# Patient Record
Sex: FEMALE | Race: WHITE | Employment: FULL TIME | URBAN - METROPOLITAN AREA
[De-identification: names, ages, dates, MRNs, and addresses within clinical notes are randomized per-mention and may not be internally consistent; named-entity substitution may affect disease eponyms.]

---

## 2017-01-30 ENCOUNTER — OFFICE VISIT (OUTPATIENT)
Dept: ORTHOPEDIC SURGERY | Age: 72
End: 2017-01-30

## 2017-01-30 VITALS
BODY MASS INDEX: 32.79 KG/M2 | WEIGHT: 229.06 LBS | SYSTOLIC BLOOD PRESSURE: 128 MMHG | HEIGHT: 70 IN | DIASTOLIC BLOOD PRESSURE: 82 MMHG | HEART RATE: 72 BPM

## 2017-01-30 DIAGNOSIS — T84.84XA PAINFUL ORTHOPAEDIC HARDWARE (HCC): Primary | ICD-10-CM

## 2017-01-30 DIAGNOSIS — M79.604 RIGHT LEG PAIN: ICD-10-CM

## 2017-01-30 PROCEDURE — 99213 OFFICE O/P EST LOW 20 MIN: CPT | Performed by: ORTHOPAEDIC SURGERY

## 2017-01-30 PROCEDURE — 1123F ACP DISCUSS/DSCN MKR DOCD: CPT | Performed by: ORTHOPAEDIC SURGERY

## 2017-01-30 PROCEDURE — G8400 PT W/DXA NO RESULTS DOC: HCPCS | Performed by: ORTHOPAEDIC SURGERY

## 2017-01-30 PROCEDURE — G8484 FLU IMMUNIZE NO ADMIN: HCPCS | Performed by: ORTHOPAEDIC SURGERY

## 2017-01-30 PROCEDURE — 4040F PNEUMOC VAC/ADMIN/RCVD: CPT | Performed by: ORTHOPAEDIC SURGERY

## 2017-01-30 PROCEDURE — 1090F PRES/ABSN URINE INCON ASSESS: CPT | Performed by: ORTHOPAEDIC SURGERY

## 2017-01-30 PROCEDURE — G8427 DOCREV CUR MEDS BY ELIG CLIN: HCPCS | Performed by: ORTHOPAEDIC SURGERY

## 2017-01-30 PROCEDURE — G8419 CALC BMI OUT NRM PARAM NOF/U: HCPCS | Performed by: ORTHOPAEDIC SURGERY

## 2017-01-30 PROCEDURE — 1036F TOBACCO NON-USER: CPT | Performed by: ORTHOPAEDIC SURGERY

## 2017-01-30 PROCEDURE — 73552 X-RAY EXAM OF FEMUR 2/>: CPT | Performed by: ORTHOPAEDIC SURGERY

## 2017-01-30 PROCEDURE — 3017F COLORECTAL CA SCREEN DOC REV: CPT | Performed by: ORTHOPAEDIC SURGERY

## 2017-01-30 PROCEDURE — 3014F SCREEN MAMMO DOC REV: CPT | Performed by: ORTHOPAEDIC SURGERY

## 2017-02-07 ENCOUNTER — HOSPITAL ENCOUNTER (OUTPATIENT)
Dept: NUCLEAR MEDICINE | Age: 72
Discharge: HOME | End: 2017-02-07
Attending: ORTHOPAEDIC SURGERY | Admitting: ORTHOPAEDIC SURGERY

## 2017-02-07 DIAGNOSIS — T84.84XA PAIN DUE TO INTERNAL ORTHOPEDIC PROSTHETIC DEVICES, IMPLANTS AND GRAFTS, INITIAL ENCOUNTER (HCC): ICD-10-CM

## 2017-02-07 DIAGNOSIS — M79.604 RIGHT LEG PAIN: ICD-10-CM

## 2017-02-07 DIAGNOSIS — T84.84XA PAINFUL ORTHOPAEDIC HARDWARE (HCC): ICD-10-CM

## 2017-02-07 RX ORDER — TC 99M MEDRONATE 20 MG/10ML
27.6 INJECTION, POWDER, LYOPHILIZED, FOR SOLUTION INTRAVENOUS
Status: COMPLETED | OUTPATIENT
Start: 2017-02-07 | End: 2017-02-07

## 2017-02-07 RX ADMIN — TC 99M MEDRONATE 27.6 MILLICURIE: 20 INJECTION, POWDER, LYOPHILIZED, FOR SOLUTION INTRAVENOUS at 09:15

## 2017-02-14 ENCOUNTER — TELEPHONE (OUTPATIENT)
Dept: ORTHOPEDIC SURGERY | Age: 72
End: 2017-02-14

## 2017-02-15 ENCOUNTER — TELEPHONE (OUTPATIENT)
Dept: ORTHOPEDIC SURGERY | Age: 72
End: 2017-02-15

## 2017-02-15 DIAGNOSIS — M48.061 LUMBAR STENOSIS: ICD-10-CM

## 2017-02-15 DIAGNOSIS — M51.36 DDD (DEGENERATIVE DISC DISEASE), LUMBAR: Primary | ICD-10-CM

## 2017-02-20 ENCOUNTER — TELEPHONE (OUTPATIENT)
Dept: ORTHOPEDIC SURGERY | Age: 72
End: 2017-02-20

## 2017-02-21 ENCOUNTER — TELEPHONE (OUTPATIENT)
Dept: ORTHOPEDIC SURGERY | Age: 72
End: 2017-02-21

## 2017-02-22 ENCOUNTER — HOSPITAL ENCOUNTER (OUTPATIENT)
Dept: MRI IMAGING | Age: 72
Discharge: HOME | End: 2017-02-22
Attending: ORTHOPAEDIC SURGERY | Admitting: ORTHOPAEDIC SURGERY

## 2017-02-22 DIAGNOSIS — M48.061 LUMBAR STENOSIS: ICD-10-CM

## 2017-02-22 DIAGNOSIS — M51.36 DDD (DEGENERATIVE DISC DISEASE), LUMBAR: ICD-10-CM

## 2017-02-22 DIAGNOSIS — M51.36 OTHER INTERVERTEBRAL DISC DEGENERATION, LUMBAR REGION: ICD-10-CM

## 2017-02-23 ENCOUNTER — TELEPHONE (OUTPATIENT)
Dept: ORTHOPEDIC SURGERY | Age: 72
End: 2017-02-23

## 2017-03-23 ENCOUNTER — TELEPHONE (OUTPATIENT)
Dept: ORTHOPEDIC SURGERY | Age: 72
End: 2017-03-23

## 2017-04-10 ENCOUNTER — TELEPHONE (OUTPATIENT)
Dept: ENDOCRINOLOGY | Age: 72
End: 2017-04-10

## 2017-04-12 ENCOUNTER — TELEPHONE (OUTPATIENT)
Dept: ENDOCRINOLOGY | Age: 72
End: 2017-04-12

## 2017-04-18 ENCOUNTER — OFFICE VISIT (OUTPATIENT)
Dept: ENDOCRINOLOGY | Age: 72
End: 2017-04-18

## 2017-04-18 VITALS
HEART RATE: 79 BPM | WEIGHT: 232.2 LBS | OXYGEN SATURATION: 96 % | HEIGHT: 69 IN | RESPIRATION RATE: 16 BRPM | SYSTOLIC BLOOD PRESSURE: 83 MMHG | DIASTOLIC BLOOD PRESSURE: 56 MMHG | BODY MASS INDEX: 34.39 KG/M2

## 2017-04-18 DIAGNOSIS — M81.0 OSTEOPOROSIS: Primary | ICD-10-CM

## 2017-04-18 PROCEDURE — G8400 PT W/DXA NO RESULTS DOC: HCPCS | Performed by: INTERNAL MEDICINE

## 2017-04-18 PROCEDURE — 3017F COLORECTAL CA SCREEN DOC REV: CPT | Performed by: INTERNAL MEDICINE

## 2017-04-18 PROCEDURE — 1090F PRES/ABSN URINE INCON ASSESS: CPT | Performed by: INTERNAL MEDICINE

## 2017-04-18 PROCEDURE — 4005F PHARM THX FOR OP RXD: CPT | Performed by: INTERNAL MEDICINE

## 2017-04-18 PROCEDURE — 3014F SCREEN MAMMO DOC REV: CPT | Performed by: INTERNAL MEDICINE

## 2017-04-18 PROCEDURE — G8417 CALC BMI ABV UP PARAM F/U: HCPCS | Performed by: INTERNAL MEDICINE

## 2017-04-18 PROCEDURE — 1036F TOBACCO NON-USER: CPT | Performed by: INTERNAL MEDICINE

## 2017-04-18 PROCEDURE — 1123F ACP DISCUSS/DSCN MKR DOCD: CPT | Performed by: INTERNAL MEDICINE

## 2017-04-18 PROCEDURE — G8427 DOCREV CUR MEDS BY ELIG CLIN: HCPCS | Performed by: INTERNAL MEDICINE

## 2017-04-18 PROCEDURE — 4040F PNEUMOC VAC/ADMIN/RCVD: CPT | Performed by: INTERNAL MEDICINE

## 2017-04-18 PROCEDURE — 99213 OFFICE O/P EST LOW 20 MIN: CPT | Performed by: INTERNAL MEDICINE

## 2017-04-18 RX ORDER — GABAPENTIN 400 MG/1
400 CAPSULE ORAL 4 TIMES DAILY
COMMUNITY
End: 2021-10-12

## 2017-04-18 RX ORDER — FESOTERODINE FUMARATE 8 MG/1
TABLET, EXTENDED RELEASE ORAL
COMMUNITY
End: 2019-04-22 | Stop reason: SDUPTHER

## 2017-04-18 RX ORDER — ALENDRONATE SODIUM 70 MG/1
TABLET ORAL
Qty: 12 TABLET | Refills: 3 | Status: SHIPPED | OUTPATIENT
Start: 2017-04-18 | End: 2018-05-17

## 2017-04-18 ASSESSMENT — ENCOUNTER SYMPTOMS
BACK PAIN: 0
PHOTOPHOBIA: 0
HEMOPTYSIS: 0
COUGH: 0
BLURRED VISION: 0
DOUBLE VISION: 0
ORTHOPNEA: 0

## 2017-06-29 RX ORDER — ALENDRONATE SODIUM 70 MG/1
TABLET ORAL
Qty: 12 TABLET | Refills: 3 | Status: SHIPPED | OUTPATIENT
Start: 2017-06-29 | End: 2018-05-16 | Stop reason: SDUPTHER

## 2018-04-09 ENCOUNTER — HOSPITAL ENCOUNTER (OUTPATIENT)
Dept: GENERAL RADIOLOGY | Age: 73
Discharge: HOME | End: 2018-04-09
Attending: INTERNAL MEDICINE | Admitting: INTERNAL MEDICINE

## 2018-04-09 DIAGNOSIS — M81.0 AGE-RELATED OSTEOPOROSIS WITHOUT CURRENT PATHOLOGICAL FRACTURE: ICD-10-CM

## 2018-04-09 DIAGNOSIS — M81.0 OSTEOPOROSIS: ICD-10-CM

## 2018-04-17 ENCOUNTER — OFFICE VISIT (OUTPATIENT)
Dept: ENDOCRINOLOGY | Age: 73
End: 2018-04-17

## 2018-04-17 VITALS
HEART RATE: 82 BPM | SYSTOLIC BLOOD PRESSURE: 109 MMHG | WEIGHT: 227 LBS | DIASTOLIC BLOOD PRESSURE: 70 MMHG | RESPIRATION RATE: 16 BRPM | HEIGHT: 69 IN | BODY MASS INDEX: 33.62 KG/M2 | OXYGEN SATURATION: 95 %

## 2018-04-17 DIAGNOSIS — M85.80 OSTEOPENIA, UNSPECIFIED LOCATION: Primary | ICD-10-CM

## 2018-04-17 PROCEDURE — 1123F ACP DISCUSS/DSCN MKR DOCD: CPT | Performed by: INTERNAL MEDICINE

## 2018-04-17 PROCEDURE — 3014F SCREEN MAMMO DOC REV: CPT | Performed by: INTERNAL MEDICINE

## 2018-04-17 PROCEDURE — G8399 PT W/DXA RESULTS DOCUMENT: HCPCS | Performed by: INTERNAL MEDICINE

## 2018-04-17 PROCEDURE — 99213 OFFICE O/P EST LOW 20 MIN: CPT | Performed by: INTERNAL MEDICINE

## 2018-04-17 PROCEDURE — 1090F PRES/ABSN URINE INCON ASSESS: CPT | Performed by: INTERNAL MEDICINE

## 2018-04-17 PROCEDURE — G8427 DOCREV CUR MEDS BY ELIG CLIN: HCPCS | Performed by: INTERNAL MEDICINE

## 2018-04-17 PROCEDURE — 4040F PNEUMOC VAC/ADMIN/RCVD: CPT | Performed by: INTERNAL MEDICINE

## 2018-04-17 PROCEDURE — G8417 CALC BMI ABV UP PARAM F/U: HCPCS | Performed by: INTERNAL MEDICINE

## 2018-04-17 PROCEDURE — 1036F TOBACCO NON-USER: CPT | Performed by: INTERNAL MEDICINE

## 2018-04-17 PROCEDURE — 3017F COLORECTAL CA SCREEN DOC REV: CPT | Performed by: INTERNAL MEDICINE

## 2018-04-17 ASSESSMENT — ENCOUNTER SYMPTOMS
BACK PAIN: 0
BLURRED VISION: 0
PHOTOPHOBIA: 0
ORTHOPNEA: 0
DOUBLE VISION: 0
HEMOPTYSIS: 0
COUGH: 0

## 2018-05-17 RX ORDER — ALENDRONATE SODIUM 70 MG/1
TABLET ORAL
Qty: 12 TABLET | Refills: 3 | Status: SHIPPED | OUTPATIENT
Start: 2018-05-17 | End: 2021-10-12

## 2018-05-18 RX ORDER — ALENDRONATE SODIUM 70 MG/1
TABLET ORAL
Qty: 12 TABLET | Refills: 0 | Status: SHIPPED | OUTPATIENT
Start: 2018-05-18 | End: 2019-04-22 | Stop reason: SDUPTHER

## 2018-09-27 ENCOUNTER — OFFICE VISIT (OUTPATIENT)
Dept: ORTHOPEDIC SURGERY | Age: 73
End: 2018-09-27
Payer: MEDICARE

## 2018-09-27 VITALS
SYSTOLIC BLOOD PRESSURE: 139 MMHG | DIASTOLIC BLOOD PRESSURE: 78 MMHG | HEART RATE: 79 BPM | BODY MASS INDEX: 35.61 KG/M2 | HEIGHT: 68 IN | WEIGHT: 235 LBS

## 2018-09-27 DIAGNOSIS — M72.2 PLANTAR FASCIITIS, BILATERAL: Primary | ICD-10-CM

## 2018-09-27 PROCEDURE — G8427 DOCREV CUR MEDS BY ELIG CLIN: HCPCS | Performed by: PODIATRIST

## 2018-09-27 PROCEDURE — 1090F PRES/ABSN URINE INCON ASSESS: CPT | Performed by: PODIATRIST

## 2018-09-27 PROCEDURE — G8417 CALC BMI ABV UP PARAM F/U: HCPCS | Performed by: PODIATRIST

## 2018-09-27 PROCEDURE — 1036F TOBACCO NON-USER: CPT | Performed by: PODIATRIST

## 2018-09-27 PROCEDURE — 99202 OFFICE O/P NEW SF 15 MIN: CPT | Performed by: PODIATRIST

## 2018-09-27 PROCEDURE — 3017F COLORECTAL CA SCREEN DOC REV: CPT | Performed by: PODIATRIST

## 2018-09-27 PROCEDURE — 4040F PNEUMOC VAC/ADMIN/RCVD: CPT | Performed by: PODIATRIST

## 2018-09-27 PROCEDURE — 1123F ACP DISCUSS/DSCN MKR DOCD: CPT | Performed by: PODIATRIST

## 2018-09-27 PROCEDURE — G8399 PT W/DXA RESULTS DOCUMENT: HCPCS | Performed by: PODIATRIST

## 2018-09-27 PROCEDURE — 1101F PT FALLS ASSESS-DOCD LE1/YR: CPT | Performed by: PODIATRIST

## 2018-09-27 RX ORDER — NAPROXEN 500 MG/1
500 TABLET ORAL
COMMUNITY
Start: 2018-01-18 | End: 2021-10-12

## 2018-09-27 RX ORDER — AMLODIPINE BESYLATE 5 MG/1
5 TABLET ORAL
COMMUNITY
Start: 2018-01-18 | End: 2019-04-22 | Stop reason: SDUPTHER

## 2018-09-27 RX ORDER — ALENDRONATE SODIUM 70 MG/1
70 TABLET ORAL
COMMUNITY
Start: 2018-01-18 | End: 2019-04-22 | Stop reason: SDUPTHER

## 2018-09-27 RX ORDER — FESOTERODINE FUMARATE 8 MG/1
TABLET, EXTENDED RELEASE ORAL NIGHTLY
Status: ON HOLD | COMMUNITY
Start: 2018-09-17 | End: 2022-02-25 | Stop reason: HOSPADM

## 2018-09-27 NOTE — PROGRESS NOTES
HISTORY OF PRESENT ILLNESS: This is an initial visit for a 81-year-old female with heel pain to both feet. She's been having the pain for several months. There is more pain to the right foot. There is no history of trauma. The pain is worsened with activity and relieved by rest.  Pain is often present in the mornings and after short periods of inactivity. Initially, after movement, the pain is sharp but seems to resolve for a short time with activity, only to return with prolonged activity. The patient denies any shooting pain to the toes or on the bottom of the foot. In the past, she states that all she needed for treatment has been and orthotic. She feels that she needs a new orthotic prescription because she is having pain more consistently now. Family History, Social History, and Review of Systems were reviewed from patient history form dated on 9/27/2018 and available in the patient's chart under the MEDIA tab. PHYSICAL EXAMINATION:  The patient is alert and orientated x3. The area of greatest palpable tenderness is at the plantar central aspect of the right heel. There is no pain with side-to-side compression of the heel. There are no paresthesias elicited at the tibial nerve, over the tarsal tunnel. She has a semirigid higher arch foot structure bilateral.    There is less pain to the same area of the other heel. Again, no pain is elicited with side-to-side compression of the heel. No paresthesias are noted with percussion over the heel or the tarsal tunnel. Sensation is otherwise grossly intact. There is no erythema, ecchymosis, or edema noted. Palpable pedal pulses are present. The remainder of the exam is unremarkable. RADIOGRAPHS: None taken    ASSESSMENT: Plantar fasciitis, bilateral      PLAN: The patient was educated on the pathology and its treatment options. The importance of rest was emphasized in the treatment of this condition. Overall activity is to be decreased.

## 2018-10-03 ENCOUNTER — ORTHOTIC/BRACE ENCOUNTER (OUTPATIENT)
Dept: ORTHOPEDIC SURGERY | Age: 73
End: 2018-10-03

## 2018-10-10 ENCOUNTER — ORTHOTIC/BRACE ENCOUNTER (OUTPATIENT)
Dept: ORTHOPEDIC SURGERY | Age: 73
End: 2018-10-10
Payer: MEDICARE

## 2018-10-10 DIAGNOSIS — M72.2 PLANTAR FASCIITIS, BILATERAL: Primary | ICD-10-CM

## 2018-10-10 PROCEDURE — L3020 FOOT LONGITUD/METATARSAL SUP: HCPCS | Performed by: PEDORTHIST

## 2019-04-22 ENCOUNTER — OFFICE VISIT (OUTPATIENT)
Dept: ENDOCRINOLOGY | Age: 74
End: 2019-04-22
Payer: MEDICARE

## 2019-04-22 VITALS
SYSTOLIC BLOOD PRESSURE: 117 MMHG | OXYGEN SATURATION: 95 % | HEIGHT: 70 IN | BODY MASS INDEX: 31.5 KG/M2 | DIASTOLIC BLOOD PRESSURE: 72 MMHG | WEIGHT: 220 LBS | HEART RATE: 74 BPM

## 2019-04-22 DIAGNOSIS — E55.9 VITAMIN D DEFICIENCY: ICD-10-CM

## 2019-04-22 DIAGNOSIS — M81.0 OSTEOPOROSIS, UNSPECIFIED OSTEOPOROSIS TYPE, UNSPECIFIED PATHOLOGICAL FRACTURE PRESENCE: Primary | ICD-10-CM

## 2019-04-22 PROCEDURE — 1123F ACP DISCUSS/DSCN MKR DOCD: CPT | Performed by: INTERNAL MEDICINE

## 2019-04-22 PROCEDURE — G8399 PT W/DXA RESULTS DOCUMENT: HCPCS | Performed by: INTERNAL MEDICINE

## 2019-04-22 PROCEDURE — G8417 CALC BMI ABV UP PARAM F/U: HCPCS | Performed by: INTERNAL MEDICINE

## 2019-04-22 PROCEDURE — 4040F PNEUMOC VAC/ADMIN/RCVD: CPT | Performed by: INTERNAL MEDICINE

## 2019-04-22 PROCEDURE — 99214 OFFICE O/P EST MOD 30 MIN: CPT | Performed by: INTERNAL MEDICINE

## 2019-04-22 PROCEDURE — 1036F TOBACCO NON-USER: CPT | Performed by: INTERNAL MEDICINE

## 2019-04-22 PROCEDURE — 1090F PRES/ABSN URINE INCON ASSESS: CPT | Performed by: INTERNAL MEDICINE

## 2019-04-22 PROCEDURE — 3017F COLORECTAL CA SCREEN DOC REV: CPT | Performed by: INTERNAL MEDICINE

## 2019-04-22 PROCEDURE — G8427 DOCREV CUR MEDS BY ELIG CLIN: HCPCS | Performed by: INTERNAL MEDICINE

## 2019-04-22 RX ORDER — BUPROPION HYDROCHLORIDE 150 MG/1
300 TABLET, EXTENDED RELEASE ORAL DAILY
COMMUNITY
End: 2022-10-25 | Stop reason: CLARIF

## 2019-04-22 ASSESSMENT — ENCOUNTER SYMPTOMS
BACK PAIN: 0
DOUBLE VISION: 0
PHOTOPHOBIA: 0
BLURRED VISION: 0
COUGH: 0
ORTHOPNEA: 0
HEMOPTYSIS: 0

## 2019-04-22 NOTE — PROGRESS NOTES
Endocrinology    Raymond Mukherjee M.D. Phone: [phone]   FAX: [phone]       Nilsa Branch   YOB: 1945     Date of Visit:  4/22/2019    Allergies   Allergen Reactions    Pregabalin Other (See Comments)     Other reaction(s): Confusion       Outpatient Medications Marked as Taking for the 4/22/19 encounter (Office Visit) with Darren Connors MD   Medication Sig Dispense Refill    buPROPion (WELLBUTRIN SR) 150 MG extended release tablet Take 150 mg by mouth daily      naproxen (NAPROSYN) 500 MG tablet Take 500 mg by mouth      Fesoterodine Fumarate ER (TOVIAZ) 8 MG TB24 TAKE 1 TABLET BY MOUTH DAILY      alendronate (FOSAMAX) 70 MG tablet TAKE 1 TABLET BY MOUTH EVERY 7 DAYS 12 tablet 3    gabapentin (NEURONTIN) 400 MG capsule Take 400 mg by mouth 4 times daily.  naproxen (NAPROSYN) 250 MG tablet Take 250 mg by mouth 2 times daily (with meals)      Probiotic Product (PROBIOTIC & ACIDOPHILUS EX ST PO) Take 1 tablet by mouth daily 20 billion active culture      amLODIPine (NORVASC) 5 MG tablet Take 5 mg by mouth daily      RESTASIS 0.05 % ophthalmic emulsion Place 2 drops into both eyes every 12 hours       escitalopram (LEXAPRO) 20 MG tablet Take 20 mg by mouth daily       Multiple Vitamins-Minerals (MULTIVITAMIN PO) Take 1 tablet by mouth daily            Vitals:    04/22/19 0925   BP: 117/72   Site: Right Upper Arm   Position: Sitting   Cuff Size: Large Adult   Pulse: 74   SpO2: 95%   Weight: 220 lb (99.8 kg)   Height: 5' 9.65\" (1.769 m)     Body mass index is 31.89 kg/m².      Wt Readings from Last 3 Encounters:   04/22/19 220 lb (99.8 kg)   09/27/18 235 lb (106.6 kg)   04/17/18 227 lb (103 kg)     BP Readings from Last 3 Encounters:   04/22/19 117/72   09/27/18 139/78   04/17/18 109/70        Past Medical History:   Diagnosis Date    Allergic     rhinitis    Anemia     Anxiety     Depression     Eye disorder     Fibromyalgia     GERD (gastroesophageal reflux disease)     Hypertension     Obesity     KAIT (obstructive sleep apnea)     no CPAP - has dental appliance    Osteoarthritis     Osteoporosis     PONV (postoperative nausea and vomiting)      Past Surgical History:   Procedure Laterality Date    HIP SURGERY      JOINT REPLACEMENT Right 1993/2003    HIP    JOINT REPLACEMENT Left 5/18/16    left total knee replacement    KNEE SURGERY      WRIST FRACTURE SURGERY Left      Family History   Problem Relation Age of Onset    Cancer Father     Alcohol Abuse Mother     Cancer Mother     Osteoarthritis Mother     Dementia Mother     Cancer Maternal Aunt         breast     Social History     Tobacco Use   Smoking Status Never Smoker   Smokeless Tobacco Never Used      Social History     Substance and Sexual Activity   Alcohol Use No    Alcohol/week: 0.0 oz       HPI    Lorena Arvizu is a  who is here for a follow-up for management of osteoporosis. Orthopedic surgeon : Dr. Arlen Chacon MD       She has a PMH of Hypertension, GERD, Anxiety, osteoarthritis, anemia, obesity, s/p hip replacemet. She was diagnosed with Osteopenia in 2012. History of fractures : Wrist fracture after fall in 2005. Pharmacological therapy for Osteoprosis: Alendronate 70 mg weekly since 04/16     FH of Osteoporosis. Mother had clinical osteoporosis  FH of hip fracture. No     Secondary causes of osteoprorsis: Menopause at the age 56,No smoking, No alcohol, No prolonged steroid use. Calcium: 1000mg/d   _x_ Milk _x_ Cheese _x_ Yogurt __ Ice Cream __ Fortified OJ __ Other:     Vitamin D3:  800 IU/d ( MVI)    Exercise: Walks. Limited by severe arthritis. Follows up regularly with a dentist. No major dental procedures planned. Review of Systems   Constitutional: Positive for malaise/fatigue. Negative for chills, diaphoresis, fever and weight loss. Eyes: Negative for blurred vision, double vision and photophobia.    Respiratory: Negative for cough and hemoptysis. Cardiovascular: Negative for chest pain, palpitations and orthopnea. Genitourinary: Negative for dysuria, flank pain, frequency, hematuria and urgency. Musculoskeletal: Negative for back pain, falls, joint pain, myalgias and neck pain. Skin: Negative for itching and rash. Neurological: Negative for dizziness, tingling, tremors, sensory change, speech change, focal weakness, seizures, loss of consciousness and headaches. Endo/Heme/Allergies: Negative for environmental allergies and polydipsia. Does not bruise/bleed easily. Psychiatric/Behavioral: Negative for depression, hallucinations, memory loss, substance abuse and suicidal ideas. The patient is not nervous/anxious and does not have insomnia. Physical Exam   Constitutional: She is oriented to person, place, and time. She appears well-developed and well-nourished. Psychiatric: Her behavior is normal.           DEXA scan 04/18    BONE DENSITOMETRY       T score of the lumbar spine is 0.9 which is within the range of   normal.       T score of the left femoral neck is - 1.8 which is within the   range of osteopenia.         T score of the left hip is - 1.0 which is within the range of   normal.           Assessment/Plan       1. Osteoporosis    This 68 y.o. female was found to have osteopenia on DEXA scan done in 02/12. Post menopausal     DEXA scan in 02/12 showed a T score of -1.8 at L Femoral neck. Repeat DEXA scan in 03/16 showed the T score of -2.4 at the left femoral neck ( 6 % decrease in her BMD)    Risk factor is FH of osteoporosis. Work-up for secondary causes showed normal renal and liver function, TSH. She has chronic anemia due to iron deficiency. Calcium, phos, alk phos. Advised 1200 mg of calcium . On  alendronate 70 mg weekly since 04/16. Tolerating without side effects . DEXA scan in 04/18 showed   T score of 0.9 in lumbar spine and -1.8 at Left femur. Bone density has improved when compared to last time . Will continue alendronate. If bone is stable, improved at next DEXA scan ( 04/20) will consider a drug holiday. 2. Vit D def. Vit D was 27 in 07/18  On Vitamin D 800 IU daily. Will add vitamin D3 1000 Iu daily . 3. Arthritis. S/p Left knee replacement in 05/16 by Dr. Anupama Campos. S/p Right knee replacement by Dr. Ralph Pradhan. 4. GERD/hypertension . As per PCP. RTC 12 months.

## 2019-09-25 ENCOUNTER — OFFICE VISIT (OUTPATIENT)
Dept: ORTHOPEDIC SURGERY | Age: 74
End: 2019-09-25
Payer: MEDICARE

## 2019-09-25 VITALS
SYSTOLIC BLOOD PRESSURE: 122 MMHG | WEIGHT: 220.13 LBS | BODY MASS INDEX: 31.51 KG/M2 | HEART RATE: 84 BPM | HEIGHT: 70 IN | DIASTOLIC BLOOD PRESSURE: 74 MMHG

## 2019-09-25 DIAGNOSIS — M67.911 ROTATOR CUFF DYSFUNCTION, RIGHT: Primary | ICD-10-CM

## 2019-09-25 DIAGNOSIS — M75.101 TEAR OF RIGHT ROTATOR CUFF, UNSPECIFIED TEAR EXTENT, UNSPECIFIED WHETHER TRAUMATIC: ICD-10-CM

## 2019-09-25 DIAGNOSIS — M25.511 RIGHT SHOULDER PAIN, UNSPECIFIED CHRONICITY: ICD-10-CM

## 2019-09-25 PROCEDURE — 4040F PNEUMOC VAC/ADMIN/RCVD: CPT | Performed by: ORTHOPAEDIC SURGERY

## 2019-09-25 PROCEDURE — 3017F COLORECTAL CA SCREEN DOC REV: CPT | Performed by: ORTHOPAEDIC SURGERY

## 2019-09-25 PROCEDURE — 1123F ACP DISCUSS/DSCN MKR DOCD: CPT | Performed by: ORTHOPAEDIC SURGERY

## 2019-09-25 PROCEDURE — G8399 PT W/DXA RESULTS DOCUMENT: HCPCS | Performed by: ORTHOPAEDIC SURGERY

## 2019-09-25 PROCEDURE — G8417 CALC BMI ABV UP PARAM F/U: HCPCS | Performed by: ORTHOPAEDIC SURGERY

## 2019-09-25 PROCEDURE — G8427 DOCREV CUR MEDS BY ELIG CLIN: HCPCS | Performed by: ORTHOPAEDIC SURGERY

## 2019-09-25 PROCEDURE — 1090F PRES/ABSN URINE INCON ASSESS: CPT | Performed by: ORTHOPAEDIC SURGERY

## 2019-09-25 PROCEDURE — 1036F TOBACCO NON-USER: CPT | Performed by: ORTHOPAEDIC SURGERY

## 2019-09-25 PROCEDURE — 99213 OFFICE O/P EST LOW 20 MIN: CPT | Performed by: ORTHOPAEDIC SURGERY

## 2019-09-25 RX ORDER — MELOXICAM 15 MG/1
15 TABLET ORAL DAILY
Qty: 30 TABLET | Refills: 3 | Status: SHIPPED | OUTPATIENT
Start: 2019-09-25 | End: 2019-09-25 | Stop reason: SDUPTHER

## 2019-09-25 ASSESSMENT — ENCOUNTER SYMPTOMS
ALLERGIC/IMMUNOLOGIC NEGATIVE: 1
GASTROINTESTINAL NEGATIVE: 1
RESPIRATORY NEGATIVE: 1
BACK PAIN: 1
EYES NEGATIVE: 1

## 2019-09-25 NOTE — PROGRESS NOTES
things mentioned in the History of Present Illness and Past Medical History. Past History:  Past Medical History:   Diagnosis Date    Allergic     rhinitis    Anemia     Anxiety     Depression     Eye disorder     Fibromyalgia     GERD (gastroesophageal reflux disease)     Hypertension     Obesity     KAIT (obstructive sleep apnea)     no CPAP - has dental appliance    Osteoarthritis     Osteoporosis     PONV (postoperative nausea and vomiting)      Past Surgical History:   Procedure Laterality Date    HIP SURGERY      JOINT REPLACEMENT Right 1993/2003    HIP    JOINT REPLACEMENT Left 5/18/16    left total knee replacement    KNEE SURGERY      WRIST FRACTURE SURGERY Left      Current Outpatient Medications on File Prior to Visit   Medication Sig Dispense Refill    buPROPion (WELLBUTRIN SR) 150 MG extended release tablet Take 150 mg by mouth daily      naproxen (NAPROSYN) 500 MG tablet Take 500 mg by mouth      Fesoterodine Fumarate ER (TOVIAZ) 8 MG TB24 TAKE 1 TABLET BY MOUTH DAILY      alendronate (FOSAMAX) 70 MG tablet TAKE 1 TABLET BY MOUTH EVERY 7 DAYS 12 tablet 3    gabapentin (NEURONTIN) 400 MG capsule Take 400 mg by mouth 4 times daily.  naproxen (NAPROSYN) 250 MG tablet Take 250 mg by mouth 2 times daily (with meals)      Probiotic Product (PROBIOTIC & ACIDOPHILUS EX ST PO) Take 1 tablet by mouth daily 20 billion active culture      amLODIPine (NORVASC) 5 MG tablet Take 5 mg by mouth daily      RESTASIS 0.05 % ophthalmic emulsion Place 2 drops into both eyes every 12 hours       escitalopram (LEXAPRO) 20 MG tablet Take 20 mg by mouth daily       Multiple Vitamins-Minerals (MULTIVITAMIN PO) Take 1 tablet by mouth daily        No current facility-administered medications on file prior to visit.       Social History     Socioeconomic History    Marital status:      Spouse name: Not on file    Number of children: Not on file    Years of education: Not on file    Highest education level: Not on file   Occupational History    Not on file   Social Needs    Financial resource strain: Not on file    Food insecurity:     Worry: Not on file     Inability: Not on file    Transportation needs:     Medical: Not on file     Non-medical: Not on file   Tobacco Use    Smoking status: Never Smoker    Smokeless tobacco: Never Used   Substance and Sexual Activity    Alcohol use: No     Alcohol/week: 0.0 standard drinks    Drug use: No    Sexual activity: Not on file   Lifestyle    Physical activity:     Days per week: Not on file     Minutes per session: Not on file    Stress: Not on file   Relationships    Social connections:     Talks on phone: Not on file     Gets together: Not on file     Attends [employer] service: Not on file     Active member of club or organization: Not on file     Attends meetings of clubs or organizations: Not on file     Relationship status: Not on file    Intimate partner violence:     Fear of current or ex partner: Not on file     Emotionally abused: Not on file     Physically abused: Not on file     Forced sexual activity: Not on file   Other Topics Concern    Not on file   Social History Narrative    Not on file     Family History   Problem Relation Age of Onset    Cancer Father     Alcohol Abuse Mother     Cancer Mother     Osteoarthritis Mother     Dementia Mother     Cancer Maternal Aunt         breast       Current Medications:    Current Outpatient Medications   Medication Sig Dispense Refill    meloxicam (MOBIC) 15 MG tablet Take 1 tablet by mouth daily 30 tablet 3    buPROPion (WELLBUTRIN SR) 150 MG extended release tablet Take 150 mg by mouth daily      naproxen (NAPROSYN) 500 MG tablet Take 500 mg by mouth      Fesoterodine Fumarate ER (TOVIAZ) 8 MG TB24 TAKE 1 TABLET BY MOUTH DAILY      alendronate (FOSAMAX) 70 MG tablet TAKE 1 TABLET BY MOUTH EVERY 7 DAYS 12 tablet 3    gabapentin (NEURONTIN) 400 MG capsule Take 400 mg by mouth 4 times daily.  naproxen (NAPROSYN) 250 MG tablet Take 250 mg by mouth 2 times daily (with meals)      Probiotic Product (PROBIOTIC & ACIDOPHILUS EX ST PO) Take 1 tablet by mouth daily 20 billion active culture      amLODIPine (NORVASC) 5 MG tablet Take 5 mg by mouth daily      RESTASIS 0.05 % ophthalmic emulsion Place 2 drops into both eyes every 12 hours       escitalopram (LEXAPRO) 20 MG tablet Take 20 mg by mouth daily       Multiple Vitamins-Minerals (MULTIVITAMIN PO) Take 1 tablet by mouth daily        No current facility-administered medications for this visit. Allergies: Allergies   Allergen Reactions    Pregabalin Other (See Comments)     Other reaction(s): Confusion         Physical Exam:  Vitals:    09/25/19 1534   BP: 122/74   Pulse: 84     General: Samantha Jackson is a healthy and well appearing 69 y.o. female who is sitting comfortably in our office in acute distress. General Exam:   Constitutional: Patient is adequately groomed with no evidence of malnutrition  DTRs: Deep tendon reflexes are intact  Mental Status: The patient is oriented to time, place and person. The patient's mood and affect are appropriate. Lymphatic: The lymphatic examination bilaterally reveals all areas to be without enlargement or induration. Vascular: Examination reveals no swelling or calf tenderness. Peripheral pulses are palpable and 2+. Neurological: The patient has good coordination. There is no weakness or sensory deficit. Neuro: alert. Oriented X 3  Eyes: Extra-ocular muscles intact  Mouth: Oral mucosa moist. No perioral lesions  Pulm: Respirations unlabored and regular. Right shoulder Exam:  Inspection: No gross deformities, no signs of infection. Palpation: She has exquisite tenderness over the posterior joint line. She has mild tenderness over the rotator cuff and bicipital groove. She has no tenderness at the [facility] joint. She does have a glenohumeral crepitus present. Airam James, personally performed the services described in this documentation as described by Shelton Estrada PA-C in my presence, and it is both accurate and complete. Barry Busby MD, PhD  9/25/2019

## 2019-09-27 RX ORDER — MELOXICAM 15 MG/1
15 TABLET ORAL DAILY
Qty: 90 TABLET | Refills: 3 | Status: SHIPPED | OUTPATIENT
Start: 2019-09-27 | End: 2020-01-17

## 2019-10-23 ENCOUNTER — OFFICE VISIT (OUTPATIENT)
Dept: ORTHOPEDIC SURGERY | Age: 74
End: 2019-10-23
Payer: MEDICARE

## 2019-10-23 VITALS
HEART RATE: 66 BPM | WEIGHT: 220.24 LBS | DIASTOLIC BLOOD PRESSURE: 80 MMHG | BODY MASS INDEX: 31.53 KG/M2 | HEIGHT: 70 IN | SYSTOLIC BLOOD PRESSURE: 122 MMHG

## 2019-10-23 DIAGNOSIS — M25.511 RIGHT SHOULDER PAIN, UNSPECIFIED CHRONICITY: Primary | ICD-10-CM

## 2019-10-23 DIAGNOSIS — M75.101 TEAR OF RIGHT ROTATOR CUFF, UNSPECIFIED TEAR EXTENT, UNSPECIFIED WHETHER TRAUMATIC: ICD-10-CM

## 2019-10-23 PROCEDURE — G8427 DOCREV CUR MEDS BY ELIG CLIN: HCPCS | Performed by: ORTHOPAEDIC SURGERY

## 2019-10-23 PROCEDURE — 3017F COLORECTAL CA SCREEN DOC REV: CPT | Performed by: ORTHOPAEDIC SURGERY

## 2019-10-23 PROCEDURE — 1090F PRES/ABSN URINE INCON ASSESS: CPT | Performed by: ORTHOPAEDIC SURGERY

## 2019-10-23 PROCEDURE — 99212 OFFICE O/P EST SF 10 MIN: CPT | Performed by: ORTHOPAEDIC SURGERY

## 2019-10-23 PROCEDURE — 4040F PNEUMOC VAC/ADMIN/RCVD: CPT | Performed by: ORTHOPAEDIC SURGERY

## 2019-10-23 PROCEDURE — G8417 CALC BMI ABV UP PARAM F/U: HCPCS | Performed by: ORTHOPAEDIC SURGERY

## 2019-10-23 PROCEDURE — G8399 PT W/DXA RESULTS DOCUMENT: HCPCS | Performed by: ORTHOPAEDIC SURGERY

## 2019-10-23 PROCEDURE — 1036F TOBACCO NON-USER: CPT | Performed by: ORTHOPAEDIC SURGERY

## 2019-10-23 PROCEDURE — 1123F ACP DISCUSS/DSCN MKR DOCD: CPT | Performed by: ORTHOPAEDIC SURGERY

## 2019-10-23 PROCEDURE — G8484 FLU IMMUNIZE NO ADMIN: HCPCS | Performed by: ORTHOPAEDIC SURGERY

## 2020-01-17 RX ORDER — MELOXICAM 15 MG/1
15 TABLET ORAL DAILY
Qty: 30 TABLET | Refills: 2 | Status: SHIPPED | OUTPATIENT
Start: 2020-01-17 | End: 2020-01-17

## 2020-01-17 RX ORDER — MELOXICAM 15 MG/1
15 TABLET ORAL DAILY
Qty: 90 TABLET | Refills: 2 | Status: SHIPPED | OUTPATIENT
Start: 2020-01-17 | End: 2020-05-06

## 2020-04-27 ENCOUNTER — VIRTUAL VISIT (OUTPATIENT)
Dept: ENDOCRINOLOGY | Age: 75
End: 2020-04-27
Payer: MEDICARE

## 2020-04-27 ENCOUNTER — VIRTUAL VISIT (OUTPATIENT)
Dept: ENDOCRINOLOGY | Age: 75
End: 2020-04-27

## 2020-04-27 ENCOUNTER — OFFICE VISIT (OUTPATIENT)
Dept: ENDOCRINOLOGY | Age: 75
End: 2020-04-27
Payer: MEDICARE

## 2020-04-27 PROCEDURE — 99441 PR PHYS/QHP TELEPHONE EVALUATION 5-10 MIN: CPT | Performed by: INTERNAL MEDICINE

## 2020-04-27 NOTE — PROGRESS NOTES
alcohol, No prolonged steroid use. Calcium: 1000mg/d   _x_ Milk _x_ Cheese _x_ Yogurt __ Ice Cream __ Fortified OJ __ Other:     Vitamin D3:  800 IU/d ( MVI)    Exercise: Walks. Limited by severe arthritis. Follows up regularly with a dentist. No major dental procedures planned. Review of Systems   Constitutional: Positive for malaise/fatigue. Negative for chills, diaphoresis, fever and weight loss. Eyes: Negative for blurred vision, double vision and photophobia. Respiratory: Negative for cough and hemoptysis. Cardiovascular: Negative for chest pain, palpitations and orthopnea. Genitourinary: Negative for dysuria, flank pain, frequency, hematuria and urgency. Musculoskeletal: Negative for back pain, falls, joint pain, myalgias and neck pain. Skin: Negative for itching and rash. Neurological: Negative for dizziness, tingling, tremors, sensory change, speech change, focal weakness, seizures, loss of consciousness and headaches. Endo/Heme/Allergies: Negative for environmental allergies and polydipsia. Does not bruise/bleed easily. Psychiatric/Behavioral: Negative for depression, hallucinations, memory loss, substance abuse and suicidal ideas. The patient is not nervous/anxious and does not have insomnia. DEXA scan 04/18    BONE DENSITOMETRY       T score of the lumbar spine is 0.9 which is within the range of   normal.       T score of the left femoral neck is - 1.8 which is within the   range of osteopenia.         T score of the left hip is - 1.0 which is within the range of   normal.           Assessment/Plan       1. Osteoporosis    This 69 y.o. female was found to have osteopenia on DEXA scan done in 02/12. Post menopausal     DEXA scan in 02/12 showed a T score of -1.8 at L Femoral neck. Repeat DEXA scan in 03/16 showed the T score of -2.4 at the left femoral neck ( 6 % decrease in her BMD)    Risk factor is FH of osteoporosis.        Work-up for secondary

## 2020-04-27 NOTE — PROGRESS NOTES
Endocrinology    Gerardo Elkins M.D. Phone: [phone]   FAX: [phone]       Hardik Lin   YOB: 1945     Date of Visit:  4/27/2020    Allergies   Allergen Reactions    Pregabalin Other (See Comments)     Other reaction(s): Confusion       Outpatient Medications Marked as Taking for the 4/27/20 encounter (Office Visit) with Miles Yanez MD   Medication Sig Dispense Refill    meloxicam (MOBIC) 15 MG tablet TAKE 1 TABLET BY MOUTH DAILY 90 tablet 2    buPROPion (WELLBUTRIN SR) 150 MG extended release tablet Take 150 mg by mouth daily      naproxen (NAPROSYN) 500 MG tablet Take 500 mg by mouth      Fesoterodine Fumarate ER (TOVIAZ) 8 MG TB24 TAKE 1 TABLET BY MOUTH DAILY      alendronate (FOSAMAX) 70 MG tablet TAKE 1 TABLET BY MOUTH EVERY 7 DAYS 12 tablet 3    gabapentin (NEURONTIN) 400 MG capsule Take 400 mg by mouth 4 times daily.  naproxen (NAPROSYN) 250 MG tablet Take 250 mg by mouth 2 times daily (with meals)      Probiotic Product (PROBIOTIC & ACIDOPHILUS EX ST PO) Take 1 tablet by mouth daily 20 billion active culture      amLODIPine (NORVASC) 5 MG tablet Take 5 mg by mouth daily      RESTASIS 0.05 % ophthalmic emulsion Place 2 drops into both eyes every 12 hours       escitalopram (LEXAPRO) 20 MG tablet Take 20 mg by mouth daily       Multiple Vitamins-Minerals (MULTIVITAMIN PO) Take 1 tablet by mouth daily            There were no vitals filed for this visit. There is no height or weight on file to calculate BMI.      Wt Readings from Last 3 Encounters:   10/23/19 220 lb 3.8 oz (99.9 kg)   09/25/19 220 lb 2.1 oz (99.9 kg)   04/22/19 220 lb (99.8 kg)     BP Readings from Last 3 Encounters:   10/23/19 122/80   09/25/19 122/74   04/22/19 117/72        Past Medical History:   Diagnosis Date    Allergic     rhinitis    Anemia     Anxiety     Depression     Eye disorder     Fibromyalgia     GERD (gastroesophageal reflux disease)     Hypertension     Obesity

## 2020-05-06 RX ORDER — MELOXICAM 15 MG/1
15 TABLET ORAL DAILY
Qty: 30 TABLET | Refills: 2 | Status: ON HOLD | OUTPATIENT
Start: 2020-05-06 | End: 2022-02-25 | Stop reason: HOSPADM

## 2020-06-01 ENCOUNTER — HOSPITAL ENCOUNTER (OUTPATIENT)
Dept: GENERAL RADIOLOGY | Age: 75
Discharge: HOME | End: 2020-06-01
Payer: MEDICARE

## 2020-06-01 PROCEDURE — 77080 DXA BONE DENSITY AXIAL: CPT

## 2020-06-02 ENCOUNTER — TELEPHONE (OUTPATIENT)
Dept: ENDOCRINOLOGY | Age: 75
End: 2020-06-02

## 2020-10-28 ENCOUNTER — OFFICE VISIT (OUTPATIENT)
Dept: ORTHOPEDIC SURGERY | Age: 75
End: 2020-10-28
Payer: MEDICARE

## 2020-10-28 VITALS — TEMPERATURE: 96.6 F | WEIGHT: 220 LBS | BODY MASS INDEX: 31.5 KG/M2 | HEIGHT: 70 IN

## 2020-10-28 PROCEDURE — 1123F ACP DISCUSS/DSCN MKR DOCD: CPT | Performed by: ORTHOPAEDIC SURGERY

## 2020-10-28 PROCEDURE — 3017F COLORECTAL CA SCREEN DOC REV: CPT | Performed by: ORTHOPAEDIC SURGERY

## 2020-10-28 PROCEDURE — 1036F TOBACCO NON-USER: CPT | Performed by: ORTHOPAEDIC SURGERY

## 2020-10-28 PROCEDURE — G8417 CALC BMI ABV UP PARAM F/U: HCPCS | Performed by: ORTHOPAEDIC SURGERY

## 2020-10-28 PROCEDURE — G8484 FLU IMMUNIZE NO ADMIN: HCPCS | Performed by: ORTHOPAEDIC SURGERY

## 2020-10-28 PROCEDURE — 4040F PNEUMOC VAC/ADMIN/RCVD: CPT | Performed by: ORTHOPAEDIC SURGERY

## 2020-10-28 PROCEDURE — G8399 PT W/DXA RESULTS DOCUMENT: HCPCS | Performed by: ORTHOPAEDIC SURGERY

## 2020-10-28 PROCEDURE — 1090F PRES/ABSN URINE INCON ASSESS: CPT | Performed by: ORTHOPAEDIC SURGERY

## 2020-10-28 PROCEDURE — G8427 DOCREV CUR MEDS BY ELIG CLIN: HCPCS | Performed by: ORTHOPAEDIC SURGERY

## 2020-10-28 PROCEDURE — 99213 OFFICE O/P EST LOW 20 MIN: CPT | Performed by: ORTHOPAEDIC SURGERY

## 2020-10-28 NOTE — PROGRESS NOTES
Chief Complaint    Follow-up (left shoulder )      History of Present Illness:  Grant Daley is a pleasant, 77 y.o., female, here today for follow up of her bilateral shoulders. We have treated her in the past for right shoulder pain related to mild glenohumeral osteoarthritis. We have administered intermittent corticosteroid injection to which she responded well. Today she is hoping be be evaluated for her left shoulder pain. Overall she states she has some occasional soreness but attributes this to her fibromyalgia. She does not notice any overall issues with the shoulders at this time. Occasional soreness with sleeping on an awkward position. She reports no new injuries or setbacks. Pain Assessment  Location of Pain: Shoulder  Location Modifiers: Left  Severity of Pain: 1  Quality of Pain: Aching  Duration of Pain: Persistent  Frequency of Pain: Constant  Aggravating Factors: Other (Comment)(n/a)  Relieving Factors: Rest  Result of Injury: No  Work-Related Injury: No  Are there other pain locations you wish to document?: No      Medical History:  Patient's medications, allergies, past medical, surgical, social and family histories were reviewed and updated as appropriate. Review of Systems        Review of Systems  A 14 point review of systems was completed by the patient on 9/25/19 and is available in the media section of the scanned medical record and was reviewed on 10/28/2020. The review is negative with the exception of those things mentioned in the HPI and Past Medical History    Vital Signs:  Vitals:    10/28/20 0915   Temp: 96.6 °F (35.9 °C)       General/Appearance: Alert and oriented and in no apparent distress. Skin:  There are no skin lesions, cellulitis, or extreme edema. The patient has warm and well-perfused Bilateral upper extremities with brisk capillary refill.       Right and Left Shoulder Exam:    Forward flexion-170 right, 160 left  Abduction-170 right, 160 left  External rotation-50 right, 50 left  Internal rotation-T7 right, T10 left  5/5 strength with [place], [place] toast  5/5 strength with external rotation, external rotation  No tenderness palpation over the [facility] joint, biceps tendon, rotator cuff  Negative De, negative Neer        Radiology:     Plain radiographs of the left shoulder comprising 3 views: AP, Scapulary Y and Axillary were obtained and reviewed in the office:     Impression: Mild progression of known moderate glenohumeral joint arthritis. No other obvious abnormalities noted. Assessment :  Ms. David Dewitt is a pleasant, 77 y.o. patient who has mild to moderate left glenohumeral joint arthritis      Impression:  Encounter Diagnoses   Name Primary?  Arthritis of left glenohumeral joint Yes    Left shoulder pain, unspecified chronicity        Office Procedures:  Orders Placed This Encounter   Procedures    XR SHOULDER LEFT (MIN 2 VIEWS)     Standing Status:   Future     Number of Occurrences:   1     Standing Expiration Date:   10/28/2021       Treatment Plan:    Overall patient is doing very well. At this time she would like to continue conservative treatment of her bilateral shoulders. If she does have recurrent issues of pain or loss of function she was told to call to make appointment. Otherwise we will see her back at 1 year for repeat clinical exam and x-rays of the left shoulder. 10/28/2020  9:37 AM    [address] Fellow    The encounter with David Dewitt was supervised by Dr Moon Coon who personally examined the patient and reviewed the plan. This dictation was performed with a verbal recognition program (DRAGON) and it was checked for errors. It is possible that there are still dictated errors within this office note. If so, please bring any errors to my attention for an addendum.   All efforts were made to ensure that this office note is accurate.   __________________________  I was physically present and personally supervised the Orthopaedic Sports Medicine Fellow in the evaluation and development of a treatment plan for this patient. I personally interviewed the patient and performed a physical examination. In addition, I discussed the patient's condition and treatment options with them. I have also reviewed and agree with the past medical, family and social history unless otherwise noted. All of the patient's questions were answered. Barry Alejandro MD, PhD  10/28/2020

## 2021-10-12 ENCOUNTER — OFFICE VISIT (OUTPATIENT)
Dept: ORTHOPEDIC SURGERY | Age: 76
End: 2021-10-12
Payer: MEDICARE

## 2021-10-12 VITALS — BODY MASS INDEX: 31.5 KG/M2 | HEIGHT: 70 IN | WEIGHT: 220 LBS

## 2021-10-12 DIAGNOSIS — M19.012 PRIMARY OSTEOARTHRITIS OF LEFT SHOULDER: ICD-10-CM

## 2021-10-12 DIAGNOSIS — M19.012 ARTHRITIS OF LEFT GLENOHUMERAL JOINT: Primary | ICD-10-CM

## 2021-10-12 DIAGNOSIS — M50.30 DDD (DEGENERATIVE DISC DISEASE), CERVICAL: ICD-10-CM

## 2021-10-12 DIAGNOSIS — M25.511 RIGHT SHOULDER PAIN, UNSPECIFIED CHRONICITY: ICD-10-CM

## 2021-10-12 PROCEDURE — G8427 DOCREV CUR MEDS BY ELIG CLIN: HCPCS | Performed by: ORTHOPAEDIC SURGERY

## 2021-10-12 PROCEDURE — G8417 CALC BMI ABV UP PARAM F/U: HCPCS | Performed by: ORTHOPAEDIC SURGERY

## 2021-10-12 PROCEDURE — 3017F COLORECTAL CA SCREEN DOC REV: CPT | Performed by: ORTHOPAEDIC SURGERY

## 2021-10-12 PROCEDURE — 1036F TOBACCO NON-USER: CPT | Performed by: ORTHOPAEDIC SURGERY

## 2021-10-12 PROCEDURE — 1123F ACP DISCUSS/DSCN MKR DOCD: CPT | Performed by: ORTHOPAEDIC SURGERY

## 2021-10-12 PROCEDURE — 1090F PRES/ABSN URINE INCON ASSESS: CPT | Performed by: ORTHOPAEDIC SURGERY

## 2021-10-12 PROCEDURE — 99213 OFFICE O/P EST LOW 20 MIN: CPT | Performed by: ORTHOPAEDIC SURGERY

## 2021-10-12 PROCEDURE — G8399 PT W/DXA RESULTS DOCUMENT: HCPCS | Performed by: ORTHOPAEDIC SURGERY

## 2021-10-12 PROCEDURE — G8484 FLU IMMUNIZE NO ADMIN: HCPCS | Performed by: ORTHOPAEDIC SURGERY

## 2021-10-12 PROCEDURE — 4040F PNEUMOC VAC/ADMIN/RCVD: CPT | Performed by: ORTHOPAEDIC SURGERY

## 2021-10-12 RX ORDER — LOSARTAN POTASSIUM 50 MG/1
50 TABLET ORAL DAILY
Status: ON HOLD | COMMUNITY
Start: 2020-11-20 | End: 2022-02-11 | Stop reason: HOSPADM

## 2021-10-12 RX ORDER — DULOXETIN HYDROCHLORIDE 60 MG/1
60 CAPSULE, DELAYED RELEASE ORAL DAILY
COMMUNITY
Start: 2021-10-08 | End: 2022-10-25 | Stop reason: CLARIF

## 2021-10-12 RX ORDER — GABAPENTIN 600 MG/1
1200 TABLET ORAL 2 TIMES DAILY
Status: ON HOLD | COMMUNITY
Start: 2021-09-07 | End: 2022-02-25 | Stop reason: HOSPADM

## 2021-10-12 RX ORDER — LANOLIN/MINERAL OIL/PETROLATUM
OINTMENT (GRAM) OPHTHALMIC (EYE)
COMMUNITY
End: 2022-10-25 | Stop reason: CLARIF

## 2021-10-12 RX ORDER — OMEPRAZOLE 20 MG/1
CAPSULE, DELAYED RELEASE ORAL
Status: ON HOLD | COMMUNITY
Start: 2021-09-07 | End: 2022-02-06

## 2021-10-12 NOTE — PROGRESS NOTES
12 [facility]  History and Physical  Shoulder Pain    Date:  10/12/2021    Name:  Lance Lovelace  Address:  [address] [place], [place] [address]    :  1945      Age:   77 y.o.    SSN:  [id number]      Medical Record Number:  <[id number]>    Reason for Visit:    Shoulder Pain (F/U BILAT SHOULDER)      HPI:   Lance Lovelace is a 77 y.o. female who presents to our office today for follow up of the right shoulder pain. She has been experiencing right shoulder pain for the last 3 weeks. She is having pain primarily over the posterior right shoulder, over the trapezius and on the right lateral side of her neck. She reports that lifting her right arm up overhead does relieve her pain somewhat. She reports having 10/10 pain. She does having referred pain down the arm to the hand on occasion. She denies any numbness or tingling. She is currently going to physical therapy at [facility] and at [facility] on [employer]. Patient reports that this has not completely helped alleviate her symptoms. Pain Assessment  Location of Pain: Shoulder  Location Modifiers: Left, Right  Severity of Pain: 6  Quality of Pain: Aching, Dull, Sharp, Throbbing  Duration of Pain: Persistent  Frequency of Pain: Constant  Aggravating Factors: Other (Comment)  Limiting Behavior: Yes  Relieving Factors: Rest, Other (Comment), Ice  Work-Related Injury: No  Are there other pain locations you wish to document?: No    Patient has had no medical changes since last evaluated        Review of Systems:  A 14 point review of systems available in the scanned medical record as documented by the patient. The review is negative with the exception of those things mentioned in the History of Present Illness and Past Medical History.       Past Medical History:  Patient's medications, allergies, past medical, surgical, social and family histories were reviewed and updated as appropriate. Allergies: Allergies   Allergen Reactions    Pregabalin Other (See Comments)     Other reaction(s): Confusion    Other reaction(s): Confusion  Other reaction(s): not responsive, Other (See Comments)  Other reaction(s): Confusion       Physical Exam:  There were no vitals filed for this visit. General: Helga Chase is a healthy and well appearing 77 y.o. female who is sitting comfortably in our office in acute distress. Skin:  There are no skin lesions, cellulitis, or extreme edema. The patient has warm and well-perfused Bilateral upper extremities with brisk capillary refill. Eyes: Extra-ocular muscles intact  Mouth: Oral mucosa moist. No perioral lesions  Pulm: Respirations unlabored and regular. Neuro: Alert and oriented x3    right and left Shoulder Exam:  Inspection:  No gross deformities, no signs of infection. Palpation: Tenderness over the right trapezius. She has no tenderness over the rotator cuff footprint, bicipital groove, ac joint and posterior joint line. No crepitus. Active Range of Motion: Forward elevation of 160, abduction of 160, external rotation with elbow at the side 45, internal rotation to the back is T11    Passive Range of Motion: similar to active    Strength: External rotation with resistance with elbow at the side 5/5, internal rotation with resistance with elbow at the side 5/5, supraspinatus testing 5/5    Special Tests:   No Sauarv muscle deformity. Neurovascular: Sensation to light touch is intact, no motor deficits, palpable radial pulses 2+      Radiographic:  3 xray views of the bilateral  shoulder including True AP in internal and external and axillary lateral were taken in our office today reveals moderate degenerative changes within the left glenohumeral joint and mild degenerative changes in the right glenohumeral joint. No fractures, dislocations, visible tumors, or signs of acute trauma.       Radiographic Impression: Moderate osteoarthritis left shoulder very mild arthritis of the right shoulder    Self assessment questionnaires including ASES and Simple Shoulder Test were completed today. Assessment:  Bella Fajardo is a 77 y.o. female with a history of left primary osteoarthritis currently with right sided pain. This is most likely related to her cervical spine, at least based on physical examination. .    Impression:  Encounter Diagnoses   Name Primary?  Arthritis of left glenohumeral joint Yes    Right shoulder pain, unspecified chronicity     Primary osteoarthritis of left shoulder     DDD (degenerative disc disease), cervical        Office Procedures:  Orders Placed This Encounter   Procedures    XR SHOULDER LEFT (MIN 2 VIEWS)     Standing Status:   Future     Number of Occurrences:   1     Standing Expiration Date:   10/12/2022     Order Specific Question:   Reason for exam:     Answer:   f/u    XR SHOULDER RIGHT (MIN 2 VIEWS)     Standing Status:   Future     Number of Occurrences:   1     Standing Expiration Date:   10/12/2022     Order Specific Question:   Reason for exam:     Answer:   pain       Plan:   She does have underlining mild osteoarthritis of the right shoulder and moderate osteoarthritis in the left shoulder. We feel that her current symptoms are not coming from the shoulder but that it is coming from her cervical spine. We feel that it would be best for her to meet with a spine specialist for her neck. She may continue to go to physical therapy at [facility] in [place]. We given her a referral to the [address] [place] [address] clinic to evaluate her cervical spine. Riccardo was agreeable to this. All her questions were fully answered. We would like to see her back in 1 year for her shoulders or sooner with worsening symptoms. 10/12/2021  11:13 AM      Gaurav Stafford PA-C  Orthopaedic Sports Medicine Physician Assistant    During this examination, IGaurav PA-C, functioned as a scribe for Dr. Kylah Valles.      This dictation was performed with a verbal recognition program (DRAGON) and it was checked for errors. It is possible that there are still dictated errors within this office note. If so, please bring any errors to my attention for an addendum. All efforts were made to ensure that this office note is accurate.  ________  I, Dr. Lauren Cuevas, personally performed the services described in this documentation as described by Alma Anaya PA-C in my presence, and it is both accurate and complete. Barry Jasmine MD, PhD  10/12/2021

## 2022-02-06 ENCOUNTER — APPOINTMENT (OUTPATIENT)
Dept: GENERAL RADIOLOGY | Age: 77
DRG: 481 | End: 2022-02-06
Payer: MEDICARE

## 2022-02-06 ENCOUNTER — HOSPITAL ENCOUNTER (INPATIENT)
Age: 77
LOS: 5 days | Discharge: NURSING, REHAB OR LONG-TERM CARE | DRG: 481 | End: 2022-02-11
Attending: STUDENT IN AN ORGANIZED HEALTH CARE EDUCATION/TRAINING PROGRAM | Admitting: INTERNAL MEDICINE
Payer: MEDICARE

## 2022-02-06 DIAGNOSIS — S72.91XA CLOSED FRACTURE OF RIGHT FEMUR, UNSPECIFIED FRACTURE MORPHOLOGY, UNSPECIFIED PORTION OF FEMUR, INITIAL ENCOUNTER (HCC): Primary | ICD-10-CM

## 2022-02-06 DIAGNOSIS — S72.491A CLOSED COMMINUTED INTRA-ARTICULAR FRACTURE OF DISTAL FEMUR, RIGHT, INITIAL ENCOUNTER (HCC): ICD-10-CM

## 2022-02-06 LAB
ABO/RH: NORMAL
ALBUMIN SERPL-MCNC: 4.1 G/DL (ref 3.4–5)
ANION GAP SERPL CALCULATED.3IONS-SCNC: 12 MMOL/L (ref 3–16)
ANTIBODY SCREEN: NORMAL
APTT: 31 SEC (ref 26.2–38.6)
BACTERIA: ABNORMAL /HPF
BASOPHILS ABSOLUTE: 0 K/UL (ref 0–0.2)
BASOPHILS RELATIVE PERCENT: 0.2 %
BILIRUBIN URINE: ABNORMAL
BLOOD, URINE: NEGATIVE
BUN BLDV-MCNC: 21 MG/DL (ref 7–20)
CALCIUM SERPL-MCNC: 9.5 MG/DL (ref 8.3–10.6)
CHLORIDE BLD-SCNC: 101 MMOL/L (ref 99–110)
CLARITY: CLEAR
CO2: 27 MMOL/L (ref 21–32)
COLOR: YELLOW
COMMENT UA: ABNORMAL
CREAT SERPL-MCNC: 1 MG/DL (ref 0.6–1.2)
EOSINOPHILS ABSOLUTE: 0 K/UL (ref 0–0.6)
EOSINOPHILS RELATIVE PERCENT: 0.1 %
EPITHELIAL CELLS, UA: ABNORMAL /HPF (ref 0–5)
FINE CASTS, UA: ABNORMAL /LPF (ref 0–2)
GFR AFRICAN AMERICAN: >60
GFR NON-AFRICAN AMERICAN: 54
GLUCOSE BLD-MCNC: 160 MG/DL (ref 70–99)
GLUCOSE URINE: NEGATIVE MG/DL
HCT VFR BLD CALC: 36 % (ref 36–48)
HEMOGLOBIN: 12 G/DL (ref 12–16)
INR BLD: 1.05 (ref 0.88–1.12)
KETONES, URINE: 15 MG/DL
LEUKOCYTE ESTERASE, URINE: ABNORMAL
LYMPHOCYTES ABSOLUTE: 1 K/UL (ref 1–5.1)
LYMPHOCYTES RELATIVE PERCENT: 6.5 %
MCH RBC QN AUTO: 29.1 PG (ref 26–34)
MCHC RBC AUTO-ENTMCNC: 33.2 G/DL (ref 31–36)
MCV RBC AUTO: 87.6 FL (ref 80–100)
MICROSCOPIC EXAMINATION: YES
MONOCYTES ABSOLUTE: 0.8 K/UL (ref 0–1.3)
MONOCYTES RELATIVE PERCENT: 5.4 %
NEUTROPHILS ABSOLUTE: 13.5 K/UL (ref 1.7–7.7)
NEUTROPHILS RELATIVE PERCENT: 87.8 %
NITRITE, URINE: POSITIVE
PDW BLD-RTO: 14.7 % (ref 12.4–15.4)
PH UA: 6 (ref 5–8)
PHOSPHORUS: 3 MG/DL (ref 2.5–4.9)
PLATELET # BLD: 208 K/UL (ref 135–450)
PMV BLD AUTO: 9.7 FL (ref 5–10.5)
POTASSIUM SERPL-SCNC: 4.2 MMOL/L (ref 3.5–5.1)
PRO-BNP: 344 PG/ML (ref 0–449)
PROTEIN UA: 30 MG/DL
PROTHROMBIN TIME: 11.9 SEC (ref 9.9–12.7)
RBC # BLD: 4.11 M/UL (ref 4–5.2)
RBC UA: ABNORMAL /HPF (ref 0–4)
SODIUM BLD-SCNC: 140 MMOL/L (ref 136–145)
SPECIFIC GRAVITY UA: 1.02 (ref 1–1.03)
URINE REFLEX TO CULTURE: ABNORMAL
URINE TYPE: ABNORMAL
UROBILINOGEN, URINE: 0.2 E.U./DL
WBC # BLD: 15.4 K/UL (ref 4–11)
WBC UA: ABNORMAL /HPF (ref 0–5)

## 2022-02-06 PROCEDURE — 6360000002 HC RX W HCPCS: Performed by: INTERNAL MEDICINE

## 2022-02-06 PROCEDURE — 73560 X-RAY EXAM OF KNEE 1 OR 2: CPT

## 2022-02-06 PROCEDURE — 1200000000 HC SEMI PRIVATE

## 2022-02-06 PROCEDURE — 73552 X-RAY EXAM OF FEMUR 2/>: CPT

## 2022-02-06 PROCEDURE — 93005 ELECTROCARDIOGRAM TRACING: CPT | Performed by: INTERNAL MEDICINE

## 2022-02-06 PROCEDURE — 80069 RENAL FUNCTION PANEL: CPT

## 2022-02-06 PROCEDURE — 99284 EMERGENCY DEPT VISIT MOD MDM: CPT

## 2022-02-06 PROCEDURE — 36415 COLL VENOUS BLD VENIPUNCTURE: CPT

## 2022-02-06 PROCEDURE — 85610 PROTHROMBIN TIME: CPT

## 2022-02-06 PROCEDURE — 2580000003 HC RX 258: Performed by: INTERNAL MEDICINE

## 2022-02-06 PROCEDURE — 86923 COMPATIBILITY TEST ELECTRIC: CPT

## 2022-02-06 PROCEDURE — 73501 X-RAY EXAM HIP UNI 1 VIEW: CPT

## 2022-02-06 PROCEDURE — 6360000002 HC RX W HCPCS: Performed by: STUDENT IN AN ORGANIZED HEALTH CARE EDUCATION/TRAINING PROGRAM

## 2022-02-06 PROCEDURE — 96374 THER/PROPH/DIAG INJ IV PUSH: CPT

## 2022-02-06 PROCEDURE — 86900 BLOOD TYPING SEROLOGIC ABO: CPT

## 2022-02-06 PROCEDURE — P9016 RBC LEUKOCYTES REDUCED: HCPCS

## 2022-02-06 PROCEDURE — 81001 URINALYSIS AUTO W/SCOPE: CPT

## 2022-02-06 PROCEDURE — 6370000000 HC RX 637 (ALT 250 FOR IP): Performed by: INTERNAL MEDICINE

## 2022-02-06 PROCEDURE — 86850 RBC ANTIBODY SCREEN: CPT

## 2022-02-06 PROCEDURE — 85730 THROMBOPLASTIN TIME PARTIAL: CPT

## 2022-02-06 PROCEDURE — 85025 COMPLETE CBC W/AUTO DIFF WBC: CPT

## 2022-02-06 PROCEDURE — 86901 BLOOD TYPING SEROLOGIC RH(D): CPT

## 2022-02-06 PROCEDURE — 83880 ASSAY OF NATRIURETIC PEPTIDE: CPT

## 2022-02-06 RX ORDER — PANTOPRAZOLE SODIUM 40 MG/1
40 TABLET, DELAYED RELEASE ORAL
Status: DISCONTINUED | OUTPATIENT
Start: 2022-02-07 | End: 2022-02-11 | Stop reason: HOSPADM

## 2022-02-06 RX ORDER — OXYCODONE HYDROCHLORIDE AND ACETAMINOPHEN 5; 325 MG/1; MG/1
2 TABLET ORAL EVERY 4 HOURS PRN
Status: DISCONTINUED | OUTPATIENT
Start: 2022-02-06 | End: 2022-02-11 | Stop reason: HOSPADM

## 2022-02-06 RX ORDER — ACETAMINOPHEN 325 MG/1
650 TABLET ORAL EVERY 6 HOURS PRN
Status: DISCONTINUED | OUTPATIENT
Start: 2022-02-06 | End: 2022-02-11 | Stop reason: HOSPADM

## 2022-02-06 RX ORDER — ONDANSETRON 4 MG/1
4 TABLET, ORALLY DISINTEGRATING ORAL EVERY 8 HOURS PRN
Status: DISCONTINUED | OUTPATIENT
Start: 2022-02-06 | End: 2022-02-11 | Stop reason: HOSPADM

## 2022-02-06 RX ORDER — ACETAMINOPHEN 650 MG/1
650 SUPPOSITORY RECTAL EVERY 6 HOURS PRN
Status: DISCONTINUED | OUTPATIENT
Start: 2022-02-06 | End: 2022-02-11 | Stop reason: HOSPADM

## 2022-02-06 RX ORDER — ONDANSETRON 2 MG/ML
4 INJECTION INTRAMUSCULAR; INTRAVENOUS EVERY 6 HOURS PRN
Status: DISCONTINUED | OUTPATIENT
Start: 2022-02-06 | End: 2022-02-11 | Stop reason: HOSPADM

## 2022-02-06 RX ORDER — MULTIVIT WITH MINERALS/LUTEIN
1000 TABLET ORAL DAILY
COMMUNITY
End: 2022-10-25 | Stop reason: CLARIF

## 2022-02-06 RX ORDER — SODIUM CHLORIDE 0.9 % (FLUSH) 0.9 %
5-40 SYRINGE (ML) INJECTION PRN
Status: DISCONTINUED | OUTPATIENT
Start: 2022-02-06 | End: 2022-02-11 | Stop reason: HOSPADM

## 2022-02-06 RX ORDER — GABAPENTIN 600 MG/1
1200 TABLET ORAL 2 TIMES DAILY
Status: DISCONTINUED | OUTPATIENT
Start: 2022-02-06 | End: 2022-02-09

## 2022-02-06 RX ORDER — AMLODIPINE BESYLATE 5 MG/1
5 TABLET ORAL DAILY
Status: DISCONTINUED | OUTPATIENT
Start: 2022-02-07 | End: 2022-02-11 | Stop reason: HOSPADM

## 2022-02-06 RX ORDER — GABAPENTIN 600 MG/1
600 TABLET ORAL 4 TIMES DAILY
Status: DISCONTINUED | OUTPATIENT
Start: 2022-02-06 | End: 2022-02-06

## 2022-02-06 RX ORDER — DULOXETIN HYDROCHLORIDE 60 MG/1
60 CAPSULE, DELAYED RELEASE ORAL DAILY
Status: DISCONTINUED | OUTPATIENT
Start: 2022-02-07 | End: 2022-02-11 | Stop reason: HOSPADM

## 2022-02-06 RX ORDER — ESCITALOPRAM OXALATE 20 MG/1
20 TABLET ORAL DAILY
Status: DISCONTINUED | OUTPATIENT
Start: 2022-02-06 | End: 2022-02-11 | Stop reason: HOSPADM

## 2022-02-06 RX ORDER — SODIUM CHLORIDE 0.9 % (FLUSH) 0.9 %
5-40 SYRINGE (ML) INJECTION EVERY 12 HOURS SCHEDULED
Status: DISCONTINUED | OUTPATIENT
Start: 2022-02-06 | End: 2022-02-11 | Stop reason: HOSPADM

## 2022-02-06 RX ORDER — OXYCODONE HYDROCHLORIDE AND ACETAMINOPHEN 5; 325 MG/1; MG/1
1 TABLET ORAL EVERY 4 HOURS PRN
Status: DISCONTINUED | OUTPATIENT
Start: 2022-02-06 | End: 2022-02-11 | Stop reason: HOSPADM

## 2022-02-06 RX ORDER — BUPROPION HYDROCHLORIDE 150 MG/1
150 TABLET, EXTENDED RELEASE ORAL DAILY
Status: DISCONTINUED | OUTPATIENT
Start: 2022-02-06 | End: 2022-02-11 | Stop reason: HOSPADM

## 2022-02-06 RX ORDER — POLYETHYLENE GLYCOL 3350 17 G/17G
17 POWDER, FOR SOLUTION ORAL DAILY PRN
Status: DISCONTINUED | OUTPATIENT
Start: 2022-02-06 | End: 2022-02-11 | Stop reason: HOSPADM

## 2022-02-06 RX ORDER — SODIUM CHLORIDE 9 MG/ML
25 INJECTION, SOLUTION INTRAVENOUS PRN
Status: DISCONTINUED | OUTPATIENT
Start: 2022-02-06 | End: 2022-02-11 | Stop reason: HOSPADM

## 2022-02-06 RX ADMIN — SODIUM CHLORIDE, PRESERVATIVE FREE 10 ML: 5 INJECTION INTRAVENOUS at 21:36

## 2022-02-06 RX ADMIN — HYDROMORPHONE HYDROCHLORIDE 1 MG: 1 INJECTION, SOLUTION INTRAMUSCULAR; INTRAVENOUS; SUBCUTANEOUS at 15:22

## 2022-02-06 RX ADMIN — ESCITALOPRAM OXALATE 20 MG: 20 TABLET ORAL at 21:34

## 2022-02-06 RX ADMIN — OXYCODONE HYDROCHLORIDE AND ACETAMINOPHEN 2 TABLET: 5; 325 TABLET ORAL at 19:42

## 2022-02-06 RX ADMIN — BUPROPION HYDROCHLORIDE 150 MG: 150 TABLET, EXTENDED RELEASE ORAL at 21:35

## 2022-02-06 RX ADMIN — HYDROMORPHONE HYDROCHLORIDE 0.5 MG: 1 INJECTION, SOLUTION INTRAMUSCULAR; INTRAVENOUS; SUBCUTANEOUS at 18:51

## 2022-02-06 RX ADMIN — GABAPENTIN 1200 MG: 600 TABLET, FILM COATED ORAL at 21:34

## 2022-02-06 RX ADMIN — HYDROMORPHONE HYDROCHLORIDE 0.5 MG: 1 INJECTION, SOLUTION INTRAMUSCULAR; INTRAVENOUS; SUBCUTANEOUS at 21:32

## 2022-02-06 ASSESSMENT — PAIN DESCRIPTION - LOCATION
LOCATION: KNEE

## 2022-02-06 ASSESSMENT — PAIN SCALES - GENERAL
PAINLEVEL_OUTOF10: 10
PAINLEVEL_OUTOF10: 9
PAINLEVEL_OUTOF10: 10
PAINLEVEL_OUTOF10: 10

## 2022-02-06 ASSESSMENT — PAIN DESCRIPTION - DIRECTION
RADIATING_TOWARDS: HIP
RADIATING_TOWARDS: HIP

## 2022-02-06 ASSESSMENT — PAIN DESCRIPTION - DESCRIPTORS
DESCRIPTORS: SHARP;SHOOTING

## 2022-02-06 ASSESSMENT — PAIN - FUNCTIONAL ASSESSMENT
PAIN_FUNCTIONAL_ASSESSMENT: PREVENTS OR INTERFERES SOME ACTIVE ACTIVITIES AND ADLS
PAIN_FUNCTIONAL_ASSESSMENT: PREVENTS OR INTERFERES SOME ACTIVE ACTIVITIES AND ADLS

## 2022-02-06 ASSESSMENT — PAIN DESCRIPTION - PROGRESSION
CLINICAL_PROGRESSION: NOT CHANGED

## 2022-02-06 ASSESSMENT — PAIN DESCRIPTION - FREQUENCY
FREQUENCY: INTERMITTENT

## 2022-02-06 ASSESSMENT — PAIN DESCRIPTION - ONSET
ONSET: ON-GOING

## 2022-02-06 ASSESSMENT — PAIN DESCRIPTION - ORIENTATION
ORIENTATION: RIGHT

## 2022-02-06 ASSESSMENT — PAIN DESCRIPTION - PAIN TYPE
TYPE: ACUTE PAIN

## 2022-02-06 NOTE — H&P
Hospital Medicine History & Physical      PCP: Margie Parker MD    Date of Admission: 2/6/2022    Date of Service: Pt seen/examined on 02/6/22 and Admitted to Inpatient with expected LOS greater than two midnights due to medical therapy. Chief Complaint: Margaret Martinez on ice going down stairs at home      History Of Present Illness:      77 y.o. female Alfredo Austin   -Has a history of hypertension fibromyalgia depression, anxiety, history of bilateral knee replacements as well as right hip replacement. She had a mechanical fall from standing, she went out to walk out of her back door stepped on a patch of ice slipped and fell on her right hip/right side, she was unable to stand up or bear weight on the extremity. Noted to have 10 out of 10 severity pain, work-up in the ED showed distal right femoral fracture with displacement. Patient was given a dose of fentanyl in route by EMS. Given dose of Dilaudid in the emergency room. Past Medical History:          Diagnosis Date    Allergic     rhinitis    Anemia     Anxiety     Closed comminuted intra-articular fracture of distal femur, right, initial encounter ([facility] [address] 2/6/2022    Depression     Eye disorder     Fibromyalgia     GERD (gastroesophageal reflux disease)     Hypertension     Obesity     KAIT (obstructive sleep apnea)     no CPAP - has dental appliance    Osteoarthritis     Osteoporosis     PONV (postoperative nausea and vomiting)        Past Surgical History:          Procedure Laterality Date    HIP SURGERY      JOINT REPLACEMENT Right 1993/2003    HIP    JOINT REPLACEMENT Left 5/18/16    left total knee replacement    KNEE SURGERY      WRIST FRACTURE SURGERY Left        Medications Prior to Admission:      Prior to Admission medications    Medication Sig Start Date End Date Taking? Authorizing Provider   gabapentin (NEURONTIN) 600 MG tablet 4,200 mg daily.   9/7/21  Yes Historical Provider, MD   omeprazole (PRILOSEC) 20 MG delayed release capsule  9/7/21  Yes Historical Provider, MD   DULoxetine (CYMBALTA) 60 MG extended release capsule  10/8/21  Yes Historical Provider, MD   meloxicam (MOBIC) 15 MG tablet TAKE 1 TABLET BY MOUTH DAILY 5/6/20  Yes Humberto Alvarez MD   buPROPion [facility] SR) 150 MG extended release tablet Take 150 mg by mouth daily   Yes Historical Provider, MD   Fesoterodine Fumarate ER (TOVIAZ) 8 MG TB24 TAKE 1 TABLET BY MOUTH DAILY 9/17/18  Yes Historical Provider, MD   RESTASIS 0.05 % ophthalmic emulsion Place 2 drops into both eyes every 12 hours  10/18/13  Yes Historical Provider, MD   escitalopram (LEXAPRO) 20 MG tablet Take 20 mg by mouth daily  12/2/13  Yes Historical Provider, MD   Multiple Vitamins-Minerals (MULTIVITAMIN PO) Take 1 tablet by mouth daily    Yes Historical Provider, MD   White Petrolatum-Mineral Oil ([address] [place] [address] PETROL-MINERAL OIL-LANOLIN) 0.1-0.1 % OINT Refresh P.M. 57.3 %-42.5 % eye ointment   UTD    Historical Provider, MD   losartan (COZAAR) 50 MG tablet Take 50 mg by mouth daily 11/20/20   Historical Provider, MD   amLODIPine (NORVASC) 5 MG tablet Take 5 mg by mouth daily    Historical Provider, MD       Allergies:  Pregabalin    Social History:      The patient currently lives at home, alone    TOBACCO:   reports that she has never smoked. She has never used smokeless tobacco.  ETOH:   reports no history of alcohol use. Family History:      Reviewed        Problem Relation Age of Onset    Cancer Father     Alcohol Abuse Mother     Cancer Mother     Osteoarthritis Mother     Dementia Mother     Cancer Maternal Aunt         breast       REVIEW OF SYSTEMS:   Pertinent positives as noted in the HPI. All other systems reviewed and negative. PHYSICAL EXAM PERFORMED:    /75   Pulse 72   Temp 97.7 °F (36.5 °C) (Oral)   Resp 17   SpO2 98%     General appearance:  No apparent distress, appears stated age and cooperative. HEENT:  Normal cephalic, atraumatic without obvious deformity. Pupils equal, round, and reactive to light. Extra ocular muscles intact. Conjunctivae/corneas clear. Neck: Supple, with full range of motion. No jugular venous distention. Trachea midline. Respiratory:  Normal respiratory effort. Clear to auscultation, bilaterally without Rales/Wheezes/Rhonchi. Cardiovascular:  Regular rate and rhythm with normal S1/S2 without murmurs, rubs or gallops. Abdomen: Soft, non-tender, non-distended with normal bowel sounds. Musculoskeletal: tenderness mid to distal femur, negative logroll, pulses intact  Skin: Skin color, texture, turgor normal.  No rashes or lesions. Neurologic:  Neurovascularly intact without any focal sensory/motor deficits. Cranial nerves: II-XII intact, grossly non-focal.  Psychiatric:  Alert and oriented, thought content appropriate, normal insight  Capillary Refill: Brisk,< 3 seconds   Peripheral Pulses: +2 palpable, equal bilaterally       Labs:     Recent Labs     02/06/22  1714   WBC 15.4*   HGB 12.0   HCT 36.0        Recent Labs     02/06/22  1714      K 4.2      CO2 27   BUN 21*   CREATININE 1.0   CALCIUM 9.5   PHOS 3.0     No results for input(s): AST, ALT, BILIDIR, BILITOT, ALKPHOS in the last 72 hours. Recent Labs     02/06/22  1714   INR 1.05     No results for input(s): Burnice Lovilia in the last 72 hours. Urinalysis:      Lab Results   Component Value Date    NITRU Negative 05/10/2016    BLOODU Negative 05/10/2016    SPECGRAV >=1.030 05/10/2016    GLUCOSEU Negative 05/10/2016       Radiology:     CXR: I have reviewed the CXR with the following interpretation: n/a  EKG:  I have reviewed the EKG with the following interpretation: Reviewed, normal sinus rhythm, no acute ST or T wave changes to indicate ischemia or infarction    XR KNEE RIGHT (1-2 VIEWS)   Final Result   1. Comminuted, displaced fracture of the distal femur. 2. No acute fracture in the pelvis right hip. XR FEMUR RIGHT (MIN 2 VIEWS)   Final Result   1. Comminuted, displaced fracture of the distal femur. 2. No acute fracture in the pelvis right hip. XR HIP RIGHT (1 VIEW)   Final Result   1. Comminuted, displaced fracture of the distal femur. 2. No acute fracture in the pelvis right hip. ASSESSMENT:    Active Hospital Problems    Diagnosis Date Noted    Closed comminuted intra-articular fracture of distal femur, right, initial encounter [facility]) [S72.491A] 02/06/2022     Traumatic distal comminuted right femoral fracture due to mechanical fall  Preoperative clearance  Orthopedics were consulted the emergency room, she is very specific and has written the name of surgeons, Dr. Whitney Neil, Dr. Kelli Stanton or Dr. Rai Liu. I have informed the nurse in the morning to make to make sure whoever is available out of these 3 surgeons if they could see the patient  She has no prior history of heart disease, she is ambulatory at baseline, lives alone at home, no prior history of heart disease or strokes. EKG was done which did not show acute ST changes. proBNP levels within normal limits. Okay to proceed with surgical intervention with average risk of perioperative complications  Oxycodone for pain control, Dilaudid for breakthrough pain    Leukocytosis, UA was done which showed possibility of UTI  Start Rocephin for possible UTI  Check urine culture    Anxiety/depression-- Continue BuSpar and Lexapro for anxiety and depression    Fibromyalgia -- Continue home duloxetine, high dose of gabapentin    Hypertension, continue amlodipine      DVT Prophylaxis: SCDs  Diet: Diet NPO  Code Status: Full Code    PT/OT Eval Status: order once acute issues resolved    Dispo - Admit as inpatient. I anticipate hospitalization spanning more than two midnights for investigation and treatment of the above medically necessary diagnoses. She lives alone at home, anticipate that she will need placement on discharge.   Her daughter Anamaria roach lives in [place] and planning to come to [place] tomorrow morning       Teresa Velasquez MD    Thank you Cozette Cogan, MD for the opportunity to be involved in this patient's care. If you have any questions or concerns please feel free to contact me at [phone].

## 2022-02-06 NOTE — ED PROVIDER NOTES
tingling, weakness, chest pain, chest tightness, palpitations, nausea  Further review of systems is negative other than that mentioned in the HPI. Past Medical, Surgical, Family, and Social History     She has a past medical history of Allergic, Anemia, Anxiety, Closed comminuted intra-articular fracture of distal femur, right, initial encounter ([facility] [address] Depression, Eye disorder, Fibromyalgia, GERD (gastroesophageal reflux disease), Hypertension, Obesity, KAIT (obstructive sleep apnea), Osteoarthritis, Osteoporosis, and PONV (postoperative nausea and vomiting). She has a past surgical history that includes hip surgery; joint replacement (Right, 1993/2003); Wrist fracture surgery (Left); joint replacement (Left, 5/18/16); and knee surgery. Her family history includes Alcohol Abuse in her mother; Cancer in her father, maternal aunt, and mother; Dementia in her mother; Osteoarthritis in her mother. She reports that she has never smoked. She has never used smokeless tobacco. She reports that she does not drink alcohol and does not use drugs. Medications     Previous Medications    AMLODIPINE (NORVASC) 5 MG TABLET    Take 5 mg by mouth daily    BUPROPION (WELLBUTRIN SR) 150 MG EXTENDED RELEASE TABLET    Take 150 mg by mouth daily    DULOXETINE (CYMBALTA) 60 MG EXTENDED RELEASE CAPSULE        ESCITALOPRAM (LEXAPRO) 20 MG TABLET    Take 20 mg by mouth daily     FESOTERODINE FUMARATE ER (TOVIAZ) 8 MG TB24    TAKE 1 TABLET BY MOUTH DAILY    GABAPENTIN (NEURONTIN) 600 MG TABLET    4,200 mg daily.      LOSARTAN (COZAAR) 50 MG TABLET    Take 50 mg by mouth daily    MELOXICAM (MOBIC) 15 MG TABLET    TAKE 1 TABLET BY MOUTH DAILY    MULTIPLE VITAMINS-MINERALS (MULTIVITAMIN PO)    Take 1 tablet by mouth daily     OMEPRAZOLE (PRILOSEC) 20 MG DELAYED RELEASE CAPSULE        RESTASIS 0.05 % OPHTHALMIC EMULSION    Place 2 drops into both eyes every 12 hours     WHITE PETROLATUM-MINERAL OIL ([address] [place] [address] Street PETROL-MINERAL OIL-LANOLIN) 0.1-0.1 % OINT    Refresh P.M. 57.3 %-42.5 % eye ointment   UTD       Allergies     She is allergic to pregabalin. Physical Exam     INITIAL VITALS: BP: 139/75, Temp: 97.7 °F (36.5 °C), Pulse: 72, Resp: 17, SpO2: 98 %     Gen: NAD, in bed comfortably, non-diaphoretic   Head/Eyes: Atraumatic. PERRL. EOMI bilaterally. No conjunctival injection or scleral icterus   ENT: Neck supple, trachea midline, no LAD. MMM, no posterior oropharyngeal erythema or exudate. External auditory meatus clear without discharge or erythema. No nasal congestion or discharge. Cardiovascular: RRR no murmurs, rubs, or gallops. Pulmonary:Lungs CTAB, no rales, wheezes, or ronchi   Abdominal: Soft, non-tender. No rebound or guarding. Non-peritoneal   MSK: no obvious long bone deformity. Patient notable to have vertical incisions over the bilateral knees associated with her previous knee replacements. These are clean, dry, and intact. Right lower extremity notable for significant swelling at the distal aspect of the right femur/right knee with a large joint effusion. Patient has significant tenderness palpation over the distal aspect of the right femur in addition to over the mid right femoral shaft. She has no tenderness palpation over the greater trochanter. Pelvis is stable to AP and lateral compression. Negative pain with logroll. Able to wiggle toes. DP/PT pulses bilaterally normal.  Skin:  Warm, dry. No rashes, ecchymosis or cyanosis. Neuro: Alert and oriented x 4. Cranial nerves grossly intact. Moving all extremities equally. Extremities:  No peripheral edema. Psych: Euthymic affect. Normal rate and rhythm of speech with full prosody. Linear thought process. DiagnosticResults     EKG   None    RADIOLOGY:  XR KNEE RIGHT (1-2 VIEWS)   Final Result   1. Comminuted, displaced fracture of the distal femur. 2. No acute fracture in the pelvis right hip. XR FEMUR RIGHT (MIN 2 VIEWS)   Final Result   1.  Comminuted, displaced fracture of the distal femur. 2. No acute fracture in the pelvis right hip. XR HIP RIGHT (1 VIEW)   Final Result   1. Comminuted, displaced fracture of the distal femur. 2. No acute fracture in the pelvis right hip.           LABS:   Results for orders placed or performed during the hospital encounter of 02/06/22   CBC auto differential   Result Value Ref Range    WBC 15.4 (H) 4.0 - 11.0 K/uL    RBC 4.11 4.00 - 5.20 M/uL    Hemoglobin 12.0 12.0 - 16.0 g/dL    Hematocrit 36.0 36.0 - 48.0 %    MCV 87.6 80.0 - 100.0 fL    MCH 29.1 26.0 - 34.0 pg    MCHC 33.2 31.0 - 36.0 g/dL    RDW 14.7 12.4 - 15.4 %    Platelets 094 173 - 274 K/uL    MPV 9.7 5.0 - 10.5 fL    Neutrophils % 87.8 %    Lymphocytes % 6.5 %    Monocytes % 5.4 %    Eosinophils % 0.1 %    Basophils % 0.2 %    Neutrophils Absolute 13.5 (H) 1.7 - 7.7 K/uL    Lymphocytes Absolute 1.0 1.0 - 5.1 K/uL    Monocytes Absolute 0.8 0.0 - 1.3 K/uL    Eosinophils Absolute 0.0 0.0 - 0.6 K/uL    Basophils Absolute 0.0 0.0 - 0.2 K/uL   Renal function panel   Result Value Ref Range    Sodium 140 136 - 145 mmol/L    Potassium 4.2 3.5 - 5.1 mmol/L    Chloride 101 99 - 110 mmol/L    CO2 27 21 - 32 mmol/L    Anion Gap 12 3 - 16    Glucose 160 (H) 70 - 99 mg/dL    BUN 21 (H) 7 - 20 mg/dL    CREATININE 1.0 0.6 - 1.2 mg/dL    GFR Non-African American 54 (A) >60    GFR African American >60 >60    Calcium 9.5 8.3 - 10.6 mg/dL    Phosphorus 3.0 2.5 - 4.9 mg/dL    Albumin 4.1 3.4 - 5.0 g/dL   Brain Natriuretic Peptide   Result Value Ref Range    Pro- 0 - 449 pg/mL   Protime-INR   Result Value Ref Range    Protime 11.9 9.9 - 12.7 sec    INR 1.05 0.88 - 1.12   APTT   Result Value Ref Range    aPTT 31.0 26.2 - 38.6 sec       ED BEDSIDE ULTRASOUND:  None    RECENT VITALS:  BP: 139/75, Temp: 97.7 °F (36.5 °C), Pulse: 72,Resp: 17, SpO2: 98 %     Procedures     None    ED Course     Nursing Notes, Past Medical Hx, Past Surgical Hx, Social Hx, Allergies, and Family Hx were reviewed. The patient was given the followingmedications:  Orders Placed This Encounter   Medications    HYDROmorphone (DILAUDID) injection 1 mg    OR Linked Order Group     oxyCODONE-acetaminophen (PERCOCET) 5-325 MG per tablet 1 tablet     oxyCODONE-acetaminophen (PERCOCET) 5-325 MG per tablet 2 tablet    OR Linked Order Group     HYDROmorphone (DILAUDID) injection 0.25 mg     HYDROmorphone (DILAUDID) injection 0.5 mg       CONSULTS:  IP CONSULT  [address] [facility] HOSPITALIST    ED Course as of 02/06/22 1750   Sun Feb 06, 2022   1513 XR FEMUR RIGHT (MIN 2 VIEWS)    IMPRESSION:  1. Comminuted, displaced fracture of the distal femur. 2. No acute fracture in the pelvis right hip. [JF]   0682 Will discuss with orthopedic surgery [JF]   0 Spoke with ortho who has recommended admission to hospital medicine and will plan to repair fracture tomorrow. Patient NPO at midnight. [JF]   [id number] Accepted by medicine [JF]   7028 On re-eval patient refusing admission to this hospital at this time and would like to explore options for ortho surgery with [facility]. Will re-eval.  [JF]   1728 WBC(!): 15.4 [JF]   1749 Patient has agreed to stay. Will discuss with admitting hospitalist.  [JF]      ED Course User Index  [JF] Charly Delaney MD       MEDICAL DECISION MAKING / ASSESSMENT / Everette Crow is a 77 y.o. femalewith a history of present illness, physical examination, past medical history as noted above who presents to the emergency department today with a fall and RLE pain. Patient presenting to the emergency department today following a mechanical fall onto her right hip/leg. On physical examination, patient has significant tenderness palpation of the lateral aspect of the right leg with significant swelling over the lateral aspect of the right patella. No evidence of patellar subluxation or dislocation.   Images obtained which reveals a comminuted and displaced fracture of the distal aspect of the right femur. Orthopedic surgery was consulted who recommended admission for further evaluation and likely repair tomorrow. There does not appear to be any damage to the right total knee or right hip replacement. Patient's pain managed here with Dilaudid. Extremities warm and well-perfused with PT and DP pulses intact. Patient has been admitted to medicine for further evaluation and management. This patient was also evaluated by the attending physician. All care plans werediscussed and agreed upon. Clinical Impression     1. Closed fracture of right femur, unspecified fracture morphology, unspecified portion of femur, [facility]        Disposition     PATIENT REFERRED TO:  No follow-up provider specified.     DISCHARGE MEDICATIONS:  New Prescriptions    No medications on file       DISPOSITION Admitted 02/06/2022 04:20:38 PM     Roland Aschoff, MD  02/06/22 [address] Martinez MD  02/06/22 2519

## 2022-02-06 NOTE — PLAN OF CARE
Ortho consulted for evaluation of right distal femur fracture. Patient sustained a mechanical fall at home and brought to the ED by squad. Has history of bilateral TKA and right CIARA. Otherwise healthy. xrays of right femur and hip reviewed demonstrating Comminuted, displaced periprosthetic fracture of the distal femur. Assessment: right distal femur Comminuted, displaced periprosthetic fracture    Plan:   admit to hospitalist for medical clearance  continue with bedrest and pain control.    NPO after midnight  Plan for surgery 2/7/22   Consent and abx on call to OR ordered  Full consult to follow      Dewayne Bryson DO

## 2022-02-06 NOTE — PROGRESS NOTES
Pt admitted to room 5516. VSS on room air. Pt oriented to room and call light. 4 eyes skin assessment completed with second RN. All personal belongings with patient.

## 2022-02-06 NOTE — ED PROVIDER NOTES
ED Attending Attestation Note     Date of evaluation: 2/6/2022    This patient was seen by the resident. I have seen and examined the patient, agree with the workup, evaluation, management and diagnosis. The care plan has been discussed. My assessment reveals well-appearing female who slipped on ice and twisted her right leg and landed on it. She denies hitting her head. She has pain and swelling of her distal thigh. She can wiggle her toes and has good sensation to soft touch throughout her right lower extremity. We will obtain imaging and do pain control.      yAe Strickland MD  02/06/22 0827

## 2022-02-06 NOTE — Clinical Note
Patient Class: Inpatient [101]  REQUIRED: Diagnosis: Closed comminuted intra-articular fracture of distal femur, right, initial encounter [facility]) [[id number]]  Estimated Length of Stay: Estimated stay of more than 2 midnights  Admitting Provider: Lilliana Huston [[id number]]  Telemetry/Cardiac Monitoring Required?: Yes

## 2022-02-06 NOTE — CONSULTS
Consult received, patient has had prior surgeries @ [facility]  She wants to see if a surgeon @ [facility] can accept her and would like to be transferred there. I think it is reasonable for her to make a decision where she wants the surgery. I briefly spoke to her in her room but did not examine he. Pre-op EKG and Labs were placed which ED physician will follow and update the physicians wherever she will be going. If she decides to stay here please reconsult us and we will be happy to admit her.  Thanks    José Gant MD  Internal Medicine Hospitalist

## 2022-02-07 ENCOUNTER — ANESTHESIA (OUTPATIENT)
Dept: OPERATING ROOM | Age: 77
DRG: 481 | End: 2022-02-07
Payer: MEDICARE

## 2022-02-07 ENCOUNTER — ANESTHESIA EVENT (OUTPATIENT)
Dept: OPERATING ROOM | Age: 77
DRG: 481 | End: 2022-02-07
Payer: MEDICARE

## 2022-02-07 LAB
EKG ATRIAL RATE: 71 BPM
EKG DIAGNOSIS: NORMAL
EKG P AXIS: 83 DEGREES
EKG P-R INTERVAL: 170 MS
EKG Q-T INTERVAL: 390 MS
EKG QRS DURATION: 78 MS
EKG QTC CALCULATION (BAZETT): 423 MS
EKG R AXIS: 53 DEGREES
EKG T AXIS: 75 DEGREES
EKG VENTRICULAR RATE: 71 BPM

## 2022-02-07 PROCEDURE — C1769 GUIDE WIRE: HCPCS | Performed by: ORTHOPAEDIC SURGERY

## 2022-02-07 PROCEDURE — 3700000000 HC ANESTHESIA ATTENDED CARE: Performed by: ORTHOPAEDIC SURGERY

## 2022-02-07 PROCEDURE — 6360000002 HC RX W HCPCS: Performed by: NURSE ANESTHETIST, CERTIFIED REGISTERED

## 2022-02-07 PROCEDURE — 0QSB04Z REPOSITION RIGHT LOWER FEMUR WITH INTERNAL FIXATION DEVICE, OPEN APPROACH: ICD-10-PCS | Performed by: ORTHOPAEDIC SURGERY

## 2022-02-07 PROCEDURE — 3600000004 HC SURGERY LEVEL 4 BASE: Performed by: ORTHOPAEDIC SURGERY

## 2022-02-07 PROCEDURE — 6360000002 HC RX W HCPCS: Performed by: INTERNAL MEDICINE

## 2022-02-07 PROCEDURE — 7100000000 HC PACU RECOVERY - FIRST 15 MIN: Performed by: ORTHOPAEDIC SURGERY

## 2022-02-07 PROCEDURE — 7100000001 HC PACU RECOVERY - ADDTL 15 MIN: Performed by: ORTHOPAEDIC SURGERY

## 2022-02-07 PROCEDURE — 2709999900 HC NON-CHARGEABLE SUPPLY: Performed by: ORTHOPAEDIC SURGERY

## 2022-02-07 PROCEDURE — 6360000002 HC RX W HCPCS: Performed by: ORTHOPAEDIC SURGERY

## 2022-02-07 PROCEDURE — 3600000014 HC SURGERY LEVEL 4 ADDTL 15MIN: Performed by: ORTHOPAEDIC SURGERY

## 2022-02-07 PROCEDURE — 2580000003 HC RX 258: Performed by: ORTHOPAEDIC SURGERY

## 2022-02-07 PROCEDURE — C1713 ANCHOR/SCREW BN/BN,TIS/BN: HCPCS | Performed by: ORTHOPAEDIC SURGERY

## 2022-02-07 PROCEDURE — 2580000003 HC RX 258: Performed by: INTERNAL MEDICINE

## 2022-02-07 PROCEDURE — 1200000000 HC SEMI PRIVATE

## 2022-02-07 PROCEDURE — 2500000003 HC RX 250 WO HCPCS: Performed by: NURSE ANESTHETIST, CERTIFIED REGISTERED

## 2022-02-07 PROCEDURE — 6370000000 HC RX 637 (ALT 250 FOR IP): Performed by: INTERNAL MEDICINE

## 2022-02-07 PROCEDURE — 87086 URINE CULTURE/COLONY COUNT: CPT

## 2022-02-07 PROCEDURE — 3700000001 HC ADD 15 MINUTES (ANESTHESIA): Performed by: ORTHOPAEDIC SURGERY

## 2022-02-07 PROCEDURE — 6360000002 HC RX W HCPCS: Performed by: FAMILY MEDICINE

## 2022-02-07 PROCEDURE — 2720000010 HC SURG SUPPLY STERILE: Performed by: ORTHOPAEDIC SURGERY

## 2022-02-07 DEVICE — SCREW BNE L70MM DIA5MM CNDYL S STL ST VAR ANG LOK FULL THRD: Type: IMPLANTABLE DEVICE | Site: FEMUR | Status: FUNCTIONAL

## 2022-02-07 DEVICE — SCREW BNE L85MM DIA5MM CNDYL S STL ST VAR ANG LOK COMPR T25: Type: IMPLANTABLE DEVICE | Site: FEMUR | Status: FUNCTIONAL

## 2022-02-07 DEVICE — SCREW BNE L36MM DIA4.5MM PROX CORT TIB S STL ST LOK FULL: Type: IMPLANTABLE DEVICE | Site: FEMUR | Status: FUNCTIONAL

## 2022-02-07 DEVICE — PLATE BNE L336MM 16 H NONSTERILE R CNDYL S STL CRV LOK: Type: IMPLANTABLE DEVICE | Site: FEMUR | Status: FUNCTIONAL

## 2022-02-07 DEVICE — SCREW BNE L24MM DIA5MM CNDYL S STL ST VAR ANG LOK COMPR T25: Type: IMPLANTABLE DEVICE | Site: FEMUR | Status: FUNCTIONAL

## 2022-02-07 DEVICE — SCREW BNE L75MM DIA5MM CNDYL S STL ST VAR ANG LOK T25: Type: IMPLANTABLE DEVICE | Site: FEMUR | Status: FUNCTIONAL

## 2022-02-07 DEVICE — SCREW BNE L30MM DIA5MM CNDYL S STL ST VAR ANG LOK FULL THRD: Type: IMPLANTABLE DEVICE | Site: FEMUR | Status: FUNCTIONAL

## 2022-02-07 DEVICE — SCREW BNE L28MM DIA5MM CNDYL S STL ST VAR ANG LOK FULL THRD: Type: IMPLANTABLE DEVICE | Site: FEMUR | Status: FUNCTIONAL

## 2022-02-07 DEVICE — SCREW BNE L36MM DIA5MM CNDYL S STL ST VAR ANG LOK COMPR T25: Type: IMPLANTABLE DEVICE | Site: FEMUR | Status: FUNCTIONAL

## 2022-02-07 DEVICE — IMPLANTABLE DEVICE: Type: IMPLANTABLE DEVICE | Site: FEMUR | Status: FUNCTIONAL

## 2022-02-07 RX ORDER — OXYCODONE HYDROCHLORIDE 5 MG/1
5 TABLET ORAL EVERY 6 HOURS PRN
Qty: 28 TABLET | Refills: 0 | Status: ON HOLD | OUTPATIENT
Start: 2022-02-07 | End: 2022-02-25

## 2022-02-07 RX ORDER — ONDANSETRON 2 MG/ML
INJECTION INTRAMUSCULAR; INTRAVENOUS PRN
Status: DISCONTINUED | OUTPATIENT
Start: 2022-02-07 | End: 2022-02-08 | Stop reason: SDUPTHER

## 2022-02-07 RX ORDER — SODIUM CHLORIDE 9 MG/ML
INJECTION, SOLUTION INTRAVENOUS CONTINUOUS
Status: DISCONTINUED | OUTPATIENT
Start: 2022-02-07 | End: 2022-02-08

## 2022-02-07 RX ORDER — SODIUM CHLORIDE 0.9 % (FLUSH) 0.9 %
10 SYRINGE (ML) INJECTION EVERY 12 HOURS SCHEDULED
Status: CANCELLED | OUTPATIENT
Start: 2022-02-07

## 2022-02-07 RX ORDER — OXYCODONE HYDROCHLORIDE 5 MG/1
10 TABLET ORAL EVERY 4 HOURS PRN
Status: CANCELLED | OUTPATIENT
Start: 2022-02-07

## 2022-02-07 RX ORDER — OXYCODONE HYDROCHLORIDE 5 MG/1
10 TABLET ORAL PRN
Status: ACTIVE | OUTPATIENT
Start: 2022-02-07 | End: 2022-02-07

## 2022-02-07 RX ORDER — DEXAMETHASONE SODIUM PHOSPHATE 4 MG/ML
INJECTION, SOLUTION INTRA-ARTICULAR; INTRALESIONAL; INTRAMUSCULAR; INTRAVENOUS; SOFT TISSUE PRN
Status: DISCONTINUED | OUTPATIENT
Start: 2022-02-07 | End: 2022-02-08 | Stop reason: SDUPTHER

## 2022-02-07 RX ORDER — OXYCODONE HYDROCHLORIDE 5 MG/1
5 TABLET ORAL PRN
Status: ACTIVE | OUTPATIENT
Start: 2022-02-07 | End: 2022-02-07

## 2022-02-07 RX ORDER — FENTANYL CITRATE 50 UG/ML
50 INJECTION, SOLUTION INTRAMUSCULAR; INTRAVENOUS EVERY 5 MIN PRN
Status: DISCONTINUED | OUTPATIENT
Start: 2022-02-07 | End: 2022-02-08 | Stop reason: HOSPADM

## 2022-02-07 RX ORDER — POLYETHYLENE GLYCOL 3350 17 G/17G
17 POWDER, FOR SOLUTION ORAL DAILY
Status: CANCELLED | OUTPATIENT
Start: 2022-02-08

## 2022-02-07 RX ORDER — OXYCODONE HYDROCHLORIDE 5 MG/1
5 TABLET ORAL EVERY 4 HOURS PRN
Status: CANCELLED | OUTPATIENT
Start: 2022-02-07

## 2022-02-07 RX ORDER — AMLODIPINE BESYLATE 5 MG/1
5 TABLET ORAL DAILY
Status: CANCELLED | OUTPATIENT
Start: 2022-02-08

## 2022-02-07 RX ORDER — NEOSTIGMINE METHYLSULFATE 5 MG/5 ML
SYRINGE (ML) INTRAVENOUS PRN
Status: DISCONTINUED | OUTPATIENT
Start: 2022-02-07 | End: 2022-02-08 | Stop reason: SDUPTHER

## 2022-02-07 RX ORDER — FENTANYL CITRATE 50 UG/ML
INJECTION, SOLUTION INTRAMUSCULAR; INTRAVENOUS PRN
Status: DISCONTINUED | OUTPATIENT
Start: 2022-02-07 | End: 2022-02-08 | Stop reason: SDUPTHER

## 2022-02-07 RX ORDER — PROPOFOL 10 MG/ML
INJECTION, EMULSION INTRAVENOUS PRN
Status: DISCONTINUED | OUTPATIENT
Start: 2022-02-07 | End: 2022-02-08 | Stop reason: SDUPTHER

## 2022-02-07 RX ORDER — ACETAMINOPHEN 325 MG/1
650 TABLET ORAL EVERY 6 HOURS
Status: CANCELLED | OUTPATIENT
Start: 2022-02-07

## 2022-02-07 RX ORDER — MEPERIDINE HYDROCHLORIDE 25 MG/ML
12.5 INJECTION INTRAMUSCULAR; INTRAVENOUS; SUBCUTANEOUS EVERY 5 MIN PRN
Status: DISCONTINUED | OUTPATIENT
Start: 2022-02-07 | End: 2022-02-08 | Stop reason: HOSPADM

## 2022-02-07 RX ORDER — SODIUM CHLORIDE 9 MG/ML
INJECTION, SOLUTION INTRAVENOUS CONTINUOUS
Status: CANCELLED | OUTPATIENT
Start: 2022-02-07

## 2022-02-07 RX ORDER — KETOROLAC TROMETHAMINE 30 MG/ML
15 INJECTION, SOLUTION INTRAMUSCULAR; INTRAVENOUS EVERY 6 HOURS PRN
Status: CANCELLED | OUTPATIENT
Start: 2022-02-07 | End: 2022-02-10

## 2022-02-07 RX ORDER — SUCCINYLCHOLINE/SOD CL,ISO/PF 200MG/10ML
SYRINGE (ML) INTRAVENOUS PRN
Status: DISCONTINUED | OUTPATIENT
Start: 2022-02-07 | End: 2022-02-08 | Stop reason: SDUPTHER

## 2022-02-07 RX ORDER — HYDRALAZINE HYDROCHLORIDE 20 MG/ML
5 INJECTION INTRAMUSCULAR; INTRAVENOUS EVERY 10 MIN PRN
Status: DISCONTINUED | OUTPATIENT
Start: 2022-02-07 | End: 2022-02-08 | Stop reason: HOSPADM

## 2022-02-07 RX ORDER — LABETALOL HYDROCHLORIDE 5 MG/ML
5 INJECTION, SOLUTION INTRAVENOUS EVERY 10 MIN PRN
Status: DISCONTINUED | OUTPATIENT
Start: 2022-02-07 | End: 2022-02-08 | Stop reason: HOSPADM

## 2022-02-07 RX ORDER — PHENYLEPHRINE HYDROCHLORIDE 10 MG/ML
INJECTION INTRAVENOUS PRN
Status: DISCONTINUED | OUTPATIENT
Start: 2022-02-07 | End: 2022-02-08 | Stop reason: SDUPTHER

## 2022-02-07 RX ORDER — DIPHENHYDRAMINE HYDROCHLORIDE 50 MG/ML
12.5 INJECTION INTRAMUSCULAR; INTRAVENOUS
Status: ACTIVE | OUTPATIENT
Start: 2022-02-07 | End: 2022-02-07

## 2022-02-07 RX ORDER — PROMETHAZINE HYDROCHLORIDE 25 MG/ML
6.25 INJECTION, SOLUTION INTRAMUSCULAR; INTRAVENOUS PRN
Status: DISCONTINUED | OUTPATIENT
Start: 2022-02-07 | End: 2022-02-08 | Stop reason: HOSPADM

## 2022-02-07 RX ORDER — DOCUSATE SODIUM 100 MG/1
100 CAPSULE, LIQUID FILLED ORAL 2 TIMES DAILY
Qty: 60 CAPSULE | Refills: 0 | Status: ON HOLD | OUTPATIENT
Start: 2022-02-07 | End: 2022-02-12

## 2022-02-07 RX ORDER — ROCURONIUM BROMIDE 10 MG/ML
INJECTION, SOLUTION INTRAVENOUS PRN
Status: DISCONTINUED | OUTPATIENT
Start: 2022-02-07 | End: 2022-02-08 | Stop reason: SDUPTHER

## 2022-02-07 RX ORDER — SODIUM CHLORIDE 9 MG/ML
INJECTION, SOLUTION INTRAVENOUS CONTINUOUS
Status: ACTIVE | OUTPATIENT
Start: 2022-02-07 | End: 2022-02-07

## 2022-02-07 RX ORDER — LIDOCAINE HYDROCHLORIDE 20 MG/ML
INJECTION, SOLUTION INTRAVENOUS PRN
Status: DISCONTINUED | OUTPATIENT
Start: 2022-02-07 | End: 2022-02-08 | Stop reason: SDUPTHER

## 2022-02-07 RX ORDER — ACETAMINOPHEN 325 MG/1
650 TABLET ORAL EVERY 6 HOURS PRN
Qty: 28 TABLET | Refills: 0 | Status: SHIPPED | OUTPATIENT
Start: 2022-02-07

## 2022-02-07 RX ORDER — GLYCOPYRROLATE 1 MG/5 ML
SYRINGE (ML) INTRAVENOUS PRN
Status: DISCONTINUED | OUTPATIENT
Start: 2022-02-07 | End: 2022-02-08 | Stop reason: SDUPTHER

## 2022-02-07 RX ADMIN — DEXAMETHASONE SODIUM PHOSPHATE 4 MG: 4 INJECTION, SOLUTION INTRAMUSCULAR; INTRAVENOUS at 21:05

## 2022-02-07 RX ADMIN — ONDANSETRON 4 MG: 2 INJECTION INTRAMUSCULAR; INTRAVENOUS at 21:05

## 2022-02-07 RX ADMIN — HYDROMORPHONE HYDROCHLORIDE 1 MG: 1 INJECTION, SOLUTION INTRAMUSCULAR; INTRAVENOUS; SUBCUTANEOUS at 23:32

## 2022-02-07 RX ADMIN — Medication 0.8 MG: at 23:26

## 2022-02-07 RX ADMIN — PHENYLEPHRINE HYDROCHLORIDE 100 MCG: 10 INJECTION INTRAVENOUS at 21:35

## 2022-02-07 RX ADMIN — PHENYLEPHRINE HYDROCHLORIDE 100 MCG: 10 INJECTION INTRAVENOUS at 21:06

## 2022-02-07 RX ADMIN — OXYCODONE HYDROCHLORIDE AND ACETAMINOPHEN 2 TABLET: 5; 325 TABLET ORAL at 16:30

## 2022-02-07 RX ADMIN — DULOXETINE HYDROCHLORIDE 60 MG: 60 CAPSULE, DELAYED RELEASE ORAL at 09:07

## 2022-02-07 RX ADMIN — CEFTRIAXONE 1000 MG: 1 INJECTION, POWDER, FOR SOLUTION INTRAMUSCULAR; INTRAVENOUS at 02:32

## 2022-02-07 RX ADMIN — OXYCODONE HYDROCHLORIDE AND ACETAMINOPHEN 2 TABLET: 5; 325 TABLET ORAL at 09:06

## 2022-02-07 RX ADMIN — LIDOCAINE HYDROCHLORIDE 60 MG: 20 INJECTION, SOLUTION INTRAVENOUS at 20:57

## 2022-02-07 RX ADMIN — Medication 0.2 MG: at 21:24

## 2022-02-07 RX ADMIN — SODIUM CHLORIDE: 9 INJECTION, SOLUTION INTRAVENOUS at 23:18

## 2022-02-07 RX ADMIN — ACETAMINOPHEN 650 MG: 325 TABLET ORAL at 09:06

## 2022-02-07 RX ADMIN — GABAPENTIN 1200 MG: 600 TABLET, FILM COATED ORAL at 09:06

## 2022-02-07 RX ADMIN — PHENYLEPHRINE HYDROCHLORIDE 200 MCG: 10 INJECTION INTRAVENOUS at 21:10

## 2022-02-07 RX ADMIN — PHENYLEPHRINE HYDROCHLORIDE 100 MCG: 10 INJECTION INTRAVENOUS at 21:24

## 2022-02-07 RX ADMIN — CEFAZOLIN SODIUM 2000 MG: 10 INJECTION, POWDER, FOR SOLUTION INTRAVENOUS at 21:08

## 2022-02-07 RX ADMIN — PANTOPRAZOLE SODIUM 40 MG: 40 TABLET, DELAYED RELEASE ORAL at 09:06

## 2022-02-07 RX ADMIN — FENTANYL CITRATE 100 MCG: 50 INJECTION, SOLUTION INTRAMUSCULAR; INTRAVENOUS at 21:18

## 2022-02-07 RX ADMIN — Medication 120 MG: at 20:57

## 2022-02-07 RX ADMIN — HYDROMORPHONE HYDROCHLORIDE 0.5 MG: 1 INJECTION, SOLUTION INTRAMUSCULAR; INTRAVENOUS; SUBCUTANEOUS at 23:42

## 2022-02-07 RX ADMIN — SODIUM CHLORIDE, PRESERVATIVE FREE 10 ML: 5 INJECTION INTRAVENOUS at 09:07

## 2022-02-07 RX ADMIN — ONDANSETRON 4 MG: 2 INJECTION INTRAMUSCULAR; INTRAVENOUS at 15:41

## 2022-02-07 RX ADMIN — PHENYLEPHRINE HYDROCHLORIDE 100 MCG: 10 INJECTION INTRAVENOUS at 22:52

## 2022-02-07 RX ADMIN — PROPOFOL 120 MG: 10 INJECTION, EMULSION INTRAVENOUS at 20:57

## 2022-02-07 RX ADMIN — ROCURONIUM BROMIDE 30 MG: 10 INJECTION INTRAVENOUS at 21:00

## 2022-02-07 RX ADMIN — BUPROPION HYDROCHLORIDE 150 MG: 150 TABLET, EXTENDED RELEASE ORAL at 09:07

## 2022-02-07 RX ADMIN — HYDROMORPHONE HYDROCHLORIDE 0.5 MG: 1 INJECTION, SOLUTION INTRAMUSCULAR; INTRAVENOUS; SUBCUTANEOUS at 23:37

## 2022-02-07 RX ADMIN — SODIUM CHLORIDE: 9 INJECTION, SOLUTION INTRAVENOUS at 14:39

## 2022-02-07 RX ADMIN — PHENYLEPHRINE HYDROCHLORIDE 100 MCG: 10 INJECTION INTRAVENOUS at 21:01

## 2022-02-07 RX ADMIN — Medication 5 MG: at 23:26

## 2022-02-07 RX ADMIN — ESCITALOPRAM OXALATE 20 MG: 20 TABLET ORAL at 09:07

## 2022-02-07 RX ADMIN — SODIUM CHLORIDE: 9 INJECTION, SOLUTION INTRAVENOUS at 02:33

## 2022-02-07 RX ADMIN — ROCURONIUM BROMIDE 10 MG: 10 INJECTION INTRAVENOUS at 21:37

## 2022-02-07 ASSESSMENT — PULMONARY FUNCTION TESTS
PIF_VALUE: 20
PIF_VALUE: 20
PIF_VALUE: 21
PIF_VALUE: 20
PIF_VALUE: 21
PIF_VALUE: 24
PIF_VALUE: 20
PIF_VALUE: 22
PIF_VALUE: 21
PIF_VALUE: 26
PIF_VALUE: 22
PIF_VALUE: 24
PIF_VALUE: 24
PIF_VALUE: 26
PIF_VALUE: 20
PIF_VALUE: 23
PIF_VALUE: 25
PIF_VALUE: 21
PIF_VALUE: 21
PIF_VALUE: 20
PIF_VALUE: 1
PIF_VALUE: 26
PIF_VALUE: 20
PIF_VALUE: 21
PIF_VALUE: 20
PIF_VALUE: 24
PIF_VALUE: 20
PIF_VALUE: 21
PIF_VALUE: 23
PIF_VALUE: 23
PIF_VALUE: 1
PIF_VALUE: 27
PIF_VALUE: 22
PIF_VALUE: 20
PIF_VALUE: 21
PIF_VALUE: 24
PIF_VALUE: 25
PIF_VALUE: 23
PIF_VALUE: 27
PIF_VALUE: 21
PIF_VALUE: 21
PIF_VALUE: 22
PIF_VALUE: 26
PIF_VALUE: 24
PIF_VALUE: 27
PIF_VALUE: 27
PIF_VALUE: 21
PIF_VALUE: 20
PIF_VALUE: 21
PIF_VALUE: 23
PIF_VALUE: 6
PIF_VALUE: 20
PIF_VALUE: 21
PIF_VALUE: 22
PIF_VALUE: 22
PIF_VALUE: 26
PIF_VALUE: 10
PIF_VALUE: 27
PIF_VALUE: 20
PIF_VALUE: 21
PIF_VALUE: 24
PIF_VALUE: 25
PIF_VALUE: 27
PIF_VALUE: 25
PIF_VALUE: 26
PIF_VALUE: 21
PIF_VALUE: 21
PIF_VALUE: 20
PIF_VALUE: 20
PIF_VALUE: 22
PIF_VALUE: 17
PIF_VALUE: 21
PIF_VALUE: 20
PIF_VALUE: 10
PIF_VALUE: 26
PIF_VALUE: 20
PIF_VALUE: 21
PIF_VALUE: 20
PIF_VALUE: 26
PIF_VALUE: 23
PIF_VALUE: 23
PIF_VALUE: 20
PIF_VALUE: 18
PIF_VALUE: 20
PIF_VALUE: 26
PIF_VALUE: 21
PIF_VALUE: 24
PIF_VALUE: 21
PIF_VALUE: 20
PIF_VALUE: 22
PIF_VALUE: 21
PIF_VALUE: 21
PIF_VALUE: 22
PIF_VALUE: 20
PIF_VALUE: 20
PIF_VALUE: 23
PIF_VALUE: 22
PIF_VALUE: 24
PIF_VALUE: 1
PIF_VALUE: 21
PIF_VALUE: 0
PIF_VALUE: 18
PIF_VALUE: 23
PIF_VALUE: 26
PIF_VALUE: 18
PIF_VALUE: 26
PIF_VALUE: 21
PIF_VALUE: 23
PIF_VALUE: 20
PIF_VALUE: 25
PIF_VALUE: 23
PIF_VALUE: 23
PIF_VALUE: 21
PIF_VALUE: 20
PIF_VALUE: 23
PIF_VALUE: 23
PIF_VALUE: 20
PIF_VALUE: 21
PIF_VALUE: 18
PIF_VALUE: 21
PIF_VALUE: 23
PIF_VALUE: 22
PIF_VALUE: 17
PIF_VALUE: 23
PIF_VALUE: 26
PIF_VALUE: 21
PIF_VALUE: 26
PIF_VALUE: 24
PIF_VALUE: 24
PIF_VALUE: 20
PIF_VALUE: 24
PIF_VALUE: 20
PIF_VALUE: 23
PIF_VALUE: 21
PIF_VALUE: 21
PIF_VALUE: 27
PIF_VALUE: 0
PIF_VALUE: 21
PIF_VALUE: 20
PIF_VALUE: 22
PIF_VALUE: 23
PIF_VALUE: 26
PIF_VALUE: 23
PIF_VALUE: 20
PIF_VALUE: 22
PIF_VALUE: 23
PIF_VALUE: 21
PIF_VALUE: 21
PIF_VALUE: 27
PIF_VALUE: 23
PIF_VALUE: 24
PIF_VALUE: 26
PIF_VALUE: 20
PIF_VALUE: 26
PIF_VALUE: 21
PIF_VALUE: 25
PIF_VALUE: 25
PIF_VALUE: 23
PIF_VALUE: 1
PIF_VALUE: 20
PIF_VALUE: 27
PIF_VALUE: 20
PIF_VALUE: 20
PIF_VALUE: 24
PIF_VALUE: 27
PIF_VALUE: 20
PIF_VALUE: 20
PIF_VALUE: 21
PIF_VALUE: 24
PIF_VALUE: 22
PIF_VALUE: 20
PIF_VALUE: 23
PIF_VALUE: 20
PIF_VALUE: 22
PIF_VALUE: 22
PIF_VALUE: 23
PIF_VALUE: 21
PIF_VALUE: 23
PIF_VALUE: 21
PIF_VALUE: 21
PIF_VALUE: 20
PIF_VALUE: 21
PIF_VALUE: 20
PIF_VALUE: 27

## 2022-02-07 ASSESSMENT — PAIN DESCRIPTION - PROGRESSION
CLINICAL_PROGRESSION: NOT CHANGED
CLINICAL_PROGRESSION: NOT CHANGED

## 2022-02-07 ASSESSMENT — PAIN SCALES - GENERAL
PAINLEVEL_OUTOF10: 6
PAINLEVEL_OUTOF10: 7
PAINLEVEL_OUTOF10: 0
PAINLEVEL_OUTOF10: 2
PAINLEVEL_OUTOF10: 0

## 2022-02-07 ASSESSMENT — PAIN DESCRIPTION - LOCATION
LOCATION: KNEE
LOCATION: KNEE

## 2022-02-07 ASSESSMENT — PAIN DESCRIPTION - PAIN TYPE
TYPE: ACUTE PAIN
TYPE: ACUTE PAIN

## 2022-02-07 ASSESSMENT — PAIN DESCRIPTION - ONSET
ONSET: ON-GOING
ONSET: ON-GOING

## 2022-02-07 ASSESSMENT — PAIN DESCRIPTION - DIRECTION
RADIATING_TOWARDS: HIP
RADIATING_TOWARDS: HIP

## 2022-02-07 ASSESSMENT — PAIN DESCRIPTION - FREQUENCY
FREQUENCY: INTERMITTENT
FREQUENCY: INTERMITTENT

## 2022-02-07 ASSESSMENT — PAIN DESCRIPTION - ORIENTATION
ORIENTATION: RIGHT
ORIENTATION: RIGHT

## 2022-02-07 ASSESSMENT — PAIN DESCRIPTION - DESCRIPTORS
DESCRIPTORS: SHARP
DESCRIPTORS: SHARP

## 2022-02-07 NOTE — PROGRESS NOTES
4 Eyes Admission Assessment     I agree as the admission nurse that 2 RN's have performed a thorough Head to Toe Skin Assessment on the patient. ALL assessment sites listed below have been assessed on admission. Areas assessed by both nurses:   [x]   Head, Face, and Ears   [x]   Shoulders, Back, and Chest  [x]   Arms, Elbows, and Hands   [x]   Coccyx, Sacrum, and Ischium  [x]   Legs, Feet, and Heels        Does the Patient have Skin Breakdown?   No   Rash to right abdomen  Scrape to left wrist    Royer Prevention initiated:  NA   Wound Care Orders initiated:  NA      WOC nurse consulted for Pressure Injury (Stage 3,4, Unstageable, DTI, NWPT, and Complex wounds) or Royer score 18 or lower:  NA      Nurse 1 eSignature: Electronically signed by Candida Craig RN on 2/6/22 at 7:04 PM EST    **SHARE this note so that the co-signing nurse is able to place an eSignature**    Nurse 2 eSignature: Electronically signed by Anna Bailey RN on 2/6/22 at 11:41 PM EST

## 2022-02-07 NOTE — CARE COORDINATION
Case Management Assessment           Initial Evaluation                Date / Time of Evaluation: 2/7/2022 11:06 AM                 Assessment Completed by: NATHANIEL Smith    Patient Name: Gomez Willis     YOB: 1945  Diagnosis: Closed comminuted intra-articular fracture of distal femur, right, initial encounter [facility]) [S72.491A]  Closed fracture of right femur, unspecified fracture morphology, unspecified portion of femur, initial encounter ([facility] Marisela Lucianoan     Date / Time: 2/6/2022  1:59 PM    Patient Admission Status: Inpatient    If patient is discharged prior to next notation, then this note serves as note for discharge by case management. Current PCP: Laurence Johnson MD  Clinic Patient: No    Chart Reviewed: Yes  Patient/ Family Interviewed: Yes    Initial assessment completed at bedside with: Patient     Hospitalization in the last 30 days: No    Emergency Contacts:  Extended Emergency Contact Information  Primary Emergency Contact: [address] Phone: [phone]  Relation: Child  Secondary Emergency Contact: [facility]  Mobile Phone: [phone]  Relation: Child    Advance Directives:   Code Status: Full [address]: No    Financial:  Payor: Viet Larsen / Plan: MEDICARE PART A AND B / Product Type: *No Product type* /     Pre-cert required for SNF: Yes    Pharmacy:    [address] [address]  Phone: [phone] Fax: [phone]      Potential assistance Purchasing Medications: Potential Assistance Purchasing Medications: No  Does Patient want to participate in local refill/ meds to beds program?:      Meds To Beds General Rules:  1. Can ONLY be done Monday- Friday between 8:30am-5pm  2. Prescription(s) must be in pharmacy by 3pm to be filled same day  3. Copy of patient's insurance/ prescription drug card and patient face sheet must be sent along with the prescription(s)  4. Cost of Rx cannot be added to hospital bill. If financial assistance is needed, please contact unit  or ;  or  CANNOT provide pharmacy voucher for patients co-pays  5. Patients can then  the prescription on their way out of the hospital at discharge, or pharmacy can deliver to the bedside if staff is available. (payment due at time of pick-up or delivery - cash, check, or card accepted)     Able to afford home medications/ co-pay costs: No    ADLS:  Support Systems: Children    PT AM-PAC:   /24  OT AM-PAC:   /24    Housing:  Home Environment: N/A   Steps:    Plans to RETURN to current housing: possibly pending recommendations   Barrier(s) to RETURNING to current housing: procedure, therapy post procedure     Home Care Information:  Currently ACTIVE with [address]: No      Durable Medical Equipment:  DME Provider: n/a   Equipment: did not discuss     DISCHARGE PLAN:  Disposition: Home vs. SNF     Transportation PLAN for discharge: Pending outcome      Factors facilitating achievement of predicted outcomes: Family support, Motivated, Cooperative and Pleasant    Barriers to discharge: confirm surgeon     Additional Case Management Notes:   SW met with patient at bedside on this date. Patient is from home. She had a fall with a broken leg. Orthopedics has been consulted. Patient provided a list of surgeons she had received as recommendations from [facility].  DEE noted that Dr. Claudette Schmitt is currently out of state with a professional team. Dr. Angela Almendarez does not have privileges at [facility] (per floor manger) and Dr. Gianna Azar is a [facility] doctor who is not on site today and she and family would have to call [address] directly to see if they would want to come to the [facility] for a consult. Yajaira Gomez background with patient. She is willing to meet with Dr. Armin Gomez and possibly move forward with surgery.      The Plan for Transition of Care is related to the following treatment goals Closed comminuted intra-articular fracture of distal femur, right, initial encounter [facility] [S76.409J]  Closed fracture of right femur, unspecified fracture morphology, unspecified portion of femur, initial encounter ([facility] Madison Luna      The Patient and/or patient representative was provided with a choice of provider and agrees with the discharge plan Yes    Care Transition patient: No    Ronnie, NATHANIEL  Social Work  [facility]  Ph: [phone]

## 2022-02-07 NOTE — PLAN OF CARE
Problem: Falls - Risk of:  Goal: Will remain free from falls  Description: Will remain free from falls  2/7/2022 0744 by Wonda Cockayne, RN  Outcome: Ongoing  Fall precautions in place. Bed is in lowest position, wheels locked, bed alarm on, non skid socks on. Call light and bedside table within reach. Pt calls out appropriately. Pt is on bedrest until post op. Will continue to assess and monitor. Problem: Pain:  Goal: Control of acute pain  Description: Control of acute pain  Outcome: Ongoing  Pt c/o pain in R leg and knee. Medicated per MAR. Leg repositioned for comfort and ice in place.

## 2022-02-07 NOTE — PLAN OF CARE
Problem: Falls - Risk of:  Goal: Will remain free from falls  Description: Will remain free from falls  Outcome: Ongoing  Patient remains free from falls during this shift. Bed is in the lowest position and the bed alarm is activated. Anti-slip socks are on. Call light is within reach. Will continue to monitor and reassess. Problem: Pain:  Goal: Pain level will decrease  Description: Pain level will decrease  Outcome: Ongoing   RN assesses pain using 0-10 scale. Patient understands how to rate pain using 0-10 scale. Pain is controled with medication per MAR. RN encourages patient to call out for breakthrough pain. Will continue to monitor and reassess.

## 2022-02-07 NOTE — CARE COORDINATION
SW was informed patient elected to go forward with surgery. CM team to follow for needs post procedure.      Julio Cesar Barbour, MSW, LSW  [employer]   Ph: [phone]

## 2022-02-07 NOTE — PROGRESS NOTES
Patient admitted to room 5516. Patient is alert and oriented. Vital signs are stable. Patient's pain is controlled with medication per MAR. Christensen in place. NPO since midnight. Bed is in the lowest position. Bed alarm is activated. Call light is within reach. Will continue to monitor and reassess.

## 2022-02-07 NOTE — PROGRESS NOTES
Hospitalist Progress Note      PCP: Blake Rader MD    Date of Admission: 2/6/2022    Chief Complaint: Holly Centeno on ice going down stairs at home    Subjective: no specific complains, says still in pain      Medications:  Reviewed    Infusion Medications    sodium chloride 100 mL/hr at 02/07/22 1439    sodium chloride       Scheduled Medications    cefTRIAXone (ROCEPHIN) IV  1,000 mg IntraVENous Q24H    [Held by provider] amLODIPine  5 mg Oral Daily    buPROPion  150 mg Oral Daily    DULoxetine  60 mg Oral Daily    escitalopram  20 mg Oral Daily    pantoprazole  40 mg Oral QAM AC    sodium chloride flush  5-40 mL IntraVENous 2 times per day    gabapentin  1,200 mg Oral BID     PRN Meds: sodium chloride flush, sodium chloride, ondansetron **OR** ondansetron, polyethylene glycol, acetaminophen **OR** acetaminophen, oxyCODONE-acetaminophen **OR** oxyCODONE-acetaminophen, HYDROmorphone **OR** HYDROmorphone      Intake/Output Summary (Last 24 hours) at 2/7/2022 1533  Last data filed at 2/7/2022 1216  Gross per 24 hour   Intake 0 ml   Output 225 ml   Net -225 ml       Physical Exam Performed:    /66   Pulse 78   Temp 98 °F (36.7 °C) (Oral)   Resp 16   Ht 5' 10.5\" (1.791 m)   Wt 220 lb (99.8 kg)   SpO2 92%   BMI 31.12 kg/m²     General appearance:  No apparent distress, appears stated age and cooperative. HEENT:  Normal cephalic, atraumatic without obvious deformity. Pupils equal, round, and reactive to light. Extra ocular muscles intact. Conjunctivae/corneas clear. Neck: Supple, with full range of motion. No jugular venous distention. Trachea midline. Respiratory:  Normal respiratory effort. Clear to auscultation, bilaterally without Rales/Wheezes/Rhonchi. Cardiovascular:  Regular rate and rhythm with normal S1/S2 without murmurs, rubs or gallops. Abdomen: Soft, non-tender, non-distended with normal bowel sounds.   Musculoskeletal: tenderness mid to distal femur, negative logroll, pulses intact  Skin: Skin color, texture, turgor normal.  No rashes or lesions. Neurologic:  Neurovascularly intact without any focal sensory/motor deficits. Cranial nerves: II-XII intact, grossly non-focal.  Psychiatric:  Alert and oriented, thought content appropriate, normal insight  Capillary Refill: Brisk,< 3 seconds   Peripheral Pulses: +2 palpable, equal bilaterally      Labs:   Recent Labs     02/06/22  1714   WBC 15.4*   HGB 12.0   HCT 36.0        Recent Labs     02/06/22  1714      K 4.2      CO2 27   BUN 21*   CREATININE 1.0   CALCIUM 9.5   PHOS 3.0     No results for input(s): AST, ALT, BILIDIR, BILITOT, ALKPHOS in the last 72 hours. Recent Labs     02/06/22  1714   INR 1.05     No results for input(s): Nu Castor in the last 72 hours. Urinalysis:      Lab Results   Component Value Date    NITRU POSITIVE 02/06/2022    WBCUA 6-9 02/06/2022    BACTERIA 2+ 02/06/2022    RBCUA 0-2 02/06/2022    BLOODU Negative 02/06/2022    SPECGRAV 1.025 02/06/2022    GLUCOSEU Negative 02/06/2022       Radiology:  XR KNEE RIGHT (1-2 VIEWS)   Final Result   1. Comminuted, displaced fracture of the distal femur. 2. No acute fracture in the pelvis right hip. XR FEMUR RIGHT (MIN 2 VIEWS)   Final Result   1. Comminuted, displaced fracture of the distal femur. 2. No acute fracture in the pelvis right hip. XR HIP RIGHT (1 VIEW)   Final Result   1. Comminuted, displaced fracture of the distal femur. 2. No acute fracture in the pelvis right hip.               Assessment/Plan:    Active Hospital Problems    Diagnosis Date Noted    Closed comminuted intra-articular fracture of distal femur, right, initial encounter [facility]) [S72.491A] 02/06/2022     Traumatic distal comminuted right femoral fracture due to mechanical fall  Preoperative clearance  Orthopedics were consulted the emergency room,   she is now agreeable to get surgery done with on-call surgeon for the hosptial  She has no prior history of heart disease, she is ambulatory at baseline, lives alone at home, no prior history of heart disease or strokes. EKG was done which did not show acute ST changes. proBNP levels within normal limits.   Okay to proceed with surgical intervention with average risk of perioperative complications  Oxycodone for pain control, Dilaudid for breakthrough pain  Continue IVF     Leukocytosis, UA was done which showed possibility of UTI  Start Rocephin for possible UTI  Follow up     Anxiety/depression-- Continue BuSpar and Lexapro for anxiety and depression     Fibromyalgia -- Continue home duloxetine, high dose of gabapentin     Hypertension, borderline bp, hold amlodipine       DVT Prophylaxis: SCDs  Diet: Diet NPO  Code Status: Full Code    PT/OT Eval Status: per ortho after Ortho surgery    Dispo - continue inpatient care    David Zheng MD   Hospitalist

## 2022-02-08 ENCOUNTER — APPOINTMENT (OUTPATIENT)
Dept: GENERAL RADIOLOGY | Age: 77
DRG: 481 | End: 2022-02-08
Payer: MEDICARE

## 2022-02-08 VITALS — TEMPERATURE: 100 F | SYSTOLIC BLOOD PRESSURE: 116 MMHG | DIASTOLIC BLOOD PRESSURE: 56 MMHG | OXYGEN SATURATION: 93 %

## 2022-02-08 LAB — URINE CULTURE, ROUTINE: NORMAL

## 2022-02-08 PROCEDURE — 73552 X-RAY EXAM OF FEMUR 2/>: CPT

## 2022-02-08 PROCEDURE — 2580000003 HC RX 258: Performed by: ORTHOPAEDIC SURGERY

## 2022-02-08 PROCEDURE — A4217 STERILE WATER/SALINE, 500 ML: HCPCS | Performed by: ORTHOPAEDIC SURGERY

## 2022-02-08 PROCEDURE — 6370000000 HC RX 637 (ALT 250 FOR IP): Performed by: INTERNAL MEDICINE

## 2022-02-08 PROCEDURE — 6360000002 HC RX W HCPCS: Performed by: ORTHOPAEDIC SURGERY

## 2022-02-08 PROCEDURE — C9290 INJ, BUPIVACAINE LIPOSOME: HCPCS | Performed by: ORTHOPAEDIC SURGERY

## 2022-02-08 PROCEDURE — 6360000002 HC RX W HCPCS: Performed by: FAMILY MEDICINE

## 2022-02-08 PROCEDURE — 97167 OT EVAL HIGH COMPLEX 60 MIN: CPT

## 2022-02-08 PROCEDURE — 97530 THERAPEUTIC ACTIVITIES: CPT

## 2022-02-08 PROCEDURE — 2580000003 HC RX 258: Performed by: INTERNAL MEDICINE

## 2022-02-08 PROCEDURE — 97163 PT EVAL HIGH COMPLEX 45 MIN: CPT

## 2022-02-08 PROCEDURE — 1200000000 HC SEMI PRIVATE

## 2022-02-08 PROCEDURE — 3209999900 FLUORO FOR SURGICAL PROCEDURES

## 2022-02-08 RX ORDER — MAGNESIUM HYDROXIDE 1200 MG/15ML
LIQUID ORAL CONTINUOUS PRN
Status: COMPLETED | OUTPATIENT
Start: 2022-02-08 | End: 2022-02-08

## 2022-02-08 RX ORDER — NALOXONE HYDROCHLORIDE 0.4 MG/ML
INJECTION, SOLUTION INTRAMUSCULAR; INTRAVENOUS; SUBCUTANEOUS PRN
Status: DISCONTINUED | OUTPATIENT
Start: 2022-02-08 | End: 2022-02-08 | Stop reason: SDUPTHER

## 2022-02-08 RX ORDER — NALOXONE HYDROCHLORIDE 0.4 MG/ML
0.04 INJECTION, SOLUTION INTRAMUSCULAR; INTRAVENOUS; SUBCUTANEOUS PRN
Status: DISCONTINUED | OUTPATIENT
Start: 2022-02-08 | End: 2022-02-11 | Stop reason: HOSPADM

## 2022-02-08 RX ADMIN — NALOXONE HYDROCHLORIDE 0.04 MG: 0.4 INJECTION, SOLUTION INTRAMUSCULAR; INTRAVENOUS; SUBCUTANEOUS at 01:13

## 2022-02-08 RX ADMIN — NALOXONE HYDROCHLORIDE 0.04 MG: 0.4 INJECTION, SOLUTION INTRAMUSCULAR; INTRAVENOUS; SUBCUTANEOUS at 00:50

## 2022-02-08 RX ADMIN — GABAPENTIN 1200 MG: 600 TABLET, FILM COATED ORAL at 09:24

## 2022-02-08 RX ADMIN — SODIUM CHLORIDE: 9 INJECTION, SOLUTION INTRAVENOUS at 01:16

## 2022-02-08 RX ADMIN — BUPROPION HYDROCHLORIDE 150 MG: 150 TABLET, EXTENDED RELEASE ORAL at 09:23

## 2022-02-08 RX ADMIN — GABAPENTIN 1200 MG: 600 TABLET, FILM COATED ORAL at 20:13

## 2022-02-08 RX ADMIN — SODIUM CHLORIDE, PRESERVATIVE FREE 10 ML: 5 INJECTION INTRAVENOUS at 20:14

## 2022-02-08 RX ADMIN — OXYCODONE HYDROCHLORIDE AND ACETAMINOPHEN 1 TABLET: 5; 325 TABLET ORAL at 16:13

## 2022-02-08 RX ADMIN — SODIUM CHLORIDE, PRESERVATIVE FREE 10 ML: 5 INJECTION INTRAVENOUS at 12:05

## 2022-02-08 RX ADMIN — NALOXONE HYDROCHLORIDE 0.08 MG: 0.4 INJECTION, SOLUTION INTRAMUSCULAR; INTRAVENOUS; SUBCUTANEOUS at 00:38

## 2022-02-08 RX ADMIN — PANTOPRAZOLE SODIUM 40 MG: 40 TABLET, DELAYED RELEASE ORAL at 16:12

## 2022-02-08 RX ADMIN — DULOXETINE HYDROCHLORIDE 60 MG: 60 CAPSULE, DELAYED RELEASE ORAL at 09:23

## 2022-02-08 RX ADMIN — ESCITALOPRAM OXALATE 20 MG: 20 TABLET ORAL at 09:23

## 2022-02-08 RX ADMIN — OXYCODONE HYDROCHLORIDE AND ACETAMINOPHEN 1 TABLET: 5; 325 TABLET ORAL at 20:13

## 2022-02-08 ASSESSMENT — PAIN DESCRIPTION - DESCRIPTORS: DESCRIPTORS: ACHING;SHARP;SHOOTING

## 2022-02-08 ASSESSMENT — PULMONARY FUNCTION TESTS
PIF_VALUE: 0
PIF_VALUE: 25
PIF_VALUE: 25
PIF_VALUE: 26
PIF_VALUE: 26
PIF_VALUE: 25
PIF_VALUE: 1
PIF_VALUE: 2
PIF_VALUE: 0
PIF_VALUE: 0
PIF_VALUE: 26
PIF_VALUE: 1
PIF_VALUE: 5
PIF_VALUE: 1
PIF_VALUE: 25
PIF_VALUE: 4

## 2022-02-08 ASSESSMENT — PAIN SCALES - GENERAL
PAINLEVEL_OUTOF10: 0
PAINLEVEL_OUTOF10: 7
PAINLEVEL_OUTOF10: 0
PAINLEVEL_OUTOF10: 0
PAINLEVEL_OUTOF10: 8
PAINLEVEL_OUTOF10: 8

## 2022-02-08 ASSESSMENT — PAIN DESCRIPTION - LOCATION
LOCATION: KNEE
LOCATION: KNEE

## 2022-02-08 ASSESSMENT — PAIN DESCRIPTION - ONSET: ONSET: ON-GOING

## 2022-02-08 ASSESSMENT — PAIN - FUNCTIONAL ASSESSMENT: PAIN_FUNCTIONAL_ASSESSMENT: PREVENTS OR INTERFERES SOME ACTIVE ACTIVITIES AND ADLS

## 2022-02-08 ASSESSMENT — PAIN DESCRIPTION - ORIENTATION
ORIENTATION: RIGHT
ORIENTATION: RIGHT

## 2022-02-08 ASSESSMENT — PAIN DESCRIPTION - PROGRESSION: CLINICAL_PROGRESSION: NOT CHANGED

## 2022-02-08 ASSESSMENT — PAIN DESCRIPTION - PAIN TYPE
TYPE: SURGICAL PAIN
TYPE: SURGICAL PAIN

## 2022-02-08 ASSESSMENT — PAIN DESCRIPTION - FREQUENCY: FREQUENCY: INTERMITTENT

## 2022-02-08 NOTE — ANESTHESIA POSTPROCEDURE EVALUATION
Department of Anesthesiology  Postprocedure Note    Patient: Yoel Lema  MRN: [id number]  [place]: 1945  Date of evaluation: 2/8/2022  Time:  12:15 AM     Procedure Summary     Date: 02/07/22 Room / Location: [address]    Anesthesia Start: 2053 Anesthesia Stop: 02/08/22 0014    Procedure: FEMUR OPEN REDUCTION INTERNAL FIXATION RIGHT (Right ) Diagnosis: (right distal femur tila-prosthetic fracture)    Surgeons: Lilian Conteh DO Responsible Provider: Yamini Rios MD    Anesthesia Type: general ASA Status: 2          Anesthesia Type: general    Parth Phase I:      Parth Phase II:      Last vitals: Reviewed and per EMR flowsheets. Anesthesia Post Evaluation    Patient location during evaluation: PACU  Level of consciousness: awake  Complications: no  Comments: Was breathing 6-8 times a minute in PACU. I gave 80 mcg narcan at 12:35 am.  Nice effect with increase in RR and eyes opening.   Multimodal analgesia pain management approach

## 2022-02-08 NOTE — ANESTHESIA PRE PROCEDURE
Department of Anesthesiology  Preprocedure Note       Name:  Kirk Iraheta   Age:  77 y.o.  :  1945                                          MRN:  [id number]         Date:  2022      Surgeon: Andrea Ferraro):  Nicole Carlos DO    Procedure: Procedure(s): FEMUR OPEN REDUCTION INTERNAL FIXATION RIGHT    Medications prior to admission:   Prior to Admission medications    Medication Sig Start Date End Date Taking? Authorizing Provider   vitamin D (CHOLECALCIFEROL) 25 MCG (1000 UT) TABS tablet Take 1,000 Units by mouth daily   Yes Historical Provider, MD   Ascorbic Acid (VITAMIN C) 1000 MG tablet Take 1,000 mg by mouth daily   Yes Historical Provider, MD   gabapentin (NEURONTIN) 600 MG tablet 1,200 mg 2 times daily.   21  Yes Historical Provider, MD   DULoxetine (CYMBALTA) 60 MG extended release capsule 60 mg daily  10/8/21  Yes Historical Provider, MD   meloxicam (MOBIC) 15 MG tablet TAKE 1 TABLET BY MOUTH DAILY 20  Yes Roma Mckinney MD   buPROPion [facility]) 150 MG extended release tablet Take 150 mg by mouth daily   Yes Historical Provider, MD   Fesoterodine Fumarate ER (TOVIAZ) 8 MG TB24 at bedtime  18  Yes Historical Provider, MD   amLODIPine (NORVASC) 5 MG tablet Take 5 mg by mouth daily   Yes Historical Provider, MD   RESTASIS 0.05 % ophthalmic emulsion Place 2 drops into both eyes every 12 hours  10/18/13  Yes Historical Provider, MD   escitalopram (LEXAPRO) 20 MG tablet Take 20 mg by mouth daily  13  Yes Historical Provider, MD   Multiple Vitamins-Minerals (MULTIVITAMIN PO) Take 1 tablet by mouth daily    Yes Historical Provider, MD   White Petrolatum-Mineral Oil ([address] [place] [address] PETROL-MINERAL OIL-LANOLIN) 0.1-0.1 % OINT Refresh P.M. 57.3 %-42.5 % eye ointment   UTD    Historical Provider, MD   losartan (COZAAR) 50 MG tablet Take 50 mg by mouth daily 20   Historical Provider, MD       Current medications:    Current Facility-Administered Medications   Medication Dose Route Frequency Provider Last Rate Last Admin    cefTRIAXone (ROCEPHIN) 1000 mg IVPB in 50 mL D5W minibag  1,000 mg IntraVENous Q24H Keo Gimenez MD   Stopped at 02/07/22 0302    0.9 % sodium chloride infusion   IntraVENous Continuous Keo Gimenez  mL/hr at 02/07/22 1439 New Bag at 02/07/22 1439    meperidine (DEMEROL) injection 12.5 mg  12.5 mg IntraVENous Q5 Min PRN Rianna River MD        HYDROmorphone (DILAUDID) injection 0.25 mg  0.25 mg IntraVENous Q5 Min PRN Rianna River MD        fentaNYL (SUBLIMAZE) injection 50 mcg  50 mcg IntraVENous Q5 Min PRN Rianna River MD        HYDROmorphone (DILAUDID) injection 0.25 mg  0.25 mg IntraVENous Q5 Min PRN Rianna River MD        HYDROmorphone (DILAUDID) injection 0.5 mg  0.5 mg IntraVENous Q5 Min PRN Rianna River MD        oxyCODONE (ROXICODONE) immediate release tablet 5 mg  5 mg Oral PRN Rianna River MD        Or    oxyCODONE (ROXICODONE) immediate release tablet 10 mg  10 mg Oral PRN Rianna River MD        promethazine (PHENERGAN) injection 6.25 mg  6.25 mg IntraVENous PRN Rianna River MD        diphenhydrAMINE (BENADRYL) injection 12.5 mg  12.5 mg IntraVENous Once PRN Rianna River MD        labetalol (NORMODYNE;TRANDATE) injection 5 mg  5 mg IntraVENous Q10 Min PRN Rianna River MD        hydrALAZINE (APRESOLINE) injection 5 mg  5 mg IntraVENous Q10 Min PRN Rianna River MD        [Held by provider] amLODIPine (NORVASC) tablet 5 mg  5 mg Oral Daily Keo Gimenez MD        buPROPion [facility]) extended release tablet 150 mg  150 mg Oral Daily Keo Gimenez MD   150 mg at 02/07/22 7460    DULoxetine (CYMBALTA) extended release capsule 60 mg  60 mg Oral Daily Keo Gimenez MD   60 mg at 02/07/22 9177    escitalopram (LEXAPRO) tablet 20 mg  20 mg Oral Daily Keo Gimenez MD   20 mg at 02/07/22 0907    pantoprazole (PROTONIX) tablet 40 mg  40 mg Oral [facility] Keo Gimenez MD   40 mg at 02/07/22 7807    Primary localized osteoarthrosis, lower leg M17.10    Hip pain M25.559    Ischial bursitis M70.70    Fe deficiency anemia D50.9    Knee pain, bilateral M25.561, M25.562    Obesity (BMI 30-39. 9) E66.9    Fibromyalgia M79.7    Hypertension I10    Left TKR Z96.659    Chronic blood loss anemia D50.0    Unspecified adverse effect of other drug, medicinal and biological substance(995.29) T50.995A    Painful orthopaedic hardware ([facility] T84.84XA    Plantar fasciitis, bilateral M72.2    Closed comminuted intra-articular fracture of distal femur, right, initial encounter ([facility] S72.491A       Past Medical History:        Diagnosis Date    Allergic     rhinitis    Anemia     Anxiety     Closed comminuted [facility]-articular [facility] of distal femur, right[facility] 2/6/2022    Depression     Eye disorder     Fibromyalgia     GERD (gastroesophageal reflux disease)     Hypertension     Obesity     KAIT (obstructive sleep apnea)     no CPAP - has dental appliance    Osteoarthritis     Osteoporosis     PONV (postoperative nausea and vomiting)        Past Surgical History:        Procedure Laterality Date    HIP SURGERY      JOINT REPLACEMENT Right 1993/2003    HIP    JOINT REPLACEMENT Left 5/18/16    left total knee replacement    KNEE SURGERY      WRIST FRACTURE SURGERY Left        Social History:    Social History     Tobacco Use    Smoking status: Never Smoker    Smokeless tobacco: Never Used   Substance Use Topics    Alcohol use: No     Alcohol/week: 0.0 standard drinks                                Counseling given: Not Answered      Vital Signs (Current):   Vitals:    02/07/22 0630 02/07/22 1102 02/07/22 1103 02/07/22 1440   BP: (!) 92/55 (!) 89/54 (!) 90/54 107/66   Pulse: 84 80  78   Resp: 16 16  16   Temp: 97.9 °F (36.6 °C) 98.4 °F (36.9 °C)  98 °F (36.7 °C)   TempSrc: Oral Oral  Oral   SpO2: 91% 90%  92%   Weight:       Height: BP Readings from Last 3 Encounters:   02/07/22 107/66   10/23/19 122/80   09/25/19 122/74       NPO Status:                                                                                 BMI:   Wt Readings from Last 3 Encounters:   02/06/22 220 lb (99.8 kg)   10/12/21 220 lb (99.8 kg)   10/28/20 220 lb (99.8 kg)     Body mass index is 31.12 kg/m². CBC:   Lab Results   Component Value Date    WBC 15.4 02/06/2022    RBC 4.11 02/06/2022    RBC 4.39 02/03/2017    HGB 12.0 02/06/2022    HCT 36.0 02/06/2022    MCV 87.6 02/06/2022    RDW 14.7 02/06/2022     02/06/2022       CMP:   Lab Results   Component Value Date     02/06/2022    K 4.2 02/06/2022     02/06/2022    CO2 27 02/06/2022    BUN 21 02/06/2022    CREATININE 1.0 02/06/2022    GFRAA >60 02/06/2022    LABGLOM 54 02/06/2022    GLUCOSE 160 02/06/2022    CALCIUM 9.5 02/06/2022       POC Tests: No results for input(s): POCGLU, POCNA, POCK, POCCL, POCBUN, POCHEMO, POCHCT in the last 72 hours.     Coags:   Lab Results   Component Value Date    PROTIME 11.9 02/06/2022    INR 1.05 02/06/2022    APTT 31.0 02/06/2022       HCG (If Applicable): No results found for: PREGTESTUR, PREGSERUM, HCG, HCGQUANT     ABGs: No results found for: PHART, PO2ART, BRR8YPM, KPK0IGW, BEART, V9YVTBNR     Type & Screen (If Applicable):  No results found for: LABABO, LABRH    Drug/Infectious Status (If Applicable):  No results found for: HIV, HEPCAB    COVID-19 Screening (If Applicable): No results found for: COVID19        Anesthesia Evaluation   history of anesthetic complications:   Airway: Mallampati: II  TM distance: >3 FB   Neck ROM: full  Mouth opening: > = 3 FB Dental:          Pulmonary:   (+) pneumonia:  sleep apnea:                             Cardiovascular:    (+) hypertension:,         Rhythm: regular  Rate: normal                    Neuro/Psych:   (+) neuromuscular disease:, psychiatric history:            GI/Hepatic/Renal:   (+) GERD:, Endo/Other:                     Abdominal:             Vascular: Other Findings:             Anesthesia Plan      general     ASA 2       Induction: intravenous. Anesthetic plan and risks discussed with patient. Plan discussed with CRNA.                   Ramírez Vega MD   2/7/2022

## 2022-02-08 NOTE — PLAN OF CARE
Problem: Falls - Risk of:  Goal: Will remain free from falls  Description: Will remain free from falls  2/8/2022 1624 by Sailaja Patel RN  Outcome: Ongoing  2/8/2022 0305 by Sonny Morocho RN  Outcome: Ongoing  2/8/2022 0304 by Sonny Morocho RN  Outcome: Ongoing  Goal: Absence of physical injury  Description: Absence of physical injury  2/8/2022 1624 by Sailaja Patel RN  Outcome: Ongoing  2/8/2022 0305 by Sonny Morocho RN  Outcome: Ongoing  2/8/2022 0304 by Sonny Morocho RN  Outcome: Ongoing     Problem: Pain:  Description: Pain management should include both nonpharmacologic and pharmacologic interventions. Goal: Pain level will decrease  Description: Pain level will decrease  2/8/2022 1624 by Sailaja Patel RN  Outcome: Ongoing  2/8/2022 0305 by Sonny Morocho RN  Outcome: Ongoing  2/8/2022 0304 by Sonny Morocho RN  Outcome: Ongoing  Goal: Control of acute pain  Description: Control of acute pain  2/8/2022 1624 by Sailaja Patel RN  Outcome: Ongoing  2/8/2022 0305 by Sonny Morocho RN  Outcome: Ongoing  2/8/2022 0304 by Sonny Morocho RN  Outcome: Ongoing  Goal: Control of chronic pain  Description: Control of chronic pain  2/8/2022 1624 by Sailaja Patel RN  Outcome: Ongoing  2/8/2022 0305 by Sonny Morocho RN  Outcome: Ongoing  2/8/2022 0304 by Sonny Morocho RN  Outcome: Ongoing     Problem: Pain:  Description: Pain management should include both nonpharmacologic and pharmacologic interventions.   Goal: Pain level will decrease  Description: Pain level will decrease  2/8/2022 1624 by Sailaja Patel RN  Outcome: Ongoing  2/8/2022 0305 by Sonny Morocho RN  Outcome: Ongoing  2/8/2022 0304 by Sonny Morocho RN  Outcome: Ongoing  Goal: Control of acute pain  Description: Control of acute pain  2/8/2022 1624 by Sailaja Patel RN  Outcome: Ongoing  2/8/2022 0305 by Sonny Morocho RN  Outcome: Ongoing  2/8/2022 0304 by Sonny Morocho RN  Outcome: Ongoing  Goal: Control of chronic pain  Description: Control of chronic pain  2/8/2022 1624 by Katelyn Clifford RN  Outcome: Ongoing  2/8/2022 0305 by Saji Balderrama RN  Outcome: Ongoing  2/8/2022 0304 by Saji Balderrama RN  Outcome: Ongoing     Problem: Physical Regulation:  Goal: Will remain free from infection  Description: Will remain free from infection  2/8/2022 1624 by Katelyn Clifford RN  Outcome: Ongoing  2/8/2022 0305 by Saji Balderrama RN  Outcome: Ongoing     Problem: Physical Regulation:  Goal: Will remain free from infection  Description: Will remain free from infection  2/8/2022 1624 by Katelyn Clifford RN  Outcome: Ongoing  2/8/2022 0305 by Saji Balderrama RN  Outcome: Ongoing     Problem: Skin Integrity:  Goal: Demonstration of wound healing without infection will improve  Description: Demonstration of wound healing without infection will improve  2/8/2022 1624 by Katelyn Clifford RN  Outcome: Ongoing  2/8/2022 0305 by Saji Balderrama RN  Outcome: Ongoing  Goal: Complications related to intravenous access or infusion will be avoided or minimized  Description: Complications related to intravenous access or infusion will be avoided or minimized  2/8/2022 1624 by Katelyn Clifford RN  Outcome: Ongoing  2/8/2022 0305 by Saji Balderrama RN  Outcome: Ongoing

## 2022-02-08 NOTE — PROGRESS NOTES
Pt noted resting in bed , nasal canula 4L in place, pt denied any pain or discomfort, side rails up x2, bed in low position, call light within reach.

## 2022-02-08 NOTE — PROGRESS NOTES
Physical Therapy    Facility/Department: [facility] 5T ORTHO/NEURO  Initial Assessment    NAME: José Miguel Segovia  : 1945  MRN: [id number]    Date of Service: 2022    Discharge Recommendations:Sandra Rand scored a  on the AM-PAC short mobility form. Current research shows that an AM-PAC score of 17 or less is typically not associated with a discharge to the patient's home setting. Based on the patient's AM-PAC score and their current functional mobility deficits, it is recommended that the patient have 3-5 sessions per week of Physical Therapy at d/c to increase the patient's independence. Please see assessment section for further patient specific details. If patient discharges prior to next session this note will serve as a discharge summary. Please see below for the latest assessment towards goals. PT Equipment Recommendations  Equipment Needed:  (defer)    Assessment   Assessment: 76 yo admitted 22 for fall/R femur fx. Pt in now TTWB R. Pt confused this pm and unable to grasp concept of TTWB R/unable to stand with restricted WB. We discussed at length with pt/dtr safety concerns for pt to try to return home at this time (pt with 10 steps to enter home). Pt would benefit from continued skilled IP PT at discharge. Treatment Diagnosis: mobility impairment due to R femur fx  Decision Making: High Complexity  Patient Education: Pt educated on PT Role, importance of OOB mobility, need to call for assist to get up, TTWB R and she was unable to verbalize understanding and will need reinforcement. REQUIRES PT FOLLOW UP: Yes  Activity Tolerance  Activity Tolerance: Patient limited by fatigue;Patient limited by pain; Patient limited by cognitive status       Patient Diagnosis(es): The primary encounter diagnosis was Closed fracture of right femur, unspecified fracture morphology, unspecified portion of femur, initial encounter ([facility] [address]  A diagnosis of Closed comminuted intra-articular fracture of distal femur, right, initial encounter [facility]) was also pertinent to this visit. has a past medical history of Allergic, Anemia, Anxiety, Closed comminuted intra-articular fracture of distal femur, right, initial encounter ([address] Depression, Eye disorder, Fibromyalgia, GERD (gastroesophageal reflux disease), Hypertension, Obesity, KAIT (obstructive sleep apnea), Osteoarthritis, Osteoporosis, PONV (postoperative nausea and vomiting), and Prolonged emergence from general anesthesia. has a past surgical history that includes hip surgery; joint replacement (Right, ); Wrist fracture surgery (Left); joint replacement (Left, 16); knee surgery; and Femur fracture surgery (Right, 2022). Restrictions  Position Activity Restriction  Other position/activity restrictions: TTWB R, no activity restriction noted     Vision/Hearing  Vision: Within Functional Limits  Hearing: Within functional limits       Subjective  General  Chart Reviewed: Yes  Additional Pertinent Hx: 78 yo admitted 22 for fall/R femur fx. OR  FEMUR OPEN REDUCTION INTERNAL FIXATION RIGHT. Pmhx: HTN, fibromyalgia, KAIT, L TKR, R THR>  Family / Caregiver Present: Yes (dtr)  Diagnosis: R femur fx  Follows Commands: Impaired  Subjective  Subjective: Pt found supine in bed and agreeable to PT. Pt reports she's having difficulty thinking this afternoon.   Pain Screening  Patient Currently in Pain: Yes (rates R hip pain 8/10, RN aware)         Orientation  Orientation  Overall Orientation Status:  (oriented to name/; difficulty staying on task)     Social/Functional History  Social/Functional History  Lives With: Alone  Type of Home: House  Home Layout: Multi-level,Able to Live on Main level with bedroom/bathroom  Home Access:  (can enter through garage with 10 steps with rail; 10 porch steps with 2 steps without rail to enter in front; 5+5 with rail in back)  Bathroom Toilet: Handicap height (RTS and handicap ht toilet)  Bathroom Equipment: Shower chair  Home Equipment: 4 wheeled walker  ADL Assistance: Independent  Ambulation Assistance: Independent (uses 4 WW outside of house)  Additional Comments: Pt/dtr report pt had \"special\" way of moving/performing mobility prior to fall. Objective          AROM RLE (degrees)  RLE AROM:  (hip/knee flexion to 90 degrees in sitting; ankle DF to neutral)  AROM LLE (degrees)  LLE AROM : WFL     Strength RLE  Strength RLE:  (hip/knee flexion 2+/5 grossly throughout; limited by pain/edema)   strength L LE: 4/5 grossly throughout     Bed mobility  Supine to Sit: Maximum assistance (slow and effortful with max vc to stay on task)  Sit to Supine: Maximum assistance (with max vc for safety)  Scooting: Maximal assistance     Transfers  Sit to Stand:  (attempted from EOB and pt unable to follow for TTWB R/unable to stand on L LE from EOB)  Comment: GEORGE on 2L 02-sp02 94%        Balance  Standing - Static: Fair  Standing - Dynamic: Poor  Comments: Pt sat EOB x10 mins with min assist overall; max LOB posterior at times. Pt fatigued quickly.         Plan   Plan  Times per week: 5-7  Current Treatment Recommendations: Strengthening,Balance Training  Safety Devices  Type of devices: Bed alarm in place,Call light within reach,Left in bed,Nurse notified           AM-PAC Score  AM-PAC Inpatient Mobility Raw Score : 6 (02/08/22 1541)  AM-PAC Inpatient T-Scale Score : 23.55 (02/08/22 1541)  Mobility Inpatient CMS 0-100% Score: 100 (02/08/22 1541)  Mobility Inpatient CMS G-Code Modifier : CN (02/08/22 1541)          Goals  Short term goals  Time Frame for Short term goals: discharge  Short term goal 1: sit to/from supine supervision  Short term goal 2: CGA B LE HEP x10  Short term goal 3: sit EOB x20 mins with supervision  Patient Goals   Patient goals : return home       Therapy Time   Individual Concurrent Group Co-treatment   Time In 1420         Time Out 1530         Minutes 70           Timed Code Treatment Minutes:60       Total Treatment Minutes:  70       James Dumont, PT

## 2022-02-08 NOTE — CARE COORDINATION
Patient is from home alone and is normally completely independent at baseline. She has several steps to enter her house and at this time she is toe touch weight bearing and requires a lot of assistance and will not be safe to return to home. Her son is in hospice in [place], [place] and she and her daughter are looking at either taking her to [place] and possibly getting her into a rehab unit there vs going to rehab here and then going to see her son. Currently she is on O2 and would need to wean off of this if going to [place] because her daughter would try to drive the 9 hours to get there instead of paying for transport. No precert needed for placement.      Electronically signed by Neftali Colvin RN on 2/8/2022 at 6:05 PM  [phone]

## 2022-02-08 NOTE — PROGRESS NOTES
Occupational Therapy   Occupational Therapy Initial Assessment/Treatment Note  Date: 2022   Patient Name: Jh Flynn  MRN: [id number]     : 1945    Date of Service: 2022    Discharge Recommendations: Jh Flynn scored a 13/24 on the AM-PAC ADL Inpatient form. Current research shows that an AM-PAC score of 17 or less is typically not associated with a discharge to the patient's home setting. Based on the patient's AM-PAC score and their current ADL deficits, it is recommended that the patient have 3-5 sessions per week of Occupational Therapy at d/c to increase the patient's independence. Please see assessment section for further patient specific details. If patient discharges prior to next session this note will serve as a discharge summary. Please see below for the latest assessment towards goals. OT Equipment Recommendations  Other: defer    Assessment   Assessment: 78 yo admitted 22 for fall/R femur fx and now has R TTWB restrictions. Pt demonstrating cognition and sequencing issues making functional transfer attempt unsafe and unfeasible as pt was attempting to utilize RLE with full weight bearing despite max VCs. Pt requiring significant redirection and max education as to barriers with home d/c at this time due to anticipated need for 2 person assist with transfers and LB ADLs. Pt would benefit from continued skilled IP OT at discharge.   Treatment Diagnosis: decreased ADLs, functional mobility and functional transfers 2/2 R femur fx  Prognosis: Guarded  Decision Making: High Complexity  OT Education: Plan of Care;OT Role;Transfer Training;Family Education  Patient Education: max education on TTWBing with demonstration on how to properly position RLE and achieve full stance safely- pt unable to internalize due to poor sequencing and confusion, family education on d/c recommendations and need for furter rehab prior to d/c home  REQUIRES OT FOLLOW UP: Yes  Activity Tolerance  Activity Tolerance: Treatment limited secondary to decreased cognition  Activity Tolerance: Unable to complete OOB transfer due to decreased cognition  Safety Devices  Safety Devices in place: Yes  Type of devices: Nurse notified; Bed alarm in place;Call light within reach; Left in bed           Patient Diagnosis(es): The primary encounter diagnosis was Closed fracture of right femur, unspecified fracture morphology, unspecified portion of femur, initial encounter ([facility] [address] A diagnosis of Closed comminuted intra-articular fracture of distal femur, right, initial encounter [facility]) was also pertinent to this visit. has a past medical history of Allergic, Anemia, Anxiety, Closed comminuted intra-articular fracture of distal femur, right, initial encounter ([facility] [address] Depression, Eye disorder, Fibromyalgia, GERD (gastroesophageal reflux disease), Hypertension, Obesity, KAIT (obstructive sleep apnea), Osteoarthritis, Osteoporosis, PONV (postoperative nausea and vomiting), and Prolonged emergence from general anesthesia. has a past surgical history that includes hip surgery; joint replacement (Right, 1993/2003); Wrist fracture surgery (Left); joint replacement (Left, 5/18/16); knee surgery; and Femur fracture surgery (Right, 2/7/2022). Treatment Diagnosis: decreased ADLs, functional mobility and functional transfers 2/2 R femur fx      Restrictions  Position Activity Restriction  Other position/activity restrictions: TTWB R, no activity restriction noted    Subjective   General  Chart Reviewed: Yes  Patient assessed for rehabilitation services?: Yes  Additional Pertinent Hx: 76 yo admitted 2/6/22 for fall/R femur fx. OR 2/7 FEMUR OPEN REDUCTION INTERNAL FIXATION RIGHT.  Pmhx: HTN, fibromyalgia, KAIT, L TKR, R THR  Family / Caregiver Present: Yes (daughter Kei Hernadez)  Patient Currently in Pain: Yes (rates R hip pain 8/10, RN aware)  Pain Assessment  Pain Level: 8  Vital Signs  Patient Currently in Pain: Yes (rates R hip pain 8/10, RN aware)  Social/Functional History  Social/Functional History  Lives With: Alone  Type of Home: House  Home Layout: Multi-level,Able to Live on Main level with bedroom/bathroom  Home Access:  (can enter through garage with 10 steps with rail; 10 porch steps with 2 steps without rail to enter in front; 5+5 with rail in back)  Bathroom Toilet: Handicap height (RTS and handicap ht toilet)  Bathroom Equipment: Shower chair  Home Equipment: 4 wheeled walker  ADL Assistance: Independent  Ambulation Assistance: Independent (uses 4 WW outside of house)  Additional Comments: Pt/dtr report pt had \"special\" way of moving/performing mobility prior to fall. Objective        Orientation  Overall Orientation Status: Within Functional Limits        ADL  LE Dressing: Dependent/Total (total A to don socks while supine)  Tone RUE  RUE Tone: Normotonic  Tone LUE  LUE Tone: Normotonic  Coordination  Movements Are Fluid And Coordinated: Yes     Bed mobility  Supine to Sit: Maximum assistance (HOB slightly elevated, use of bed rails, slow and effortful with VCs to sequence)  Sit to Supine: Maximum assistance (decreased trunk eccentic control upon descent, assist to manage BLEs)  Scooting: Maximal assistance (superiorly in bed-use of bed rails, assist to prevent WBing through RLE; + time and mod A to scoot to EOB)  Transfers  Transfer Comments: ++ time spent attempting STS from EOB to RW with VCs and TCs for hand placement, assist to unweight RLE, pt unable to grasp concept of TTWBing and attempting to full WB, not attempted due to safety concerns     Cognition  Overall Cognitive Status: Exceptions  Arousal/Alertness: Appropriate responses to stimuli  Following Commands: Inconsistently follows commands; Follows one step commands with increased time; Follows one step commands with repetition  Attention Span: Attends with cues to redirect  Memory: Appears intact  Safety Judgement: Decreased awareness of need for safety;Decreased awareness of need for assistance  Problem Solving: Assistance required to correct errors made;Decreased awareness of errors;Assistance required to identify errors made;Assistance required to implement solutions;Assistance required to generate solutions  Insights: Not aware of deficits  Initiation: Does not require cues  Sequencing: Requires cues for some                                        Plan   Plan  Times per week: 5-7  Current Treatment Recommendations: Patient/Caregiver Education & Training,Gait Training,Home Management Training,Equipment Evaluation, Education, & procurement,Balance Adron Lento / ADL,Safety Education & Training    G-Code     OutComes Score                                                  AM-PAC Score        AM-PAC Inpatient Daily Activity Raw Score: 13 (02/08/22 1636)  AM-PAC Inpatient ADL T-Scale Score : 32.03 (02/08/22 1636)  ADL Inpatient CMS 0-100% Score: 63.03 (02/08/22 1636)  ADL Inpatient CMS G-Code Modifier : CL (02/08/22 1636)    Goals  Short term goals  Time Frame for Short term goals: by d/c  Short term goal 1: Pt will complete bed to chair transfer  Short term goal 2: Pt will verbalize understanding of TTWBing  Short term goal 3: Pt will tolerate stance for 30 seconds at RW  Patient Goals   Patient goals : none stated       Therapy Time   Individual Concurrent Group Co-treatment   Time In [address] [facility] [address]         Time Out 1533         Minutes 42         Timed Code Treatment Minutes: 184 [facility],

## 2022-02-08 NOTE — PROGRESS NOTES
Patient admitted to PACU # 13 from OR at [id number] post FEMUR OPEN REDUCTION INTERNAL FIXATION RIGHT - Right   per Dr. Cl Donovan. Attached to PACU monitoring system and report received from anesthesia provider. Patient was reported to be hemodynamically stable during procedure. Patient arrived to PACU with oral airway in place. Pt was on non-rebreather at 6 L. Pt placed on 15 L. IVF infusing. Pt NSR on monitor. Pt RLE surgical drsg c/d/i with accordian drain place. Will continue to monitor.

## 2022-02-08 NOTE — PROGRESS NOTES
Physician Progress Note      PATIENT:               Lakshmi Ji  CSN #:                  [id number]  :                       1945  ADMIT DATE:       2022 1:59 PM  [address] [place] DATE:  RESPONDING  PROVIDER #:        Gaye Lundberg MD          QUERY TEXT:    Pt admitted  with hip fx and underwent ORIF. Pt noted to have possible UTI. UCX result from  @ 08:46 AM <10,000 Mixed skin/urogenital migdalia. Pt   receiving Rocephin. from If possible, please clarify likely status of UTI:    The medical record reflects the following:  Risk Factors: 77 y.o. female with hip fx, s/p ORIF  Clinical Indicators: Per H&P: Leukocytosis, UA was done which showed   possibility of UTI, UCx negative ([facility] in [facility]  @ 08:46 AM)  Treatment: Rocephin  Options provided:  -- UTI ruled out  -- Urinary Tract Infection (UTI)  -- Other - I will add my own diagnosis  -- Disagree - Not applicable / Not valid  -- Disagree - Clinically unable to determine / Unknown  -- Refer to Clinical Documentation Reviewer    PROVIDER RESPONSE TEXT:    After study UTI is ruled out.     Query created by: Teri Green on 2022 5:38 PM      Electronically signed by:  Gaye Lundberg MD 2022 5:41 PM

## 2022-02-08 NOTE — PROGRESS NOTES
Hospitalist Progress Note      PCP: Armando Lo MD    Date of Admission: 2/6/2022    Chief Complaint: Mariama Barbara on ice going down stairs at home    Subjective:   Patient seen and examined, daughter present at bedside  She does not complain of pain    Medications:  Reviewed    Infusion Medications    sodium chloride       Scheduled Medications    cefTRIAXone (ROCEPHIN) IV  1,000 mg IntraVENous Q24H    [Held by provider] amLODIPine  5 mg Oral Daily    buPROPion  150 mg Oral Daily    DULoxetine  60 mg Oral Daily    escitalopram  20 mg Oral Daily    pantoprazole  40 mg Oral QAM AC    sodium chloride flush  5-40 mL IntraVENous 2 times per day    gabapentin  1,200 mg Oral BID     PRN Meds: naloxone, sodium chloride flush, sodium chloride, ondansetron **OR** ondansetron, polyethylene glycol, acetaminophen **OR** acetaminophen, oxyCODONE-acetaminophen **OR** oxyCODONE-acetaminophen, HYDROmorphone **OR** HYDROmorphone      Intake/Output Summary (Last 24 hours) at 2/8/2022 1341  Last data filed at 2/8/2022 0155  Gross per 24 hour   Intake 172 ml   Output 170 ml   Net 2 ml       Physical Exam Performed:    BP (!) 112/59   Pulse 90   Temp 98 °F (36.7 °C) (Oral)   Resp 16   Ht 5' 10.5\" (1.791 m)   Wt 220 lb (99.8 kg)   SpO2 92%   BMI 31.12 kg/m²     General appearance:  No apparent distress, appears stated age and cooperative. HEENT:  Normal cephalic, atraumatic without obvious deformity. Pupils equal, round, and reactive to light. Extra ocular muscles intact. Conjunctivae/corneas clear. Neck: Supple, with full range of motion. No jugular venous distention. Trachea midline. Respiratory:  Normal respiratory effort. Clear to auscultation, bilaterally without Rales/Wheezes/Rhonchi. Cardiovascular:  Regular rate and rhythm with normal S1/S2 without murmurs, rubs or gallops. Abdomen: Soft, non-tender, non-distended with normal bowel sounds.   Musculoskeletal:  Right leg wrapped postop  Skin: Skin color, texture, turgor normal.  No rashes or lesions. Neurologic:  Neurovascularly intact without any focal sensory/motor deficits. Cranial nerves: II-XII intact, grossly non-focal.  Psychiatric:  Alert and oriented, thought content appropriate, normal insight  Capillary Refill: Brisk,< 3 seconds   Peripheral Pulses: +2 palpable, equal bilaterally      Labs:   Recent Labs     02/06/22  1714   WBC 15.4*   HGB 12.0   HCT 36.0        Recent Labs     02/06/22  1714      K 4.2      CO2 27   BUN 21*   CREATININE 1.0   CALCIUM 9.5   PHOS 3.0     No results for input(s): AST, ALT, BILIDIR, BILITOT, ALKPHOS in the last 72 hours. Recent Labs     02/06/22  1714   INR 1.05     No results for input(s): Eugene Grates in the last 72 hours. Urinalysis:      Lab Results   Component Value Date    NITRU POSITIVE 02/06/2022    WBCUA 6-9 02/06/2022    BACTERIA 2+ 02/06/2022    RBCUA 0-2 02/06/2022    BLOODU Negative 02/06/2022    SPECGRAV 1.025 02/06/2022    GLUCOSEU Negative 02/06/2022       Radiology:  FLUORO FOR SURGICAL PROCEDURES   Final Result   Impression: Operative fluoroscopy utilized during right femur surgery, as above. XR FEMUR RIGHT (MIN 2 VIEWS)   Final Result   Impression: Operative fluoroscopy utilized during right femur surgery, as above. XR KNEE RIGHT (1-2 VIEWS)   Final Result   1. Comminuted, displaced fracture of the distal femur. 2. No acute fracture in the pelvis right hip. XR FEMUR RIGHT (MIN 2 VIEWS)   Final Result   1. Comminuted, displaced fracture of the distal femur. 2. No acute fracture in the pelvis right hip. XR HIP RIGHT (1 VIEW)   Final Result   1. Comminuted, displaced fracture of the distal femur. 2. No acute fracture in the pelvis right hip.               Assessment/Plan:    Active Hospital Problems    Diagnosis Date Noted    Closed commin[facility]articular [facility] of distal femur, right[facility] 02/06/2022 Traumatic distal comminuted right femoral fracture due to mechanical fall  S/p Procedure:  1. ORIF Right distal femoral fracture                         2. Intraoperative Flouroscopy Right Femur with Interpretation   EBL: 200 mL  PT OT is consulted, toe-touch weightbearing per orthopedic surgery  Likely will need placement  Oxycodone for pain control, Dilaudid for breakthrough pain  No need for IV fluids, blood pressures okay, she is eating good     Hypoxemia post op, likely atelactasis, IS q hr, wean off for SpO2 92% or above    Leukocytosis, UA was done which showed possibility of UTI  Continue Rocephin, urine culture was negative  No antibiotics on discharge    Anxiety/depression-- Continue BuSpar and Lexapro for anxiety and depression     Fibromyalgia -- Continue home duloxetine, high dose of gabapentin     Hypertension: hold amlodipine for now       DVT Prophylaxis: SCDs, per orthopedic surgery,  Diet: ADULT DIET; Regular  Code Status: Full Code    PT/OT Eval Status: Ordered    Dispo - continue inpatient care, will need placement, discussed with daughter, patient's son is on hospice [place]. Actually patient was about to get out of the house to try to [place] when she had a fall. They are inquiring about rehab/nursing facility in [place] which is in the PT OT and be close to her son.   Discussed with     Tammie Gomez MD   Hospitalist

## 2022-02-08 NOTE — PROGRESS NOTES
PACU Transfer Note    Vitals:    02/08/22 0145   BP: 117/61   Pulse: 100   Resp: 15   Temp: 97.5 °F (36.4 °C)   SpO2: 92%       In: 172 [P.O.:20; I.V.:152]  Out: 125 [Urine:125]    Pain assessment:  none  Pain Level: 0    Report given to Receiving unit RN.    2/8/2022 2:05 AM

## 2022-02-08 NOTE — OP NOTE
[address]  Procedure Note      Alois Alpers  2/6/2022        Pre-operative Diagnosis: Right midshaft comminuted displaced and shortened femur Fracture with ipsilateral total hip and total knee replacement, there is no extension of fracture into total hip or total knee replacement. There is no evidence of instability to total knee replacement. Post-operative Diagnosis: Same     Procedure:  1. ORIF Right distal femoral fracture     2. Intraoperative Flouroscopy Right Femur with Interpretation     Surgeon: Eleni Chandra DO      Assistant:     Anesthesia: General     EBL: 200 ml    Tourniquet Time: None     Specimens: None     Drains: None     Complications: None     Surgical Findings:     INDICATIONS FOR PROCEDURE: The patient sustained the above injury with unsatisfactory alignment and/or stability. We discussed operative vs. non operative treatment options, including risks and benefits of each. After multiple questions were asked and answered, the above procedure was recommended. The patient and/or POA ultimately elected to undergo the above-named procedure and surgical consent was signed willingly on the hospital webster. PROCEDURE: The patient was identified in the pre-operative holding area. The Right lower extremity region was identified by the patient, the patient's family, myself, preoperative progress notes and radiographs, anesthesia and nursing staffs as being the surgical area of interest. This area was then initialized by myself. Preoperative IV antibiotics were then infused and the patient was taken to the operating room. A TIME OUT was then performed for patient, side, site, and antibiotic infusion. The lower extremity was then prepped and draped in a sterile fashion.      A standard longitudinal incision was then carried out over the lateral aspect of the distal femur with sharp dissection through the skin, subcutaneous tissues, and to the lateral femoral condyle. Under fluoroscopic guidance, a guide pin was placed in the central position of the condyles. Appropriate placement was confirmed with fluoroscopy on both AP and lateral views. Screw and distal femoral plate placement was performed with the [employer] variable angle distal femoral plate equipment, and the fracture was aligned with satisfactory alignment. Proximal and distal screws were placed. XRay demonstrated satisfactory alignment and stability. Hardware was in good stable position. The wounds were then copiously irrigated with sterile saline fluid and closed in layers with the appropriate suture and/or staple material. Appropriate sterile dressings were then applied via standard technique. The plan was for then transfer to the PACU postoperatively. RADIOGRAPHIC ADDENDUM: It should be noted that multiple AP and lateral radiographs of the patient's Right knee and femur region were obtained throughout the above-named procedure to ascertain appropriate fracture reduction and hardware placement. These were interpreted by myself intra-operatively.          Rosemarie Reyes DO

## 2022-02-08 NOTE — PLAN OF CARE
Problem: Falls - Risk of:  Goal: Will remain free from falls  Description: Will remain free from falls  2/8/2022 0305 by Michael Hanley RN  Outcome: Ongoing  2/8/2022 0304 by Michael Hanley RN  Outcome: Ongoing  Goal: Absence of physical injury  Description: Absence of physical injury  2/8/2022 0305 by Michael Hanley RN  Outcome: Ongoing  2/8/2022 0304 by Michael Hanley RN  Outcome: Ongoing     Problem: Pain:  Goal: Pain level will decrease  Description: Pain level will decrease  2/8/2022 0305 by Michael Hanley RN  Outcome: Ongoing  2/8/2022 0304 by Michael Hanley RN  Outcome: Ongoing  Goal: Control of acute pain  Description: Control of acute pain  2/8/2022 0305 by Michael Hanley RN  Outcome: Ongoing  2/8/2022 0304 by Michael Hanley RN  Outcome: Ongoing  Goal: Control of chronic pain  Description: Control of chronic pain  2/8/2022 0305 by Michael Hanley RN  Outcome: Ongoing  2/8/2022 0304 by Michael Hanley RN  Outcome: Ongoing     Problem: Physical Regulation:  Goal: Will remain free from infection  Description: Will remain free from infection  Outcome: Ongoing     Problem: Skin Integrity:  Goal: Demonstration of wound healing without infection will improve  Description: Demonstration of wound healing without infection will improve  Outcome: Ongoing  Goal: Complications related to intravenous access or infusion will be avoided or minimized  Description: Complications related to intravenous access or infusion will be avoided or minimized  Outcome: Ongoing

## 2022-02-08 NOTE — CONSULTS
Inpatient Consultation    Yg Heller (1945)  2/7/2022 Date of consult    Reason for Consult: Right midshaft periprosthetic femur fracture  Requesting Physician:  Allan Ponce MD     CHIEF COMPLAINT:  As above    History Obtained From:  patient, family member -daughter, EMR    HISTORY OF PRESENT ILLNESS:                The patient is a 77 y.o. female who presents with above chief complaint. Patient was walking down her steps getting ready to leave on a trip to [place] to see her terminally ill son when she slipped and fell onto the right leg, she felt immediate pain was unable to ambulate. She was brought to the emergency room by khadra where x-rays confirmed a comminuted, displaced and shortened femoral shaft fracture. Patient does have ipsilateral total hip and total knee. Patient remains independent, she lives alone, she does not use any assistive devices at home but in public she does use a walker. She continues to work as a psychologist. Her daughter is at bedside today, she currently resides in [place], she is also a doctor in [address] [place] [address] Of [facility].     Past Medical History:        Diagnosis Date    Allergic     rhinitis    Anemia     Anxiety     Closed comminuted intra-articular fracture of distal femur, right, initial encounter ([facility] 2/6/2022    Depression     Eye disorder     Fibromyalgia     GERD (gastroesophageal reflux disease)     Hypertension     Obesity     KAIT (obstructive sleep apnea)     no CPAP - has dental appliance    Osteoarthritis     Osteoporosis     PONV (postoperative nausea and vomiting)        Past Surgical History:        Procedure Laterality Date    HIP SURGERY      JOINT REPLACEMENT Right 1993/2003    HIP    JOINT REPLACEMENT Left 5/18/16    left total knee replacement    KNEE SURGERY      WRIST FRACTURE SURGERY Left        Current Medications:   Current Facility-Administered Medications: ceFAZolin (ANCEF) 2000 mg in dextrose 5 % 50 mL IVPB, 2,000 mg, IntraVENous, On Call to OR  cefTRIAXone (ROCEPHIN) 1000 mg IVPB in 50 mL D5W minibag, 1,000 mg, IntraVENous, Q24H  0.9 % sodium chloride infusion, , IntraVENous, Continuous  meperidine (DEMEROL) injection 12.5 mg, 12.5 mg, IntraVENous, Q5 Min PRN  HYDROmorphone (DILAUDID) injection 0.25 mg, 0.25 mg, IntraVENous, Q5 Min PRN  fentaNYL (SUBLIMAZE) injection 50 mcg, 50 mcg, IntraVENous, Q5 Min PRN  HYDROmorphone (DILAUDID) injection 0.25 mg, 0.25 mg, IntraVENous, Q5 Min PRN  HYDROmorphone (DILAUDID) injection 0.5 mg, 0.5 mg, IntraVENous, Q5 Min PRN  oxyCODONE (ROXICODONE) immediate release tablet 5 mg, 5 mg, Oral, PRN **OR** oxyCODONE (ROXICODONE) immediate release tablet 10 mg, 10 mg, Oral, PRN  promethazine (PHENERGAN) injection 6.25 mg, 6.25 mg, IntraVENous, PRN  diphenhydrAMINE (BENADRYL) injection 12.5 mg, 12.5 mg, IntraVENous, Once PRN  labetalol (NORMODYNE;TRANDATE) injection 5 mg, 5 mg, IntraVENous, Q10 Min PRN  hydrALAZINE (APRESOLINE) injection 5 mg, 5 mg, IntraVENous, Q10 Min PRN  [Held by provider] amLODIPine (NORVASC) tablet 5 mg, 5 mg, Oral, Daily  buPROPion (WELLBUTRIN SR) extended release tablet 150 mg, 150 mg, Oral, Daily  DULoxetine (CYMBALTA) extended release capsule 60 mg, 60 mg, Oral, Daily  escitalopram (LEXAPRO) tablet 20 mg, 20 mg, Oral, Daily  pantoprazole (PROTONIX) tablet 40 mg, 40 mg, Oral, QAM AC  sodium chloride flush 0.9 % injection 5-40 mL, 5-40 mL, IntraVENous, 2 times per day  sodium chloride flush 0.9 % injection 5-40 mL, 5-40 mL, IntraVENous, PRN  0.9 % sodium chloride infusion, 25 mL, IntraVENous, PRN  ondansetron (ZOFRAN-ODT) disintegrating tablet 4 mg, 4 mg, Oral, Q8H PRN **OR** ondansetron (ZOFRAN) injection 4 mg, 4 mg, IntraVENous, Q6H PRN  polyethylene glycol (GLYCOLAX) packet 17 g, 17 g, Oral, Daily PRN  acetaminophen (TYLENOL) tablet 650 mg, 650 mg, Oral, Q6H PRN **OR** acetaminophen (TYLENOL) suppository 650 mg, 650 mg, Rectal, Q6H PRN  oxyCODONE-acetaminophen (PERCOCET) 5-325 MG per tablet 1 tablet, 1 tablet, Oral, Q4H PRN **OR** oxyCODONE-acetaminophen (PERCOCET) 5-325 MG per tablet 2 tablet, 2 tablet, Oral, Q4H PRN  HYDROmorphone (DILAUDID) injection 0.25 mg, 0.25 mg, IntraVENous, Q3H PRN **OR** HYDROmorphone (DILAUDID) injection 0.5 mg, 0.5 mg, IntraVENous, Q3H PRN  gabapentin (NEURONTIN) tablet 1,200 mg, 1,200 mg, Oral, BID  Facility-Administered Medications Ordered in Other Encounters: dexamethasone (DECADRON) injection, , IntraVENous, PRN  ondansetron (ZOFRAN) injection, , IntraVENous, PRN  succinylcholine (ANECTINE) injection, , IntraVENous, PRN  propofol injection, , IntraVENous, PRN  lidocaine (cardiac) (XYLOCAINE) injection, , IntraVENous, PRN  phenylephrine (VAZCULEP) injection, , IntraVENous, PRN    Allergies:  Pregabalin    Social History:   TOBACCO:   reports that she has never smoked. She has never used smokeless tobacco.  ETOH:   reports no history of alcohol use. DRUGS:   reports no history of drug use. Family History:       Problem Relation Age of Onset    Cancer Father     Alcohol Abuse Mother     Cancer Mother     Osteoarthritis Mother     Dementia Mother     Cancer Maternal Aunt         breast       REVIEW OF SYSTEMS:    CONSTITUTIONAL:  negative  RESPIRATORY:  negative  CARDIOVASCULAR:  negative  MUSCULOSKELETAL:  positive for  pain    PHYSICAL EXAM:      General: alert, appears stated age and cooperative   Skin: Skin intact, No Erythema, No Drainage and Positive for Edema   Extremity: Distal NVI   DVT Exam: No evidence of DVT seen on physical exam.  Negative Sonny's sign. No significant calf/ankle edema.      RLE: + dorsal pedis and posterior tibial pulse palpable, +sensation intact to light touch L4-S1, thigh and calf compartments soft and compressible, motor function intact ehl, df, pf.   Good cap refill <2 sec  Pain with log roll, incision from previous total hip and total knee replacements are well-healed      DATA:        CBC with Differential:    Lab Results   Component Value Date    WBC 15.4 02/06/2022    RBC 4.11 02/06/2022    RBC 4.39 02/03/2017    HGB 12.0 02/06/2022    HCT 36.0 02/06/2022     02/06/2022    MCV 87.6 02/06/2022    MCH 29.1 02/06/2022    MCHC 33.2 02/06/2022    RDW 14.7 02/06/2022    LYMPHOPCT 6.5 02/06/2022    LYMPHOPCT 31.4 02/03/2017    MONOPCT 5.4 02/06/2022    BASOPCT 0.2 02/06/2022    MONOSABS 0.8 02/06/2022    LYMPHSABS 1.0 02/06/2022    EOSABS 0.0 02/06/2022    BASOSABS 0.0 02/06/2022     CMP:    Lab Results   Component Value Date     02/06/2022    K 4.2 02/06/2022     02/06/2022    CO2 27 02/06/2022    BUN 21 02/06/2022    CREATININE 1.0 02/06/2022    GFRAA >60 02/06/2022    LABGLOM 54 02/06/2022    GLUCOSE 160 02/06/2022    CALCIUM 9.5 02/06/2022     BMP:    Lab Results   Component Value Date     02/06/2022    K 4.2 02/06/2022     02/06/2022    CO2 27 02/06/2022    BUN 21 02/06/2022    CREATININE 1.0 02/06/2022    CALCIUM 9.5 02/06/2022    GFRAA >60 02/06/2022    LABGLOM 54 02/06/2022    GLUCOSE 160 02/06/2022         Radiology:     @  XR KNEE RIGHT (1-2 VIEWS)   Final Result   1. Comminuted, displaced fracture of the distal femur. 2. No acute fracture in the pelvis right hip. XR FEMUR RIGHT (MIN 2 VIEWS)   Final Result   1. Comminuted, displaced fracture of the distal femur. 2. No acute fracture in the pelvis right hip. XR HIP RIGHT (1 VIEW)   Final Result   1. Comminuted, displaced fracture of the distal femur. 2. No acute fracture in the pelvis right hip. IMPRESSION/RECOMMENDATIONS:    Assessment: Right midshaft, comminuted, displaced and shortened femur fracture, ipsilateral total hip and total knee arthroplasty without evidence of loosening    Plan:  1) Imaging findings reviewed with patient and family. Conservative and surgical treatment options discussed.   After discussion today with the family and patient they elected to proceed with surgical intervention. The surgical procedure was discussed in detail. The risks and possible complications of the surgery in general, as well as those directly related to this procedure were reviewed today. General risks include but are not limited to persistent pain, infection, excessive blood loss, neurovascular injury, chronic swelling or edema, and the potential need for additional treatment or surgical intervention in the future. The patient understands that, again, the risks and possible complications are not limited to those specifically stated above. The patient accepted the above risks, possible complications and elects to proceed. All questions were answered appropriately, and they understand that they can contact me at any time to further discuss any questions or concerns. I recommend plate fixation due to the unstable nature of the fracture,  bone quality and the patients functional status. Patient and daughter inquired about other methods for fixation, patient has a posterior stabilized knee which precludes intramedullary nail fixation. Fracture is also midshaft which precludes an oncologic knee replacement. These various treatment options were discussed with patient and daughter, they agree with proceeding with plate fixation as this is optimal surgical approach for her fracture pattern. 2) NPO, consent obtained, abx on call to OR  3) patient medically optimized  4) will continue to follow post operatively, will need SW for D/C planning        Thank you for the opportunity to consult on this patient.      Rosemarie Reyes,

## 2022-02-08 NOTE — PROGRESS NOTES
2015 Admitted to PACU - pre-op  Anesthesia - dr David Kilgore here to see patient  NS started  2030 SpO2 low - O2 applied at 2l.   Plan to Call daughter Jose D Haley [phone] post op  Dr Deshaun Chacon here talking with patient and daughter  2052 to OR

## 2022-02-08 NOTE — ADDENDUM NOTE
Addendum  created 02/08/22 0054 by Rianna River MD    Clinical Note Signed, Intraprocedure Meds edited, Order list changed

## 2022-02-09 ENCOUNTER — APPOINTMENT (OUTPATIENT)
Dept: GENERAL RADIOLOGY | Age: 77
DRG: 481 | End: 2022-02-09
Payer: MEDICARE

## 2022-02-09 LAB
ALBUMIN SERPL-MCNC: 2.9 G/DL (ref 3.4–5)
ANION GAP SERPL CALCULATED.3IONS-SCNC: 3 MMOL/L (ref 3–16)
BASOPHILS ABSOLUTE: 0 K/UL (ref 0–0.2)
BASOPHILS RELATIVE PERCENT: 0.3 %
BLOOD BANK DISPENSE STATUS: NORMAL
BLOOD BANK PRODUCT CODE: NORMAL
BPU ID: NORMAL
BUN BLDV-MCNC: 35 MG/DL (ref 7–20)
CALCIUM SERPL-MCNC: 8.3 MG/DL (ref 8.3–10.6)
CHLORIDE BLD-SCNC: 105 MMOL/L (ref 99–110)
CO2: 28 MMOL/L (ref 21–32)
CREAT SERPL-MCNC: 1.5 MG/DL (ref 0.6–1.2)
DESCRIPTION BLOOD BANK: NORMAL
EOSINOPHILS ABSOLUTE: 0.1 K/UL (ref 0–0.6)
EOSINOPHILS RELATIVE PERCENT: 2.1 %
GFR AFRICAN AMERICAN: 41
GFR NON-AFRICAN AMERICAN: 34
GLUCOSE BLD-MCNC: 113 MG/DL (ref 70–99)
HCT VFR BLD CALC: 21.7 % (ref 36–48)
HCT VFR BLD CALC: 23.6 % (ref 36–48)
HEMOGLOBIN: 7.2 G/DL (ref 12–16)
HEMOGLOBIN: 8.2 G/DL (ref 12–16)
LYMPHOCYTES ABSOLUTE: 1.3 K/UL (ref 1–5.1)
LYMPHOCYTES RELATIVE PERCENT: 17.7 %
MCH RBC QN AUTO: 29.4 PG (ref 26–34)
MCHC RBC AUTO-ENTMCNC: 33 G/DL (ref 31–36)
MCV RBC AUTO: 89.1 FL (ref 80–100)
MONOCYTES ABSOLUTE: 0.6 K/UL (ref 0–1.3)
MONOCYTES RELATIVE PERCENT: 7.9 %
NEUTROPHILS ABSOLUTE: 5.2 K/UL (ref 1.7–7.7)
NEUTROPHILS RELATIVE PERCENT: 72 %
PDW BLD-RTO: 15.3 % (ref 12.4–15.4)
PHOSPHORUS: 3 MG/DL (ref 2.5–4.9)
PLATELET # BLD: 124 K/UL (ref 135–450)
PMV BLD AUTO: 9.9 FL (ref 5–10.5)
POTASSIUM SERPL-SCNC: 4.6 MMOL/L (ref 3.5–5.1)
PRO-BNP: 2377 PG/ML (ref 0–449)
RBC # BLD: 2.44 M/UL (ref 4–5.2)
SODIUM BLD-SCNC: 136 MMOL/L (ref 136–145)
WBC # BLD: 7.2 K/UL (ref 4–11)

## 2022-02-09 PROCEDURE — 97535 SELF CARE MNGMENT TRAINING: CPT

## 2022-02-09 PROCEDURE — 83880 ASSAY OF NATRIURETIC PEPTIDE: CPT

## 2022-02-09 PROCEDURE — 97530 THERAPEUTIC ACTIVITIES: CPT

## 2022-02-09 PROCEDURE — 1200000000 HC SEMI PRIVATE

## 2022-02-09 PROCEDURE — 6370000000 HC RX 637 (ALT 250 FOR IP): Performed by: INTERNAL MEDICINE

## 2022-02-09 PROCEDURE — 36415 COLL VENOUS BLD VENIPUNCTURE: CPT

## 2022-02-09 PROCEDURE — 2580000003 HC RX 258: Performed by: INTERNAL MEDICINE

## 2022-02-09 PROCEDURE — 85025 COMPLETE CBC W/AUTO DIFF WBC: CPT

## 2022-02-09 PROCEDURE — 85014 HEMATOCRIT: CPT

## 2022-02-09 PROCEDURE — 6360000002 HC RX W HCPCS: Performed by: INTERNAL MEDICINE

## 2022-02-09 PROCEDURE — 80069 RENAL FUNCTION PANEL: CPT

## 2022-02-09 PROCEDURE — 36430 TRANSFUSION BLD/BLD COMPNT: CPT

## 2022-02-09 PROCEDURE — 71045 X-RAY EXAM CHEST 1 VIEW: CPT

## 2022-02-09 PROCEDURE — 85018 HEMOGLOBIN: CPT

## 2022-02-09 RX ORDER — SODIUM CHLORIDE 9 MG/ML
INJECTION, SOLUTION INTRAVENOUS PRN
Status: COMPLETED | OUTPATIENT
Start: 2022-02-09 | End: 2022-02-09

## 2022-02-09 RX ORDER — 0.9 % SODIUM CHLORIDE 0.9 %
500 INTRAVENOUS SOLUTION INTRAVENOUS ONCE
Status: DISCONTINUED | OUTPATIENT
Start: 2022-02-09 | End: 2022-02-11 | Stop reason: HOSPADM

## 2022-02-09 RX ORDER — GABAPENTIN 600 MG/1
600 TABLET ORAL 2 TIMES DAILY
Status: DISCONTINUED | OUTPATIENT
Start: 2022-02-09 | End: 2022-02-11 | Stop reason: HOSPADM

## 2022-02-09 RX ADMIN — SODIUM CHLORIDE, PRESERVATIVE FREE 10 ML: 5 INJECTION INTRAVENOUS at 10:17

## 2022-02-09 RX ADMIN — DULOXETINE HYDROCHLORIDE 60 MG: 60 CAPSULE, DELAYED RELEASE ORAL at 10:15

## 2022-02-09 RX ADMIN — PANTOPRAZOLE SODIUM 40 MG: 40 TABLET, DELAYED RELEASE ORAL at 06:19

## 2022-02-09 RX ADMIN — GABAPENTIN 1200 MG: 600 TABLET, FILM COATED ORAL at 10:14

## 2022-02-09 RX ADMIN — CEFTRIAXONE 1000 MG: 1 INJECTION, POWDER, FOR SOLUTION INTRAMUSCULAR; INTRAVENOUS at 00:03

## 2022-02-09 RX ADMIN — SODIUM CHLORIDE: 9 INJECTION, SOLUTION INTRAVENOUS at 15:10

## 2022-02-09 RX ADMIN — ESCITALOPRAM OXALATE 20 MG: 20 TABLET ORAL at 10:15

## 2022-02-09 RX ADMIN — OXYCODONE HYDROCHLORIDE AND ACETAMINOPHEN 2 TABLET: 5; 325 TABLET ORAL at 03:15

## 2022-02-09 RX ADMIN — BUPROPION HYDROCHLORIDE 150 MG: 150 TABLET, EXTENDED RELEASE ORAL at 10:15

## 2022-02-09 RX ADMIN — GABAPENTIN 600 MG: 600 TABLET, FILM COATED ORAL at 21:05

## 2022-02-09 RX ADMIN — SODIUM CHLORIDE, PRESERVATIVE FREE 10 ML: 5 INJECTION INTRAVENOUS at 21:06

## 2022-02-09 ASSESSMENT — PAIN DESCRIPTION - DESCRIPTORS
DESCRIPTORS: SHARP;ACHING
DESCRIPTORS: ACHING;SHARP

## 2022-02-09 ASSESSMENT — PAIN SCALES - GENERAL
PAINLEVEL_OUTOF10: 0
PAINLEVEL_OUTOF10: 2
PAINLEVEL_OUTOF10: 9

## 2022-02-09 ASSESSMENT — PAIN DESCRIPTION - LOCATION
LOCATION: LEG
LOCATION: KNEE

## 2022-02-09 ASSESSMENT — PAIN DESCRIPTION - PROGRESSION
CLINICAL_PROGRESSION: GRADUALLY WORSENING
CLINICAL_PROGRESSION: NOT CHANGED

## 2022-02-09 ASSESSMENT — PAIN DESCRIPTION - ORIENTATION
ORIENTATION: RIGHT
ORIENTATION: RIGHT

## 2022-02-09 ASSESSMENT — PAIN DESCRIPTION - ONSET: ONSET: ON-GOING

## 2022-02-09 ASSESSMENT — PAIN DESCRIPTION - PAIN TYPE: TYPE: SURGICAL PAIN

## 2022-02-09 ASSESSMENT — PAIN DESCRIPTION - FREQUENCY
FREQUENCY: CONTINUOUS
FREQUENCY: INTERMITTENT

## 2022-02-09 NOTE — PROGRESS NOTES
Occupational Therapy  Facility/Department: [facility] 5T ORTHO/NEURO  Daily Treatment Note  NAME: Kendy Chery  : 1945  MRN: [id number]    Date of Service: 2022    Discharge Recommendations:  Kendy Chery scored a 12/24 on the AM-PAC ADL Inpatient form. Current research shows that an AM-PAC score of 17 or less is typically not associated with a discharge to the patient's home setting. Based on the patient's AM-PAC score and their current ADL deficits, it is recommended that the patient have 3-5 sessions per week of Occupational Therapy at d/c to increase the patient's independence. Please see assessment section for further patient specific details. If patient discharges prior to next session this note will serve as a discharge summary. Please see below for the latest assessment towards goals. OT Equipment Recommendations  Equipment Needed: No  Other: defer    Assessment   Performance deficits / Impairments: Decreased functional mobility ; Decreased ADL status; Decreased strength;Decreased ROM; Decreased safe awareness;Decreased cognition;Decreased endurance;Decreased balance;Decreased high-level IADLs;Decreased coordination;Decreased posture  Assessment: 76 yo admitted 22 for fall/R femur fx and now has R TTWB restrictions. Pt demonstrating cognition and sequencing issues making functional transfers unsafe, max A x2 for squat pivot t/f to strong side. Pt needing assist to maintain TTWB despite education and max VCs. TA for LB ADL. Mod A bed mobility. Pt would benefit from continued skilled IP OT at discharge. Treatment Diagnosis: decreased ADLs, functional mobility and functional transfers 2/2 R femur fx  Prognosis: Guarded; Fair  REQUIRES OT FOLLOW UP: Yes  Activity Tolerance  Activity Tolerance: Patient Tolerated treatment well;Treatment limited secondary to decreased cognition;Patient limited by pain  Activity Tolerance: Vitals seated in chair at end of session: 116/55, 93% and 0.5L, 80bpm  Safety Devices  Type of devices: Nurse notified;Call light within reach;Gait belt; Chair alarm in place; Left in chair         Patient Diagnosis(es): The primary encounter diagnosis was Closed fracture of right femur, unspecified fracture morphology, unspecified portion of femur, initial encounter ([facility] [address] A diagnosis of Closed comminuted intra-articular fracture of distal femur, right, initial encounter [facility]) was also pertinent to this visit. has a past medical history of Allergic, Anemia, Anxiety, Closed comminuted intra-articular fracture of distal femur, right, initial encounter ([facility] [address] Depression, Eye disorder, Fibromyalgia, GERD (gastroesophageal reflux disease), Hypertension, Obesity, KAIT (obstructive sleep apnea), Osteoarthritis, Osteoporosis, PONV (postoperative nausea and vomiting), and Prolonged emergence from general anesthesia. has a past surgical history that includes hip surgery; joint replacement (Right, 1993/2003); Wrist fracture surgery (Left); joint replacement (Left, 5/18/16); knee surgery; and Femur fracture surgery (Right, 2/7/2022). Restrictions  Position Activity Restriction  Other position/activity restrictions: TTWB R, no activity restriction noted  Subjective   General  Chart Reviewed: Yes  Patient assessed for rehabilitation services?: Yes  Additional Pertinent Hx: 76 yo admitted 2/6/22 for fall/R femur fx. OR 2/7 FEMUR OPEN REDUCTION INTERNAL FIXATION RIGHT. Pmhx: HTN, fibromyalgia, KAIT, L TKR, R THR  Family / Caregiver Present: Yes (daughter)  Referring Practitioner: Bucky Massey MD  Diagnosis: R femur fx s/p ORIF  Subjective  Subjective: RN cleared pt for tx. Pt supine at session start. Pain Assessment  Pain Assessment: 0-10  Pain Level: 2 (at rest, increasing with movement)  Pain Location: Leg  Pain Orientation: Right  Pain Descriptors: Aching; Sharp  Pain Frequency: Continuous  Clinical Progression: Gradually worsening  Functional Pain Assessment: Prevents or interferes some active activities and ADLs  Non-Pharmaceutical Pain Intervention(s): Emotional support;Distraction;Rest;Repositioned  Response to Pain Intervention: Patient Satisfied  Vital Signs  Patient Currently in Pain: Yes   Orientation  Orientation  Overall Orientation Status: Within Functional Limits  Objective    ADL  Feeding: Setup (at end of session)  LE Dressing: Dependent/Total (socks)  Toileting: Dependent/Total (valdez)        Balance  Sitting Balance: Contact guard assistance  Standing Balance: Dependent/Total (max A x2)  Bed mobility  Supine to Sit: Moderate assistance (to R, HOB raised, use of bed rail, increased time)  Scooting: Moderate assistance (to EOB)  Transfers  Stand Pivot Transfers: Dependent/Total (max A x2 to L)  Sit to stand: Dependent/Total (max A x2 with SW)  Stand to sit: Dependent/Total (max A x2 with SW)                       Cognition  Overall Cognitive Status: Exceptions  Arousal/Alertness: Appropriate responses to stimuli  Following Commands: Inconsistently follows commands; Follows one step commands with increased time; Follows one step commands with repetition  Attention Span: Attends with cues to redirect  Memory: Appears intact  Safety Judgement: Decreased awareness of need for safety;Decreased awareness of need for assistance  Problem Solving: Assistance required to correct errors made;Decreased awareness of errors;Assistance required to identify errors made;Assistance required to implement solutions;Assistance required to generate solutions  Insights: Decreased awareness of deficits  Initiation: Requires cues for some  Sequencing: Requires cues for some                                         Education: Role of OT, safe t/f training, safe use of DME, awareness of deficits, discharge planning, ADL as therapeutic exercise, importance of OOB, WB status     Plan   Plan  Times per week: 5-7  Current Treatment Recommendations: Patient/Caregiver Education & Training,Gait Training,Home Management Training,Equipment Evaluation, Education, & procurement,Balance [place] / ADL,Safety Education & Training    AM-PAC Score        AM-PAC Inpatient Daily Activity Raw Score: 12 (02/09/22 1219)  AM-PAC Inpatient ADL T-Scale Score : 30.6 (02/09/22 1219)  ADL Inpatient CMS 0-100% Score: 66.57 (02/09/22 1219)  ADL Inpatient CMS G-Code Modifier : CL (02/09/22 1219)    Goals  Short term goals  Time Frame for Short term goals: by d/c  Short term goal 1: Pt will complete bed to chair transfer. GOAL MET 2/9. NEW GOAL: Functional t/f with mod A x2  Short term goal 2: Pt will verbalize understanding of TTWBing. GOAL MET 2/9. NEW GOAL: Maintain TTWB 100% of session with min A or less  Short term goal 3: Pt will tolerate stance for 30 seconds at RW. NOT MET 2/9. Patient Goals   Patient goals : \"Be in less pain. \"       Therapy Time   Individual Concurrent Group Co-treatment   Time In 1115         Time Out 1155         Minutes 40         Timed Code Treatment Minutes: 36 Minutes       If patient is discharged prior to next treatment session, this note will serve as the discharge summary.   Hilary Kate, OTR/L #[id number]

## 2022-02-09 NOTE — PROGRESS NOTES
Physical Therapy    Facility/Department: [facility] 5T ORTHO/NEURO  Initial Assessment    NAME: Doyle Thakkar  : 1945  MRN: [id number]    Date of Service: 2022    Discharge Recommendations:Sandra Rand scored a  on the AM-PAC short mobility form. Current research shows that an AM-PAC score of 17 or less is typically not associated with a discharge to the patient's home setting. Based on the patient's AM-PAC score and their current functional mobility deficits, it is recommended that the patient have 3-5 sessions per week of Physical Therapy at d/c to increase the patient's independence. Please see assessment section for further patient specific details. If patient discharges prior to next session this note will serve as a discharge summary. Please see below for the latest assessment towards goals. PT Equipment Recommendations  Equipment Needed:  (defer to next level of care)    Assessment   Assessment: Pt demo improved mentation and mobility this date but still demo very poor mobiilty with pain/TTWB (NWB) R. Pt now agreeable to pursue continue skilled IP PT at discharge as she lives alone and cannot manage at this time. Dtr here and still asking about pt getting to [place] to see her son who is in hospice. Pt would benefit from continued skilled IP PT at discharge to maximize her functional independence prior to d/c home. Treatment Diagnosis: mobility impairment due to R femur fx  Patient Education: Pt educated on PT Role, importance of OOB mobility, need to call for assist to get up, TTWB R and she was unable to verbalize understanding and will need reinforcement.   REQUIRES PT FOLLOW UP: Yes  Activity Tolerance  Activity Tolerance: Patient Tolerated treatment well;Patient limited by fatigue (limited by TTWB L/NWB L)       Patient Diagnosis(es): The primary encounter diagnosis was Closed fracture of right femur, unspecified fracture morphology, unspecified portion of femur, initial encounter [facility]). A diagnosis of Closed comminuted intra-articular fracture of distal femur, right, initial encounter [facility]) was also pertinent to this visit. has a past medical history of Allergic, Anemia, Anxiety, Closed comminuted intra-articular fracture of distal femur, right, initial encounter ([facility] [address] Depression, Eye disorder, Fibromyalgia, GERD (gastroesophageal reflux disease), Hypertension, Obesity, KAIT (obstructive sleep apnea), Osteoarthritis, Osteoporosis, PONV (postoperative nausea and vomiting), and Prolonged emergence from general anesthesia. has a past surgical history that includes hip surgery; joint replacement (Right, 1993/2003); Wrist fracture surgery (Left); joint replacement (Left, 5/18/16); knee surgery; and Femur fracture surgery (Right, 2/7/2022). Restrictions  Position Activity Restriction  Other position/activity restrictions: TTWB R, no activity restriction noted  Vision/Hearing        Subjective  General  Chart Reviewed: Yes  Patient assessed for rehabilitation services?: Yes  Additional Pertinent Hx: 76 yo admitted 2/6/22 for fall/R femur fx. OR 2/7 FEMUR OPEN REDUCTION INTERNAL FIXATION RIGHT. Pmhx: HTN, fibromyalgia, KAIT, L TKR, R THR>  Family / Caregiver Present: Yes (dtr)  Subjective  Subjective: Pt found supine in bed and agreeable to PT. Pt states she is thinking much more clearly this date.   Pain Screening  Patient Currently in Pain: Yes (rates pain 2/10 at rest; increased with movement; RN aware)         Orientation  Orientation  Overall Orientation Status: Within Functional Limits     Social/Functional History  Social/Functional History  Lives With: Alone  Type of Home: House  Home Layout: Multi-level,Able to Live on Main level with bedroom/bathroom  Home Access:  (can enter through garage with 10 steps with rail; 10 porch steps with 2 steps without rail to enter in front; 5+5 with rail in back)  Bathroom Toilet: Handicap height (RTS and handicap ht toilet)  Bathroom Equipment: Shower chair  Home Equipment: 4 wheeled walker  ADL Assistance: Independent  Ambulation Assistance: Independent (uses 4 WW outside of house)  Additional Comments: Pt/dtr report pt had \"special\" way of moving/performing mobility prior to fall. Objective     Bed mobility  Supine to Sit: Moderate assistance (HOB up; heavy reliance on rail; effortful (pt using L LE to assist R LE))  Scooting: Moderate assistance (to EOB)     Transfers  Sit to Stand: 2 Person Assistance;Maximum Assistance (max vc for hand placement and for /TTWB /NWB R)  Stand to sit: 2 Person Assistance;Maximum Assistance (with max vc for hand placement and for TTWB/NWB R (tactile cues also))    Squat Pivot Transfers: Maximum Assistance;2 Person Assistance (pt pivot to L and unable to consistently maintainTTWB/ NWB R during tranfer)  Comment: Pt GEORGE on RA and .5L 02; sp02 between 85% initially with mobility and up to 94% at rest; B/P 116/55; RN aware        Balance  Sitting - Static: Good  Sitting - Dynamic: Fair;+  Standing - Static: Poor  Comments: Pt sat EOB x10 mins with SBA overall; pt c/o increased  R Hip pain with sitting. RN aware.  Pt stood approx 5 sec with max assist x2 but unable to maintain NWB L.        Plan   Plan  Times per week: 5-7  Current Treatment Recommendations: Strengthening,Balance Training,Transfer Training  Safety Devices  Type of devices: Call light within reach,Chair alarm in place,Nurse notified,Left in chair (foot rest in place)            AM-PAC Score  AM-PAC Inpatient Mobility Raw Score : 7 (02/09/22 1312)  AM-PAC Inpatient T-Scale Score : 26.42 (02/09/22 1312)  Mobility Inpatient CMS 0-100% Score: 92.36 (02/09/22 1312)  Mobility Inpatient CMS G-Code Modifier : CM (02/09/22 1312)          Goals  Short term goals  Time Frame for Short term goals: discharge  Short term goal 1: sit to/from supine supervision  ongoing  Short term goal 2: CGA B LE HEP x10  ongoing  Short term goal 3: sit EOB x20 mins with supervision  ongoing  Short term goal 4: bed to/from chair supervision SALVADOR LEAVITT (set 2/9)  Patient Goals   Patient goals : return home       Therapy Time   Individual Concurrent Group Co-treatment   Time In 200         Time Out 1200         Minutes 45           Timed Code Treatment Minutes: 45      Total Treatment Minutes:  100 Frist KEVIN Maldonado

## 2022-02-09 NOTE — CARE COORDINATION
SW met with patient and daughter at bedside. Had long dicussion on levels of care and planning. AT this time family interested in SNF placement with possibility of ARU/IRF if her therapy improves. Goal is to get up to [place] in the next three weeks if possible visit her son who is on Hospice. Referrals requested to [facility] (unable to accept), [facility], and [facility] for rehab     Referrals placed in [address] [place] [address] RdAna M Dougherty, MSW, LSW  [facility] Work   Ph: [phone]

## 2022-02-09 NOTE — PROGRESS NOTES
Hospitalist Progress Note      PCP: Emilie Allen MD    Date of Admission: 2/6/2022    Chief Complaint: Cristiano Grimaldo on ice going down stairs at home    Subjective:   Patient seen and examined, she says she feels okay denies chest pain shortness of breath  Daughter present at bedside discussed patient care with her    Medications:  Reviewed    Infusion Medications    sodium chloride       Scheduled Medications    sodium chloride  500 mL IntraVENous Once    gabapentin  600 mg Oral BID    cefTRIAXone (ROCEPHIN) IV  1,000 mg IntraVENous Q24H    [Held by provider] amLODIPine  5 mg Oral Daily    buPROPion  150 mg Oral Daily    DULoxetine  60 mg Oral Daily    escitalopram  20 mg Oral Daily    pantoprazole  40 mg Oral QAM AC    sodium chloride flush  5-40 mL IntraVENous 2 times per day     PRN Meds: naloxone, sodium chloride flush, sodium chloride, ondansetron **OR** ondansetron, polyethylene glycol, acetaminophen **OR** acetaminophen, oxyCODONE-acetaminophen **OR** oxyCODONE-acetaminophen, HYDROmorphone **OR** HYDROmorphone      Intake/Output Summary (Last 24 hours) at 2/9/2022 2146  Last data filed at 2/9/2022 1900  Gross per 24 hour   Intake 1067.5 ml   Output 720 ml   Net 347.5 ml       Physical Exam Performed:    BP (!) 151/78   Pulse 97   Temp 99.7 °F (37.6 °C) (Oral)   Resp 18   Ht 5' 10.5\" (1.791 m)   Wt 220 lb (99.8 kg)   SpO2 92%   BMI 31.12 kg/m²     General appearance:  No apparent distress, appears stated age and cooperative. HEENT:  Normal cephalic, atraumatic without obvious deformity. Pupils equal, round, and reactive to light. Extra ocular muscles intact. Conjunctivae/corneas clear. Neck: Supple, with full range of motion. No jugular venous distention. Trachea midline. Respiratory:  Normal respiratory effort. Decreased breath sound in the bases  Cardiovascular:  Regular rate and rhythm with normal S1/S2 without murmurs, rubs or gallops.   Abdomen: Soft, non-tender, hip.              Assessment/Plan:    Active Hospital Problems    Diagnosis Date Noted    Closed comminuted intra-articular fracture of distal femur, right, initial encounter [facility]) Demarco Vance 02/06/2022     Traumatic distal comminuted right femoral fracture due to mechanical fall  S/p Procedure:  1. ORIF Right distal femoral fracture                         2. Intraoperative Flouroscopy Right Femur with Interpretation   EBL: 200 mL  toe-touch weightbearing per orthopedic surgery  We will need placement on discharge  Oxycodone for pain control, Dilaudid for breakthrough pain  No need for IV fluids, blood pressures okay, she is eating good     Hypoxemia post op, likely atelactasis, IS q hr, wean off for SpO2 92% or above  Also suspect that she dropped her hemoglobin to 7.2, this could be contributing  We will be transfusing 1 unit packed RBC    Acute blood loss anemia, hemoglobin dropped to 7.2  With patient having borderline blood pressures, hypoxemia, acute kidney injury, will given 1 unit packed RBC  Aim for hemoglobin greater than 8    Acute kidney injury, not present on admission  Likely hemodynamic instability  Transfusion 1 unit packed RBC  Monitor blood pressure  Keep map above 65, systolic greater than 673  Avoid NSAIDs (Ibuprofen, Aleve, Motrin, Naproxen, Toradol etc), Fleet enemas, IV contrast Dye and other nephrotoxins! Strict input and output  Repeat renal profile tomorrow    Leukocytosis, UA was abnormal, UTI ruled out  Can stop Rocephin    Anxiety/depression-- Continue BuSpar and Lexapro for anxiety and depression     Fibromyalgia -- Continue home duloxetine, high dose of gabapentin     Hypertension: hold amlodipine for now       DVT Prophylaxis: SCDs, per orthopedic surgery,  Diet: ADULT DIET; Regular  Code Status: Full Code    PT/OT Eval Status: Ordered    Dispo - continue inpatient care, will need placement, discussed with daughter, patient's son is on hospice [place].   Actually patient was about to get out of the house to try to [place] when she had a fall. They are inquiring about rehab/nursing facility in [place] which is in the PT OT and be close to her son.   Discussed with     Manford Kehr, MD   Hospitalist

## 2022-02-09 NOTE — CARE COORDINATION
SW spoke with daughter outside of room. PT/OT working with patient. Daughter wanting to return to observe how patient is doing with therapy. Daughter is willing to meet with SW at 2:00 pm to discuss discharge plan in detail and review options. SW to follow.        Latosha Howard, MSW, LSW  [facility] Work   Ph: [phone]

## 2022-02-09 NOTE — PROGRESS NOTES
Pt noted resting in bed , nasal canula 4L in place, pt c/o pain/ discomfort,medicated per mar, side rails up x2, bed in low position, call light within reach

## 2022-02-09 NOTE — PLAN OF CARE
Problem: Falls - Risk of:  Goal: Will remain free from falls  Description: Will remain free from falls  2/8/2022 2301 by Ginette Scott RN  Outcome: Ongoing  2/8/2022 1624 by Efrain Rivera RN  Outcome: Ongoing  Goal: Absence of physical injury  Description: Absence of physical injury  2/8/2022 2301 by Ginette Scott RN  Outcome: Ongoing  2/8/2022 1624 by Efrain Rivera RN  Outcome: Ongoing     Problem: Pain:  Goal: Pain level will decrease  Description: Pain level will decrease  2/8/2022 2301 by Ginette Scott RN  Outcome: Ongoing  2/8/2022 1624 by Efrain Rivera RN  Outcome: Ongoing  Goal: Control of acute pain  Description: Control of acute pain  2/8/2022 2301 by Ginette Scott RN  Outcome: Ongoing  2/8/2022 1624 by Efrain Rivera RN  Outcome: Ongoing  Goal: Control of chronic pain  Description: Control of chronic pain  2/8/2022 2301 by Ginette Scott RN  Outcome: Ongoing  2/8/2022 1624 by Efrain Rivera RN  Outcome: Ongoing     Problem: Skin Integrity:  Goal: Demonstration of wound healing without infection will improve  Description: Demonstration of wound healing without infection will improve  2/8/2022 2301 by Ginette Scott RN  Outcome: Ongoing  2/8/2022 1624 by Efrain Rivera RN  Outcome: Ongoing  Goal: Complications related to intravenous access or infusion will be avoided or minimized  Description: Complications related to intravenous access or infusion will be avoided or minimized  2/8/2022 2301 by Ginette Scott RN  Outcome: Ongoing  2/8/2022 1624 by Efrain Rivera RN  Outcome: Ongoing  Goal: Will show no infection signs and symptoms  Description: Will show no infection signs and symptoms  Outcome: Ongoing  Goal: Absence of new skin breakdown  Description: Absence of new skin breakdown  Outcome: Ongoing

## 2022-02-09 NOTE — PLAN OF CARE
Problem: Falls - Risk of:  Goal: Will remain free from falls  Description: Will remain free from falls  Outcome: Ongoing  Goal: Absence of physical injury  Description: Absence of physical injury  Outcome: Ongoing     Problem: Pain:  Description: Pain management should include both nonpharmacologic and pharmacologic interventions.   Goal: Pain level will decrease  Description: Pain level will decrease  Outcome: Ongoing  Goal: Control of acute pain  Description: Control of acute pain  Outcome: Ongoing  Goal: Control of chronic pain  Description: Control of chronic pain  Outcome: Ongoing     Problem: Physical Regulation:  Goal: Will remain free from infection  Description: Will remain free from infection  Outcome: Ongoing     Problem: Skin Integrity:  Goal: Demonstration of wound healing without infection will improve  Description: Demonstration of wound healing without infection will improve  Outcome: Ongoing  Goal: Complications related to intravenous access or infusion will be avoided or minimized  Description: Complications related to intravenous access or infusion will be avoided or minimized  Outcome: Ongoing  Goal: Will show no infection signs and symptoms  Description: Will show no infection signs and symptoms  Outcome: Ongoing  Goal: Absence of new skin breakdown  Description: Absence of new skin breakdown  Outcome: Ongoing     Problem: Skin Integrity:  Goal: Demonstration of wound healing without infection will improve  Description: Demonstration of wound healing without infection will improve  Outcome: Ongoing  Goal: Complications related to intravenous access or infusion will be avoided or minimized  Description: Complications related to intravenous access or infusion will be avoided or minimized  Outcome: Ongoing  Goal: Will show no infection signs and symptoms  Description: Will show no infection signs and symptoms  Outcome: Ongoing  Goal: Absence of new skin breakdown  Description: Absence of new skin breakdown  Outcome: Ongoing

## 2022-02-10 LAB
ALBUMIN SERPL-MCNC: 3.2 G/DL (ref 3.4–5)
ANION GAP SERPL CALCULATED.3IONS-SCNC: 7 MMOL/L (ref 3–16)
BILIRUBIN URINE: NEGATIVE
BLOOD, URINE: ABNORMAL
BUN BLDV-MCNC: 27 MG/DL (ref 7–20)
CALCIUM SERPL-MCNC: 8.9 MG/DL (ref 8.3–10.6)
CHLORIDE BLD-SCNC: 107 MMOL/L (ref 99–110)
CLARITY: CLEAR
CO2: 26 MMOL/L (ref 21–32)
COLOR: YELLOW
CREAT SERPL-MCNC: 1 MG/DL (ref 0.6–1.2)
GFR AFRICAN AMERICAN: >60
GFR NON-AFRICAN AMERICAN: 54
GLUCOSE BLD-MCNC: 109 MG/DL (ref 70–99)
GLUCOSE URINE: NEGATIVE MG/DL
HCT VFR BLD CALC: 25.4 % (ref 36–48)
HEMOGLOBIN: 8.6 G/DL (ref 12–16)
KETONES, URINE: NEGATIVE MG/DL
LEUKOCYTE ESTERASE, URINE: NEGATIVE
MICROSCOPIC EXAMINATION: YES
NITRITE, URINE: NEGATIVE
PH UA: 6 (ref 5–8)
PHOSPHORUS: 2.5 MG/DL (ref 2.5–4.9)
POTASSIUM SERPL-SCNC: 4.4 MMOL/L (ref 3.5–5.1)
PRO-BNP: 2677 PG/ML (ref 0–449)
PROTEIN UA: ABNORMAL MG/DL
RBC UA: NORMAL /HPF (ref 0–4)
SODIUM BLD-SCNC: 140 MMOL/L (ref 136–145)
SPECIFIC GRAVITY UA: 1.02 (ref 1–1.03)
URINE TYPE: ABNORMAL
UROBILINOGEN, URINE: 0.2 E.U./DL
WBC UA: NORMAL /HPF (ref 0–5)

## 2022-02-10 PROCEDURE — 2580000003 HC RX 258: Performed by: INTERNAL MEDICINE

## 2022-02-10 PROCEDURE — 97535 SELF CARE MNGMENT TRAINING: CPT

## 2022-02-10 PROCEDURE — 36415 COLL VENOUS BLD VENIPUNCTURE: CPT

## 2022-02-10 PROCEDURE — 83880 ASSAY OF NATRIURETIC PEPTIDE: CPT

## 2022-02-10 PROCEDURE — 80069 RENAL FUNCTION PANEL: CPT

## 2022-02-10 PROCEDURE — 85018 HEMOGLOBIN: CPT

## 2022-02-10 PROCEDURE — 81001 URINALYSIS AUTO W/SCOPE: CPT

## 2022-02-10 PROCEDURE — 85014 HEMATOCRIT: CPT

## 2022-02-10 PROCEDURE — 6370000000 HC RX 637 (ALT 250 FOR IP): Performed by: INTERNAL MEDICINE

## 2022-02-10 PROCEDURE — 6360000002 HC RX W HCPCS: Performed by: INTERNAL MEDICINE

## 2022-02-10 PROCEDURE — 97530 THERAPEUTIC ACTIVITIES: CPT

## 2022-02-10 PROCEDURE — 94762 N-INVAS EAR/PLS OXIMTRY CONT: CPT

## 2022-02-10 PROCEDURE — 1200000000 HC SEMI PRIVATE

## 2022-02-10 RX ORDER — FUROSEMIDE 10 MG/ML
20 INJECTION INTRAMUSCULAR; INTRAVENOUS ONCE
Status: COMPLETED | OUTPATIENT
Start: 2022-02-10 | End: 2022-02-10

## 2022-02-10 RX ORDER — TAMSULOSIN HYDROCHLORIDE 0.4 MG/1
0.4 CAPSULE ORAL DAILY
Status: DISCONTINUED | OUTPATIENT
Start: 2022-02-10 | End: 2022-02-11 | Stop reason: HOSPADM

## 2022-02-10 RX ADMIN — DULOXETINE HYDROCHLORIDE 60 MG: 60 CAPSULE, DELAYED RELEASE ORAL at 11:11

## 2022-02-10 RX ADMIN — OXYCODONE HYDROCHLORIDE AND ACETAMINOPHEN 2 TABLET: 5; 325 TABLET ORAL at 13:49

## 2022-02-10 RX ADMIN — TAMSULOSIN HYDROCHLORIDE 0.4 MG: 0.4 CAPSULE ORAL at 11:11

## 2022-02-10 RX ADMIN — PANTOPRAZOLE SODIUM 40 MG: 40 TABLET, DELAYED RELEASE ORAL at 06:51

## 2022-02-10 RX ADMIN — GABAPENTIN 600 MG: 600 TABLET, FILM COATED ORAL at 11:10

## 2022-02-10 RX ADMIN — ESCITALOPRAM OXALATE 20 MG: 20 TABLET ORAL at 11:10

## 2022-02-10 RX ADMIN — CEFTRIAXONE 1000 MG: 1 INJECTION, POWDER, FOR SOLUTION INTRAMUSCULAR; INTRAVENOUS at 00:42

## 2022-02-10 RX ADMIN — SODIUM CHLORIDE, PRESERVATIVE FREE 5 ML: 5 INJECTION INTRAVENOUS at 20:44

## 2022-02-10 RX ADMIN — GABAPENTIN 600 MG: 600 TABLET, FILM COATED ORAL at 20:09

## 2022-02-10 RX ADMIN — SODIUM CHLORIDE, PRESERVATIVE FREE 10 ML: 5 INJECTION INTRAVENOUS at 20:14

## 2022-02-10 RX ADMIN — FUROSEMIDE 20 MG: 10 INJECTION, SOLUTION INTRAMUSCULAR; INTRAVENOUS at 11:10

## 2022-02-10 RX ADMIN — BUPROPION HYDROCHLORIDE 150 MG: 150 TABLET, EXTENDED RELEASE ORAL at 11:10

## 2022-02-10 ASSESSMENT — PAIN SCALES - GENERAL
PAINLEVEL_OUTOF10: 0
PAINLEVEL_OUTOF10: 4
PAINLEVEL_OUTOF10: 9
PAINLEVEL_OUTOF10: 0
PAINLEVEL_OUTOF10: 0
PAINLEVEL_OUTOF10: 8

## 2022-02-10 NOTE — DISCHARGE INSTR - COC
Continuity of Care Form    Patient Name: Suni Her   :  1945  MRN:  [id number]    Admit date:  2022  Discharge date:  2022    Code Status Order: Full Code   Advance Directives:      Admitting Physician:  Carey Cleveland MD  PCP: Stephen Arriaga MD    Discharging Nurse: [facility] Unit/Room#: 5057/7637-74  Discharging Unit Phone Number: ***    Emergency Contact:   Extended Emergency Contact Information  Primary Emergency Contact: [address] of [address] [place] [address] Phone: [phone]  Relation: Child  Secondary Emergency Contact: [facility]  Mobile Phone: [phone]  Relation: Child    Past Surgical History:  Past Surgical History:   Procedure Laterality Date    FEMUR FRACTURE SURGERY Right 2022    FEMUR OPEN REDUCTION INTERNAL FIXATION RIGHT performed by Parviz Barriga DO at [address] Right     HIP    JOINT REPLACEMENT Left 16    left total knee replacement    KNEE SURGERY      WRIST FRACTURE SURGERY Left        Immunization History:   Immunization History   Administered Date(s) Administered    COVID-19, Pfizer Purple top, DILUTE for use, 12+ yrs, 30mcg/0.3mL dose 2021, 2021, 2021    Influenza 2015    Influenza Virus Vaccine 2016    Pneumococcal Conjugate Vaccine 2015       Active Problems:  Patient Active Problem List   Diagnosis Code    DDD (degenerative disc disease), lumbar M51.36    Cervical spondylosis M47.812    Neck pain M54.2    Back pain M54.9    Lumbar stenosis M48.061    Discogenic low back pain M51.36    Myofascial pain M79.18    Primary localized osteoarthrosis, lower leg M17.10    Hip pain M25.559    Ischial bursitis M70.70    Fe deficiency anemia D50.9    Knee pain, bilateral M25.561, M25.562    Obesity (BMI 30-39. 9) E66.9    Fibromyalgia M79.7    Hypertension I10    Left TKR Z96.659    Chronic blood loss anemia D50.0    Unspecified adverse effect of other drug, medicinal and biological substance(995.29) T50.995A    Painful orthopa[facility] ([facility] T84.84XA    Plantar fasciitis, bilateral M72.2    Closed comminuted intra-articular fracture of distal femur, right, initial encounter ([facility] S72.491A       Isolation/Infection:   Isolation            No Isolation          Patient Infection Status       None to display            Nurse Assessment:  Last Vital Signs: BP (!) 167/74   Pulse 82   Temp 97.9 °F (36.6 °C) (Oral)   Resp 16   Ht 5' 10.5\" (1.791 m)   Wt 220 lb (99.8 kg)   SpO2 95%   BMI 31.12 kg/m²     Last documented pain score (0-10 scale): Pain Level: 0  Last Weight:   Wt Readings from Last 1 Encounters:   02/06/22 220 lb (99.8 kg)     Mental Status:  {IP PT MENTAL STATUS:05489}    IV Access:  { MARCIE IV ACCESS:504931266}    Nursing Mobility/ADLs:  Walking   {P DME OQYP:672064016}  Transfer  {[facility] DME JIUD:885248849}  Bathing  {[facility] DME VLTJ:859667962}  Dressing  {[facility] DME EBVX:975050632}  Toileting  {[facility] DME NTTU:102722262}  Feeding  {[facility] DME LJGS:752279614}  Med Admin  {[facility] DME QRRC:001990391}  Med Delivery   { MARCIE MED Delivery:917581745}    Wound Care Documentation and Therapy:  Incision 05/18/16 Knee Anterior; Left (Active)   Number of days: 2094        Elimination:  Continence: Bowel: {YES / IB:23791}  Bladder: {YES / BF:97719}  Urinary Catheter: {Urinary Catheter:407374518}   Colostomy/Ileostomy/Ileal Conduit: {YES / ZA:59647}       Date of Last BM: ***    Intake/Output Summary (Last 24 hours) at 2/10/2022 1307  Last data filed at 2/10/2022 1100  Gross per 24 hour   Intake 827.5 ml   Output 1200 ml   Net -372.5 ml     I/O last 3 completed shifts:   In: 1307.5 [P.O.:1030; Blood:277.5]  Out: 1720 [Urine:1420; Drains:300]    Safety Concerns:     [address] Safety Concerns:742097934}    Impairments/Disabilities:      [address] Impairments/Disabilities:926309565}    Nutrition Therapy:  Current Nutrition Therapy:   [address] Diet IMFO:223434362}    Routes of Feeding: {CHP DME Other Feedings:059163997}  Liquids: {Slp liquid thickness:77702}  Daily Fluid Restriction: {CHP DME Yes amt example:506686941}  Last Modified Barium Swallow with Video (Video Swallowing Test): {Done Not Done CTIC:221806005}    Treatments at the Time of Hospital Discharge:   Respiratory Treatments: ***  Oxygen Therapy:  {Therapy; copd oxygen:33587}  Ventilator:    {[facility] Vent BXNA:103268606}    Rehab Therapies: {THERAPEUTIC INTERVENTION:9372720873}  Weight Bearing Status/Restrictions: { CC Weight Bearin}  Other Medical Equipment (for information only, NOT a DME order):  {EQUIPMENT:708073606}  Other Treatments: ***    Patient's personal belongings (please select all that are sent with patient):  {CHP DME Belongings:572450503}    RN SIGNATURE:  {Esignature:680453937}    CASE MANAGEMENT/SOCIAL WORK SECTION    Inpatient Status Date: 2022     Readmission Risk Assessment Score:  Readmission Risk              Risk of Unplanned Readmission:  12           Discharging to Facility/ [address]   Phone: [phone]    / signature: Electronically signed by NATHANIEL Toledo on 22 at 6:11 PM EST    PHYSICIAN SECTION    Prognosis: Good    Condition at Discharge: Stable    Rehab Potential (if transferring to Rehab): Good    Recommended Labs or Other Treatments After Discharge:   Physical and occupational therapy  Urinary retention post op, keep valdez in place, attempt voiding trail in 1 week  Started on Flomax  Was on Losartan and Amlodipine and this has been discontinued for now, if BP trends up consider restarting  Lovenox for DVT Px  Needs follow up with orthopedic surgery  She has KATI, and I have asked daughter to bring CPAP so resp therapist can help her    Physician Certification: I certify the above information and transfer of Alfredo Austin  is necessary for the continuing treatment of the diagnosis listed and that she requires Acute Rehab for less 30 days.      Update Admission H&P: No change in H&P    PHYSICIAN SIGNATURE:  Electronically signed by Keo Gimenez MD on 2/11/22 at 11:39 AM EST

## 2022-02-10 NOTE — PROGRESS NOTES
Hospitalist Progress Note      PCP: Lu Weems MD    Date of Admission: 2/6/2022    Chief Complaint: Nicky Inman on ice going down stairs at home    Subjective:   Patient seen and examined, she says she feels okay denies chest pain shortness of breath  Daughter present at bedside discussed patient care with her    Medications:  Reviewed    Infusion Medications    sodium chloride       Scheduled Medications    tamsulosin  0.4 mg Oral Daily    sodium chloride  500 mL IntraVENous Once    gabapentin  600 mg Oral BID    cefTRIAXone (ROCEPHIN) IV  1,000 mg IntraVENous Q24H    [Held by provider] amLODIPine  5 mg Oral Daily    buPROPion  150 mg Oral Daily    DULoxetine  60 mg Oral Daily    escitalopram  20 mg Oral Daily    pantoprazole  40 mg Oral QAM AC    sodium chloride flush  5-40 mL IntraVENous 2 times per day     PRN Meds: naloxone, sodium chloride flush, sodium chloride, ondansetron **OR** ondansetron, polyethylene glycol, acetaminophen **OR** acetaminophen, oxyCODONE-acetaminophen **OR** oxyCODONE-acetaminophen, HYDROmorphone **OR** HYDROmorphone      Intake/Output Summary (Last 24 hours) at 2/10/2022 1455  Last data filed at 2/10/2022 1200  Gross per 24 hour   Intake 827.5 ml   Output 2700 ml   Net -1872.5 ml       Physical Exam Performed:    BP (!) 167/74   Pulse 82   Temp 97.9 °F (36.6 °C) (Oral)   Resp 16   Ht 5' 10.5\" (1.791 m)   Wt 220 lb (99.8 kg)   SpO2 95%   BMI 31.12 kg/m²     General appearance:  No apparent distress, appears stated age and cooperative. HEENT:  Normal cephalic, atraumatic without obvious deformity. Pupils equal, round, and reactive to light. Extra ocular muscles intact. Conjunctivae/corneas clear. Neck: Supple, with full range of motion. No jugular venous distention. Trachea midline. Respiratory:  Normal respiratory effort.   Decreased breath sound in the bases  Cardiovascular:  Regular rate and rhythm with normal S1/S2 without murmurs, rubs or gallops. Abdomen: Soft, non-tender, non-distended with normal bowel sounds. Musculoskeletal:  Right leg wrapped postop  Drain removed  Skin: Skin color, texture, turgor normal.  No rashes or lesions. Neurologic:  Neurovascularly intact without any focal sensory/motor deficits. Cranial nerves: II-XII intact, grossly non-focal.  Psychiatric:  Alert and oriented, thought content appropriate, normal insight  Capillary Refill: Brisk,< 3 seconds   Peripheral Pulses: +2 palpable, equal bilaterally      Labs:   Recent Labs     02/09/22 0633 02/09/22  2347 02/10/22  0628   WBC 7.2  --   --    HGB 7.2* 8.2* 8.6*   HCT 21.7* 23.6* 25.4*   *  --   --      Recent Labs     02/09/22  0633 02/10/22  0628    140   K 4.6 4.4    107   CO2 28 26   BUN 35* 27*   CREATININE 1.5* 1.0   CALCIUM 8.3 8.9   PHOS 3.0 2.5     No results for input(s): AST, ALT, BILIDIR, BILITOT, ALKPHOS in the last 72 hours. No results for input(s): INR in the last 72 hours. No results for input(s): Myrla Greulich in the last 72 hours. Urinalysis:      Lab Results   Component Value Date    NITRU Negative 02/10/2022    WBCUA 0-2 02/10/2022    BACTERIA 2+ 02/06/2022    RBCUA 0-2 02/10/2022    BLOODU TRACE-INTACT 02/10/2022    SPECGRAV 1.020 02/10/2022    GLUCOSEU Negative 02/10/2022       Radiology:  XR CHEST PORTABLE   Final Result      1. Mild bilateral basilar airspace disease. FLUORO FOR SURGICAL PROCEDURES   Final Result   Impression: Operative fluoroscopy utilized during right femur surgery, as above. XR FEMUR RIGHT (MIN 2 VIEWS)   Final Result   Impression: Operative fluoroscopy utilized during right femur surgery, as above. XR KNEE RIGHT (1-2 VIEWS)   Final Result   1. Comminuted, displaced fracture of the distal femur. 2. No acute fracture in the pelvis right hip. XR FEMUR RIGHT (MIN 2 VIEWS)   Final Result   1. Comminuted, displaced fracture of the distal femur.    2. No acute fracture in the pelvis right hip. XR HIP RIGHT (1 VIEW)   Final Result   1. Comminuted, displaced fracture of the distal femur. 2. No acute fracture in the pelvis right hip. Assessment/Plan:    Active Hospital Problems    Diagnosis Date Noted    Closed comminuted intra-articular fracture of distal femur, right, initial encounter [facility]) [S72.491A] 02/06/2022     Traumatic distal comminuted right femoral fracture due to mechanical fall  S/p Procedure:  1. ORIF Right distal femoral fracture                         2. Intraoperative Flouroscopy Right Femur with Interpretation   EBL: 200 mL  toe-touch weightbearing per orthopedic surgery  We will need placement on discharge  Oxycodone for pain control, Dilaudid for breakthrough pain  No need for IV fluids, blood pressures okay, she is eating good     Hypoxemia post op, likely atelactasis, IS q hr, wean off for SpO2 92% or above. Improving  Overnight hypoxemia due to KAIT, she has had a diagnosis but not complaint. Will get overnight pul oximetry  Give dose of Lasix to see response    Acute blood loss anemia, hemoglobin dropped to 7.2  With patient having borderline blood pressures, hypoxemia, acute kidney injury  hgb stable after 1 unit pRBC    Acute kidney injury, not present on admission  Likely hemodynamic instability and anemia  - Cr now down to 1.0  - Needing valdez placement overnight for urinary retention  - Add flomax  Avoid NSAIDs (Ibuprofen, Aleve, Motrin, Naproxen, Toradol etc), Fleet enemas, IV contrast Dye and other nephrotoxins! Strict input and output  Repeat renal profile tomorrow    Leukocytosis, UA was abnormal, UTI ruled out  Can stop Rocephin    Anxiety/depression-- Continue BuSpar and Lexapro for anxiety and depression     Fibromyalgia -- Continue home duloxetine, high dose of gabapentin     Hypertension: hold amlodipine for now       DVT Prophylaxis: SCDs, per orthopedic surgery,  Diet: ADULT DIET;  Regular  Code Status: Full Code    PT/OT Nathaniel Status: Ordered    Dispo - continue inpatient care, will need placement, discussed with daughter, likely dc Friday    Rick Collier MD   Internal Medicine Hospitalist

## 2022-02-10 NOTE — FLOWSHEET NOTE
02/10/22 1448   Encounter Summary   Services provided to: Patient  (busy with staff )   Referral/Consult From: [id number] MICHEAL Moya   (2/10, david )   Complexity of Encounter Low   Length of Encounter 15 minutes   Routine   Type Initial   Staff Hilton Barron MA

## 2022-02-10 NOTE — PROGRESS NOTES
Physical Therapy  Daily Treatment Note    Discharge Recommendations: Vasquez Del Toro scored a 7/24 on the AM-PAC short mobility form. Current research shows that an AM-PAC score of 17 or less is typically not associated with a discharge to the patient's home setting. Based on the patient's AM-PAC score and their current functional mobility deficits, it is recommended that the patient have 3-5 sessions per week of Physical Therapy at d/c to increase the patient's independence. Please see assessment section for further patient specific details. Assessment:  Pt with good participation overall for therapy with occasional encouragement required. Pt limited by R LE pain with activity, TTWB R LE and general weakness. Pt needing assist x 2 for safe mobility at this time. Also needing occasional cues/reminders for sequencing, safe use of [place] Bathe (hand placement) and continuation of task. Would benefit from continued IP PT at D/C prior to returning home. Plan is for SNF. Equipment Needs: Defer to next level of care    Chart Reviewed: Yes     Other position/activity restrictions: TTWB R, no activity restriction noted   Additional Pertinent Hx: 76 yo admitted 2/6/22 for fall/R femur fx. OR 2/7 FEMUR OPEN REDUCTION INTERNAL FIXATION RIGHT. Pmhx: HTN, fibromyalgia, KAIT, L TKR, R THR>      Diagnosis: R femur fx   Treatment Diagnosis: mobility impairment due to R femur fx    Subjective: Pt in bed initially. Daughter present. Pt agreeable to working with therapy after receiving pain medicine. Wants to try to sit on the commode to have a bowel movement. Pain: c/o R knee/distal femur discomfort with activity. Objective:    Bed mobility  Supine to sit: Min assist x 1, HOB up with use of rail. Very slow and effortful, especially for R LE. Scooting:  Mod assist to EOB    Transfers  Sit to stand: Dependent (Mod assist x 1 + Min assist x 1) from raised height bed to [place]; Dependent (Mod assist x 1 + Min assist x 1) from raised height commode to Sherren Husbands; Min assist x 1 from Sherren Husbands seat (twice)  Stand to sit: Mod assist x 1 onto raised height commode; Mod assist x 1 into recliner. Cues for eccentric control. Bed > commode > chair: Dependent via Sherren Husbands    Balance  Sat EOB 5+ minutes with SBA  Static stance at [address] assist x 1-2  Sitting on Sherren Husbands for transfers with CGA for balance    Patient Education  Role of PT. Calling for assist with needs. Expressed understanding. TTWB R LE. Occasional reminders during session with transfers. Safety Devices  Pt left with needs in reach. In chair (reclined) with chair alarm on. RN aware. Daughter present. AM-PAC score  AM-PAC Inpatient Mobility Raw Score : 7  AM-PAC Inpatient T-Scale Score : 26.42  Mobility Inpatient CMS 0-100% Score: 92.36  Mobility Inpatient CMS G-Code Modifier : CM    Goals: (as determined and assessed by primary PT)  Time Frame for Short term goals: discharge  Short term goal 1: sit to/from supine supervision  ongoing   Short term goal 2: CGA B LE HEP x10  ongoing   Short term goal 3: sit EOB x20 mins with supervision  ongoing  Short term goal 4: bed to/from chair supervision NWB L (set 2/9)     Plan:  Times per week: 5-7;    Current Treatment Recommendations: Strengthening,Balance Training,Transfer Training    Therapy Time    Individual  Concurrent  Group  Co-treatment    Time In  1423            Time Out  1510            Minutes  47              Timed Code Treatment Minutes: 47  Total Treatment Minutes: 47    Will continue per plan of care. If patient is discharged prior to next treatment, this note will serve as the discharge summary.     [place], [place] #[id number]

## 2022-02-10 NOTE — PROGRESS NOTES
Orthopedic Surgery Progress Note    Loretta Matthews  2/9/2022    Subjective:     Post-Operative Day # 2 s/p right  Distal femur ORIF    Systemic or Specific Complaints: No Complaints and resting comfortably in bed, no family at bedside. Received 1 UPRBC today, up with PT and awaiting SNF approval.     Objective:     BP (!) 151/78   Pulse 97   Temp 99.7 °F (37.6 °C) (Oral)   Resp 18   Ht 5' 10.5\" (1.791 m)   Wt 220 lb (99.8 kg)   SpO2 92%   BMI 31.12 kg/m²     Intake/Output Summary (Last 24 hours) at 2/9/2022 2059  Last data filed at 2/9/2022 1900  Gross per 24 hour   Intake 1067.5 ml   Output 720 ml   Net 347.5 ml         General: alert, appears stated age and cooperative   Wound: Wound clean and dry no evidence of infection. and Positive for Edema   Extremity: Distal NVI   DVT Exam: No evidence of DVT seen on physical exam.  Negative Sonny's sign. No significant calf/ankle edema.     + dp palp, +SILT L4-S1, thigh and calf compartments soft and compressible, motor function intact ehl, df, pf  Drain and valdez in place    Data Review  CBC:   Lab Results   Component Value Date    WBC 7.2 02/09/2022    RBC 2.44 02/09/2022    RBC 4.39 02/03/2017    HGB 7.2 02/09/2022    HCT 21.7 02/09/2022     02/09/2022       Assessment:     Post-Operative Day # 2 s/p right Distal femur ORIF, doing well     Plan:      1: Continues current post-op course   2:  Continue pain control: D/C pain script in chart  3:  Physical therapy as per protocol - TTWB RLE  4:  Medical management per hospitalist  5: Social work for discharge planning   6: DVT prophylaxis: lovenox 40mg QD x 20days.  Script in chart   7: D/C drain at bedside this evening  8: ortho stable for D/C when pain well controlled and progressing with physical therapy, f/u with me in 2 weeks for xrays and wound check    Aleisha Allan DO

## 2022-02-10 NOTE — PLAN OF CARE
Problem: Falls - Risk of:  Goal: Will remain free from falls  2/10/2022 9765 by Marjan Short RN  Outcome: Met This Shift     Problem: Falls - Risk of:  Goal: Absence of physical injury  2/10/2022 7903 by Marjan Short RN  Outcome: Met This Shift     Problem: Pain:  Goal: Pain level will decrease  2/10/2022 0337 by Marjan Short RN  Outcome: Met This Shift

## 2022-02-10 NOTE — PROGRESS NOTES
Occupational Therapy  Facility/Department: [facility] 5T ORTHO/NEURO  Daily Treatment Note  NAME: Gutierrze White  : 1945  MRN: [id number]    Date of Service: 2/10/2022    Discharge Recommendations:  Gutierrez White scored a 13/24 on the AM-PAC ADL Inpatient form. Current research shows that an AM-PAC score of 17 or less is typically not associated with a discharge to the patient's home setting. Based on the patient's AM-PAC score and their current ADL deficits, it is recommended that the patient have 3-5 sessions per week of Occupational Therapy at d/c to increase the patient's independence. Please see assessment section for further patient specific details. If patient discharges prior to next session this note will serve as a discharge summary. Please see below for the latest assessment towards goals. OT Equipment Recommendations  Other: defer    Assessment   Performance deficits / Impairments: Decreased functional mobility ; Decreased ADL status; Decreased strength;Decreased ROM; Decreased safe awareness;Decreased cognition;Decreased endurance;Decreased balance;Decreased high-level IADLs;Decreased coordination;Decreased posture      Assessment: Pt limited by TTWB precuations requiring assist x2 for stance and transfers. Karel meyers used for toilet transfer and mobility this session requiring ++increased time for encouragement and education. Pt continues to require repetition of education for TTWB precautions and sequencing tasks. Pt would benefit from inpt OT for maximizing functional independence and safety. Continue OT per POC. Treatment Diagnosis: decreased ADLs, functional mobility and functional transfers 2/2 R femur fx  Prognosis: Guarded; Fair  OT Education: OT Role;Plan of Care;Transfer Training;Precautions  Patient Education: education on TTWB - continue to repeat as needed  Activity Tolerance  Activity Tolerance: Patient Tolerated treatment well;Treatment limited secondary to decreased cognition;Patient limited by pain  Activity Tolerance: Pt limited by TTWB precautions  Safety Devices  Safety Devices in place: Yes  Type of devices: Left in chair;Nurse notified;Call light within reach; Chair alarm in place;Gait belt         Patient Diagnosis(es): The primary encounter diagnosis was Closed fracture of right femur, unspecified fracture morphology, unspecified portion of femur, initial encounter ([facility] A diagnosis of Closed comminuted intra-articular fracture of distal femur, right, initial encounter [facility]) was also pertinent to this visit. has a past medical history of Allergic, Anemia, Anxiety, Closed comminuted intra-articular fracture of distal femur, right, initial encounter ([facility] Depression, Eye disorder, Fibromyalgia, GERD (gastroesophageal reflux disease), Hypertension, Obesity, KAIT (obstructive sleep apnea), Osteoarthritis, Osteoporosis, PONV (postoperative nausea and vomiting), and Prolonged emergence from general anesthesia. has a past surgical history that includes hip surgery; joint replacement (Right, 1993/2003); Wrist fracture surgery (Left); joint replacement (Left, 5/18/16); knee surgery; and Femur fracture surgery (Right, 2/7/2022). Restrictions  Position Activity Restriction  Other position/activity restrictions: TTWB R, no activity restriction noted     Diagnosis: R femur fx s/p ORIF  Treatment Diagnosis: decreased ADLs, functional mobility and functional transfers 2/2 R femur fx    Subjective:   Pt met supine in bed and agreeable to OT treatment      Pain:   Increased pain with all movement of LLE      Objective:    Cognition/Orientation:  Impaired    Bed mobility   Supine to sit:  Min A with +++increased time for completion:   Scooting:  Mod A scooting to EOB    Functional Mobility   Sit to Stand: Min A +Mod A from EOB and toilet and Min A from Jaquan Leader stedy seat  Stand to Sit: Mod A to toilet and recliner chair  Bed to Chair Transfer: Dependent upon [facility] Leader mira with assist x2  Commode Transfer: Dependent upon [facility] with assist x2  Other: Transfers requiring ++++increased time for education and encouragement throughout session. Pt with decreased problem solving easily confused by step by step directions. ADLs   Grooming: CGA with vCS for sequencing seated in emil stedy at sink and SBAfor washing face and hands with step by step VCs seated in recliner chair  Bathing: SBA for UE washing seated in recliner chair  UB dressing: CGA with VCs for donning gown  Toileting: Max A with second person for steadying stance      Safety Devices in Place:     Pt left seated in recliner chair with alarm on and call light in reach. Plan  If pt discharges prior to next treatment, this note will serve as discharge summary  Plan  Times per week: 5-7  Current Treatment Recommendations: Patient/Caregiver Education & Training,Gait Training,Home Management Training,Equipment Evaluation, Education, & procurement,Balance [place] / ADL,Safety Education & Training     AM-[facility] Inpatient Daily Activity Raw Score: 13 (02/10/22 1539)  AM-PAC Inpatient ADL T-Scale Score : 32.03 (02/10/22 1539)  ADL Inpatient CMS 0-100% Score: 63.03 (02/10/22 1539)  ADL Inpatient CMS G-Code Modifier : CL (02/10/22 1539)    Goals (as determined and assessed by primary OT)  Short term goals  Time Frame for Short term goals: by d/c  Short term goal 1: Pt will complete bed to chair transfer. GOAL MET 2/9. NEW GOAL: Functional t/f with mod A x2  Short term goal 2: Pt will verbalize understanding of TTWBing. GOAL MET 2/9. NEW GOAL: Maintain TTWB 100% of session with min A or less  Short term goal 3: Pt will tolerate stance for 30 seconds at RW. NOT MET  Patient Goals   Patient goals : \"Be in less pain. \"       Therapy Time   Individual Concurrent Group Co-treatment   Time In 1423         Time Out 1535         Minutes 72         Timed Code Treatment Minutes: JUDD Spangler

## 2022-02-10 NOTE — PROGRESS NOTES
Patient alert and oriented with intermittent confusion. Patient able to be re-oriented easily. VSS. No acute distress noted. Patient on 02 NC at 1L. Patient denies any pain. Hemovac drain pulled per Dr. Celso Bernard. Patient bladder scan earlier revealed 500 cc in her bladder. Christensen inserted. Bedside table and call light within reach. Bed alarm on. Will continue to monitor.

## 2022-02-10 NOTE — CARE COORDINATION
SW met with patient and daughter at bedside. SW updated that [facility] unable [facility] patient. SW clarified further questions for daughter. Contact information placed on board for both RN ROLAND and DEE PRICE received call from [facility] ([phone]) They can accept patient at discharge at 1:05 pm.     SW received a call from [facility] ([phone]) they are willing to accept patient. They can put her in a semi-private without a roommate and then put her on wait list for private . They can not guarantee it will remain private. DEE placed call to [facility] ([phone]) at 3:11 pm and [facility] requesting a return call.          Lucho Munoz, MSW, LSW  [facility] Work   Ph: [phone]

## 2022-02-11 ENCOUNTER — HOSPITAL ENCOUNTER (INPATIENT)
Age: 77
LOS: 15 days | Discharge: NURSING, REHAB OR LONG-TERM CARE | DRG: 560 | End: 2022-02-26
Attending: PHYSICAL MEDICINE & REHABILITATION | Admitting: PHYSICAL MEDICINE & REHABILITATION
Payer: MEDICARE

## 2022-02-11 VITALS
TEMPERATURE: 97.4 F | RESPIRATION RATE: 16 BRPM | DIASTOLIC BLOOD PRESSURE: 69 MMHG | SYSTOLIC BLOOD PRESSURE: 121 MMHG | WEIGHT: 220 LBS | HEIGHT: 71 IN | BODY MASS INDEX: 30.8 KG/M2 | OXYGEN SATURATION: 92 % | HEART RATE: 83 BPM

## 2022-02-11 DIAGNOSIS — S72.91XA CLOSED FRACTURE OF RIGHT FEMUR, UNSPECIFIED FRACTURE MORPHOLOGY, UNSPECIFIED PORTION OF FEMUR, INITIAL ENCOUNTER (HCC): ICD-10-CM

## 2022-02-11 DIAGNOSIS — S72.351A CLOSED DISPLACED COMMINUTED FRACTURE OF SHAFT OF RIGHT FEMUR, INITIAL ENCOUNTER (HCC): Primary | ICD-10-CM

## 2022-02-11 LAB
ALBUMIN SERPL-MCNC: 3.4 G/DL (ref 3.4–5)
ANION GAP SERPL CALCULATED.3IONS-SCNC: 8 MMOL/L (ref 3–16)
BASOPHILS ABSOLUTE: 0 K/UL (ref 0–0.2)
BASOPHILS ABSOLUTE: 0 K/UL (ref 0–0.2)
BASOPHILS RELATIVE PERCENT: 0.4 %
BASOPHILS RELATIVE PERCENT: 0.4 %
BUN BLDV-MCNC: 27 MG/DL (ref 7–20)
CALCIUM SERPL-MCNC: 9.1 MG/DL (ref 8.3–10.6)
CHLORIDE BLD-SCNC: 102 MMOL/L (ref 99–110)
CO2: 29 MMOL/L (ref 21–32)
CREAT SERPL-MCNC: 0.9 MG/DL (ref 0.6–1.2)
EOSINOPHILS ABSOLUTE: 0.2 K/UL (ref 0–0.6)
EOSINOPHILS ABSOLUTE: 0.3 K/UL (ref 0–0.6)
EOSINOPHILS RELATIVE PERCENT: 3.6 %
EOSINOPHILS RELATIVE PERCENT: 4.7 %
GFR AFRICAN AMERICAN: >60
GFR NON-AFRICAN AMERICAN: >60
GLUCOSE BLD-MCNC: 106 MG/DL (ref 70–99)
HCT VFR BLD CALC: 27.2 % (ref 36–48)
HCT VFR BLD CALC: 28.4 % (ref 36–48)
HEMOGLOBIN: 8.9 G/DL (ref 12–16)
HEMOGLOBIN: 9.3 G/DL (ref 12–16)
LYMPHOCYTES ABSOLUTE: 1.2 K/UL (ref 1–5.1)
LYMPHOCYTES ABSOLUTE: 1.2 K/UL (ref 1–5.1)
LYMPHOCYTES RELATIVE PERCENT: 18.1 %
LYMPHOCYTES RELATIVE PERCENT: 18.1 %
MCH RBC QN AUTO: 29 PG (ref 26–34)
MCH RBC QN AUTO: 29.6 PG (ref 26–34)
MCHC RBC AUTO-ENTMCNC: 32.7 G/DL (ref 31–36)
MCHC RBC AUTO-ENTMCNC: 32.7 G/DL (ref 31–36)
MCV RBC AUTO: 88.5 FL (ref 80–100)
MCV RBC AUTO: 90.6 FL (ref 80–100)
MONOCYTES ABSOLUTE: 0.5 K/UL (ref 0–1.3)
MONOCYTES ABSOLUTE: 0.5 K/UL (ref 0–1.3)
MONOCYTES RELATIVE PERCENT: 7.8 %
MONOCYTES RELATIVE PERCENT: 8.1 %
NEUTROPHILS ABSOLUTE: 4.6 K/UL (ref 1.7–7.7)
NEUTROPHILS ABSOLUTE: 4.6 K/UL (ref 1.7–7.7)
NEUTROPHILS RELATIVE PERCENT: 69 %
NEUTROPHILS RELATIVE PERCENT: 69.8 %
PDW BLD-RTO: 14.8 % (ref 12.4–15.4)
PDW BLD-RTO: 15 % (ref 12.4–15.4)
PHOSPHORUS: 3.3 MG/DL (ref 2.5–4.9)
PLATELET # BLD: 175 K/UL (ref 135–450)
PLATELET # BLD: 187 K/UL (ref 135–450)
PMV BLD AUTO: 9.6 FL (ref 5–10.5)
PMV BLD AUTO: 9.6 FL (ref 5–10.5)
POTASSIUM SERPL-SCNC: 4.2 MMOL/L (ref 3.5–5.1)
PRO-BNP: 1874 PG/ML (ref 0–449)
RBC # BLD: 3.01 M/UL (ref 4–5.2)
RBC # BLD: 3.22 M/UL (ref 4–5.2)
SARS-COV-2, NAAT: NOT DETECTED
SODIUM BLD-SCNC: 139 MMOL/L (ref 136–145)
WBC # BLD: 6.6 K/UL (ref 4–11)
WBC # BLD: 6.6 K/UL (ref 4–11)

## 2022-02-11 PROCEDURE — 94762 N-INVAS EAR/PLS OXIMTRY CONT: CPT

## 2022-02-11 PROCEDURE — 97530 THERAPEUTIC ACTIVITIES: CPT

## 2022-02-11 PROCEDURE — 6370000000 HC RX 637 (ALT 250 FOR IP): Performed by: INTERNAL MEDICINE

## 2022-02-11 PROCEDURE — 97535 SELF CARE MNGMENT TRAINING: CPT

## 2022-02-11 PROCEDURE — 2580000003 HC RX 258: Performed by: INTERNAL MEDICINE

## 2022-02-11 PROCEDURE — 85025 COMPLETE CBC W/AUTO DIFF WBC: CPT

## 2022-02-11 PROCEDURE — 6370000000 HC RX 637 (ALT 250 FOR IP): Performed by: PHYSICAL MEDICINE & REHABILITATION

## 2022-02-11 PROCEDURE — 80069 RENAL FUNCTION PANEL: CPT

## 2022-02-11 PROCEDURE — 83880 ASSAY OF NATRIURETIC PEPTIDE: CPT

## 2022-02-11 PROCEDURE — 1280000000 HC REHAB R&B

## 2022-02-11 PROCEDURE — 87635 SARS-COV-2 COVID-19 AMP PRB: CPT

## 2022-02-11 PROCEDURE — 36415 COLL VENOUS BLD VENIPUNCTURE: CPT

## 2022-02-11 RX ORDER — POLYETHYLENE GLYCOL 3350 17 G/17G
17 POWDER, FOR SOLUTION ORAL DAILY PRN
Status: CANCELLED | OUTPATIENT
Start: 2022-02-11

## 2022-02-11 RX ORDER — POLYETHYLENE GLYCOL 3350 17 G/17G
17 POWDER, FOR SOLUTION ORAL DAILY PRN
Status: DISCONTINUED | OUTPATIENT
Start: 2022-02-11 | End: 2022-02-26 | Stop reason: HOSPADM

## 2022-02-11 RX ORDER — SENNA AND DOCUSATE SODIUM 50; 8.6 MG/1; MG/1
1 TABLET, FILM COATED ORAL 2 TIMES DAILY
Status: DISCONTINUED | OUTPATIENT
Start: 2022-02-11 | End: 2022-02-26 | Stop reason: HOSPADM

## 2022-02-11 RX ORDER — BUPROPION HYDROCHLORIDE 150 MG/1
150 TABLET, EXTENDED RELEASE ORAL DAILY
Status: DISCONTINUED | OUTPATIENT
Start: 2022-02-12 | End: 2022-02-26 | Stop reason: HOSPADM

## 2022-02-11 RX ORDER — OXYCODONE HYDROCHLORIDE AND ACETAMINOPHEN 5; 325 MG/1; MG/1
1 TABLET ORAL EVERY 4 HOURS PRN
Status: CANCELLED | OUTPATIENT
Start: 2022-02-11

## 2022-02-11 RX ORDER — BISACODYL 10 MG
10 SUPPOSITORY, RECTAL RECTAL DAILY PRN
Status: DISCONTINUED | OUTPATIENT
Start: 2022-02-11 | End: 2022-02-26 | Stop reason: HOSPADM

## 2022-02-11 RX ORDER — OXYCODONE HYDROCHLORIDE AND ACETAMINOPHEN 5; 325 MG/1; MG/1
2 TABLET ORAL EVERY 4 HOURS PRN
Status: DISCONTINUED | OUTPATIENT
Start: 2022-02-11 | End: 2022-02-26 | Stop reason: HOSPADM

## 2022-02-11 RX ORDER — GABAPENTIN 600 MG/1
600 TABLET ORAL 2 TIMES DAILY
Status: CANCELLED | OUTPATIENT
Start: 2022-02-11

## 2022-02-11 RX ORDER — SODIUM CHLORIDE 9 MG/ML
25 INJECTION, SOLUTION INTRAVENOUS PRN
Status: DISCONTINUED | OUTPATIENT
Start: 2022-02-11 | End: 2022-02-26 | Stop reason: HOSPADM

## 2022-02-11 RX ORDER — LANOLIN ALCOHOL/MO/W.PET/CERES
3 CREAM (GRAM) TOPICAL NIGHTLY PRN
Status: DISCONTINUED | OUTPATIENT
Start: 2022-02-11 | End: 2022-02-26 | Stop reason: HOSPADM

## 2022-02-11 RX ORDER — ACETAMINOPHEN 325 MG/1
650 TABLET ORAL EVERY 6 HOURS PRN
Status: DISCONTINUED | OUTPATIENT
Start: 2022-02-11 | End: 2022-02-21

## 2022-02-11 RX ORDER — TAMSULOSIN HYDROCHLORIDE 0.4 MG/1
0.4 CAPSULE ORAL DAILY
Qty: 30 CAPSULE | Refills: 3 | Status: ON HOLD | OUTPATIENT
Start: 2022-02-12 | End: 2022-02-25 | Stop reason: HOSPADM

## 2022-02-11 RX ORDER — SODIUM CHLORIDE 9 MG/ML
INJECTION, SOLUTION INTRAVENOUS CONTINUOUS
Status: DISCONTINUED | OUTPATIENT
Start: 2022-02-11 | End: 2022-02-18

## 2022-02-11 RX ORDER — BUPROPION HYDROCHLORIDE 150 MG/1
150 TABLET, EXTENDED RELEASE ORAL DAILY
Status: CANCELLED | OUTPATIENT
Start: 2022-02-11

## 2022-02-11 RX ORDER — ESCITALOPRAM OXALATE 10 MG/1
20 TABLET ORAL DAILY
Status: DISCONTINUED | OUTPATIENT
Start: 2022-02-12 | End: 2022-02-26 | Stop reason: HOSPADM

## 2022-02-11 RX ORDER — PANTOPRAZOLE SODIUM 40 MG/1
40 TABLET, DELAYED RELEASE ORAL
Qty: 30 TABLET | Refills: 3 | Status: SHIPPED | OUTPATIENT
Start: 2022-02-12 | End: 2022-10-25 | Stop reason: CLARIF

## 2022-02-11 RX ORDER — ESCITALOPRAM OXALATE 20 MG/1
20 TABLET ORAL DAILY
Status: CANCELLED | OUTPATIENT
Start: 2022-02-11

## 2022-02-11 RX ORDER — ONDANSETRON 4 MG/1
4 TABLET, ORALLY DISINTEGRATING ORAL EVERY 8 HOURS PRN
Status: CANCELLED | OUTPATIENT
Start: 2022-02-11

## 2022-02-11 RX ORDER — BISACODYL 10 MG
10 SUPPOSITORY, RECTAL RECTAL DAILY PRN
Status: CANCELLED | OUTPATIENT
Start: 2022-02-11

## 2022-02-11 RX ORDER — DULOXETIN HYDROCHLORIDE 60 MG/1
60 CAPSULE, DELAYED RELEASE ORAL DAILY
Status: CANCELLED | OUTPATIENT
Start: 2022-02-11

## 2022-02-11 RX ORDER — SODIUM CHLORIDE 9 MG/ML
25 INJECTION, SOLUTION INTRAVENOUS PRN
Status: CANCELLED | OUTPATIENT
Start: 2022-02-11

## 2022-02-11 RX ORDER — ONDANSETRON 2 MG/ML
4 INJECTION INTRAMUSCULAR; INTRAVENOUS EVERY 6 HOURS PRN
Status: DISCONTINUED | OUTPATIENT
Start: 2022-02-11 | End: 2022-02-26 | Stop reason: HOSPADM

## 2022-02-11 RX ORDER — TAMSULOSIN HYDROCHLORIDE 0.4 MG/1
0.4 CAPSULE ORAL DAILY
Status: CANCELLED | OUTPATIENT
Start: 2022-02-11

## 2022-02-11 RX ORDER — LANOLIN ALCOHOL/MO/W.PET/CERES
3 CREAM (GRAM) TOPICAL NIGHTLY PRN
Status: CANCELLED | OUTPATIENT
Start: 2022-02-11

## 2022-02-11 RX ORDER — OXYCODONE HYDROCHLORIDE AND ACETAMINOPHEN 5; 325 MG/1; MG/1
1 TABLET ORAL EVERY 4 HOURS PRN
Status: DISCONTINUED | OUTPATIENT
Start: 2022-02-11 | End: 2022-02-26 | Stop reason: HOSPADM

## 2022-02-11 RX ORDER — PANTOPRAZOLE SODIUM 40 MG/1
40 TABLET, DELAYED RELEASE ORAL
Status: DISCONTINUED | OUTPATIENT
Start: 2022-02-12 | End: 2022-02-26 | Stop reason: HOSPADM

## 2022-02-11 RX ORDER — ONDANSETRON 2 MG/ML
4 INJECTION INTRAMUSCULAR; INTRAVENOUS EVERY 6 HOURS PRN
Status: CANCELLED | OUTPATIENT
Start: 2022-02-11

## 2022-02-11 RX ORDER — DULOXETIN HYDROCHLORIDE 60 MG/1
60 CAPSULE, DELAYED RELEASE ORAL DAILY
Status: DISCONTINUED | OUTPATIENT
Start: 2022-02-12 | End: 2022-02-26 | Stop reason: HOSPADM

## 2022-02-11 RX ORDER — TAMSULOSIN HYDROCHLORIDE 0.4 MG/1
0.4 CAPSULE ORAL DAILY
Status: DISCONTINUED | OUTPATIENT
Start: 2022-02-11 | End: 2022-02-26 | Stop reason: HOSPADM

## 2022-02-11 RX ORDER — OXYCODONE HYDROCHLORIDE AND ACETAMINOPHEN 5; 325 MG/1; MG/1
2 TABLET ORAL EVERY 4 HOURS PRN
Status: CANCELLED | OUTPATIENT
Start: 2022-02-11

## 2022-02-11 RX ORDER — GABAPENTIN 600 MG/1
600 TABLET ORAL 2 TIMES DAILY
Status: DISCONTINUED | OUTPATIENT
Start: 2022-02-11 | End: 2022-02-13

## 2022-02-11 RX ORDER — ONDANSETRON 4 MG/1
4 TABLET, ORALLY DISINTEGRATING ORAL EVERY 8 HOURS PRN
Status: DISCONTINUED | OUTPATIENT
Start: 2022-02-11 | End: 2022-02-26 | Stop reason: HOSPADM

## 2022-02-11 RX ORDER — ACETAMINOPHEN 325 MG/1
650 TABLET ORAL EVERY 6 HOURS PRN
Status: CANCELLED | OUTPATIENT
Start: 2022-02-11

## 2022-02-11 RX ORDER — PANTOPRAZOLE SODIUM 40 MG/1
40 TABLET, DELAYED RELEASE ORAL
Status: CANCELLED | OUTPATIENT
Start: 2022-02-12

## 2022-02-11 RX ORDER — SENNA AND DOCUSATE SODIUM 50; 8.6 MG/1; MG/1
1 TABLET, FILM COATED ORAL 2 TIMES DAILY
Status: CANCELLED | OUTPATIENT
Start: 2022-02-11

## 2022-02-11 RX ADMIN — SODIUM CHLORIDE, PRESERVATIVE FREE 10 ML: 5 INJECTION INTRAVENOUS at 08:30

## 2022-02-11 RX ADMIN — ESCITALOPRAM OXALATE 20 MG: 20 TABLET ORAL at 08:29

## 2022-02-11 RX ADMIN — GABAPENTIN 600 MG: 600 TABLET, FILM COATED ORAL at 21:57

## 2022-02-11 RX ADMIN — TAMSULOSIN HYDROCHLORIDE 0.4 MG: 0.4 CAPSULE ORAL at 21:57

## 2022-02-11 RX ADMIN — BUPROPION HYDROCHLORIDE 150 MG: 150 TABLET, EXTENDED RELEASE ORAL at 08:29

## 2022-02-11 RX ADMIN — PANTOPRAZOLE SODIUM 40 MG: 40 TABLET, DELAYED RELEASE ORAL at 08:29

## 2022-02-11 RX ADMIN — GABAPENTIN 600 MG: 600 TABLET, FILM COATED ORAL at 08:29

## 2022-02-11 RX ADMIN — OXYCODONE HYDROCHLORIDE AND ACETAMINOPHEN 2 TABLET: 5; 325 TABLET ORAL at 11:33

## 2022-02-11 RX ADMIN — DULOXETINE HYDROCHLORIDE 60 MG: 60 CAPSULE, DELAYED RELEASE ORAL at 08:30

## 2022-02-11 RX ADMIN — TAMSULOSIN HYDROCHLORIDE 0.4 MG: 0.4 CAPSULE ORAL at 08:29

## 2022-02-11 ASSESSMENT — PAIN DESCRIPTION - FREQUENCY: FREQUENCY: CONTINUOUS

## 2022-02-11 ASSESSMENT — PAIN DESCRIPTION - ORIENTATION: ORIENTATION: RIGHT

## 2022-02-11 ASSESSMENT — PAIN DESCRIPTION - ONSET: ONSET: ON-GOING

## 2022-02-11 ASSESSMENT — PAIN DESCRIPTION - LOCATION: LOCATION: HIP

## 2022-02-11 ASSESSMENT — PAIN - FUNCTIONAL ASSESSMENT: PAIN_FUNCTIONAL_ASSESSMENT: PREVENTS OR INTERFERES SOME ACTIVE ACTIVITIES AND ADLS

## 2022-02-11 ASSESSMENT — PAIN DESCRIPTION - PROGRESSION: CLINICAL_PROGRESSION: NOT CHANGED

## 2022-02-11 ASSESSMENT — PAIN DESCRIPTION - DESCRIPTORS: DESCRIPTORS: ACHING

## 2022-02-11 ASSESSMENT — PAIN SCALES - GENERAL
PAINLEVEL_OUTOF10: 0
PAINLEVEL_OUTOF10: 7

## 2022-02-11 ASSESSMENT — PAIN DESCRIPTION - PAIN TYPE: TYPE: SURGICAL PAIN

## 2022-02-11 NOTE — PROGRESS NOTES
assist x 1) from raised height commode to [place]; CGA from [place] seat (twice)  Stand to sit: Mod assist x 1 onto bed (twice); Mod assist x 1 onto raised height commode; Mod assist x 1 into recliner. Decreased eccentric control   Bed > commode > chair: Dependent via [place]  Other: Pt with occasional reminders for TTWB R LE with transfers, but appeared to be maintaining most of the time. Balance  Sat EOB ~ 15 minutes with SBA  Static stance at wheeled walker ~15-20 secs x 2 trials with CGA. Pt appeared to be maintaining TTWB R LE in stance. Sitting in [place] for transfers with SBA    Patient Education  Reviewed TTWB R LE. Pt expressed and demonstrated understanding. Calling for assist with needs. Expressed understanding. Safety Devices  Pt left with needs in reach. In chair (reclined) with OT present. RN updated. AM-PAC score  AM-PAC Inpatient Mobility Raw Score : 10  AM-PAC Inpatient T-Scale Score : 32.29  Mobility Inpatient CMS 0-100% Score: 76.75  Mobility Inpatient CMS G-Code Modifier : CL    Goals: (as determined and assessed by primary PT)  Time Frame for Short term goals: discharge  Short term goal 1: sit to/from supine supervision  ongoing   Short term goal 2: CGA B LE HEP x10  ongoing   Short term goal 3: sit EOB x20 mins with supervision  ongoing  Short term goal 4: bed to/from chair supervision NWB L (set 2/9)     Plan:  Times per week: 5-7;    Current Treatment Recommendations: Strengthening,Balance Training,Transfer Training    Therapy Time    Individual  Concurrent  Group  Co-treatment    Time In  855            Time Out  939            Minutes  44              Timed Code Treatment Minutes: 44  Total Treatment Minutes: 44    Will continue per plan of care. If patient is discharged prior to next treatment, this note will serve as the discharge summary.     [place], [place] #[id number]

## 2022-02-11 NOTE — CARE COORDINATION
Case Management            Discharge Note                    Date / Time of Note: 2/11/2022 6:08 PM                  Discharge Note Completed by: NATHANIEL Gunter    Patient Name: Ángel Dixon   YOB: 1945  Diagnosis: Closed comminuted intra-articular fracture of distal femur, right, initial encounter [facility]) Derrel Labrum  Closed fracture of right femur, unspecified fracture morphology, unspecified portion of femur, [facility] Eve Moraester   Date / Time: 2/6/2022  1:59 PM    Current PCP: Nickie Gamez MD  Clinic patient: No    Hospitalization in the last 30 days: No    Advance Directives:  Code Status: Full Code  [place] DNR form completed and on chart: Not Indicated    Financial:  Payor: MEDICARE / Plan: MEDICARE PART A AND B / Product Type: *No Product type* /      Pharmacy:    [address] [facility] [phone] Magdiel Wong [phone]   [facility] [address]  Phone: [phone] Fax: [phone]      Assistance purchasing medications?: Potential Assistance Purchasing Medications: No  Assistance provided by Case Management: None at this time    Does patient want to participate in local refill/ meds to beds program?:      Meds To Beds General Rules:  1. Can ONLY be done Monday- Friday between 8:30am-5pm  2. Prescription(s) must be in pharmacy by 3pm to be filled same day  3. Copy of patient's insurance/ prescription drug card and patient face sheet must be sent along with the prescription(s)  4. Cost of Rx cannot be added to hospital bill. If financial assistance is needed, please contact unit  or ;  or  CANNOT provide pharmacy voucher for patients co-pays  5.  Patients can then  the prescription on their way out of the hospital at discharge, or pharmacy can deliver to the bedside if staff is available. (payment due at time of pick-up or delivery - cash, check, or card accepted)     Able to afford home medications/ co-pay costs: Yes    ADLS:  Current PT AM-PAC Score: 10 /24  Current OT AM-PAC Score: 15 /24      Discharge Disposition: Inpatient Rehab: [facility] Phone: [phone] Fax: N/A    LOC at discharge: Skilled  MARCIE Completed: Yes    Notification completed in HENS/PAS?:  Not Applicable    IMM Completed:   Not Indicated    Additional CM Notes:   Patient medically ready for discharge. Patient accepted by [facility]. Patient's goal remains to go to rehab, then go up to [address] [place] [address] to see her son who is in hospice once able. Patient to transfer to unit today. SW spoke with patient and daughter at bedside and answered questions related to transition. No further SW needs at this time.      The Plan for Transition of Care is related to the following treatment goals Closed comminuted intra-articular fracture of distal femur, right, initial encounter ([facility] [S72.964N]  Closed fracture of right femur, unspecified fracture morphology, unspecified portion of femur, initial encounter ([facility] Mariangel Winslow      The Patient and/or patient representative  was provided with a choice of provider and agrees with the discharge plan Yes    Care Transitions patient: Yes    NATHANIEL Rene  [facility] [address] Work  Ph: [phone]

## 2022-02-11 NOTE — PLAN OF CARE
Problem: Falls - Risk of:  Goal: Will remain free from falls  Description: Will remain free from falls  2/11/2022 0735 by Nadine Mukherjee RN  Outcome: Ongoing     Problem: Pain:  Goal: Pain level will decrease  Description: Pain level will decrease  2/11/2022 0735 by Nadine Mukherjee RN  Outcome: Ongoing

## 2022-02-11 NOTE — DISCHARGE SUMMARY
Hospital Medicine Discharge Summary    Patient ID: Vasquez Del Toro      Patient's PCP: Shannan Aguero MD    Admit Date: 2/6/2022     Discharge Date:  02/11/2022    Admitting Physician: Sterling Thurston MD     Discharge Physician: Sterling Thurston MD     Discharge Diagnoses: Active Hospital Problems    Diagnosis Date Noted    Closed comminuted intra-articular fracture of distal femur, right, initial encounter [facility]) Hafsa Meyers 02/06/2022       The patient was seen and examined on day of discharge and this discharge summary is in conjunction with any daily progress note from day of discharge. Hospital Course:     71-year-old female done to the hospital after falling on ice while going downstairs at home. She was unable to get up or bear weight on the right lower extremity, work-up showed traumatic distal comminuted right femoral fracture. Underwent S/p Procedure:  1. ORIF Right distal femoral fracture                         2. Intraoperative Flouroscopy Right Femur with Interpretation   EBL: 200 mL  toe-touch weightbearing per orthopedic surgery  Seen by PT OT, recommended placement, she lives alone at home, daughter is here from [facility]. Postop noted to be hypoxemia possibly to atelectasis as well as fluid overload, x-ray showed bibasilar opacities, proBNP elevated, was given a dose of IV Lasix possibly was in mild diastolic heart failure.  She was weaned off oxygen prior to discharge    Acute blood loss anemia, hemoglobin dropped to 7.2, given 1 unit packed RBCs as well  Borderline hypotension, hypoxemia, also an acute kidney injury    Acute kidney injury not present admission, likely due to hemodynamic instability, anemia and urinary retention  Christensen catheter was placed during hospitalization  Creatinine down to 1.0 at the time of discharge  Flomax was added  Will be sent to rehab with Christensen catheter, recommend voiding trial in 1 week  Avoid NSAIDs    Leukocytosis per admission possibly reactive, UTI was ruled out, was did receive 3 days of Rocephin during hospitalization at    Anxiety/depression-- Continue BuSpar and Lexapro for anxiety and depression     Fibromyalgia -- Continue home duloxetine, high dose of gabapentin     Hypertension: hold amlodipine and losartan for now    History of sleep apnea, intermittently requiring 1 to 2 L oxygen nightly, daughter will bring in CPAP machine from home. I have referred her to Dr. Mimi Tellez @ [facility] who she has seen in the past    Physical Exam Performed:     /68   Pulse 91   Temp 98.4 °F (36.9 °C) (Oral)   Resp 16   Ht 5' 10.5\" (1.791 m)   Wt 220 lb (99.8 kg)   SpO2 95%   BMI 31.12 kg/m²     General appearance:  No apparent distress, appears stated age and cooperative. HEENT:  Normal cephalic, atraumatic without obvious deformity. Pupils equal, round, and reactive to light.  Extra ocular muscles intact. Conjunctivae/corneas clear. Neck: Supple, with full range of motion. No jugular venous distention. Trachea midline. Respiratory:  Normal respiratory effort. Decreased breath sound in the bases  Cardiovascular:  Regular rate and rhythm with normal S1/S2 without murmurs, rubs or gallops. Abdomen: Soft, non-tender, non-distended with normal bowel sounds. Musculoskeletal:  Right leg wrapped postop  Drain removed  Skin: Skin color, texture, turgor normal.  No rashes or lesions. Neurologic:  Neurovascularly intact without any focal sensory/motor deficits. Cranial nerves: II-XII intact, grossly non-focal.  Psychiatric:  Alert and oriented, thought content appropriate, normal insight  Capillary Refill: Brisk,< 3 seconds   Peripheral Pulses: +2 palpable, equal bilaterally        Labs:  For convenience and continuity at follow-up the following most recent labs are provided:      CBC:    Lab Results   Component Value Date    WBC 6.6 02/11/2022    HGB 9.3 02/11/2022    HCT 28.4 02/11/2022     02/11/2022       Renal:    Lab Results   Component Value Date     02/11/2022    K 4.2 02/11/2022     02/11/2022    CO2 29 02/11/2022    BUN 27 02/11/2022    CREATININE 0.9 02/11/2022    CALCIUM 9.1 02/11/2022    PHOS 3.3 02/11/2022         Significant Diagnostic Studies    Radiology:   XR CHEST PORTABLE   Final Result      1. Mild bilateral basilar airspace disease. FLUORO FOR SURGICAL PROCEDURES   Final Result   Impression: Operative fluoroscopy utilized during right femur surgery, as above. XR FEMUR RIGHT (MIN 2 VIEWS)   Final Result   Impression: Operative fluoroscopy utilized during right femur surgery, as above. XR KNEE RIGHT (1-2 VIEWS)   Final Result   1. Comminuted, displaced fracture of the distal femur. 2. No acute fracture in the pelvis right hip. XR FEMUR RIGHT (MIN 2 VIEWS)   Final Result   1. Comminuted, displaced fracture of the distal femur. 2. No acute fracture in the pelvis right hip. XR HIP RIGHT (1 VIEW)   Final Result   1. Comminuted, displaced fracture of the distal femur. 2. No acute fracture in the pelvis right hip. Consults:     IP CONSULT TO ORTHOPEDIC SURGERY  IP CONSULT TO HOSPITALIST  IP CONSULT TO HOSPITALIST  IP CONSULT TO PHYSICAL MEDICINE REHAB  IP CONSULT TO SOCIAL WORK    Disposition: Acute rehab unit    Condition at Discharge: Stable    Discharge Instructions/Follow-up:    1. Physical and occupational therapy  2. Urinary retention post op, keep valdez in place, attempt voiding trail in 1 week  3. Started on Flomax  4. Was on Losartan and Amlodipine and this has been discontinued for now, if BP trends up consider restarting  5. Lovenox for DVT Px  6. Needs follow up with orthopedic surgery  7.  She has KAIT, and I have asked daughter to bring CPAP so resp therapist can help her    Code Status:  Full Code    Activity: activity as tolerated    Diet: regular diet      Discharge Medications:     Current Discharge Medication List           Details   pantoprazole (PROTONIX) 40 MG tablet Take 1 tablet by mouth every morning (before breakfast)  Qty: 30 tablet, Refills: 3      tamsulosin (FLOMAX) 0.4 MG capsule Take 1 capsule by mouth daily  Qty: 30 capsule, Refills: 3      docusate sodium (COLACE) 100 MG capsule Take 1 capsule by mouth 2 times daily  Qty: 60 capsule, Refills: 0      enoxaparin (LOVENOX) 40 MG/0.4ML injection Inject 0.4 mLs into the skin daily for 20 days  Qty: 8 mL, Refills: 0      acetaminophen (AMINOFEN) 325 MG tablet Take 2 tablets by mouth every 6 hours as needed for Pain  Qty: 28 tablet, Refills: 0      oxyCODONE (ROXICODONE) 5 MG immediate release tablet Take 1 tablet by mouth every 6 hours as needed for Pain for up to 7 days. Intended supply: 3 days. Take lowest dose possible to manage pain  Qty: 28 tablet, Refills: 0    Comments: Reduce doses taken as pain becomes manageable  Associated Diagnoses: Closed fracture of right femur, unspecified fracture morphology, unspecified portion of femur, initial encounter ([facility]              Details   vitamin D (CHOLECALCIFEROL) 25 MCG (1000 UT) TABS tablet Take 1,000 Units by mouth daily      Ascorbic Acid (VITAMIN C) 1000 MG tablet Take 1,000 mg by mouth daily      gabapentin (NEURONTIN) 600 MG tablet 1,200 mg 2 times daily.        DULoxetine (CYMBALTA) 60 MG extended release capsule 60 mg daily       meloxicam (MOBIC) 15 MG tablet TAKE 1 TABLET BY MOUTH DAILY  Qty: 30 tablet, Refills: 2      buPROPion (WELLBUTRIN SR) 150 MG extended release tablet Take 150 mg by mouth daily      Fesoterodine Fumarate ER (TOVIAZ) 8 MG TB24 at bedtime       RESTASIS 0.05 % ophthalmic emulsion Place 2 drops into both eyes every 12 hours       escitalopram (LEXAPRO) 20 MG tablet Take 20 mg by mouth daily       Multiple Vitamins-Minerals (MULTIVITAMIN PO) Take 1 tablet by mouth daily       White Petrolatum-Mineral Oil (WH PETROL-MINERAL OIL-LANOLIN) 0.1-0.1 % OINT Refresh P.M. 57.3 %-42.5 % eye ointment   UTD             Time Spent on discharge is more than 30 minutes in the examination, evaluation, counseling and review of medications and discharge plan. Signed:    Miles Clinton MD   2/11/2022      Thank you Gemma Swann MD for the opportunity to be involved in this patient's care. If you have any questions or concerns please feel free to contact me at [phone].

## 2022-02-11 NOTE — PLAN OF CARE
Problem: Falls - Risk of:  Goal: Will remain free from falls  Description: Will remain free from falls  2/11/2022 0523 by Nayeli Webb RN  Outcome: Ongoing  2/10/2022 2012 by Antonio Watts RN  Outcome: Ongoing     Problem: Falls - Risk of:  Goal: Absence of physical injury  Description: Absence of physical injury  2/11/2022 0523 by Nayeli Webb RN  Outcome: Ongoing  2/10/2022 2012 by Antonio Watts RN  Outcome: Ongoing     Problem: Pain:  Goal: Pain level will decrease  Description: Pain level will decrease  2/11/2022 0523 by Nayeli Webb RN  Outcome: Ongoing  2/10/2022 2012 by Antonio Watts RN  Outcome: Ongoing     Problem: Pain:  Goal: Control of acute pain  Description: Control of acute pain  2/10/2022 2012 by Antonio Watts RN  Outcome: Ongoing     Problem: Pain:  Goal: Control of chronic pain  Description: Control of chronic pain  2/10/2022 2012 by Antonio Watts RN  Outcome: Ongoing

## 2022-02-11 NOTE — PROGRESS NOTES
Occupational Therapy  Facility/Department: [facility] 5T ORTHO/NEURO  Daily Treatment Note  NAME: Hieu Seth  : 1945  MRN: [id number]    Date of Service: 2022    Discharge Recommendations: Hieu Seth scored a 15/24 on the AM-PAC ADL Inpatient form. Current research shows that an AM-PAC score of 17 or less is typically not associated with a discharge to the patient's home setting. Based on the patient's AM-PAC score and their current ADL deficits, it is recommended that the patient have 3-5 sessions per week of Occupational Therapy at d/c to increase the patient's independence. Please see assessment section for further patient specific details. If patient discharges prior to next session this note will serve as a discharge summary. Please see below for the latest assessment towards goals. Assessment      Assessment: Pt showing progress this session. Able to complete 2 sit<>stand transfers with RW. Still needing STEDY for transfers. Able to follow TTWB prect this session, but cont to reinforce to maximize education. Pt would benefit from cont OT for ADLs, functional transfers and functional activities. Cont with POC    OT Education: OT Role;Plan of Care;Transfer Training;Precautions; Family Education  Patient Education: At EOS daughter asked about OT Role, Plan of Care, Case mgmt Role, possible Rehab placement. OT verb that case mgmt will handle all placement questions/ d/c needs. Pt and daughter verb understanding. Activity Tolerance  Activity Tolerance: Patient Tolerated treatment well  Safety Devices  Safety Devices in place: Yes  Type of devices: Left in chair;Nurse notified;Call light within reach; Chair alarm in place         Patient Diagnosis(es): The primary encounter diagnosis was Closed fracture of right femur, unspecified fracture morphology, unspecified portion of femur, initial encounter ([facility] [address]  A diagnosis of Closed comminuted intra-articular fracture of distal femur, right, initial encounter [facility]) was also pertinent to this visit. has a past medical history of Allergic, Anemia, Anxiety, Closed comminuted intra-articular fracture of distal femur, right, initial encounter ([facility] [address] Depression, Eye disorder, Fibromyalgia, GERD (gastroesophageal reflux disease), Hypertension, Obesity, KAIT (obstructive sleep apnea), Osteoarthritis, Osteoporosis, PONV (postoperative nausea and vomiting), and Prolonged emergence from general anesthesia. has a past surgical history that includes hip surgery; joint replacement (Right, 1993/2003); Wrist fracture surgery (Left); joint replacement (Left, 5/18/16); knee surgery; and Femur fracture surgery (Right, 2/7/2022). Restrictions  Position Activity Restriction  Other position/activity restrictions: TTWB R, no activity restriction noted  Subjective   General  Chart Reviewed: Yes  Patient assessed for rehabilitation services?: Yes  Additional Pertinent Hx: 78 yo admitted 2/6/22 for fall/R femur fx. OR 2/7 FEMUR OPEN REDUCTION INTERNAL FIXATION RIGHT. Pmhx: HTN, fibromyalgia, KAIT, L TKR, R THR  Family / Caregiver Present: Yes (daughter arrived at [address] [place] [address])  Referring Practitioner: Fabby Palafox MD  Diagnosis: R femur fx s/p ORIF  Subjective  Subjective: Pt in bed, pleasant and agreeable to OT. Orientation     Objective    ADL  Grooming: Independent (in chair for oral care)  Toileting: Maximum assistance        Balance  Sitting Balance: Stand by assistance (EOB ~ 15 min)  Standing Balance: Contact guard assistance (at RW ~20 seconds)    Toilet Transfers  Toilet - Technique:  (STEDY)  Equipment Used: Raised toilet seat with rails  Toilet Transfer: 2 Person assistance; Moderate assistance;Minimal assistance    Bed mobility  Supine to Sit: Contact guard assistance (increased time to complete with use of bed rails)  Scooting: Contact guard assistance     Transfers  Stand to sit: Moderate assistance (for control)  Transfer Comments: Able to complete 2 sit<>stands transfer with RW with CGAx2 and the Min Ax2 for second trial. Sit<>stand with STEDY Min Ax2. Still needing STEDY for transfers- CGA for  STEDY. Able to follow TTWB prect this session with initial cues. Plan  If pt discharges prior to next treatment session, this note will serve as discharge summary. Plan  Times per week: 5-7  Times per day: Daily  Current Treatment Recommendations: Patient/Caregiver Education & Training,Gait Training,Home Management Training,Equipment Evaluation, Education, & procurement,Balance Ramiro Minus / ADL,Safety Education & Training  AM-PAC Score        AM-[facility] Inpatient Daily Activity Raw Score: 15 (02/11/22 1039)  AM-PAC Inpatient ADL T-Scale Score : 34.69 (02/11/22 1039)  ADL Inpatient CMS 0-100% Score: 56.46 (02/11/22 1039)  ADL Inpatient CMS G-Code Modifier : CK (02/11/22 1039)    Goals (as determined and assessed by primary OT)    Short term goals  Time Frame for Short term goals: by d/c  Short term goal 1: Pt will complete bed to chair transfer. GOAL MET 2/9. NEW GOAL: Functional t/f with mod A x2  Short term goal 2: Pt will verbalize understanding of TTWBing. GOAL MET 2/9. NEW GOAL: Maintain TTWB 100% of session with min A or less- not met  Short term goal 3: Pt will tolerate stance for 30 seconds at RW. NOT MET  Patient Goals   Patient goals : \"Be in less pain. \"       Therapy Time   Individual Concurrent Group Co-treatment   Time In 0987         Time Out 0949         Minutes 54           Time Code Treatment Minutes: [address] JUAN Jackson/L

## 2022-02-11 NOTE — PROGRESS NOTES
[facility]   After review, this patient is felt to be:       [x]  Appropriate for Acute Inpatient Rehab    []  Appropriate for Acute Inpatient Rehab Pending Insurance Authorization    []  Not appropriate for Acute Inpatient Rehab    []  Referral received and ARU reviewing patient; Evaluation ongoing. No precert required for ARU admission. Pt in agreement per  and CL's conversation with pt this PM. Can admit to ARU today pending negative Rapid. Spoke with [place] and he is aware. Will notify DCP with further updates.  Thank you for the referral.    Jitendra Cee OTR/L  Clinical Liaison [phone]   Electronically signed by Jitendra Cee on 2/11/2022 at 3:29 PM

## 2022-02-11 NOTE — CONSULTS
JOINT REPLACEMENT Right 1993/2003    HIP    JOINT REPLACEMENT Left 5/18/16    left total knee replacement    KNEE SURGERY      WRIST FRACTURE SURGERY Left        Family History   Problem Relation Age of Onset    Cancer Father     Alcohol Abuse Mother     Cancer Mother     Osteoarthritis Mother     Dementia Mother     Cancer Maternal Aunt         breast       Social History     Socioeconomic History    Marital status:      Spouse name: None    Number of children: None    Years of education: None    Highest education level: None   Occupational History    None   Tobacco Use    Smoking status: Never Smoker    Smokeless tobacco: Never Used   Vaping Use    Vaping Use: Never used   Substance and Sexual Activity    Alcohol use: No     Alcohol/week: 0.0 standard drinks    Drug use: No    Sexual activity: Not Currently   Other Topics Concern    None   Social History Narrative    None     Social Determinants of Health     Financial Resource Strain:     Difficulty of Paying Living Expenses: Not on file   Food Insecurity:     Worried About Running Out of Food in the Last Year: Not on file    Lainey of Food in the Last Year: Not on file   Transportation Needs:     Lack of Transportation (Medical): Not on file    Lack of Transportation (Non-Medical):  Not on file   Physical Activity:     Days of Exercise per Week: Not on file    Minutes of Exercise per Session: Not on file   Stress:     Feeling of Stress : Not on file   Social Connections:     Frequency of Communication with Friends and Family: Not on file    Frequency of Social Gatherings with Friends and Family: Not on file    Attends Yazidi Services: Not on file    Active Member of Clubs or Organizations: Not on file    Attends Club or Organization Meetings: Not on file    Marital Status: Not on file   Intimate Partner Violence:     Fear of Current or Ex-Partner: Not on file    Emotionally Abused: Not on file    Physically Abused: Not on file    Sexually Abused: Not on file   Housing Stability:     Unable to Pay for Housing in the Last Year: Not on file    Number of Places Lived in the Last Year: Not on file    Unstable Housing in the Last Year: Not on file           REVIEW OF SYSTEMS:   CONSTITUTIONAL: negative for fevers, chills, diaphoresis, appetite change, night sweats, unexpected weight change, fatigue  EYES: negative for blurred vision, eye discharge, visual disturbance and icterus  HEENT: negative for hearing loss, tinnitus, ear drainage, sinus pressure, nasal congestion, epistaxis and snoring  RESPIRATORY: Negative for hemoptysis, cough, sputum production  CARDIOVASCULAR: negative for chest pain, palpitations, exertional chest pressure/discomfort, syncope, edema  GASTROINTESTINAL: negative for nausea, vomiting, diarrhea, blood in stool, abdominal pain, constipation  GENITOURINARY: negative for frequency, dysuria, urinary incontinence, decreased urine volume, and hematuria  HEMATOLOGIC/LYMPHATIC: negative for easy bruising, bleeding and lymphadenopathy  ALLERGIC/IMMUNOLOGIC: negative for recurrent infections, angioedema, anaphylaxis and drug reactions  ENDOCRINE: negative for weight changes and diabetic symptoms including polyuria, polydipsia and polyphagia  MUSCULOSKELETAL: refer to HPI  NEUROLOGICAL: negative for headaches, slurred speech, unilateral weakness  PSYCHIATRIC/BEHAVIORAL: negative for hallucinations, behavioral problems, confusion and agitation.      Physical Examination:  Vitals:   Patient Vitals for the past 24 hrs:   BP Temp Temp src Pulse Resp SpO2   02/11/22 1022 126/68 98.4 °F (36.9 °C) Oral 91 16 95 %   02/11/22 0604 (!) 145/75 98.4 °F (36.9 °C) Oral 82 16 96 %   02/11/22 0454 -- -- -- -- 18 92 %   02/11/22 0318 138/69 97.7 °F (36.5 °C) Oral 86 16 95 %   02/11/22 0158 -- -- -- -- 18 91 %   02/10/22 2253 (!) 147/82 97.8 °F (36.6 °C) Oral 82 16 91 %   02/10/22 2046 -- -- -- -- 16 92 %   02/10/22 1900 (!) 146/77 98.3 °F (36.8 °C) Oral 85 18 --     Const: Alert. WDWN. No distress  Eyes: Conjunctiva noninjected, no icterus noted; pupils equal, round, and reactive to light. HENT: Atraumatic, normocephalic; Oral mucosa moist  Neck: Trachea midline, neck supple. No thyromegaly noted. CV: Regular rate and rhythm, no murmur rub or gallop noted  Resp: Lungs diminished at bases bilaterally, no rales wheezes or rhonchi, no retractions. Respirations unlabored. GI: Soft, nontender, nondistended. Normal bowel sounds. No palpable masses. Skin: Right leg incisions dressed, c/d/i. Normal temperature and turgor. No rashes or breakdown noted. Ext: Post-op swelling right lower extremity. No varicosities. MSK: Decreased right hip and knee ROM. Otherwise no joint tenderness, erythema, warmth noted. Neuro: Alert, oriented, appropriate. No cranial nerve deficits appreciated. Sensation intact to light touch. Motor examination reveals strength 5/5 BUE and LLE, did not test RLE due to WB restriction but able to actively DF/PF at the ankle. No abnormalities with finger/nose noted. Reflexes diminished, symmetric. Psych: Stable mood, normal judgement, normal affect     Lab Results   Component Value Date    WBC 6.6 02/11/2022    HGB 9.3 (L) 02/11/2022    HCT 28.4 (L) 02/11/2022    MCV 88.5 02/11/2022     02/11/2022     Lab Results   Component Value Date    INR 1.05 02/06/2022    INR 0.97 05/10/2016    PROTIME 11.9 02/06/2022    PROTIME 11.0 05/10/2016     Lab Results   Component Value Date    CREATININE 0.9 02/11/2022    BUN 27 (H) 02/11/2022     02/11/2022    K 4.2 02/11/2022     02/11/2022    CO2 29 02/11/2022     No results found for: ALT, AST, GGT, ALKPHOS, BILITOT    Most recent EKG revealed:  EKG performed in ER and to be interpreted by ER physician. Confirmed by MD, ER ([id number]),  [address], [address] ([id number]) on 2/7/2022 6:02:18 AM    Most recent imaging studies revealed:    Xray right femur  1.  Comminuted, displaced fracture of the distal femur. 2. No acute fracture in the pelvis right hip. On my review, CXR displays mild bilateral basilar airspace disease. Assessment:  Right midshaft femur fracture -s/p ORIF (2/7 with Dr. Lia Mccoy)  Acute blood loss anemia  Hypoxia  Fluid overload  MARISELA  Urinary retention  HTN  Anxiety/depression  Fibromyalgia  KAIT    Impairments: TTWB RLE, decreased balance, endurance    Recommendations:    Patient with new functional deficits and ongoing medical complexity. Demonstrates ability to tolerate 3 hours therapy/day. She is a good candidate for acute inpatient rehab when medically appropriate. Thank you for this consult. Please contact me with any questions or concerns. Noe Lewis.  Moon Peña MD 2/11/2022, 1:36 PM

## 2022-02-12 LAB
ANION GAP SERPL CALCULATED.3IONS-SCNC: 8 MMOL/L (ref 3–16)
BASOPHILS ABSOLUTE: 0 K/UL (ref 0–0.2)
BASOPHILS RELATIVE PERCENT: 0.3 %
BUN BLDV-MCNC: 23 MG/DL (ref 7–20)
CALCIUM SERPL-MCNC: 9.1 MG/DL (ref 8.3–10.6)
CHLORIDE BLD-SCNC: 104 MMOL/L (ref 99–110)
CO2: 29 MMOL/L (ref 21–32)
CREAT SERPL-MCNC: 0.9 MG/DL (ref 0.6–1.2)
EOSINOPHILS ABSOLUTE: 0.3 K/UL (ref 0–0.6)
EOSINOPHILS RELATIVE PERCENT: 4.8 %
GFR AFRICAN AMERICAN: >60
GFR NON-AFRICAN AMERICAN: >60
GLUCOSE BLD-MCNC: 99 MG/DL (ref 70–99)
HCT VFR BLD CALC: 26.2 % (ref 36–48)
HEMOGLOBIN: 8.7 G/DL (ref 12–16)
LYMPHOCYTES ABSOLUTE: 1.4 K/UL (ref 1–5.1)
LYMPHOCYTES RELATIVE PERCENT: 21 %
MCH RBC QN AUTO: 29.3 PG (ref 26–34)
MCHC RBC AUTO-ENTMCNC: 33.3 G/DL (ref 31–36)
MCV RBC AUTO: 87.8 FL (ref 80–100)
MONOCYTES ABSOLUTE: 0.7 K/UL (ref 0–1.3)
MONOCYTES RELATIVE PERCENT: 9.9 %
NEUTROPHILS ABSOLUTE: 4.4 K/UL (ref 1.7–7.7)
NEUTROPHILS RELATIVE PERCENT: 64 %
PDW BLD-RTO: 14.4 % (ref 12.4–15.4)
PLATELET # BLD: 190 K/UL (ref 135–450)
PMV BLD AUTO: 9.6 FL (ref 5–10.5)
POTASSIUM REFLEX MAGNESIUM: 4.7 MMOL/L (ref 3.5–5.1)
PREALBUMIN: 15.6 MG/DL (ref 20–40)
RBC # BLD: 2.99 M/UL (ref 4–5.2)
SODIUM BLD-SCNC: 141 MMOL/L (ref 136–145)
WBC # BLD: 6.8 K/UL (ref 4–11)

## 2022-02-12 PROCEDURE — 80048 BASIC METABOLIC PNL TOTAL CA: CPT

## 2022-02-12 PROCEDURE — 84134 ASSAY OF PREALBUMIN: CPT

## 2022-02-12 PROCEDURE — 97535 SELF CARE MNGMENT TRAINING: CPT

## 2022-02-12 PROCEDURE — 97162 PT EVAL MOD COMPLEX 30 MIN: CPT

## 2022-02-12 PROCEDURE — 94664 DEMO&/EVAL PT USE INHALER: CPT

## 2022-02-12 PROCEDURE — 6360000002 HC RX W HCPCS: Performed by: PHYSICAL MEDICINE & REHABILITATION

## 2022-02-12 PROCEDURE — 85025 COMPLETE CBC W/AUTO DIFF WBC: CPT

## 2022-02-12 PROCEDURE — 6370000000 HC RX 637 (ALT 250 FOR IP): Performed by: PHYSICAL MEDICINE & REHABILITATION

## 2022-02-12 PROCEDURE — 97530 THERAPEUTIC ACTIVITIES: CPT

## 2022-02-12 PROCEDURE — 36415 COLL VENOUS BLD VENIPUNCTURE: CPT

## 2022-02-12 PROCEDURE — 92523 SPEECH SOUND LANG COMPREHEN: CPT

## 2022-02-12 PROCEDURE — 94150 VITAL CAPACITY TEST: CPT

## 2022-02-12 PROCEDURE — 1280000000 HC REHAB R&B

## 2022-02-12 PROCEDURE — 97167 OT EVAL HIGH COMPLEX 60 MIN: CPT

## 2022-02-12 RX ADMIN — PANTOPRAZOLE SODIUM 40 MG: 40 TABLET, DELAYED RELEASE ORAL at 06:34

## 2022-02-12 RX ADMIN — OXYCODONE HYDROCHLORIDE AND ACETAMINOPHEN 1 TABLET: 5; 325 TABLET ORAL at 08:11

## 2022-02-12 RX ADMIN — ESCITALOPRAM OXALATE 20 MG: 10 TABLET ORAL at 08:11

## 2022-02-12 RX ADMIN — TAMSULOSIN HYDROCHLORIDE 0.4 MG: 0.4 CAPSULE ORAL at 08:11

## 2022-02-12 RX ADMIN — GABAPENTIN 600 MG: 600 TABLET, FILM COATED ORAL at 08:12

## 2022-02-12 RX ADMIN — BUPROPION HYDROCHLORIDE 150 MG: 150 TABLET, EXTENDED RELEASE ORAL at 08:12

## 2022-02-12 RX ADMIN — DULOXETINE HYDROCHLORIDE 60 MG: 60 CAPSULE, DELAYED RELEASE ORAL at 08:11

## 2022-02-12 RX ADMIN — GABAPENTIN 600 MG: 600 TABLET, FILM COATED ORAL at 22:12

## 2022-02-12 RX ADMIN — ENOXAPARIN SODIUM 40 MG: 100 INJECTION SUBCUTANEOUS at 08:10

## 2022-02-12 RX ADMIN — OXYCODONE HYDROCHLORIDE AND ACETAMINOPHEN 2 TABLET: 5; 325 TABLET ORAL at 22:12

## 2022-02-12 ASSESSMENT — PAIN SCALES - GENERAL
PAINLEVEL_OUTOF10: 0
PAINLEVEL_OUTOF10: 5
PAINLEVEL_OUTOF10: 9
PAINLEVEL_OUTOF10: 0
PAINLEVEL_OUTOF10: 0

## 2022-02-12 ASSESSMENT — PAIN DESCRIPTION - PAIN TYPE
TYPE: SURGICAL PAIN;ACUTE PAIN
TYPE: ACUTE PAIN;SURGICAL PAIN

## 2022-02-12 ASSESSMENT — PAIN DESCRIPTION - FREQUENCY
FREQUENCY: INTERMITTENT
FREQUENCY: INTERMITTENT

## 2022-02-12 ASSESSMENT — PAIN DESCRIPTION - ORIENTATION
ORIENTATION: RIGHT
ORIENTATION: RIGHT;LOWER

## 2022-02-12 ASSESSMENT — PAIN DESCRIPTION - LOCATION
LOCATION: LEG
LOCATION: LEG

## 2022-02-12 ASSESSMENT — PAIN DESCRIPTION - ONSET
ONSET: GRADUAL
ONSET: ON-GOING

## 2022-02-12 ASSESSMENT — PAIN - FUNCTIONAL ASSESSMENT: PAIN_FUNCTIONAL_ASSESSMENT: ACTIVITIES ARE NOT PREVENTED

## 2022-02-12 ASSESSMENT — PAIN DESCRIPTION - DESCRIPTORS
DESCRIPTORS: SHARP
DESCRIPTORS: ACHING

## 2022-02-12 ASSESSMENT — PAIN DESCRIPTION - PROGRESSION: CLINICAL_PROGRESSION: GRADUALLY WORSENING

## 2022-02-12 NOTE — PLAN OF CARE
Problem: Nutrition  Goal: Optimal nutrition therapy  Outcome: Ongoing     Nutrition Problem #1: Increased nutrient needs  Intervention: Food and/or Nutrient Delivery: Continue Current Diet  Nutritional Goals: Pt to consistently consume >50% of meals and ONS through adm

## 2022-02-12 NOTE — PROGRESS NOTES
NURSING ASSESSMENT: ARU ADMISSION  [facility]    Patient:Sandra Jhaveri     Rehab Dx/Hx: Fredis Coffman   :1945  M:[id number]  Date of Admit: 2022  Room #: 3103/3103-01    Subjective:   Patient admitted to [employer] from [facility] via wheelchair. Alert and oriented x4. VSS. Oriented to room and call light system. Oriented to rehab routine and therapy schedules. Informed about care conferences and ordering of meals. Educated on video surveillance for safety. Drug / Medication Review:   Medications were reviewed by RN at time of admission  [x]  No potential or actual clinically significant medication issues were noted.      []   Yes, a clinically significant medication issue was identified                 []  Adverse Drug Event:                  []  Allergy:                  []  Side Effect:                  []  Ineffective Therapy:                  []  Drug Interaction:                 []  Duplicated Therapy:                 []  Untreated Indication:                  []  Non-adherence:                 []  Other:                  Nursing/Pharmacy contacted the physician:     Date:              Time:                  Actions recommended by physician were completed:   Date :            Time:    4 Eyes Skin Assessment   The patient is being assessed for: Admission     I agree that 2 RN's have performed a thorough Head to Toe Skin Assessment on the patient. ALL assessment sites listed below have been assessed. Areas assessed by both nurses:   [x]   Head, Face, and Ears   [x]   Shoulders, Back, and Chest, Abdomen  [x]   Arms, Elbows, and Hands   [x]   Coccyx, Sacrum, and Ischium  [x]   Legs, Feet, and Heel     Does the Patient have Skin Breakdown? No, scabs to 4th and 5th toe on R foot. Abrasion to L upper thigh, RLE ORIF, two pre existing open areas to R lower abdomen.           Royer Prevention initiated:  Yes   Wound Care Orders initiated:  Yes       [id number] nurse consulted for Pressure Injury (Stage 3,4, Unstageable, DTI, NWPT, Complex wounds)and New or Established Ostomies:  No     Primary Nurse eSignature: Toby Camargo RN 2/12/22 1:20 AM   Co-signer eSignature:  Kim Haddad RN

## 2022-02-12 NOTE — PROGRESS NOTES
Ace wrap surgical dressing to RLE CDI. Palpable 2+ pedal pulse. Pain well controlled T/O shift and rating at 0/10, except with therapy in AM at 5/10 and resolved with PRN percocet. Christensen patent draining clear yellow urine, sufficient amounts. See flow sheet. Not malodorous. Participated in therapy. X 1 assist with Stedy and gait belt. Daughter at bedside T/O shift. Tearful at times.  consult placed and they attempted to reach out but could not connect today. Staff will F/U tomorrow,  patient and daughter are aware and agreeable. Alert and oriented. VSS on RA.

## 2022-02-12 NOTE — PLAN OF CARE
Problem: Falls - Risk of:  Goal: Will remain free from falls  Description: Will remain free from falls  Outcome: Ongoing     Problem: Mobility - Impaired:  Goal: Mobility will improve  Description: Mobility will improve  Outcome: Ongoing     Problem: Pain:  Goal: Pain level will decrease  Description: Pain level will decrease  Outcome: Ongoing     Problem: Skin Integrity:  Goal: Will show no infection signs and symptoms  Description: Will show no infection signs and symptoms  Outcome: Ongoing

## 2022-02-12 NOTE — PROGRESS NOTES
Christensen care provided with shower with soap and water per therapy. Christensen patent draining clear, yellow urine.

## 2022-02-12 NOTE — PLAN OF CARE
Problem: Mobility - Impaired:  Goal: Mobility will improve  Description: Mobility will improve  2/12/2022 0152 by Preet Miranda RN  Note: TTWB to RLE post ORIF. X2 transfer with mira. Problem: Pain:  Goal: Control of acute pain  Description: Control of acute pain  Note: No c/o pain at time of assessment. Educated on taking pain medication prior to therapy to maximize results. Patient in agreement.

## 2022-02-12 NOTE — CONSULTS
Comprehensive Nutrition Assessment    RECOMMENDATIONS:  1. PO Diet: Continue regular diet  2. ONS: Start [employer] BID; Start Boost pudding QD    NUTRITION ASSESSMENT:    Nutritional summary & status: New ARU admit. Pt w/ rt femur fx after slipping on ice at her home. Prior to transitioning to ARU, pt consuming 50% or less of meals throughout adm and <25% of ordered ONS. Pt reports realizing her intake was decreasing r/t the leg pain and GI discomfort caused by pain medication, but understands the importance of adequate intake to promote healing. An additional factor to her poor po is pt is a vegetarian and dislikes the food at [facility]. Pt previously receiving Ensure Enlive which she did not like. Pt on board to trial Boost Pudding and Vinny Showers. RD discussed the ability to order ONS blended into milkshake or smoothie as desired. Spoke w/ pt and daughter at bedside. Encouraged daughter to bring in outside foods as desired by pt.  Admission/PMH: Admission: right femur fx; PMH: GERD, HTN     MALNUTRITION ASSESSMENT  Context of Malnutrition: Acute Illness   Malnutrition Status:  At risk for malnutrition (Comment)  Findings of the 6 clinical characteristics of malnutrition (Minimum of 2 out of 6 clinical characteristics is required to make the diagnosis of moderate or severe Protein Calorie Malnutrition based on AND/ASPEN Guidelines):  Energy Intake: Less than/equal to 50% of estimated energy requirements    Energy Intake Time: 3 days     NUTRITION DIAGNOSIS   Increased nutrient needs related to increase demand for energy/nutrients as evidenced by intake 26-50% (s/p femur fx, ORIF)    NUTRITION INTERVENTION  Food and/or Nutrient Delivery:  Continue Current Diet  Nutrition Education/Counseling:  No recommendation at this time   Goals:  Pt to consistently consume >50% of meals and ONS through adm       Nutrition Monitoring and Evaluation:   Food/Nutrient Intake Outcomes:  Food and Nutrient Intake,Supplement Intake  Physical Signs/Symptoms Outcomes:  Biochemical Data,Weight     OBJECTIVE DATA: Significant to nutrition assessment  · Nutrition-Focused Physical Findings: no bm recorded; no edema noted  · Labs: Reviewed;   · Meds: Reviewed; protonix; Percocet PRN, Zofran PRN  · Wounds: None       CURRENT NUTRITION THERAPIES  ADULT DIET; Regular     PO Intake: 26-50%   PO Supplement Intake:1-25%  Additional Sources of Calories/IVF:n/a     ANTHROPOMETRICS  Current Height: 5' 10\" (177.8 cm)  Current Weight: 220 lb 7.4 oz (100 kg)    Admission weight: 220 lb 7.4 oz (100 kg)  Ideal Body Weight (IBW): 150 lbs  (68 kg)    Usual Bodyweight 220 lb (99.8 kg)   Weight Changes No changes noted       BMI: 31.3    Wt Readings from Last 50 Encounters:   02/12/22 220 lb 7.4 oz (100 kg)   02/06/22 220 lb (99.8 kg)   10/12/21 220 lb (99.8 kg)       COMPARATIVE STANDARDS  Energy (kcal):  6495-8080 (15-18)     Protein (g):   (1.2-1.4)       Fluid (ml/day):  >1500    The patient will still be monitored per nutrition standards of care. Consult dietitian if nutrition interventions essential to patient care is needed.      Jose L Choi [place] [address], [address]  [place]:  [phone]  Office:  [phone]

## 2022-02-12 NOTE — PROGRESS NOTES
Speech Language Pathology  Facility/Department: [facility] ACUTE REHAB UNIT  Initial Speech/Language/Cognitive Assessment    NAME: Angel Luis Bunch  : 1945   MRN: [id number]  ADMISSION DATE: 2022  ADMITTING DIAGNOSIS: has DDD (degenerative disc disease), lumbar; Cervical spondylosis; Neck pain; Back pain; Lumbar stenosis; Discogenic low back pain; Myofascial pain; Primary localized osteoarthrosis, lower leg; Hip pain; Ischial bursitis; Fe deficiency anemia; Knee pain, bilateral; Obesity (BMI 30-39.9); Fibromyalgia; Hypertension; Left TKR; Chronic blood loss anemia; Unspecified adverse effect of other drug, medicinal and biological substance(995.29); Painful orthopaedic hardware [facility]); Plantar fasciitis, bilateral; Closed comminuted intra-articular fracture of distal femur, right, initial encounter ([facility] [address] and Closed displaced comminuted fracture of shaft of right femur ([facility] [address] on their problem list.  DATE ONSET: 2022    Date of Eval: 2022   Evaluating Therapist: JESSIE Martin    RECENT RESULTS  CT OF HEAD/MRI: none this admit    Primary Complaint:  Difficulty thinking clearly/remembering things    Pain:  Pain Assessment  Pain Assessment: 0-10  Pain Level: 0    Assessment:  Cognitive Diagnosis: suspected higher-level cognitive impairment  Diagnosis: Pt was administered the Cognitive Linguistic Quick Test (CLQT), and scored WFL across all domains (Attention, Memory, Executive Functions, Language, Visuospatial Skills, Clock Drawing). However it should be mentioned that pt is currently a practicing clinical psychologist, and endorsed familiarity with the tasks presented in this assessment; this may in turn have elevated her score (interference relating to variant of test-retest reliability). Per d/w PT/OT, pt was displaying functional cognitive difficulty during their assessments today.  The patient's daughter was present throughout this evaluation, and endorsed concern that she has noticed her mother demonstrating new functional memory/cognitive issues since this recent surgery. Of note, per chart, pt suffered period of hypoxia during IP stay, which maybe a possible factor. In addition to stress/fatigue/etc related to surgery/hospitalization, it is important to note that pt has other significant stressors in her life at present (namely, her son is currently in hospice care; she was leaving to visit him when she fractured her femur). Given multiple factors involved, and at patient and daughter's request, recommend further assessment of cognitive-linguistics specifically as they relate to functional tasks of IADLS (e.g. managing medications/finances/etc). Recommendations:  Requires SLP Intervention: Yes  Duration/Frequency of Treatment: 30 min/day, 5x/wk, for 3 wks  D/C Recommendations: To be determined       Plan:   Goals:  Short-term Goals  Timeframe for Short-term Goals: 2-3 weeks  Goal 1: Patient will participate in ongoing assessment of cognitive-linguistic skills as they relate to functional IADLs. Patient/family involved in developing goals and treatment plan: yes, patient and daughter    Subjective:   Previous level of function and limitations: independent  General  Chart Reviewed: Yes  Patient assessed for rehabilitation services?: Yes  Additional Pertinent Hx: Per admitting H&P (02/11/2022): 'Patient is a 78 yo F with pmh HTN, KAIT, OA, Fibromyalgia, depression/anxiety who initially presented 2/6/2022 with right leg pain s/p fall due to slipping on ice. Found to have right midshaft comminuted displaced distal femur fracture. Underwent ORIF (2/7 with Dr. Cl Donovan). Now TTWB RLE. Post-op course notbale for hypoxia, thought to be due to atelectasis and fluid overload. She was treated with IV lasix. Course further complicated by acute blood loss anemia requiring transfusion, MARISELA, and urinary retention. Now presents to ARU with impaired mobility and self-care below her baseline.  Currently, patient reports mild right thigh pain. Worse with movement. Improves with rest and medication. Denies tingling/numbness. She is motivated to start inpatient rehab program.   '  Family / Caregiver Present: Yes (daughter)  General Comment  Comments: Following surgery for ORIF, post op course notable for hypoxia. Per daughter, she notes pt has been demonstrating more cognitive/memory issues s/p this surgery than prior ones and is concerned. PT and OT have also noticed issues with functional cognition/memory. Subjective  Subjective: Patient awake, alert, pleasant, agreeable to evaluation. Shared some of her external frustrations/concerns (fell and slipped as she was getting into her car to go be with her son who is in hospice care); offered emotional support. Social/Functional History  Lives With: Alone  Type of Home: House  Active : Yes  Mode of Transportation: Car  Occupation: Part time employment  Type of occupation: clinical psychologist; has been doing telehealth recently to limit risk of catching/passing illness on to her son (now in hospice for cancer)  Leisure & Hobbies: enjoys talking, reading, music  IADL Comments: manages her own medications/finances  Vision  Vision: Impaired  Vision Exceptions:  (interocular lenses - occassional wears readers / distance glasses when driving)  Hearing  Hearing: Exceptions to [facility]  Hearing Exceptions: Hard of hearing/hearing concerns (pt endorses HL, has hearing aids at home but doesn't like using them. HL was not a barrier for this evaluation.)           Objective:  Oral/Motor  Oral Motor: Within functional limits    Auditory Comprehension  Comprehension: Within Functional Limits    Expression  Primary Mode of Expression: Verbal    Verbal Expression  Verbal Expression: Within functional limits    Written Expression  Dominant Hand: Right    Motor Speech  Motor Speech:  Within Functional Limits    Pragmatics/Social Functioning  Pragmatics: Within functional limits    Cognition:   Orientation  Overall Orientation Status: Within Functional Limits  Attention  Attention: Within Functional Limits  Memory  Memory: Within Funtional Limits  Problem Solving  Problem Solving: Within Functional Limits  Numeric Reasoning  Numeric Reasoning: Unable to assess  Safety/Judgement  Safety/Judgement: Unable to assess    Additional Assessments:  NA    Prognosis:  Speech Therapy Prognosis  Prognosis: Good  Prognosis Considerations: Previous Level of Function  Individuals consulted  Consulted and agree with results and recommendations: Patient; Family member  Family member consulted: Daughter    Education:  Patient Education: Educated pt and daughter regarding purpose of visit, role fo SLP, assessment results/recommendations. Patient Education Response: Verbalizes understanding  Safety Devices in place: Yes  Type of devices: All fall risk precautions in place; Left in bed;Bed alarm in place;Call light within reach; Other (comment) (reinforced use of call light, all needs met)    Therapy Time:   Individual Concurrent Group Co-treatment   Time In 1101 0000 0000 0000   Time Out 1201 0000 0000 0000   Minutes 60 0 0 0   Variance: 0  Timed Code Treatment Minutes: 0 Minutes  Total Treatment Time: [address] [place] [address], [place], [place].[id number]  Pg.  # [phone]    2/12/2022 1:51 PM

## 2022-02-12 NOTE — PROGRESS NOTES
Occupational Therapy   Occupational Therapy Initial Assessment/Treatment  Date: 2022   Patient Name: Dori Zhao  MRN: [id number]     : 1945    Date of Service: 2022    Discharge Recommendations:  Continue to assess pending progress,24 hour supervision or assist,Patient would benefit from continued therapy after discharge  OT Equipment Recommendations  Equipment Needed: Yes  Mobility Devices: ADL Assistive Devices  ADL Assistive Devices: Transfer Tub Bench  Other: cont to assess    Assessment   Performance deficits / Impairments: Decreased functional mobility ; Decreased ADL status; Decreased strength;Decreased ROM; Decreased safe awareness;Decreased cognition;Decreased endurance;Decreased balance;Decreased high-level IADLs;Decreased coordination;Decreased posture    Assessment: Pt is a 78 y/o F who presents to ARU below her functional baseline s/p R femur fx s/p ORIF. Pt previously independent w/ all her ADLs and IADLs and using a walker \"occasionally\" for functional mobility. Pt lives in a house alone and has 10 steps to enter her house. She lives on the main level, but has the laundry in the basement. Pt reports plan to travel to [address] [place] [address] with her adult daughter upon d/c to be with her terminally ill son. Pt currently requires 2 person assist for all functional transfers and demo decreased activity tolerance as session progressed. Pt required total A for toileting and LE dressing this date. Pt is limited by TTWB precautions, generalized weakness, difficulty attending to directions and decreased short-term memory, and decreased endurance and balance. Pt benefits from con inpt OT services to maximize safety and increase functional independence prior to d/c. Treatment Diagnosis: decreased ADLs, functional mobility and functional transfers 2/2 R femur fx  Prognosis: Guarded; Fair  Decision Making: High Complexity  OT Education: OT Role;Plan of Care;Transfer Training;Precautions; Family History  Lives With: Alone  Type of Home: House  Home Layout: Multi-level,Able to Live on Main level with bedroom/bathroom,Laundry in basement (2 story with a basement, rarely goes to second level)  Home Access:  (can enter through garage with 10 steps with rail; 10 porch steps with 2 steps without rail to enter in front; 5+5 with rail in back(L on first set of 5 , R on second set if 5); normally comes in the back enterance)  Bathroom Shower/Tub: Tub/Shower unit  Bathroom Toilet: Handicap height (RTS and handicap ht toilet)  Bathroom Equipment: Shower chair  Bathroom Accessibility:  (reports bathroom is small)  Home Equipment: 4 wheeled walker  ADL Assistance: Independent  Homemaking Assistance: Independent  Homemaking Responsibilities: Yes (does cooking/cleaning)  Ambulation Assistance: Independent (uses 4ww outside of house)  Transfer Assistance: Independent  Active : Yes  Mode of Transportation: Car  Occupation: Part time employment  Type of occupation: clinical psychologist; 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 Denies any other falls other than one leading to fx. Objective   Vision: Impaired  Vision Exceptions:  (interocular lenses - occassional wears readers / distance glasses when driving)  Hearing: Exceptions to [facility]  Hearing Exceptions: Hard of hearing/hearing concerns (pt endorses HL, has hearing aids at home but doesn't like using them.  HL was not a barrier for this evaluation.)      Orientation  Overall Orientation Status: Within Normal Limits        Balance  Sitting Balance: Stand by assistance  Standing Balance: Minimal assistance (Min A x1 at RW w/ cues to maintain TTWB)  Standing Balance  Time: ~1min total  Activity: functional transfers and stance for ADLs  Functional Mobility  Functional Mobility Comments: Not attempted due to safety and difficulty w/ maintaining TTWB precautions  Toilet Transfers  Toilet - Technique: Stand pivot  Equipment Used: Raised toilet seat with rails  Toilet Transfer: 2 Person assistance; Moderate assistance;Minimal assistance  Shower Transfers  Shower - Transfer From: Wheelchair  Shower - Transfer Type: To and From  Shower - Transfer To: Transfer tub bench  Shower - Technique: Squat pivot  Shower Transfers: 2 Person assistance; Moderate assistance (Mod Ax2)     ADL  Feeding: Setup (Per PT: pt setup w/ tray at beginning of session and ate breakfast during interview portion of eval)  Grooming: Setup; Increased time to complete (Pt washed face while seated on TTb w/ setup assist and increased time. Pt declined completion of oral hygiene 2/2 fatigue)  UE Bathing: Increased time to complete;Setup; Moderate assistance (Pt washed her chest w/ a washcloth while seated on TTB. Pt required assist to complete all other components of UE bathing- including washing and drying 2/2 fatigue.)  LE Bathing: Setup; Increased time to complete;Dependent/Total (Pt required assist to complete all components of LE bathing while seated on TTB. Rear tila hygiene was completed in stance in dry environment w/ Min A of one to maintain standing at RW and assist of other therapist to complete tila care)  UE Dressing: Setup;Maximum assistance; Increased time to complete;Verbal cueing (pt threaded BUE into bra and shirt w/ setup and increased time. Assist was provided to fasten bra in back, pull shirt over head and pull shirt down in front and back 2/2 increased fatigue)  LE Dressing: Dependent/Total (total A to doff socks and don underwear, pants, and socks.)  Toileting: Dependent/Total (pt completes toileting on std toilet w/o rails at home, however, required raised toilet seat w/ rails this date. Assist was provided for clothing mgmt in stance.  Pt attempted tila care while seated w/ +time,assist was provided in stance for thoroughness)  Additional Comments: Pt required increased assist as session progressed d/t decreased endurance and activity tolerance. Pt reporting \"I need to get back to bed. i'm just exhausted. I need to rest.\" Note: pt reports typically bathing at the [facility] and sitting on a shower bench. Pt also has a shower chair at home in her tub/shower unit. Tone RUE  RUE Tone: Normotonic  Tone LUE  LUE Tone: Normotonic  Coordination  Movements Are Fluid And Coordinated: Yes        Bed mobility  Sit to Supine: Maximum assistance (Max A for BLEs. Pt also demo decreased control of trunk descent 2/2 fatigue)     Transfers  Stand Pivot Transfers: 2 Person assistance;Minimal assistance (Min Ax2 from recliner to w/c leading L)  Sit Pivot Transfers: 2 Person assistance; Moderate assistance (Mod Ax2 squat pivot from w/c to TTB)  Sit to stand: 2 Person assistance;Minimal assistance (recliner to RW)        Cognition  Overall Cognitive Status: Exceptions  Arousal/Alertness: Appropriate responses to stimuli  Following Commands: Inconsistently follows commands; Follows one step commands with increased time; Follows one step commands with repetition  Attention Span: Attends with cues to redirect; Difficulty attending to directions  Memory: Decreased short term memory;Decreased recall of precautions  Safety Judgement: Decreased awareness of need for safety;Decreased awareness of need for assistance  Problem Solving: Assistance required to correct errors made;Decreased awareness of errors;Assistance required to identify errors made;Assistance required to implement solutions;Assistance required to generate solutions  Insights: Decreased awareness of deficits  Initiation: Requires cues for some  Sequencing: Requires cues for some           Sensation  Overall Sensation Status: WFL (denies N/T)        LUE AROM (degrees)  LUE AROM : WFL  Left Hand AROM (degrees)  Left Hand AROM: WFL  RUE AROM (degrees)  RUE AROM : WFL  Right Hand AROM (degrees)  Right Hand AROM: WFL                      Plan   Plan  Times per week: 60min 5x/week  Times per day: Daily  Current Treatment Recommendations: Patient/Caregiver Education & Training,Home Management Training,Equipment Evaluation, Education, & procurement,Balance Training,Endurance Training,Self-Care / ADL,Safety Education & Training,Strengthening,Functional Mobility Training    G-Code     OutComes Score                                                  AM-PAC Score             Goals  Short term goals  Time Frame for Short term goals: 10 days  Short term goal 1: Pt will complete toilet transfer w/ Min Ax1  Short term goal 2: Pt will complete LE dressing assessment  Short term goal 3: Pt will complete shower transfer w/ Alec x1  Short term goal 4: Pt will complete UE dressing w/ Min Ax1  Long term goals  Time Frame for Long term goals : 3 weeks  Long term goal 1: Pt will complete toilet transfer/ toileting w/ Mod I  Long term goal 2: Pt will complete UE/LE dressing w/ Mod I and use of AE prn  Long term goal 3: Pt will complete tub transfer w/ SBA  Long term goal 4: Pt will complete bathing w/ Spvn       Therapy Time   Individual Concurrent Group Co-treatment   Time In 0950     0850   Time Out 1023     0950   Minutes 33     60            Timed Code Treatment Minutes:  78    Total Treatment Minutes:  3104 Nasra Lujan OT

## 2022-02-12 NOTE — PROGRESS NOTES
Physical Therapy    Facility/Department: [facility] ACUTE REHAB UNIT  Initial Assessment    NAME: Vasquez Del Toro  : 1945  MRN: [id number]    Date of Service: 2022    Discharge Recommendations:  Continue to assess pending progress,24 hour supervision or assist,Patient would benefit from continued therapy after discharge   PT Equipment Recommendations  Equipment Needed: Yes  Mobility Devices: Rosalita Gross: Rolling  Other: will likely benefit from wc - continue to assess pending progress    Assessment   Body structures, Functions, Activity limitations: Decreased functional mobility ; Decreased balance;Decreased endurance;Decreased strength  Assessment: Pt admitted to ARU s/p R femur fx with TTWB restrictions on R LE. Pt currently requires assist x 2 for all transfers and requires VC to [facility] precautions. Pt would benefit from continued skilled PT in order to progress towards PLOF in which she lives at home alone. Treatment Diagnosis: mobility impairment due to R femur fx  Decision Making: Medium Complexity  PT Education: Goals;PT Role;Plan of Care;Precautions;Transfer Training;General Safety; Functional Mobility Training  Patient Education: Pt verbalized understanding however hoang's decreased short term memory and would benefit from continued skilled PT  REQUIRES PT FOLLOW UP: Yes  Activity Tolerance  Activity Tolerance: Patient limited by fatigue  Activity Tolerance: limited by TTWB L/NWB L, limited by short term memory requires re-orientation to task       Patient Diagnosis(es): There were no encounter diagnoses.      has a past medical history of Allergic, Anemia, Anxiety, Closed comminuted intra-articular fracture of distal femur, right, initial encounter ([facility] [address] Depression, Eye disorder, Fibromyalgia, GERD (gastroesophageal reflux disease), Hypertension, Obesity, KAIT (obstructive sleep apnea), Osteoarthritis, Osteoporosis, PONV (postoperative nausea and vomiting), and Prolonged emergence from general anesthesia. has a past surgical history that includes hip surgery; joint replacement (Right, 1993/2003); Wrist fracture surgery (Left); joint replacement (Left, 5/18/16); knee surgery; and Femur fracture surgery (Right, 2/7/2022). Restrictions  Position Activity Restriction  Other position/activity restrictions: TTWB R, up wit assistance  Vision/Hearing  Vision: Impaired  Vision Exceptions:  (interocular lenses - occassional wears readers / distance glasses when driving)  Hearing: Exceptions to [facility]  Hearing Exceptions: Hard of hearing/hearing concerns (pt endorses HL, has hearing aids at home but doesn't like using them. HL was not a barrier for this evaluation.)     Subjective  General  Chart Reviewed: Yes  Patient assessed for rehabilitation services?: Yes  Additional Pertinent Hx: 76 yo admitted 2/6/22 for fall/R femur fx. OR 2/7 FEMUR OPEN REDUCTION INTERNAL FIXATION RIGHT. Pmhx: HTN, fibromyalgia, KAIT, L TKR, R THR. Admitted to ARU 2/11/2022. Family / Caregiver Present: Yes (dtr enters during session)  Diagnosis: R femur fx  Subjective  Subjective: Pt seated in recliner upon approach and agreeable to PT/OT eval. Denies pain at rest. Reports 8/10 pain with R LE movement - RN aware.   Pain Screening  Patient Currently in Pain: Denies          Orientation  Orientation  Overall Orientation Status: Within Functional Limits  Social/Functional History  Social/Functional History  Lives With: Alone  Type of Home: House  Home Layout: Multi-level,Able to Live on Main level with bedroom/bathroom,Laundry in basement (2 story with a basement, rarely goes to second level)  Home Access:  (can enter through garage with 10 steps with rail; 10 porch steps with 2 steps without rail to enter in front; 5+5 with rail in back(L on first set of 5 , R on second set if 5); normally comes in the back enterance)  Bathroom Shower/Tub: Tub/Shower unit  Bathroom Toilet: Handicap height (RTS and handicap ht toilet)  Bathroom Equipment: Shower chair  Bathroom Accessibility:  (reports bathroom is small)  Home Equipment: 4 wheeled walker  ADL Assistance: [address]: [address] Responsibilities: Yes (does cooking/cleaning)  Ambulation Assistance: Independent (uses 4ww outside of house)  Transfer Assistance: Independent  Active : Yes  Mode of Transportation: Car  Occupation: Part time employment  Type of occupation: clinical psychologist; 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 Denies any other falls other than one leading to fx. Cognition   Cognition  Overall Cognitive Status: Exceptions  Arousal/Alertness: Appropriate responses to stimuli  Following Commands: Inconsistently follows commands; Follows one step commands with increased time; Follows one step commands with repetition  Attention Span: Attends with cues to redirect; Difficulty attending to directions  Memory: Decreased short term memory;Decreased recall of precautions  Safety Judgement: Decreased awareness of need for safety;Decreased awareness of need for assistance  Problem Solving: Assistance required to correct errors made;Decreased awareness of errors;Assistance required to identify errors made;Assistance required to implement solutions;Assistance required to generate solutions  Insights: Decreased awareness of deficits  Initiation: Requires cues for some  Sequencing: Requires cues for some    Objective          AROM RLE (degrees)  RLE AROM:  (hip/knee flexion to 90 degrees in sitting; ankle DF to neutral)  AROM LLE (degrees)  LLE AROM : WFL  Strength RLE  Comment: Seated MMTs: 3/5 hip flexion, 3+/5 knee extension, 4+/5 DF, 5/5 PF.  All MMts limited by R hip pain pain with exception of PF  Strength LLE  Comment: Seated MMTs: 4/5 hip flexion, 4+/5 knee extension, 5/5 DF/PF. Hip flexion limited by R hip pain.      Sensation  Overall Sensation Status: WFL (denies N/T)     Transfers  Sit to Stand: 2 Person Assistance (min x 2 from recliner up to RW)  Stand Pivot Transfers: 2 Person Assistance (rec>wc min x 2, wc>commode min+mod; all with RW- VC for safe handplacemnt on RW as pt likes to prop on R elbow and reach for handrail in BR, assist required for RW management)  Squat Pivot Transfers: 2 Person Assistance (mod x 2 wc>tub transfer bench, wc arm removed)  Comment: Pt R LE gaurded with therapist hand/fot under R LE for all transfers with pt demo'ing shrot periods of >TTWB however corrects in response to VC; VC for forward weight shift, hand placement, and positioning  Ambulation  Ambulation?: No     Balance  Sitting - Static: Fair;+ (SBA seated on commode/TTB)  Sitting - Dynamic: Fair;+ (SBA including for B weight shifting seated on commode)  Standing - Static: Fair;- (min x 1 with RW for ~ 2 x 30 second stand)  Standing - Dynamic: Poor (min-mod x 2 for stand pivot transfers with RW)        Plan   Plan  Times per week: 5-7 x per week 75 min  Safety Devices  Type of devices: Call light within reach,Chair alarm in place,Nurse notified,Left in chair         Goals  Short term goals  Time Frame for Short term goals: 10 days  Short term goal 1: Pt will complete sit<>supine with SBA  Short term goal 2: Pt will complete transfers with min A x 1  Short term goal 3: Pt will ambulate 10' with min A x 1 and wc follow  Short term goal 4: Pt will complete ascent/descent of 4 steps with UL handrail and min A x 1  Short term goal 5: Pt will complete 48' wc mobility with mod I  Long term goals  Time Frame for Long term goals : 3 weeks  Long term goal 1: Pt will complete bed mobility independently  Long term goal 2: Pt will complete transfers with od I and LRAD  Long term goal 3: Pt will ambulate 48' with mod I and LRAD  Long term goal 4: Pt will ascend/descend 5 + 5 stairs with UL hand rail and SBA  Long term goal 5: Pt will complete 150' wc mobility with independence  Patient Goals   Patient goals : return home, travel to [place] [address] to see her son who has terminal cancer       Therapy Time   Individual Concurrent Group Co-treatment   Time In [address] [place] [address]   Time Out 0850     0950   Minutes 15     60   Timed Code Treatment Minutes: [facility], [address] [place] [address]

## 2022-02-12 NOTE — H&P
Department of Physical Medicine & Rehabilitation  History & Physical      Patient Identification:  Hieu Seth  : 1945  Admit date: 2022   Attending provider: Sheldon Parsons MD        Primary care provider: Addy Butterfield MD     Chief Complaint: right leg pain     History of Present Illness/Hospital Course:  Patient is a 76 yo F with pmh HTN, KAIT, OA, Fibromyalgia, depression/anxiety who initially presented 2022 with right leg pain s/p fall due to slipping on ice. Found to have right midshaft comminuted displaced distal femur fracture. Underwent ORIF ( with Dr. Cornel Minor). Now TTWB RLE. Post-op course notbale for hypoxia, thought to be due to atelectasis and fluid overload. She was treated with IV lasix. Course further complicated by acute blood loss anemia requiring transfusion, MARISELA, and urinary retention. Now presents to ARU with impaired mobility and self-care below her baseline. Currently, patient reports mild right thigh pain. Worse with movement. Improves with rest and medication. Denies tingling/numbness.  She is motivated to start inpatient rehab program.        Prior Level of Function:  Independent for mobility, ADLs, and IADLs     Current Level of Function:  Deuce x 2 person for mobility  Up to maxA for ADLs     Pertinent Social History:  Support: Lives alone, daughter able to provide support  Home set-up: multi level home 10 steps to enter    Past Medical History:   Diagnosis Date    Allergic     rhinitis    Anemia     Anxiety     Closed comminuted intra-articular fracture of distal femur, right, initial encounter ([facility] [address] 2022    Depression     Eye disorder     Fibromyalgia     GERD (gastroesophageal reflux disease)     Hypertension     Obesity     KAIT (obstructive sleep apnea)     no CPAP - has dental appliance    Osteoarthritis     Osteoporosis     PONV (postoperative nausea and vomiting)     Prolonged emergence from general anesthesia        Past Surgical History:   Procedure Laterality Date    FEMUR FRACTURE SURGERY Right 2/7/2022    FEMUR OPEN REDUCTION INTERNAL FIXATION RIGHT performed by Bean Bright DO at [address] [place] JOINT REPLACEMENT Right 1993/2003    HIP    JOINT REPLACEMENT Left 5/18/16    left total knee replacement    KNEE SURGERY      WRIST FRACTURE SURGERY Left        Family History   Problem Relation Age of Onset    Cancer Father     Alcohol Abuse Mother     Cancer Mother     Osteoarthritis Mother     Dementia Mother     Cancer Maternal Aunt         breast       Social History     Socioeconomic History    Marital status:      Spouse name: Not on file    Number of children: Not on file    Years of education: Not on file    Highest education level: Not on file   Occupational History    Not on file   Tobacco Use    Smoking status: Never Smoker    Smokeless tobacco: Never Used   Vaping Use    Vaping Use: Never used   Substance and Sexual Activity    Alcohol use: No     Alcohol/week: 0.0 standard drinks    Drug use: No    Sexual activity: Not Currently   Other Topics Concern    Not on file   Social History Narrative    Not on file     Social Determinants of Health     Financial Resource Strain:     Difficulty of Paying Living Expenses: Not on file   Food Insecurity:     Worried About [address] in the Last Year: Not on file    [place]ey of Food in the Last Year: Not on file   Transportation Needs:     Lack of Transportation (Medical): Not on file    Lack of Transportation (Non-Medical):  Not on file   Physical Activity:     Days of Exercise per Week: Not on file    Minutes of Exercise per Session: Not on file   Stress:     Feeling of Stress : Not on file   Social Connections:     Frequency of Communication with Friends and Family: Not on file    Frequency of Social Gatherings with Friends and Family: Not on file    Attends Yazidism Services: Not on file   [employer] of [employer]s or Organizations: Not on file    Attends Club or Organization Meetings: Not on file    Marital Status: Not on file   Intimate Partner Violence:     Fear of Current or Ex-Partner: Not on file    Emotionally Abused: Not on file    Physically Abused: Not on file    Sexually Abused: Not on file   Housing Stability:     Unable to Pay for Housing in the Last Year: Not on file    Number of [place] in the Last Year: Not on file    Unstable Housing in the Last Year: Not on file       Allergies   Allergen Reactions    Pregabalin Other (See Comments)     Other reaction(s): Confusion    Other reaction(s): Confusion  Other reaction(s): not responsive, Other (See Comments)  Other reaction(s): Confusion         No current facility-administered medications for this encounter.          REVIEW OF SYSTEMS:   CONSTITUTIONAL: negative for fevers, chills, diaphoresis, appetite change, night sweats, unexpected weight change, fatigue  EYES: negative for blurred vision, eye discharge, visual disturbance and icterus  HEENT: negative for hearing loss, tinnitus, ear drainage, sinus pressure, nasal congestion, epistaxis and snoring  RESPIRATORY: Negative for hemoptysis, cough, sputum production  CARDIOVASCULAR: negative for chest pain, palpitations, exertional chest pressure/discomfort, syncope, edema  GASTROINTESTINAL: negative for nausea, vomiting, diarrhea, blood in stool, abdominal pain, constipation  GENITOURINARY: negative for frequency, dysuria, urinary incontinence, decreased urine volume, and hematuria  HEMATOLOGIC/LYMPHATIC: negative for easy bruising, bleeding and lymphadenopathy  ALLERGIC/IMMUNOLOGIC: negative for recurrent infections, angioedema, anaphylaxis and drug reactions  ENDOCRINE: negative for weight changes and diabetic symptoms including polyuria, polydipsia and polyphagia  MUSCULOSKELETAL: refer to HPI  NEUROLOGICAL: negative for headaches, slurred speech, unilateral weakness  PSYCHIATRIC/BEHAVIORAL: negative for hallucinations, behavioral problems, confusion and agitation. All pertinent positives are noted in the HPI. Physical Examination:  Vitals:   Patient Vitals for the past 24 hrs:    BP Temp Temp src Pulse Resp SpO2   02/11/22 1022 126/68 98.4 °F (36.9 °C) Oral 91 16 95 %   02/11/22 0604 (!) 145/75 98.4 °F (36.9 °C) Oral 82 16 96 %   02/11/22 0454 -- -- -- -- 18 92 %   02/11/22 0318 138/69 97.7 °F (36.5 °C) Oral 86 16 95 %   02/11/22 0158 -- -- -- -- 18 91 %   02/10/22 2253 (!) 147/82 97.8 °F (36.6 °C) Oral 82 16 91 %   02/10/22 2046 -- -- -- -- 16 92 %   02/10/22 1900 (!) 146/77 98.3 °F (36.8 °C) Oral 85 18 --       Const: Alert. WDWN. No distress  Eyes: Conjunctiva noninjected, no icterus noted; pupils equal, round, and reactive to light. HENT: Atraumatic, normocephalic; Oral mucosa moist  Neck: Trachea midline, neck supple. No thyromegaly noted. CV: Regular rate and rhythm, no murmur rub or gallop noted  Resp: Lungs diminished at bases bilaterally, no rales wheezes or rhonchi, no retractions. Respirations unlabored. GI: Soft, nontender, nondistended. Normal bowel sounds. No palpable masses. Skin: Right leg incisions dressed, c/d/i. Normal temperature and turgor. No rashes or breakdown noted. Ext: Post-op swelling right lower extremity. No varicosities. MSK: Decreased right hip and knee ROM. Otherwise no joint tenderness, erythema, warmth noted. Neuro: Alert, oriented, appropriate. No cranial nerve deficits appreciated. Sensation intact to light touch. Motor examination reveals strength 5/5 BUE and LLE, did not test RLE due to WB restriction but able to actively DF/PF at the ankle. No abnormalities with finger/nose noted. Reflexes diminished, symmetric.   Psych: Stable mood, normal judgement, normal affect        Lab Results   Component Value Date    WBC 6.6 02/11/2022    HGB 9.3 (L) 02/11/2022    HCT 28.4 (L) 02/11/2022    MCV 88.5 02/11/2022     02/11/2022     Lab Results nursing facility. All of the goals listed below were reviewed with the patient and he/she is in agreement. I have compared the patients medical and functional status at the time of the preadmission screening and the same on this date, and there are no significant changes. By signing this document, I acknowledge that I have personally performed a full physical examination on this patient within 24 hours of admission to this inpatient rehabilitation facility and have determined the patient to be able to tolerate the above course of treatment at an intensive level for a reasonable period of time. I will be completing a detailed individualized  Plan of Care for this patient by day four of the patients stay based upon the Preadmission Screen, this Post-Admission Evaluation, and the therapy evaluations. Barriers: TTWB RLE, decreased balance, endurance, medical comorbidities  Services Required: PT, OT, SLP  Goals: Alis for mobility, ADLs, cognition  Prognosis: Good  Anticipated Dispo: home with daughter support  ELOS: 2 weeks    Rehabilitation Diagnosis:   Orthopedic, 8.2, Femur Shaft Fracture      Assessment and Plan:    Impairments: TTWB RLE, decreased balance, endurance    Right midshaft femur fracture   -s/p ORIF (2/7 with Dr. Nicolette Loredo)  -Wound care  -TTWB  -PT/OT for functional mobility, balance, ADLs    Acute blood loss anemia   -Post-surgical.   -Monitor Hgb, transfuse prn <7.      Hypoxia  -Thought to be due to atelectasis and fluid overload  -Supplemental O2, wean as tolerated  -IS    Fluid overload  -s/p IV lasix x 1 on acute   -Monitor need for further diuresis    MARISELA  -Avoid nephrotoxins, renally dose meds  -Monitor renal function    Anxiety/depression  -bupropion, duloxetine, escitalopram    Fibromyalgia  -Duloxetine, gabapentin    KAIT  -CPAP    Bladder  -- Urinary retention  -Christensen in place, consider void trial ~1 week  -flomax    Bowel   -High risk constipation   -senna+colace BID, PRN miralax, MoM, and bisacodyl supp. Safety   -fall precautions    Pain control  -Percocet prn, gabapentin    PPx  -DVT: lovenox x 20 days per Ortho  -GI: pantoprazole    FULL CODE    Rasta Finch MD 2/11/2022, 7:07 PM

## 2022-02-12 NOTE — PROGRESS NOTES
Patient's son just passed. Patient and daughter obviously tearful and upset.  paged again and will come see them at the bedside in about 40 minutes. Offered condolences and reinforced to not hesitate to call staff with any needs. Daughter given permission to stay with patient overnight. Oncoming staff to continue to monitor.

## 2022-02-12 NOTE — PLAN OF CARE
ARU PATIENT TREATMENT PLAN  The [address], [address] [place] [address] Ave  [phone]    Yg Heller    : 1945  Acct #: [de-identified]  MRN: [id number]  PHYSICIAN:  Murphy Simpson DO  Primary Problem    Patient Active Problem List   Diagnosis    DDD (degenerative disc disease), lumbar    Cervical spondylosis    Neck pain    Back pain    Lumbar stenosis    Discogenic low back pain    Myofascial pain    Primary localized osteoarthrosis, lower leg    Hip pain    Ischial bursitis    Fe deficiency anemia    Knee pain, bilateral    Obesity (BMI 30-39. 9)    Fibromyalgia    Hypertension    Left TKR    Chronic blood loss anemia    Unspecified adverse effect of other drug, medicinal and biological substance(995.29)    Painful orthopaedic hardware (HCC)    Plantar fasciitis, bilateral    Closed comminuted intra-articular fracture of distal femur, right, initial encounter ([facility] [address]    Closed displaced comminuted fracture of shaft of right femur ([facility])     Rehabilitation Diagnosis:  Orthopedic, 8.2, Femur Shaft Fracture  ADMIT DATE:2022    Patient Goals: to live at home independently, to b able to travel to [place] to see her son in hospice   Admitting Impairments: Decreased functional mobility ; Decreased ADL status; Decreased strength;Decreased ROM; Decreased safe awareness;Decreased cognition;Decreased endurance;Decreased balance;Decreased high-level IADLs;Decreased coordination;Decreased posture  Activity Restrictions: TTWB R LE  Participation Limitations: fatigue, short term memory    CARE PLAN   NURSING:  Yg Heller while on this unit will:  [x] Be continent of bowel and bladder     [x] Have an adequate number of bowel movements  [x] Urinate with no urinary retention >300ml in bladder  [x] Complete bladder protocol with valdez removal  [] Maintain O2 SATs at ___%  [x] Have pain managed while on ARU       [] Be pain free by discharge   [x] Have no skin breakdown while on ARU  [] Have improved skin integrity via wound measurements  [x] Have no signs/symptoms of infection at the wound site  [x] Be free from injury during hospitalization   [] Complete education with patient/family with understanding demonstrated for:  [] Adjustment   [] Other:     Nursing Interventions will include:  [x] bowel/bladder training   [x] education for medical assistive devices   [x] medication education   [] O2 saturation management   [x] energy conservation   [x] stress management techniques   [x] fall prevention   [x] alarms protocol   [x] seating and positioning   [x] skin/wound care   [x] pressure relief instruction   [x] dressing changes     [x] infection protection   [x] DVT prophylaxis  [] assistance with in room safety with transfers to bed, toilet, wheelchair, shower   [x] bathroom activities and hygiene  [] Other:    Patient/Caregiver Education for:  [x] Disease/sustained injury/management     [x] Medication Use  [] Surgical intervention  [x] Safety  [x] Body mechanics and or joint protection  [x] Health maintenance     [] Other:     PHYSICAL THERAPY:  Goals:                  Short term goals  Time Frame for Short term goals: 10 days  Short term goal 1: Pt will complete sit<>supine with SBA  Short term goal 2: Pt will complete transfers with min A x 1  Short term goal 3: Pt will ambulate 10' with min A x 1 and wc follow  Short term goal 4: Pt will complete ascent/descent of 4 steps with UL handrail and min A x 1  Short term goal 5: Pt will complete 48' wc mobility with mod I            Long term goals  Time Frame for Long term goals : 3 weeks  Long term goal 1: Pt will complete bed mobility independently  Long term goal 2: Pt will complete transfers with od I and LRAD  Long term goal 3: Pt will ambulate 48' with mod I and LRAD  Long term goal 4: Pt will ascend/descend 5 + 5 stairs with UL hand rail and SBA  Long term goal 5: Pt will complete 150' wc mobility with independence These goals were reviewed with this patient at the time of assessment and Alfredo Austin is in agreement. Plan of Care: Pt to be seen 5 out of 7 days per week per ARU protocol (75 minutes with PT)                   OCCUPATIONAL THERAPY:  Goals:             Short term goals  Time Frame for Short term goals: 10 days  Short term goal 1: Pt will complete toilet transfer w/ Min Ax1  Short term goal 2: Pt will complete LE dressing assessment  Short term goal 3: Pt will complete shower transfer w/ Alec x1  Short term goal 4: Pt will complete UE dressing w/ Min Ax1 :  Long term goals  Time Frame for Long term goals : 3 weeks  Long term goal 1: Pt will complete toilet transfer/ toileting w/ Mod I  Long term goal 2: Pt will complete UE/LE dressing w/ Mod I and use of AE prn  Long term goal 3: Pt will complete tub transfer w/ SBA  Long term goal 4: Pt will complete bathing w/ Spvn :  These goals were reviewed with this patient at the time of assessment and Alfredo Austin is in agreement  Plan of Care:  Pt to be seen 5 out of 7 days per week per ARU protocol (75 minutes with OT)    SPEECH THERAPY:  Goals:           Short-term Goals  Timeframe for Short-term Goals: 2-3 weeks  Goal 1: Patient will participate in ongoing assessment of cognitive-linguistic skills as they relate to functional IADLs.               Timeframe for Short-term Goals: 2-3 weeks  Plan of Care:  Pt to be seen 5 out of 7 days per week per ARU protocol (30 minutes with SLP)    Therapy Treatments will include:  [x]  therapeutic exercises    [x]  gait training     []  neuromuscular re-ed      [x]  transfer training  [] community reintegration     [x] bed mobility    [x]  w/c mobility and training  []  self care    []home mgmt    [x]  cognitive training []  energy conservation   []  dysphagia tx    []  speech/language/communication therapy   []  group therapy    [x]  patient/family education    [] Other:    CASE MANAGEMENT:  Goals: Assist patient/family with discharge planning, patient/family counseling, and coordination with insurance during ARU stay.     Admission Period/Goal QM SCORES  QM Admit/Goal Score   Eating CARE Score: 5 / Discharge Goal: Independent   Oral Hygiene CARE Score: 7 / Discharge Goal: Independent   Shower/Bathing CARE Score: 1 / Discharge Goal: Supervision or touching assistance   UB Dressing CARE Score: 2 / Discharge Goal: Independent   LB Dressing CARE Score: 1 / Discharge Goal: Independent   Putting on/off Footwear CARE Score: 1 / Discharge Goal: Independent   Toileting Hygiene CARE Score: 1 / Discharge Goal: Independent   Bladder Continence Bladder Continence: Not applicable (valdez)    Bowel Continence      Toilet Transfers CARE Score: 1 / Discharge Goal: Independent   Shower/Bathe Self  CARE Score: 1 / Discharge Goal: Supervision or touching assistance   Rolling Left and Right CARE Score: 6 / Discharge Goal: Independent   Sit to Lying CARE Score: 4 / Discharge Goal: Independent   Lying to Sitting on Bedside   / Discharge Goal: Independent   Sit to Stand CARE Score: 1 / Discharge Goal: Independent   Chair/Bed to Chair Transfer CARE Score: 1 / Discharge Goal: Independent   Car Transfers CARE Score: 88 / Discharge Goal: Independent   Walk 10 Feet CARE Score: 88 / Discharge Goal: Independent   Walk 50 Feet with Two Turns CARE Score: 88 / Discharge Goal: Independent   Walk 150 Feet CARE Score: 88 / Discharge Goal: Not Applicable   Walk 10 Feet on Uneven Surfaces CARE Score: 88 / Discharge Goal: Supervision or touching assistance   1 Step (Curb) CARE Score: 88 / Discharge Goal: Supervision or touching assistance   4 Steps CARE Score: 88 / Discharge Goal: Supervision or touching assistance   12 Steps CARE Score: 88 / Discharge Goal: Not Applicable   Picking up Object from Floor CARE Score: 88 / Discharge Goal: Independent   Wheel 50 Feet with 2 Turns CARE Score: 1 / Discharge Goal: Independent   Type         [x] Manual        [] Motorized        [] N/A   Wheel 150 Feet CARE Score: 1 / Discharge Goal: Independent   Type         [x] Manual        [] Motorized        [] N/A        Silvia Berman will be seen a minimum of 3 hours of therapy per day, a minimum of 5 out of 7 days per week (please see above for specific treatment plan per PT/OT/SLP). [] In this rare instance due to the nature of this patient's medical involvement, this patient will be seen 15 hours per week (900 minutes within a 7day period). In addition, dietician/nutritionist may monitor calorie count as well as intake and collaboratively work with SLP on dietary upgrades. Neuropsychology/Psychology may evaluate and provide necessary support. Medical issues being managed closely and that require 24hour availability of a physician:  [] Swallowing Precautions  [x] Bowel/Bladder Fx  [x] Weight bearing precautions  [x] Wound Care    [x] Pain Mgmt   [x] Infection Protection  [x] DVT Prophylaxis   [x] Fall Precautions  [x] Fluid/Electrolyte/Nutrition Balance  [] Voice Protection   [] Respiratory  [] Other:    Medical Prognosis: [x] Good  [] Fair    [] Guarded   Total expected IRF days 17  Anticipated discharge destination: home  [] Home Independently   [x] Home Modified Independent  [x] Home with supervision    []SNF     [] Other                                           Physician anticipated functional outcomes:Alis for mobility, Alis-Deuce for ADLs, improve functional mobility, balance, edurance, cognition     IPOC brief synthesis: Patient is a 78 yo F with pmh HTN, KAIT, OA, Fibromyalgia, depression/anxiety who initially presented 2/6/2022 with right leg pain s/p fall due to slipping on ice. Found to have right midshaft comminuted displaced distal femur fracture. Underwent ORIF (2/7 with Dr. Armin Gomez). Now TTWB RLE. Post-op course notbale for hypoxia, thought to be due to atelectasis and fluid overload. She was treated with IV lasix.  Course further complicated by acute blood loss anemia requiring transfusion, MARISELA, and urinary retention. Now presents to ARU with impaired mobility and self-care below her baseline. She requires comprehensive inpatient rehab program in order to return to community setting. This initial ARU patient treatment plan of care, together with the IPOC & the Education plan, form the foundation for the patient's plan of care. Weekly patient care conferences are held to evaluate progress towards the initial treatment plan & goals. I have reviewed this initial plan of care and agree with its contents:    Title   Name    Date    Time    Physician: Adis Gonzales.  Jai Bagley MD 2/12/2022, 10:38 PM  Mikle Gitelman, D.O. M.P.H  PM&R  2/13/2022  12:59 PM        Case Mgmt: Mag DOMINGUEZ, RN, CM 2/13/22 @ [phone]: Chloé Pearson, OTD, OTR/L 2/12/22 @     PT: Manoj Batres PT, DPT [id number] 2/12 1529    RN: Levi Marie RN 2/12/22 1:20 AM     ST: Chantal Nguyễn, CCC/SLP, 2/12/22 @ [phone]    :  Devorah Toure, PT, DPT 2/12/2022 @ [phone]    Other:

## 2022-02-12 NOTE — PLAN OF CARE
Problem: Pain:  Goal: Control of acute pain  Description: Control of acute pain  Outcome: Met This Shift  Note: Patient's pain is controlled with current regime      Problem: Skin Integrity:  Goal: Will show no infection signs and symptoms  Description: Will show no infection signs and symptoms  Outcome: Ongoing  Note: Surgical site shows no s/sx of infection at this time. Will continue to assess q shift and prn and provide interventions as needed. Problem: Falls - Risk of:  Goal: Will remain free from falls  Description: Will remain free from falls  Note: Pt remains free of falls thus far this shift. All fall precautions in place and functioning properly.

## 2022-02-13 PROCEDURE — 6370000000 HC RX 637 (ALT 250 FOR IP): Performed by: PHYSICAL MEDICINE & REHABILITATION

## 2022-02-13 PROCEDURE — 94761 N-INVAS EAR/PLS OXIMETRY MLT: CPT

## 2022-02-13 PROCEDURE — 2700000000 HC OXYGEN THERAPY PER DAY

## 2022-02-13 PROCEDURE — 99232 SBSQ HOSP IP/OBS MODERATE 35: CPT | Performed by: PHYSICAL MEDICINE & REHABILITATION

## 2022-02-13 PROCEDURE — 6360000002 HC RX W HCPCS: Performed by: PHYSICAL MEDICINE & REHABILITATION

## 2022-02-13 PROCEDURE — 1280000000 HC REHAB R&B

## 2022-02-13 RX ORDER — GINSENG 100 MG
CAPSULE ORAL 3 TIMES DAILY
Status: DISCONTINUED | OUTPATIENT
Start: 2022-02-13 | End: 2022-02-26 | Stop reason: HOSPADM

## 2022-02-13 RX ORDER — GABAPENTIN 600 MG/1
600 TABLET ORAL 3 TIMES DAILY
Status: DISCONTINUED | OUTPATIENT
Start: 2022-02-13 | End: 2022-02-26 | Stop reason: HOSPADM

## 2022-02-13 RX ADMIN — PANTOPRAZOLE SODIUM 40 MG: 40 TABLET, DELAYED RELEASE ORAL at 06:38

## 2022-02-13 RX ADMIN — GABAPENTIN 600 MG: 600 TABLET, FILM COATED ORAL at 13:28

## 2022-02-13 RX ADMIN — ESCITALOPRAM OXALATE 20 MG: 10 TABLET ORAL at 09:43

## 2022-02-13 RX ADMIN — OXYCODONE HYDROCHLORIDE AND ACETAMINOPHEN 2 TABLET: 5; 325 TABLET ORAL at 17:19

## 2022-02-13 RX ADMIN — GABAPENTIN 600 MG: 600 TABLET, FILM COATED ORAL at 09:44

## 2022-02-13 RX ADMIN — Medication: at 13:29

## 2022-02-13 RX ADMIN — DULOXETINE HYDROCHLORIDE 60 MG: 60 CAPSULE, DELAYED RELEASE ORAL at 09:43

## 2022-02-13 RX ADMIN — ENOXAPARIN SODIUM 40 MG: 100 INJECTION SUBCUTANEOUS at 09:45

## 2022-02-13 RX ADMIN — Medication: at 20:41

## 2022-02-13 RX ADMIN — BUPROPION HYDROCHLORIDE 150 MG: 150 TABLET, EXTENDED RELEASE ORAL at 09:44

## 2022-02-13 RX ADMIN — GABAPENTIN 600 MG: 600 TABLET, FILM COATED ORAL at 20:41

## 2022-02-13 RX ADMIN — TAMSULOSIN HYDROCHLORIDE 0.4 MG: 0.4 CAPSULE ORAL at 09:43

## 2022-02-13 ASSESSMENT — PAIN SCALES - GENERAL
PAINLEVEL_OUTOF10: 6
PAINLEVEL_OUTOF10: 8
PAINLEVEL_OUTOF10: 0
PAINLEVEL_OUTOF10: 8

## 2022-02-13 ASSESSMENT — PAIN DESCRIPTION - ONSET: ONSET: ON-GOING

## 2022-02-13 ASSESSMENT — PAIN DESCRIPTION - ORIENTATION
ORIENTATION: RIGHT;LOWER
ORIENTATION: RIGHT

## 2022-02-13 ASSESSMENT — PAIN DESCRIPTION - FREQUENCY
FREQUENCY: INTERMITTENT
FREQUENCY: CONTINUOUS

## 2022-02-13 ASSESSMENT — PAIN DESCRIPTION - LOCATION
LOCATION: LEG
LOCATION: BACK;HIP;ANKLE

## 2022-02-13 ASSESSMENT — PAIN DESCRIPTION - PAIN TYPE
TYPE: SURGICAL PAIN;ACUTE PAIN
TYPE: SURGICAL PAIN;ACUTE PAIN;CHRONIC PAIN

## 2022-02-13 ASSESSMENT — PAIN DESCRIPTION - DESCRIPTORS
DESCRIPTORS: ACHING
DESCRIPTORS: ACHING;SORE;THROBBING

## 2022-02-13 NOTE — FLOWSHEET NOTE
Returned for follow up visit. Provided active listening around stories of patient's son and potential avenues for celebration of life.        Barbra Sheppard  Student

## 2022-02-13 NOTE — PROGRESS NOTES
Pt assessment and vitals completed. Medications administered as ordered. Pt has no complaints at this time, resting  in bed. Daughter at bedside. Will continue to monitor.

## 2022-02-13 NOTE — PLAN OF CARE
Problem: Falls - Risk of:  Goal: Will remain free from falls  Description: Will remain free from falls  2/12/2022 1945 by Tatyana Ni RN  Outcome: Ongoing  Note: Client remains free from falls, bed/chair alarm in place, door open, encouraged to use call light for needs, call light is within reach, bed locked in lowest position,  Will continue to monitor. Problem: Mobility - Impaired:  Goal: Mobility will improve  Description: Mobility will improve  Outcome: Ongoing  Note: Mobility to improve through active participation with therapy      Problem: Pain:  Goal: Control of acute pain  Description: Control of acute pain  2/12/2022 1945 by Tatyana Ni RN  Outcome: Ongoing  Note: Pt voices pain needs appropriately, pain is assessed during shift. Problem: Skin Integrity:  Goal: Will show no infection signs and symptoms  Description: Will show no infection signs and symptoms  2/12/2022 1945 by Tatyana Ni RN  Outcome: Ongoing  Note: Surgical site observed for signs/symptoms of infection. Vitals performed routinely, labs observed.  Will monitor pt while on ARU

## 2022-02-13 NOTE — PROGRESS NOTES
Nurse Brandon called for on-call . Patient's son passed this evening, her daughter is onsite w/patient. Proceeded onsite. Spent time w/patient and daughter. I will be onsite Sunday and will visit patient again.

## 2022-02-13 NOTE — PROGRESS NOTES
Department of Physical Medicine & Rehabilitation  Progress Note    Patient Identification:  Jh Flynn  [id number]  : 1945  Admit date: 2022    Chief Complaint: Closed comminuted intra-articular fracture of distal femur, right, initial encounter ([facility] [address]    Subjective:   No acute events overnight. Patient seen this am.   Her son passed away in [place] last night from [facility]. She is upset today and daughter is present to help her emotionally. Her pain is improving and she is willing to continue to participate in therapy. She would like to talk to the CRNA about the anaesthetic involved in her care. Also the daughter and patient would like to get a plan set up this week for her future care/physician appointments. ROS: No f/c, n/v, cp     Objective:  Patient Vitals for the past 24 hrs:   BP Temp Temp src Pulse Resp SpO2   22 0937 (!) 145/68 98.1 °F (36.7 °C) Oral 80 18 96 %   22 0640 -- -- -- -- -- 97 %   22 0635 -- -- -- -- -- (!) 89 %   22 0340 -- -- -- -- -- 91 %   22 2211 112/76 98.6 °F (37 °C) Oral 90 16 93 %   22 1407 -- -- -- -- -- 97 %     Const: Alert. No distress, pleasant. HEENT: Normocephalic, atraumatic. Normal sclera/conjunctiva. MMM. CV: Regular rate and rhythm. Resp: No respiratory distress. Lungs CTAB. Abd: Soft, nontender, nondistended, NABS+   Ext: + edema. Neuro: Alert, oriented, appropriately interactive. Psych: Cooperative, appropriate mood and affect    Laboratory data: Available via EMR. Last 24 hour lab  No results found for this or any previous visit (from the past 24 hour(s)).     Therapy progress:  PT  Position Activity Restriction  Other position/activity restrictions: TTWB R, up wit assistance  Objective     Sit to Stand: 2 Person Assistance (min x 2 from recliner up to RW)     OT  PT Equipment Recommendations  Equipment Needed: Yes  Mobility Devices: Micaela Estrin: Rolling  Other: will likely benefit from wc - continue to assess pending progress  Toilet - Technique: Stand pivot  Equipment Used: Raised toilet seat with rails  Assessment        SLP          Body mass index is 31.63 kg/m².     Assessment and Plan:  Right midshaft femur fracture   -s/p ORIF (2/7 with Dr. Jojo Haywood)  -Wound care  -TTWB  -PT/OT for functional mobility, balance, ADLs     Acute blood loss anemia   -Post-surgical.   -Monitor Hgb, transfuse prn <7.      Hypoxia  -Thought to be due to atelectasis and fluid overload  -Supplemental O2, wean as tolerated  -IS     Fluid overload  -s/p IV lasix x 1 on acute   -Monitor need for further diuresis     MARISELA  -Avoid nephrotoxins, renally dose meds  -Monitor renal function     Anxiety/depression  -bupropion, duloxetine, escitalopram     Fibromyalgia  -Duloxetine, gabapentin     KAIT  -CPAP     Bladder  -- Urinary retention  -Christensen in place, consider void trial ~1 week  -flomax     Bowel   -High risk constipation   -senna+colace BID, PRN miralax, MoM, and bisacodyl supp.     Safety   -fall precautions     Pain control  -Percocet prn, gabapentin     PPx  -DVT: lovenox x 20 days per Ortho  -GI: pantoprazole    Rehab Progress: Improving  Anticipated Dispo: home  Services/DME: BETY  ELOS: BETY Cunningham D.O. M.P.H  PM&R  2/13/2022  11:55 AM

## 2022-02-14 LAB
ANION GAP SERPL CALCULATED.3IONS-SCNC: 8 MMOL/L (ref 3–16)
BASOPHILS ABSOLUTE: 0 K/UL (ref 0–0.2)
BASOPHILS RELATIVE PERCENT: 0 %
BUN BLDV-MCNC: 22 MG/DL (ref 7–20)
CALCIUM SERPL-MCNC: 8.7 MG/DL (ref 8.3–10.6)
CHLORIDE BLD-SCNC: 104 MMOL/L (ref 99–110)
CO2: 30 MMOL/L (ref 21–32)
CREAT SERPL-MCNC: 0.8 MG/DL (ref 0.6–1.2)
EOSINOPHILS ABSOLUTE: 0.3 K/UL (ref 0–0.6)
EOSINOPHILS RELATIVE PERCENT: 5 %
GFR AFRICAN AMERICAN: >60
GFR NON-AFRICAN AMERICAN: >60
GLUCOSE BLD-MCNC: 90 MG/DL (ref 70–99)
HCT VFR BLD CALC: 26.1 % (ref 36–48)
HEMOGLOBIN: 8.7 G/DL (ref 12–16)
LYMPHOCYTES ABSOLUTE: 1.4 K/UL (ref 1–5.1)
LYMPHOCYTES RELATIVE PERCENT: 24 %
MCH RBC QN AUTO: 29.5 PG (ref 26–34)
MCHC RBC AUTO-ENTMCNC: 33.5 G/DL (ref 31–36)
MCV RBC AUTO: 88.2 FL (ref 80–100)
MONOCYTES ABSOLUTE: 0.6 K/UL (ref 0–1.3)
MONOCYTES RELATIVE PERCENT: 11 %
NEUTROPHILS ABSOLUTE: 3.4 K/UL (ref 1.7–7.7)
NEUTROPHILS RELATIVE PERCENT: 60 %
PDW BLD-RTO: 14.8 % (ref 12.4–15.4)
PLATELET # BLD: 224 K/UL (ref 135–450)
PLATELET SLIDE REVIEW: ADEQUATE
PMV BLD AUTO: 9.8 FL (ref 5–10.5)
POLYCHROMASIA: ABNORMAL
POTASSIUM REFLEX MAGNESIUM: 4.2 MMOL/L (ref 3.5–5.1)
RBC # BLD: 2.96 M/UL (ref 4–5.2)
SLIDE REVIEW: ABNORMAL
SODIUM BLD-SCNC: 142 MMOL/L (ref 136–145)
WBC # BLD: 5.7 K/UL (ref 4–11)

## 2022-02-14 PROCEDURE — 85025 COMPLETE CBC W/AUTO DIFF WBC: CPT

## 2022-02-14 PROCEDURE — 6370000000 HC RX 637 (ALT 250 FOR IP): Performed by: PHYSICAL MEDICINE & REHABILITATION

## 2022-02-14 PROCEDURE — 1280000000 HC REHAB R&B

## 2022-02-14 PROCEDURE — 97130 THER IVNTJ EA ADDL 15 MIN: CPT

## 2022-02-14 PROCEDURE — 97530 THERAPEUTIC ACTIVITIES: CPT

## 2022-02-14 PROCEDURE — 36415 COLL VENOUS BLD VENIPUNCTURE: CPT

## 2022-02-14 PROCEDURE — 6360000002 HC RX W HCPCS: Performed by: PHYSICAL MEDICINE & REHABILITATION

## 2022-02-14 PROCEDURE — 97116 GAIT TRAINING THERAPY: CPT

## 2022-02-14 PROCEDURE — 97112 NEUROMUSCULAR REEDUCATION: CPT

## 2022-02-14 PROCEDURE — 97129 THER IVNTJ 1ST 15 MIN: CPT

## 2022-02-14 PROCEDURE — 99232 SBSQ HOSP IP/OBS MODERATE 35: CPT | Performed by: PHYSICAL MEDICINE & REHABILITATION

## 2022-02-14 PROCEDURE — 80048 BASIC METABOLIC PNL TOTAL CA: CPT

## 2022-02-14 RX ADMIN — GABAPENTIN 600 MG: 600 TABLET, FILM COATED ORAL at 08:03

## 2022-02-14 RX ADMIN — ENOXAPARIN SODIUM 40 MG: 100 INJECTION SUBCUTANEOUS at 08:02

## 2022-02-14 RX ADMIN — OXYCODONE HYDROCHLORIDE AND ACETAMINOPHEN 1 TABLET: 5; 325 TABLET ORAL at 08:03

## 2022-02-14 RX ADMIN — OXYCODONE HYDROCHLORIDE AND ACETAMINOPHEN 1 TABLET: 5; 325 TABLET ORAL at 14:14

## 2022-02-14 RX ADMIN — BUPROPION HYDROCHLORIDE 150 MG: 150 TABLET, EXTENDED RELEASE ORAL at 08:12

## 2022-02-14 RX ADMIN — OXYCODONE HYDROCHLORIDE AND ACETAMINOPHEN 2 TABLET: 5; 325 TABLET ORAL at 21:56

## 2022-02-14 RX ADMIN — Medication: at 21:56

## 2022-02-14 RX ADMIN — DULOXETINE HYDROCHLORIDE 60 MG: 60 CAPSULE, DELAYED RELEASE ORAL at 08:04

## 2022-02-14 RX ADMIN — ESCITALOPRAM OXALATE 20 MG: 10 TABLET ORAL at 08:04

## 2022-02-14 RX ADMIN — Medication: at 13:58

## 2022-02-14 RX ADMIN — GABAPENTIN 600 MG: 600 TABLET, FILM COATED ORAL at 13:58

## 2022-02-14 RX ADMIN — ACETAMINOPHEN 650 MG: 325 TABLET ORAL at 10:06

## 2022-02-14 RX ADMIN — PANTOPRAZOLE SODIUM 40 MG: 40 TABLET, DELAYED RELEASE ORAL at 06:02

## 2022-02-14 RX ADMIN — Medication: at 08:08

## 2022-02-14 RX ADMIN — TAMSULOSIN HYDROCHLORIDE 0.4 MG: 0.4 CAPSULE ORAL at 08:03

## 2022-02-14 RX ADMIN — GABAPENTIN 600 MG: 600 TABLET, FILM COATED ORAL at 21:57

## 2022-02-14 ASSESSMENT — PAIN DESCRIPTION - LOCATION
LOCATION: BACK;HIP;LEG
LOCATION: BACK
LOCATION: BACK

## 2022-02-14 ASSESSMENT — PAIN DESCRIPTION - DESCRIPTORS
DESCRIPTORS: ACHING;SORE
DESCRIPTORS: ACHING;SORE

## 2022-02-14 ASSESSMENT — PAIN SCALES - GENERAL
PAINLEVEL_OUTOF10: 0
PAINLEVEL_OUTOF10: 3
PAINLEVEL_OUTOF10: 7
PAINLEVEL_OUTOF10: 10
PAINLEVEL_OUTOF10: 8
PAINLEVEL_OUTOF10: 4
PAINLEVEL_OUTOF10: 6

## 2022-02-14 ASSESSMENT — PAIN DESCRIPTION - ORIENTATION: ORIENTATION: RIGHT

## 2022-02-14 ASSESSMENT — PAIN DESCRIPTION - PAIN TYPE
TYPE: CHRONIC PAIN;SURGICAL PAIN
TYPE: SURGICAL PAIN;CHRONIC PAIN
TYPE: CHRONIC PAIN
TYPE: SURGICAL PAIN;CHRONIC PAIN

## 2022-02-14 ASSESSMENT — PAIN DESCRIPTION - ONSET
ONSET: ON-GOING
ONSET: ON-GOING

## 2022-02-14 ASSESSMENT — PAIN - FUNCTIONAL ASSESSMENT: PAIN_FUNCTIONAL_ASSESSMENT: PREVENTS OR INTERFERES SOME ACTIVE ACTIVITIES AND ADLS

## 2022-02-14 ASSESSMENT — PAIN DESCRIPTION - FREQUENCY
FREQUENCY: CONTINUOUS

## 2022-02-14 ASSESSMENT — PAIN DESCRIPTION - PROGRESSION: CLINICAL_PROGRESSION: GRADUALLY WORSENING

## 2022-02-14 NOTE — PROGRESS NOTES
Department of Physical Medicine & Rehabilitation  Progress Note    Patient Identification:  Johan Saldivar  [id number]  : 1945  Admit date: 2022    Chief Complaint: Closed comminuted intra-articular fracture of distal femur, right, initial encounter ([address]    Subjective:   Doing well overnight. She is having some lower back spasticity today post therapy but usually improves with heat pads. No other complaints. She is worried that her surgery will fail if she moves much in bed. Plan to continue therapy today. ROS: No f/c, n/v, cp     Objective:  Patient Vitals for the past 24 hrs:   BP Temp Temp src Pulse Resp SpO2 Weight   22 0745 (!) 143/78 98 °F (36.7 °C) Oral 76 18 96 % --   22 0600 -- -- -- -- -- -- 224 lb 13.9 oz (102 kg)   22 2030 117/75 99.3 °F (37.4 °C) Oral 84 18 94 % --     Const: Alert. No distress, pleasant. HEENT: Normocephalic, atraumatic. Normal sclera/conjunctiva. MMM. CV: Regular rate and rhythm. Resp: No respiratory distress. Lungs CTAB. Abd: Soft, nontender, nondistended, NABS+   Ext: + edema. Neuro: Alert, oriented, appropriately interactive. Psych: Cooperative, appropriate mood and affect    Laboratory data: Available via EMR. Last 24 hour lab  Recent Results (from the past 24 hour(s))   Basic Metabolic Panel w/ Reflex to MG    Collection Time: 22  6:27 AM   Result Value Ref Range    Sodium 142 136 - 145 mmol/L    Potassium reflex Magnesium 4.2 3.5 - 5.1 mmol/L    Chloride 104 99 - 110 mmol/L    CO2 30 21 - 32 mmol/L    Anion Gap 8 3 - 16    Glucose 90 70 - 99 mg/dL    BUN 22 (H) 7 - 20 mg/dL    CREATININE 0.8 0.6 - 1.2 mg/dL    GFR Non-African American >60 >60    GFR African American >60 >60    Calcium 8.7 8.3 - 10.6 mg/dL       Therapy progress:  PT  Position Activity Restriction  Other position/activity restrictions: TTWB R, up wit assistance  Objective     Sit to Stand:  Moderate Assistance  Stand to sit: Moderate Assistance  Device: [address]: Minimal assistance  Distance: x5ft  OT  PT Equipment Recommendations  Equipment Needed: Yes  Mobility Devices: Hazel Benton: Rolling  Other: will likely benefit from wc - continue to assess pending progress  Toilet - Technique: Stand pivot  Equipment Used: Raised toilet seat with rails  Assessment        SLP          Body mass index is 32.27 kg/m². Assessment and Plan:  Right midshaft femur fracture   -s/p ORIF (2/7 with Dr. Louann Maravilla)  -Wound care  -TTWB  -PT/OT for functional mobility, balance, ADLs     Acute blood loss anemia   -Post-surgical.   -Monitor Hgb, transfuse prn <7.      Hypoxia  -Thought to be due to atelectasis and fluid overload  -Supplemental O2, wean as tolerated  -IS     Fluid overload  -s/p IV lasix x 1 on acute   -improved     MARISELA  -Avoid nephrotoxins, renally dose meds  -Monitor renal function     Anxiety/depression  -bupropion, duloxetine, escitalopram     Fibromyalgia  -Duloxetine, gabapentin     KAIT  -CPAP     Bladder  -- Urinary retention  -Christensen in place, consider void trial ~1 week  -flomax     Bowel   -High risk constipation   -senna+colace BID, PRN miralax, MoM, and bisacodyl supp.   -improved     Safety   -fall precautions     Pain control  -Percocet prn, gabapentin     PPx  -DVT: lovenox x 20 days per Ortho  -GI: pantoprazole    Rehab Progress: Improving  Anticipated Dispo: home  Services/DME:   ELOS: BETY Ospina D.O. M.P.H  PM&R  2/14/2022  11:37 AM

## 2022-02-14 NOTE — PLAN OF CARE
Problem: Falls - Risk of:  Goal: Will remain free from falls  Description: Will remain free from falls  Outcome: Ongoing  Goal: Absence of physical injury  Description: Absence of physical injury  Outcome: Ongoing     Problem: Mobility - Impaired:  Goal: Mobility will improve  Description: Mobility will improve  Outcome: Ongoing     Problem: Pain:  Goal: Pain level will decrease  Description: Pain level will decrease  Outcome: Ongoing  Goal: Control of acute pain  Description: Control of acute pain  Outcome: Ongoing     Problem: Skin Integrity:  Goal: Will show no infection signs and symptoms  Description: Will show no infection signs and symptoms  Outcome: Ongoing  Goal: Absence of new skin breakdown  Description: Absence of new skin breakdown  Outcome: Ongoing

## 2022-02-14 NOTE — PATIENT CARE CONFERENCE
Inpatient Rehabilitation  Weekly Team Conference Note  The [address], [address] [place] [address] Ave  [phone]  Patient Name: Kelly Rodriguez        MRN: [id number]    : 1945  (77 y.o.)  Gender: female   Diagnosis: R femur fx  The team conference for this patient was held on 2/15/2022 at 10:30am by:  Aaron Jimenez.  Tatiana, DO    Current/Goal QM SCORES  QM Current/Goal Score   Eating CARE Score: 5 / Discharge Goal: Independent   Oral Hygiene CARE Score: 7 / Discharge Goal: Independent   Shower/Bathing CARE Score: 1 / Discharge Goal: Supervision or touching assistance   UB Dressing CARE Score: 2 / Discharge Goal: Independent   LB Dressing CARE Score: 1 / Discharge Goal: Independent   Putting on/off Footwear CARE Score: 3 / Discharge Goal: Independent   Toileting Hygiene CARE Score: 1 / Discharge Goal: Independent   Bladder Continence Bladder Continence: Not applicable (valdez)    Bowel Continence      Toilet Transfers CARE Score: 1 / Discharge Goal: Independent   Shower/Bathe Self  CARE Score: 1 / Discharge Goal: Supervision or touching assistance   Rolling Left and Right CARE Score: 6 / Discharge Goal: Independent   Sit to Lying CARE Score: 6 / Discharge Goal: Independent   Lying to Sitting on Bedside CARE Score: 4 / Discharge Goal: Independent   Sit to Stand CARE Score: 4 / Discharge Goal: Independent   Chair/Bed to Chair Transfer CARE Score: 1 / Discharge Goal: Independent   Car Transfers CARE Score: 88 / Discharge Goal: Independent   Walk 10 Feet CARE Score: 88 / Discharge Goal: Independent   Walk 50 Feet with Two Turns CARE Score: 88 / Discharge Goal: Independent   Walk 150 Feet CARE Score: 88 / Discharge Goal: Not Applicable   Walk 10 Feet on Uneven Surfaces CARE Score: 88 / Discharge Goal: Supervision or touching assistance   1 Step (Curb) CARE Score: 88 / Discharge Goal: Supervision or touching assistance   4 Steps CARE Score: 88 / Discharge Goal: Supervision or touching assistance   12 Steps CARE Score: 88 / Discharge Goal: Not Applicable   Picking up Object from Floor CARE Score: 88 / Discharge Goal: Independent   Wheel 50 Feet with 2 Turns CARE Score: 4 / Discharge Goal: Independent   Type         [x] Manual        [] Motorized        [] N/A   Wheel 150 Feet CARE Score: 4 / Discharge Goal: Independent   Type         [x] Manual        [] Motorized        [] N/A     NURSING:  A&Ox: Level of Consciousness: Alert (0)  Orientation Level: Oriented X4  Velasquez Fall Risk Score: Score: 40  Admission BIMS: 13  Wounds/Incisions/Ulcers: Unknown R Leg dressed with ace bandage  Medication Review: Reviewed meds   Pain: 10/10 with R leg, hip and back   Consultations: n/a  Imaging:   No orders to display     Active Comorbid Conditions: obesity, depression, KAIT   Systems Review:   Renal: Christensen placed for retention , Dialysis:  Type: , Frequency:   Neurological: Weak R dorsiflexion and plantar flexion  Musculoskeletal: R LE weakness because of fracture, toe touch   Respiratory: R air, clear/diminished lungs   Cardiac/Circulatory/Peripheral Vascular: WDL  Abnormal/Relevant Test Results:   Abnormal/Relevant Lab Values:   CBC:   Recent Labs     02/14/22 0627   WBC 5.7   HGB 8.7*   HCT 26.1*   MCV 88.2        BMP:   Recent Labs     02/14/22 0627      K 4.2      CO2 30   BUN 22*   CREATININE 0.8     PT/INR: No results for input(s): PROTIME, INR in the last 72 hours. APTT: No results for input(s): APTT in the last 72 hours. Liver Profile:  No results found for: AST, ALT, ALB, BILIDIR, BILITOT, ALKPHOS, GGT, 5NUCNo results found for: CHOL, HDL, TRIG  Recent Labs     02/14/22 0627   WBC 5.7   HGB 8.7*   HCT 26.1*      MCV 88.2     Recent Labs     02/14/22 0627      K 4.2      CO2 30   BUN 22*   CREATININE 0.8     No results for input(s): AST, ALT, ALB, BILIDIR, BILITOT, ALKPHOS in the last 72 hours.   No results for input(s): MG in the last 72 hours. Recent Labs     02/14/22  0627   WBC 5.7   HGB 8.7*   HCT 26.1*        Recent Labs     02/14/22  0627      K 4.2      CO2 30   BUN 22*   CREATININE 0.8   CALCIUM 8.7     No results for input(s): AST, ALT, BILIDIR, BILITOT, ALKPHOS in the last 72 hours. No results for input(s): INR in the last 72 hours. No results for input(s): Erica Ends in the last 72 hours. PHYSICAL THERAPY:  Bed Mobility:      Transfers:  Sit to Stand: Moderate Assistance  Stand to sit: Moderate Assistance  Squat Pivot Transfers: 2 Person Assistance (mod x 2 wc>tub transfer bench, wc arm removed)    Ambulation 1  Device: [address]: Minimal assistance  Quality of Gait: poor TTWB compliance. Distance: x5ft    OCCUPATIONAL THERAPY:  ADL  Feeding: Setup (Per PT: pt setup w/ tray at beginning of session and ate breakfast during interview portion of eval)  Grooming: Setup,Increased time to complete (Pt washed face while seated on TTb w/ setup assist and increased time. Pt declined completion of oral hygiene 2/2 fatigue)  UE Bathing: Increased time to complete,Setup,Moderate assistance (Pt washed her chest w/ a washcloth while seated on TTB. Pt required assist to complete all other components of UE bathing- including washing and drying 2/2 fatigue.)  LE Bathing: Setup,Increased time to complete,Dependent/Total (Pt required assist to complete all components of LE bathing while seated on TTB. Rear tila hygiene was completed in stance in dry environment w/ Min A of one to maintain standing at RW and assist of other therapist to complete tila care)  UE Dressing: Setup,Maximum assistance,Increased time to complete,Verbal cueing (pt threaded BUE into bra and shirt w/ setup and increased time. Assist was provided to fasten bra in back, pull shirt over head and pull shirt down in front and back 2/2 increased fatigue)  LE Dressing:  Moderate assistance (Pt donned L sock but required assistance to don R sock)  Toileting: Dependent/Total (pt completes toileting on std toilet w/o rails at home, however, required raised toilet seat w/ rails this date. Assist was provided for clothing mgmt in stance. Pt attempted tila care while seated w/ +time,assist was provided in stance for thoroughness)  Additional Comments: Pt required increased assist as session progressed d/t decreased endurance and activity tolerance. Pt reporting \"I need to get back to bed. i'm just exhausted. I need to rest.\" Note: pt reports typically bathing at the [facility] and sitting on a shower bench. Pt also has a shower chair at home in her tub/shower unit. Toilet Transfers: Toilet Transfers  Toilet - Technique: Stand pivot  Equipment Used: Raised toilet seat with rails  Toilet Transfer: 2 Person assistance,Moderate assistance,Minimal assistance    Tub/ShowerTransfers:  Shower Transfers  Shower - Transfer From: Wheelchair  Shower - Transfer Type: To and From  Shower - Transfer To: Transfer tub bench  Shower - Technique: Squat pivot  Shower Transfers: 2 Person assistance,Moderate assistance (Mod Ax2)    SPEECH THERAPY:  Assessment: No cognitive linguistic impairment identified    NUTRITION:   Current Weight: 228 lb 6.3 oz (103.6 kg) BMI (Calculated): 32.8  Current diet order: Current diet and supplement order: ADULT DIET; Regular  ADULT ORAL NUTRITION SUPPLEMENT; Breakfast, Dinner; Other Oral Supplement; [address]; Lunch; Fortified Pudding Oral Supplement     Feeding: Able to feed self  Room Service: Selective   NSG Recorded PO: PO Meals Eaten (%): 76 - 100%    Malnutrition Status Malnutrition Status: At risk for malnutrition (Comment)  Please see nutrition evaluation per nutrition standards of care for additional info. CASE MANAGEMENT:  Psychosocial/Behavioral Issues: none  Assessment:  Pt is from home. SW will meet with Pt and dgt for full assessment and follow for DC needs and plan.      Patient/Family Education provided by team:  Role of PT/OT/ST, Delirium precautions, potential etiology and appropriate treatment for cognitive concerns / resolving and option for ongoing cognitive diagnostic therapy if desired at anytime, TTWB precautions, safe transfers     Patient Goals for Rehab stay:  1. be more independent      Rehab Team Goals for patient for the Upcoming Week:  1. increase independence w/ functional transfers  2. Increase independence w/ ADLs     Barriers to Progress/Attainment of Goals & Efforts/Interventions to remove Barriers:  1. compliance w/ TTWB- continue to address in therapy     Discharge Plan:  Estimated Length of Stay: 15 days  Destination: home health  Services at Discharge: [address] [place] [address], Occupational Therapy, Speech Therapy and Nursing 3x week  Equipment at Discharge: TBD pending progress; likely will need w/c   Community Resources:   Factors facilitating achievement of predicted outcomes: Family support and Cooperative  Barriers to the achievement of predicted outcomes: Confusion, Anxiety and Lower extremity weakness    Special Needs in the Upcoming Week:   [x] Family/Caregiver Education  [] Home visit  []Therapeutic Pass [] Consults:_______   [] Family/Caregiver Training  [] Stroke Risk Factor Education     [] Other;_______     TEAM SUMMARY: Will continue with current poc & goals until anticipated d/c date of 2/26/2022.     MD:   Stroke Risk Factors:   [] N/A for this patient [x] HTN  []  Diabetes  [] Hyperlipidemia  [x]Obesity BMI >25  [] Atrial Fibrillation [] Smoker (current)  [] Smoker (quit in last 12 months)  [] Sleep Apnea [] Other:     Risk for Readmission: Moderate (10-19)    Justification for Continued Stay:   Criteria for continued IRF stay:  Based on my medical assessment of the patient and review of information from the interdisciplinary team, as part of this weekly team conference, the patient continues to meet the following criteria for IRF level of care:  [x] The patient requires 24-hour rehabilitation nursing care   [x] The patient requires an intensive rehabilitation therapy program  [x] The patient requires active and ongoing intervention of multiple therapy disciplines  [x] The patient requires continued physician supervision by a rehabilitation physician  [x] The patient requires an intensive and coordinated interdisciplinary team approach to the delivery of rehabilitative care    Medical Necessity-continued close physician medical management is required for:   [] Cardiac/Circulatory dysfunction  [] Respiratory/Pulmonary dysfunction  [] Integumentary complications  [] Peripheral Vascular dysfunction  [x] Musculoskeletal dysfunction  [] Neurological dysfunction d/t:  [] CVA  [] SCI  [] TBI  [] Other: __________  [] Renal dysfunction  [] Hematologic dysfunction    [] Endocrine disorders  [] GI disorders     [] Genito-Urinary dysfunction    Assessment/Plan:  [x] The patient is making good progression towards their LTG's, is actively participating in, and has a reasonable expectation to continue to benefit from the intensive rehabilitation program.  [] The estimated discharge date has been changed from initial team conference due to:   [] The estimated discharge destination has been changed from initial team conference due to:     Rehab Team Members in attendance for Team Conference:  ARU Supervisor/PPS Coordinator:  Oswaldo Caldwell, PT, DPT    Therapy Manager/ARU :  Rich Valdez PT, DPT    Nursing Manager:  Bridget Pearson RN    Social Work:  Tye Link [place]    Nursing: Lev Cordoba, SP [place], SP He, SP    Therapy:  Joycelyn [place] PT, DPT     Jose Ortiz, OTR/L  Sarika Chong, OTR/L  Twila Alonso, OTR/L    TINY Saenz, SLP    Nutrition:  Sheldon Murillo, ADY LD    I approve the established interdisciplinary plan of care as documented within the medical record of Ivana Moreira.     MD Paula Manzanares D.O. RAJI  PM&R  2/15/2022  11:31 AM

## 2022-02-14 NOTE — PROGRESS NOTES
Patient A&Ox3, VSS, shift assessments completed, valdez in place and draining well. Denies pain or discomfort. Standard fall precautions in place. Daughter at bedside. Will continue to monitor.

## 2022-02-14 NOTE — PROGRESS NOTES
Occupational Therapy  Facility/Department: [facility] ACUTE REHAB UNIT  Daily Treatment Note  NAME: Jh Flynn  : 1945  MRN: [id number]    Date of Service: 2022    Discharge Recommendations:  Continue to assess pending progress,24 hour supervision or assist,Patient would benefit from continued therapy after discharge  OT Equipment Recommendations  ADL Assistive Devices: Transfer Tub Bench  Other: cont to assess    Assessment   Performance deficits / Impairments: Decreased functional mobility ; Decreased ADL status; Decreased strength;Decreased ROM; Decreased safe awareness;Decreased cognition;Decreased endurance;Decreased balance;Decreased high-level IADLs;Decreased coordination;Decreased posture  Assessment: Pt tolerated session well today and completed several trials of stance in // bars and at the RW today w/ CGA-min A. Pt maintained TTWB for 1-3 mins at a time w/ min VCs but had max difficulty when attempting to ambulate w/ RW. Pt is limited by RLE pain and TTWB precautions. Pt benefits from ongoing OT to maximize functional independence. Cont OT per POC  Treatment Diagnosis: decreased ADLs, functional mobility and functional transfers 2/2 R femur fx    OT Education: OT Role;Precautions;Transfer Training;Equipment  Patient Education: TTWB- reinforcement needed  REQUIRES OT FOLLOW UP: Yes  Activity Tolerance  Activity Tolerance: Patient limited by fatigue  Activity Tolerance: Pt limited by TTWB precautions. BP at beginning of orpnpca=676/73, 123/72 and then dropped to 88/50. RN aware  Safety Devices  Safety Devices in place: Yes  Type of devices: Call light within reach; Left in chair;Chair alarm in place;Nurse notified         Patient Diagnosis(es): There were no encounter diagnoses.       has a past medical history of Allergic, Anemia, Anxiety, Closed comminuted intra-articular fracture of distal femur, right, initial encounter ([facility] [address] Depression, Eye disorder, Fibromyalgia, GERD (gastroesophageal reflux disease), Hypertension, Obesity, KAIT (obstructive sleep apnea), Osteoarthritis, Osteoporosis, PONV (postoperative nausea and vomiting), and Prolonged emergence from general anesthesia. has a past surgical history that includes hip surgery; joint replacement (Right, 1993/2003); Wrist fracture surgery (Left); joint replacement (Left, 5/18/16); knee surgery; and Femur fracture surgery (Right, 2/7/2022). Restrictions  Position Activity Restriction  Other position/activity restrictions: TTWB R, up wit assistance  Subjective   General  Chart Reviewed: Yes  Patient assessed for rehabilitation services?: Yes  Additional Pertinent Hx: 78 yo admitted to [facility] 2/6/22 for fall/R femur fx. OR 2/7 FEMUR OPEN REDUCTION INTERNAL FIXATION RIGHT. Admitted to ARU 2/11/22. Pmhx: HTN, fibromyalgia, KAIT, L TKR, R THR  Family / Caregiver Present: Yes (daughter)  Referring Practitioner: Melvin Meza MD  Diagnosis: R femur fx s/p ORIF  Subjective  Subjective: Pt was semi supine in bed upon arrival. Pt reported her son passed away several days ago. Pt pleasant and agreeable to OT/PT. Co treat indicated to maximize functional independence. Vital Signs  Patient Currently in Pain: Yes (Pt c/o RLE pain however pain meds just delivered prior to session)   Orientation     Objective    ADL  LE Dressing: Moderate assistance (Pt donned L sock but required assistance to don R sock)        Balance  Sitting Balance: Supervision  Standing Balance: Minimal assistance (CGA-Min A)  Standing Balance  Time: ~5 mins total  Activity: functional mobility w/ RW, stance in // bars  Comment: Pt engaged in standing balance task in // bars to improve compliance w/ TTWB RLE. Pt maintained stance for ~1 min + 1 min w/ CGA while pt maintained TTWB w/ RLE propped up on crackers to act as visual cue to assist w/ WB compliance. Pt maintained BUE support on // bars but could not tolerate extended time in stance due to dizziness.  Pt's BP was 88/50 following helps w/ her back pain. Pt donned brace I'ly. Sit to stand completed from EOB > RW w/ CGA. Pt required min VCs for hand placement on EOB. Once in stance pt completed pivot w/ RW from bed to chair w/ CGAx2. Pt appeared to be non compliant w/ TTWB RLE and required VCs for technique. Pt was assisted in w/c to therapy gym. Dynamic standing balance tasks completed in stance at RW to address safety in stance for ADLs. Pt reached outside RIANNA laterally and across midline w/ RUE to retrieve 10 bean bags. Pt completed w/ 1UE support on RW w/ CGA for 3-4 mins 2x. Pt required min-mod VCs for TTWB RLE. Short rest break needed between trials. Pt completed all sit to stand transfers from w/c > RW w/ CGA. Pt attempted ambulation w/ RW and ambulated ~3 ft w/ CGAx2. Pt however required max VCs for TTWB RLE and pt appeared non compliant. Pt required rest break in w/c due to inability to properly maintain precautions. Pt was assisted in w/c back to room. Stand pivot completed w/ RW w/ CGAx2. Pt left in recliner chair w/ chair alarm on and call light within reach.                         Plan   Plan  Times per week: 60min 5x/week  Times per day: Daily  Current Treatment Recommendations: Patient/Caregiver Education & Training,Home Management Training,Equipment Evaluation, Education, & procurement,Balance Training,Endurance Training,Self-Care / ADL,Safety Education & Training,Strengthening,Functional Mobility Training  G-Code     OutComes Score                                                  AM-PAC Score             Goals  Short term goals  Time Frame for Short term goals: 10 days-all goals ongoing  Short term goal 1: Pt will complete toilet transfer w/ Min Ax1  Short term goal 2: Pt will complete LE dressing assessment  Short term goal 3: Pt will complete shower transfer w/ Alec x1  Short term goal 4: Pt will complete UE dressing w/ Min Ax1  Long term goals  Time Frame for Long term goals : 3 weeks-all goals ongoing  Long term goal 1: Pt will complete toilet transfer/ toileting w/ Mod I  Long term goal 2: Pt will complete UE/LE dressing w/ Mod I and use of AE prn  Long term goal 3: Pt will complete tub transfer w/ SBA  Long term goal 4: Pt will complete bathing w/ Spvn       Therapy Time   Individual Concurrent Group Co-treatment   Time In       0830   Time Out       0915   Minutes       45   Timed Code Treatment Minutes: 39 Minutes      Second Session Therapy Time:   Individual Concurrent Group Co-treatment   Time In       1344   Time Out       1450   Minutes       30     Timed Code Treatment Minutes:  30    Total Treatment Minutes:  45+30= 4141 Nikolas Lobato, OT

## 2022-02-14 NOTE — FLOWSHEET NOTE
2. 14.22/Patient Sandra needs more support and an opportunity to talk through some major decisions she needs to make for care in the future. Also, she grieving over the death of her son. Her daughter Alejandro Barrera was present during most of the conversation. The  should follow-up with the patient for more spiritual support.      02/14/22 8244   Encounter Summary   Services provided to: Patient and family together   Referral/Consult From: Nurse   [address] [place] [address] Visiting   (2.14.22/Spiritual support with conversation and prayer)   Complexity of Encounter Moderate   Length of Encounter 1 hour   Routine   Type Follow up   Assessment Calm;Grieving   Intervention Active listening;Nurtured hope;Ritual;Sustaining presence/ Ministry of presence   Outcome Expressed gratitude;Engaged in conversation

## 2022-02-14 NOTE — PROGRESS NOTES
this evaluation.)  Barriers toward progress: None for stated goals    Date: 2/14/2022     Tx session 1   Total Timed Code Min 38   Total Treatment Minutes 38   Individual Treatment Minutes 38   Group Treatment Minutes 0   Co-Treat Minutes 0   Brief Exception: N/A   Pain None indicated at time of session. Pain Intervention: [] RN notified  [] Repositioned  [] Intervention offered and patient declined  [x] N/A  [] Other:   Subjective:     Pt upright in chair and agreeable to therapy with daughter present. Objective / Goals:    Patient will participate in ongoing assessment of cognitive-linguistic skills as they relate to functional IADLs. Delayed recall task (5 paired black and white line drawing, 10 total):   Visual recognition @ 9 minutes - 100% accuracy without cues for matching. Verbal recall >15 minutes - 100% accuracy without cues    Independently verbally recalls weight bearing status      Patient was administered portions of the SEBASTIAN (Assessment of Language-Related Functional Activities) with the following results:  · Counting Money: 100% accuracy. This percentage correlates with an Independent Functioning Rating of 1 which indicates a high probability of independent functioning on this task. · Understanding Medication Labels: 100% accuracy. This percentage correlates with an Independent Functioning Rating of 1 which indicates a high probability of independent functioning on this task. GOAL MET   Other areas targeted:    Education:   Significant amount of time spent educating patient and daughter re: ongoing assessment also demonstrating [facility] skills.  Educated on potential etiology for cognitive concerns as reported by daughter and staff including social-emotional stress / mental health with significant stressors re: hospitalization and recent death of her son, hospitalization in general especially with comorbidity of hearing loss, anesthesia and medication/poly pharmacy which all would not warrant cognitive therapy. Educated on potential etiology of hypoxia which would warrant cognitive therapy (although would be atypical to have a focal memory impairment, more likely to be a diffuse cognitive impairment). Educated on no deficits noted and no perceived challenge on tasks this date. Educated on options of treatment memory impairment per staff report with no known negative risk factors or expected harm but not likely to facilitate improvement if unrelated to hypoxia. Educated on delirium precautions and general brain health practices including normalizing waking/sleep cycles as is safe and reasonable, managing hydration and nutrition well, utilizing schedule and routines. Pt reports she feels that DC is appropriate at this time and verbalized understanding that SLP can be reconsulted at anytime during stay or even as OP and would likely recommend a more specific memory focused assessment if desired/warranted. Daughter present and is in agreement. Safety Devices: [x] Call light within reach  [x] Chair alarm activated and connected to nurse call light system  [] Bed alarm activated   [] Other:   Assessment: No cognitive-linguistic impairment identified. Plan: DC from speech therapy     Recommendations:   · Delirium precautions due to multiple risk factors / staff concerns. · Recommend if staff identify memory impairment or cognitive concern that they cite this and verbalize to the patient in real time to facilitate metacognitive awareness skills and improvement.       Interventions used this date:  [] Speech/Language Treatment  [] Instruction in HEP  [] Dysphagia Treatment [x] Cognitive Treatment   [] Other:    Discharge recommendations:  [] Home independently  [x] Home with assistance []  24 hour supervision  [] ECF [] Other  Continued Tx Upon Discharge: ? [] Yes    [x] No    [] TBD based on progress while on ARU     [] Vital Stim indicated     [x] Other: Educated on ability to complete further assessment at any time during stay or as OP if pt feels this is warranted. Estimated discharge date: Not yet established    Electronically signed by  Laya Oliver   Clinician     Shelly Weaver M.A., Torres  Speech-Language Pathologist    The speech-language pathologist was present, directed the patient's care, made skilled judgment and was responsible for assessment and treatment.

## 2022-02-14 NOTE — PROGRESS NOTES
Shift assessment complete. VSS. A&Ox4. Patient complains of surgical and chronic back pain. Pain being managed with PRN and scheduled medications. Non-pharm measures of repositioning, heat, and emotional support encouraged throughout shift. Fall precautions in place. Patient resting in bed for breakfast, bed alarm utilized, call light within reach.        Vitals:    02/14/22 0745   BP: (!) 143/78   Pulse: 76   Resp:    Temp: 98 °F (36.7 °C)   SpO2: 96%

## 2022-02-14 NOTE — PROGRESS NOTES
Physical Therapy  Facility/Department: [facility] ACUTE REHAB UNIT  Daily Treatment Note  NAME: Dorcas Alston  : 1945  MRN: [id number]    Date of Service: 2022    Discharge Recommendations:  Continue to assess pending progress,24 hour supervision or assist,Patient would benefit from continued therapy after discharge   PT Equipment Recommendations  Equipment Needed: Yes  Mobility Devices: Carolyn : Rolling  Other: will likely benefit from wc - continue to assess pending progress    Assessment   Assessment: Functional progression was limited by poor TTWB compliance during transfers and gait training. Pt was limited by orthostatic hypotension 153/70s (sitting) - 88/50 (standing). Presented with good trunk and LE control during bed mobility with the bed flat and without use of the handrails. Sit to stand transfers were antalgic and mildly unsteady without buckling or a LOB. Pivot transfers were unsafe due to fear. Treatment Diagnosis: mobility impairment due to R femur fx  Decision Making: Medium Complexity  Patient Education: Pt verbalized understanding however demo's decreased short term memory and would benefit from continued skilled PT  REQUIRES PT FOLLOW UP: Yes     Patient Diagnosis(es): There were no encounter diagnoses. has a past medical history of Allergic, Anemia, Anxiety, Closed comminuted intra-articular fracture of distal femur, right, initial encounter ([facility] [address] Depression, Eye disorder, Fibromyalgia, GERD (gastroesophageal reflux disease), Hypertension, Obesity, KAIT (obstructive sleep apnea), Osteoarthritis, Osteoporosis, PONV (postoperative nausea and vomiting), and Prolonged emergence from general anesthesia. has a past surgical history that includes hip surgery; joint replacement (Right, ); Wrist fracture surgery (Left); joint replacement (Left, 16); knee surgery; and Femur fracture surgery (Right, 2022).     Restrictions  Position Activity Restriction  Other position/activity restrictions: TTWB R, up wit assistance  Subjective   General  Additional Pertinent Hx: 78 yo admitted 2/6/22 for fall/R femur fx. OR 2/7 FEMUR OPEN REDUCTION INTERNAL FIXATION RIGHT. Pmhx: HTN, fibromyalgia, KAIT, L TKR, R THR. Admitted to ARU 2/11/2022. Subjective  Subjective: Pt was in bed willing to participate. c/o moderate pain. Orientation  Orientation  Overall Orientation Status: Within Functional Limits  Objective   Bed mobility  Supine to Sit: SBA    Transfers  Sit to Stand: CGA  Stand to sit: CGA  Squat pivot: Min Assist x2    Ambulation  Ambulation?: Yes  Ambulation 1  Device: Rolling Walker  Assistance: CGA  Quality of Gait: poor TTWB compliance. Distance: x5ft    W/c mobility  Distance: x150ft  Assistance: Supervision  Quality: slow using all extremities. Balance  Sitting - Static: Fair;+  Sitting - Dynamic: Fair;+  Standing - Static: Fair;-  Standing - Dynamic: Poor  Exercises  Standing trials: 2x60\" inside the // bars with CGA        Second Session:   Pt found supine in bed and agreeable to cotx, daughter present throughout session. Pt performed supine to sit with sup, HOB elevated. Pt performed sit to stand from EOB at [employer] with CGA requiring vc to maintain TTWB on RLE. Pt then perform stand pivot from bed to wc with RW and CGA but was unable to remain compliant with weight bearing status. Pt performed sit to stand transfers at RW x3 trials with CGA and vc for maintaining weight bearing status. Pt participated in dynamic standing balance activities in form of dynamic reaching outside of RIANNA with CGA and no LOB. Pt required occasional cues for maintaining TTWB. Pt stood 3-4 min x2 trials with rest break between trials due to fatigue. Pt then ambulated 5' with CGA + wc follow using RW limited by fatigue but unable to completely maintain TTWB placing more weight through RLE.  Pt then performed stand pivot transfer from wc to recliner towards L side with RW and CGA maintaining good weight bearing status. Pt left in recliner with all needs in reach and alarm activated.      Goals  Short term goals  Time Frame for Short term goals: 10 days  Short term goal 1: Pt will complete sit<>supine with SBA  Short term goal 2: Pt will complete transfers with min A x 1  Short term goal 3: Pt will ambulate 10' with min A x 1 and wc follow  Short term goal 4: Pt will complete ascent/descent of 4 steps with UL handrail and min A x 1  Short term goal 5: Pt will complete 48' wc mobility with mod I  Long term goals  Time Frame for Long term goals : 3 weeks  Long term goal 1: Pt will complete bed mobility independently  Long term goal 2: Pt will complete transfers with od I and LRAD  Long term goal 3: Pt will ambulate 48' with mod I and LRAD  Long term goal 4: Pt will ascend/descend 5 + 5 stairs with UL hand rail and SBA  Long term goal 5: Pt will complete 150' wc mobility with independence  Patient Goals   Patient goals : return home, travel to [place] [address] to see her son who has terminal cancer    Plan    Plan  Times per week: 5-7 x per week 75 min  Current Treatment Recommendations: Strengthening,Balance Training,Transfer Training  Safety Devices  Type of devices: Call light within reach,Chair alarm in place,Nurse notified,Left in chair     Therapy Time   Individual Concurrent Group Co-treatment   Time In       0830   Time Out       0915   Minutes       45   Timed Code Treatment Minutes: Melida Whitman PTA    Second Session Therapy Time:   Individual Concurrent Group Co-treatment   Time In       1420   Time Out       1450   Minutes       30      Timed Code Treatment Minutes:  45+ 30     Total Treatment Minutes:  75 min     Mary Landrum, PT, DPT

## 2022-02-14 NOTE — CARE COORDINATION
Case Management Assessment           Initial Evaluation                Date / Time of Evaluation: 2/14/2022 4:12 PM                 Assessment Completed by: SHELBI [place]    Patient Name: Dori Zhao     YOB: 1945  Diagnosis: Closed displaced comminuted fracture of shaft of right femur, [facility]     Date / Time: 2/11/2022  7:06 PM    Patient Admission Status: Inpatient    If patient is discharged prior to next notation, then this note serves as note for discharge by case management. Current PCP: Edgar Maza MD  Clinic Patient: No    Chart Reviewed: Yes  Patient/ Family Interviewed: Yes    Initial assessment completed at bedside with: with dgt - Pt currently with [address]    Hospitalization in the last 30 days: No    Emergency Contacts:  Extended Emergency Contact Information  Primary Emergency Contact: [address] of [address] [place] [address] Phone: [phone]  Relation: Child  Secondary Emergency Contact: [facility]  Mobile Phone: [phone]  Relation: Child    Advance Directives:   Code Status: Full Code      Financial  Payor: Sohan Patrick / Plan: MEDICARE PART A AND B / Product Type: *No Product type* /     Pre-cert required for SNF: No    Pharmacy    [address], [address]  [address] [place] [address]  Phone: [phone] Fax: [phone]      Potential assistance Purchasing Medications: Potential Assistance Purchasing Medications: No  Does Patient want to participate in local refill/ meds to beds program?:      Meds To Beds General Rules:  1. Can ONLY be done Monday- Friday between 8:30am-5pm  2. Prescription(s) must be in pharmacy by 3pm to be filled same day  3. Copy of patient's insurance/ prescription drug card and patient face sheet must be sent along with the prescription(s)  4.  Cost of Rx cannot be added to hospital bill. If financial assistance is needed, please contact unit  or ;  or  CANNOT provide pharmacy voucher for patients co-pays  5. Patients can then  the prescription on their way out of the hospital at discharge, or pharmacy can deliver to the bedside if staff is available. (payment due at time of pick-up or delivery - cash, check, or card accepted)     Able to afford home medications/ co-pay costs: Yes    ADLS  Support Systems: Children,Friends/Neighbors    PT AM-PAC:     OT AM-PAC:       [place]: lives at home alone  Steps:     Plans to RETURN to current housing: TBD  Barriers to RETURNING to current housing:     [address]  Currently ACTIVE with  [address]: No  Home Care Agency: Not Applicable    Currently ACTIVE with [place] on Aging: No    Durable Medical Equipment  DME Provider:   Equipment: wheeled walker    Home Oxygen and [address] prior to admission: No  [address]: Not Applicable  Other Respiratory Equipment:     Dialysis  Active with HD/PD prior to admission: No  Nephrologist:     HD Center:  Not Applicable    DISCHARGE PLAN:  Disposition: TBD    Transportation PLAN for discharge: TBD       Additional Case Management Notes:   SW met with dgt as Pt with [facility]. Dgt is here from [place] and states she will be taking FMLA from work to care for Pt and can stay with Pt at home to assist with DC needs. Pt is from home alone and was independent PTA, able to drive, no skilled Home Care. Dgt is uncertain at this time of DC plan as Pt is to be TTWB for 6 weeks. Also, dgt states Pt's son  on Saturday. He lived in [place]. Emotional support provided to dgt. SW will continue to follow.          The Plan for Transition of Care is related to the following treatment goals of Closed displaced comminuted fracture of shaft of right femur, initial encounter ([facility] [address] Alverta Leyden    The Patient and/or patient representative Letitia Calles and her family were provided with a choice of provider and agrees with the discharge plan Yes    Freedom of choice list was provided with basic dialogue that supports the patient's individualized plan of care/goals and shares the quality data associated with the providers.  Yes    Care Transition patient: No    Miguelina Lindsey  Case Management Department  Ph: [phone]

## 2022-02-15 PROCEDURE — 1280000000 HC REHAB R&B

## 2022-02-15 PROCEDURE — 99233 SBSQ HOSP IP/OBS HIGH 50: CPT | Performed by: PHYSICAL MEDICINE & REHABILITATION

## 2022-02-15 PROCEDURE — 6370000000 HC RX 637 (ALT 250 FOR IP): Performed by: PHYSICAL MEDICINE & REHABILITATION

## 2022-02-15 PROCEDURE — 6360000002 HC RX W HCPCS: Performed by: PHYSICAL MEDICINE & REHABILITATION

## 2022-02-15 PROCEDURE — 97530 THERAPEUTIC ACTIVITIES: CPT

## 2022-02-15 RX ADMIN — PANTOPRAZOLE SODIUM 40 MG: 40 TABLET, DELAYED RELEASE ORAL at 06:04

## 2022-02-15 RX ADMIN — BUPROPION HYDROCHLORIDE 150 MG: 150 TABLET, EXTENDED RELEASE ORAL at 11:59

## 2022-02-15 RX ADMIN — Medication: at 21:38

## 2022-02-15 RX ADMIN — SENNOSIDES AND DOCUSATE SODIUM 1 TABLET: 50; 8.6 TABLET ORAL at 08:18

## 2022-02-15 RX ADMIN — ENOXAPARIN SODIUM 40 MG: 100 INJECTION SUBCUTANEOUS at 08:03

## 2022-02-15 RX ADMIN — GABAPENTIN 600 MG: 600 TABLET, FILM COATED ORAL at 13:29

## 2022-02-15 RX ADMIN — OXYCODONE HYDROCHLORIDE AND ACETAMINOPHEN 1 TABLET: 5; 325 TABLET ORAL at 21:26

## 2022-02-15 RX ADMIN — GABAPENTIN 600 MG: 600 TABLET, FILM COATED ORAL at 21:27

## 2022-02-15 RX ADMIN — OXYCODONE HYDROCHLORIDE AND ACETAMINOPHEN 2 TABLET: 5; 325 TABLET ORAL at 08:03

## 2022-02-15 RX ADMIN — DULOXETINE HYDROCHLORIDE 60 MG: 60 CAPSULE, DELAYED RELEASE ORAL at 08:03

## 2022-02-15 RX ADMIN — GABAPENTIN 600 MG: 600 TABLET, FILM COATED ORAL at 08:18

## 2022-02-15 RX ADMIN — ESCITALOPRAM OXALATE 20 MG: 10 TABLET ORAL at 08:03

## 2022-02-15 RX ADMIN — Medication: at 13:31

## 2022-02-15 RX ADMIN — Medication: at 08:02

## 2022-02-15 RX ADMIN — TAMSULOSIN HYDROCHLORIDE 0.4 MG: 0.4 CAPSULE ORAL at 08:03

## 2022-02-15 RX ADMIN — OXYCODONE HYDROCHLORIDE AND ACETAMINOPHEN 2 TABLET: 5; 325 TABLET ORAL at 12:25

## 2022-02-15 RX ADMIN — SENNOSIDES AND DOCUSATE SODIUM 1 TABLET: 50; 8.6 TABLET ORAL at 21:27

## 2022-02-15 ASSESSMENT — PAIN DESCRIPTION - FREQUENCY
FREQUENCY: CONTINUOUS

## 2022-02-15 ASSESSMENT — PAIN SCALES - GENERAL
PAINLEVEL_OUTOF10: 7
PAINLEVEL_OUTOF10: 6
PAINLEVEL_OUTOF10: 2
PAINLEVEL_OUTOF10: 0
PAINLEVEL_OUTOF10: 8
PAINLEVEL_OUTOF10: 2

## 2022-02-15 ASSESSMENT — PAIN DESCRIPTION - LOCATION
LOCATION: BACK;HIP;LEG
LOCATION: HIP;LEG
LOCATION: BACK;HIP;LEG

## 2022-02-15 ASSESSMENT — PAIN DESCRIPTION - PAIN TYPE
TYPE: ACUTE PAIN;SURGICAL PAIN

## 2022-02-15 ASSESSMENT — PAIN DESCRIPTION - ONSET
ONSET: ON-GOING

## 2022-02-15 ASSESSMENT — PAIN DESCRIPTION - PROGRESSION: CLINICAL_PROGRESSION: NOT CHANGED

## 2022-02-15 ASSESSMENT — PAIN DESCRIPTION - DESCRIPTORS
DESCRIPTORS: ACHING

## 2022-02-15 ASSESSMENT — PAIN DESCRIPTION - ORIENTATION
ORIENTATION: RIGHT

## 2022-02-15 NOTE — PLAN OF CARE
Problem: Falls - Risk of:  Goal: Will remain free from falls  2/15/2022 1028 by Veronica Wisdom RN  Outcome: Ongoing  Note: Patient attending therapy as ordered. Call light in reach. Bed in low position. Daughter at bedside. 2/15/2022 0247 by Calvin Mireles RN  Outcome: Ongoing  Note: Patient is a fall risk. Patient is a x 2 stand pivot with gait belt and walker. See Fall Risk assessment for details. Bed is in low, lock position; call light/belongings within reach. No attempts to get out of bed have been made, calls appropriately when assistance is needed. Bed alarm and hourly rounds in place for safety.       Problem: Pain:  Goal: Control of acute pain  Outcome: Ongoing

## 2022-02-15 NOTE — FLOWSHEET NOTE
provided spiritual support, listening, and prayer for patient and her daughter, who are grieving the recent passing of pt's son Guillermina Wood. Patient engaged in extensive conversation in which she shared her grief over the loss of her son, as well her regret that she was unable to be at his bedside because of her accidental fall. Patient states that she has tyron, and shared her beliefs about the afterlife, which seemed to provide some comfort for her. Writer encouraged pt and her dtr to share stories about Guillermina Wood with one another and with others, as they can be an important source of comfort in their grief journey. Pt's daughter inquired about the possibility of viewing the live streamed  on , in a space other than the pt's room. Spiritual Care will look into the possibility of finding a private space for them to do this. Spiritual Care continuing to follow for support and assistance with  viewing.     02/15/22 8656   Encounter Summary   Services provided to: Patient and family together   Referral/Consult From: Other    Support System Children;Friends/neighbors   Continue Visiting Yes  (Family requests daily visits if possible)   Complexity of Encounter High   Length of Encounter 1 hour;15 minutes   Spiritual Assessment Completed Yes   Grief and Life Adjustment   Type Grief and loss   Assessment Approachable;Grieving; Anxious;Guilt   Intervention Active listening;Explored feelings, thoughts, concerns;Nurtured hope;Prayer;Sustaining presence/ Ministry of presence; Discussed belief system/Judaism practices/tyron;Discussed illness/injury and it's impact; Discussed afterlife   Outcome Comfort;Expressed gratitude;Engaged in conversation; Shared life review;Expressed feelings/needs/concerns;Expressed regrets;Grieving;Receptive

## 2022-02-15 NOTE — PROGRESS NOTES
Patient is in bed and resting at this time, vitals stable, pain was 10/10 with R leg, hip and back, gave PRN Percocet. Patient is a x 2 stand pivot with gait belt and TLSO brace. Christensen in place because of retention. Call light with in reach and bed alarm on.

## 2022-02-15 NOTE — PLAN OF CARE
Problem: Urinary Elimination:  Goal: Signs and symptoms of infection will decrease  Description: Signs and symptoms of infection will decrease  Outcome: Ongoing  Note: Patient shows no signs or symptoms of infection at this time. Christensen cath care performed with soap and water.

## 2022-02-15 NOTE — PLAN OF CARE
Problem: Falls - Risk of:  Goal: Will remain free from falls  Description: Will remain free from falls  Outcome: Ongoing  Note: Patient is a fall risk. Patient is a x 2 stand pivot with gait belt and walker. See Fall Risk assessment for details. Bed is in low, lock position; call light/belongings within reach. No attempts to get out of bed have been made, calls appropriately when assistance is needed. Bed alarm and hourly rounds in place for safety. Goal: Absence of physical injury  Description: Absence of physical injury  Outcome: Ongoing     Problem: Skin Integrity:  Goal: Will show no infection signs and symptoms  Description: Will show no infection signs and symptoms  Outcome: Ongoing  Note: Patient has surgical incision on R leg, ace wrapped, RAMÍREZ and pustules to RLQ, bacitracin applied and gauze pad. Skin is clean, dry and intact.    Goal: Absence of new skin breakdown  Description: Absence of new skin breakdown  Outcome: Ongoing

## 2022-02-15 NOTE — CARE COORDINATION
Team Conference held this morning and Team discussed DC date of 2/26/2022. SW met with Pt and daughter at bedside this afternoon to inform of tentative DC date and had long conversation regarding DC plan and options including skilled Home Care services, outpt therapy, and private duty care at home. Pt states, \"I'm definitely going home. \" Daughter states that she is taking FMLA from work and will be at home to assist Pt but wants to make arrangements now for what will be needed at home such as maybe a ramp or other DME but Pt wants to wait until closer to DC date to determine what will be needed. Pt did state that she was going to [facility] and had a good relationship with her Physical Therapist. Emotional support provided. SW will continue to follow and assist with DC needs and plan.      Mariaa Cervantes [place]  Case Management  [phone]

## 2022-02-15 NOTE — PROGRESS NOTES
Occupational Therapy  Facility/Department: [facility] ACUTE REHAB UNIT  Daily Treatment Note  NAME: Gutierrez White  : 1945  MRN: [id number]    Date of Service: 2/15/2022    Discharge Recommendations:  Continue to assess pending progress,24 hour supervision or assist,Patient would benefit from continued therapy after discharge  OT Equipment Recommendations  ADL Assistive Devices: Transfer Tub Bench  Other: cont to assess    Assessment   Performance deficits / Impairments: Decreased functional mobility ; Decreased ADL status; Decreased strength;Decreased ROM; Decreased safe awareness;Decreased cognition;Decreased endurance;Decreased balance;Decreased high-level IADLs;Decreased coordination;Decreased posture  Assessment: Pt continues to require fluctuating levels of assistance for functional transfers due to increased difficulty w/ TTWB RLE. Pt is able to complete sit to stand transfers w/ occasional CGA however pt is not always compliant w/ TTWB during this process. Increased emphasis was placed on squat pivot transfers today in attempt to increase compliance w/ TTWB however pt required assist x2 and max VCs for sequence. Pt is motivated but is limited by pain and difficulty w/ TTWB. Pt is functioning well below her baseline and benefits from ongoing OT. Cont OT per POC  Treatment Diagnosis: decreased ADLs, functional mobility and functional transfers 2/2 R femur fx    OT Education: Precautions;Transfer Training  Patient Education: TTWB- reinforcement needed  REQUIRES OT FOLLOW UP: Yes  Activity Tolerance  Activity Tolerance: Patient limited by pain  Activity Tolerance: Pt reported increased pain in RLE. Increased time spent educating pt about typical recovery from event and that pain is expected however to continue to monitor especially if the pain changes or becomes more severe. OT educated pt about ice for pain relief.  Pt verb understanding  Safety Devices  Safety Devices in place: Yes  Type of devices: Call light within reach; Left in chair;Chair alarm in place;Nurse notified         Patient Diagnosis(es): There were no encounter diagnoses. has a past medical history of Allergic, Anemia, Anxiety, Closed comminuted intra-articular fracture of distal femur, right, initial encounter ([facility] [address] Depression, Eye disorder, Fibromyalgia, GERD (gastroesophageal reflux disease), Hypertension, Obesity, KAIT (obstructive sleep apnea), Osteoarthritis, Osteoporosis, PONV (postoperative nausea and vomiting), and Prolonged emergence from general anesthesia. has a past surgical history that includes hip surgery; joint replacement (Right, 1993/2003); Wrist fracture surgery (Left); joint replacement (Left, 5/18/16); knee surgery; and Femur fracture surgery (Right, 2/7/2022). Restrictions  Position Activity Restriction  Other position/activity restrictions: TTWB R, up with assistance  Subjective   General  Chart Reviewed: Yes  Patient assessed for rehabilitation services?: Yes  Additional Pertinent Hx: 78 yo admitted to [facility] 2/6/22 for fall/R femur fx. OR 2/7 FEMUR OPEN REDUCTION INTERNAL FIXATION RIGHT. Admitted to ARU 2/11/22. Pmhx: HTN, fibromyalgia, KAIT, L TKR, R THR  Family / Caregiver Present: Yes (daughter)  Referring Practitioner: Elyssa Patel MD  Diagnosis: R femur fx s/p ORIF  Subjective  Subjective: Pt was semi supine in bed upon arrival. Pt reported she's been having pain but she had pain meds prior to session. Pt pleasant and agreeable to OT/PT. Co treat indicated to maximize functional independence. Vital Signs  Patient Currently in Pain: Yes (Pt c/o RLE pain but did not provide rating.  Meds delivered prior to session and ice pack applied at EOS)   Orientation     Objective    ADL  LE Dressing: Setup (Pt donned L sock seated at EOB.)        Balance  Sitting Balance: Supervision  Standing Balance: Contact guard assistance  Standing Balance  Time: ~2.5 mins total  Activity: stance in // bars  Comment: Pt engaged in standing balance task in // bars to improve compliance w/ TTWB RLE and to increase safety in stance for ADLs. Pt tolerated stance for ~1 min and 1.5 mins w/ CGA. Pt maintained stance w/ occasional removal of 1 UE support but required max VCS for TTWB. Crackers were placed under pt's RLE as a cue since pt appears to have increased difficulty w/ just a verbal reminder for precautions. Functional Mobility  Functional - Mobility Device: Wheelchair  Activity: To/From therapy gym  Functional Mobility Comments: OT assisted pt due to time constraints    Bed mobility  Supine to Sit: Stand by assistance  Transfers  Sit Pivot Transfers: 2 Person assistance (See below)  Sit to stand: Contact guard assistance (completed from w/c > // bars w/ CGA and mod VCs needed for TTWB RLE. VCs needed for hand placement and anterior weight shift)  Stand to sit: Contact guard assistance (VCs needed to reach BUEs back when sitting into w/c)  Transfer Comments: Increased time spent addressing squat pivot transfers to increase safety w/ transfers and to determine best transfer method that reduces risk of pt breaking her TTWB precautions. Pt is able to complete sit to stand transfers however is often non compliant w/ TTWB. Increased time spent addressing squat pivot to address importance of compliance w/ precautions. Pt completed squat pivot transfers from bed > w/c 3x w/ fluctuating levels of assistance. Pt required anywhere between [facility] [address] to [address]. Pt required max VCs for transfer techniques, including hand placement and anterior weight shift. Pt w/ increased difficulty comprehending transfer sequence and required frequent reminders for TTWB RLE. Cognition  Arousal/Alertness: Appropriate responses to stimuli  Following Commands: Inconsistently follows commands; Follows one step commands with increased time; Follows one step commands with repetition  Attention Span: Attends with cues to redirect; Difficulty attending to directions  Memory: Decreased short term memory;Decreased recall of precautions  Safety Judgement: Decreased awareness of need for safety;Decreased awareness of need for assistance  Problem Solving: Assistance required to correct errors made;Decreased awareness of errors;Assistance required to identify errors made;Assistance required to implement solutions;Assistance required to generate solutions  Insights: Decreased awareness of deficits  Initiation: Requires cues for some  Sequencing: Requires cues for some  Cognition Comment: Pt required VCs for TTWB precautions. Pt appeared to have some difficulty maintaining attention to task at times. Pt w/ difficulty sequencing transfers                                  2nd session: Pt was semi supine in bed upon arrival. Pt was pleasant and agreeable to OT/PT. Co treat indicated to maximize functional independence. Bed mobility:  Supine > sit- completed w/ HOB elevated w/ SBA    Functional transfers:  Squat pivot- completed from EOB > w/c w/ Min Ax1 + CGAx1. VCs needed for TTWB RLE and VCs needed for proper sequence   Toilet- pt completed stand pivot transfer from w/c > RTS w/ CGA. Pt transferred before OT/PT were ready and she placed full weight through her RLE and used GB. Squat pivot transfer completed from RTS > w/c and from w/c <> RTS w/ increased emphasis on proper technique and TTWB RLE. Pt had increased difficulty and required min Ax1 + Mod Ax1 for transfers. Pt continues to attempt to stand for transfers and she requires max VCs for hand placement as pt will forget to move her RUE while transitioning into w/c. Pt also attempts to complete pivots in several parts vs one smooth movement resulting in her landing on the wheel of the w/c. VCs needed for improved technique to reduce chance of w/c moving. Pt practiced pants management seated on toilet to increase compliance w/ TTWB.  Pt was able to weight shift L and R to remove

## 2022-02-15 NOTE — PROGRESS NOTES
Physical Therapy  Facility/Department: [facility] ACUTE REHAB UNIT  Daily Treatment Note  NAME: Angel Luis Bunch  : 1945  MRN: [id number]     Date of Service: 2/15/2022     Discharge Recommendations:  Continue to assess pending progress,24 hour supervision or assist,Patient would benefit from continued therapy after discharge   PT Equipment Recommendations  Equipment Needed: Yes  Mobility Devices: Bettyjo Hylan: Rolling  Other: will likely benefit from wc - continue to assess pending progress     Assessment   Assessment: Pt is pleasant with a great sense of humor but requires re direction; c/o increased RLE pain towards the end of our session. Pt continues to be struggle with TTWB precautions and sequencing during transfers; extra cues, demonstration and education were provided multiple times. Bed mobility is progressing well presenting with good trunk  and LE control. 2nd Session: Pt tolerated session well with improved pain management; more impulsive this afternoon. Bed mobility was performed at a supervision level with the HOB elevated. Performed 1 squat pivot transfer(bed to w/c) , 3 SPT (w/c<>commode) and 1 stand pivot transfer (w/c to commode). All requiring extensive cues for sequencing and TTWB requiring Min + Mod assist for most transfers; when performed correctly. OT and PT discussed attempting Stand pivot transfers with a RW tomorrow.        Treatment Diagnosis: mobility impairment due to R femur fx  Decision Making: Medium Complexity  Patient Education: Pt verbalized understanding however demo's decreased short term memory and would benefit from continued skilled PT  REQUIRES PT FOLLOW UP: Yes      Patient Diagnosis(es): There were no encounter diagnoses.      has a past medical history of Allergic, Anemia, Anxiety, Closed comminuted intra-articular fracture of distal femur, right, initial encounter ([facility] [address] Depression, Eye disorder, Fibromyalgia, GERD (gastroesophageal reflux disease), Hypertension, Obesity, KAIT (obstructive sleep apnea), Osteoarthritis, Osteoporosis, PONV (postoperative nausea and vomiting), and Prolonged emergence from general anesthesia. has a past surgical history that includes hip surgery; joint replacement (Right, 1993/2003); Wrist fracture surgery (Left); joint replacement (Left, 5/18/16); knee surgery; and Femur fracture surgery (Right, 2/7/2022).    Restrictions  Position Activity Restriction  Other position/activity restrictions: TTWB R, up wit assistance  Subjective   General  Additional Pertinent Hx: 78 yo admitted 2/6/22 for fall/R femur fx. OR 2/7 FEMUR OPEN REDUCTION INTERNAL FIXATION RIGHT. Pmhx: HTN, fibromyalgia, KAIT, L TKR, R THR. Admitted to ARU 2/11/2022. Subjective  Subjective: Pt was in bed willing to participate. c/o moderate pain. Orientation  Orientation  Overall Orientation Status: WNL    Objective     Bed mobility  Supine to Sit: Supervision with the bed flat     Transfers   Sit to Stand: CGA  Stand to sit: CGA  Squat pivot (bed<> w/c 3x):  Varying levels of assist. (Min + CGA) or (Min + Mod).      Balance  Sitting - Static: Good  Sitting - Dynamic: Good  Standing - Static: fair with UE support  Standing - Dynamic: Poor     Exercises  Standing trials: 2x60\" inside the // bars with CGA          Goals  Short term goals  Time Frame for Short term goals: 10 days  Short term goal 1: Pt will complete sit<>supine with SBA  Short term goal 2: Pt will complete transfers with min A x 1  Short term goal 3: Pt will ambulate 10' with min A x 1 and wc follow  Short term goal 4: Pt will complete ascent/descent of 4 steps with UL handrail and min A x 1  Short term goal 5: Pt will complete 48' wc mobility with mod I  Long term goals  Time Frame for Long term goals : 3 weeks  Long term goal 1: Pt will complete bed mobility independently  Long term goal 2: Pt will complete transfers with od I and LRAD  Long term goal 3: Pt will ambulate 48' with mod I and LRAD  Long term goal 4: Pt will ascend/descend 5 + 5 stairs with UL hand rail and SBA  Long term goal 5: Pt will complete 150' wc mobility with independence  Patient Goals   Patient goals : return home, travel to [place] [id number] to see her son who has terminal cancer     Plan    Plan  Times per week: 5-7 x per week 75 min  Current Treatment Recommendations: Strengthening,Balance Training,Transfer Training  Safety Devices  Type of devices: Call light within reach,Chair alarm in place,Nurse notified,Left in chair      Therapy Time    Individual Concurrent Group Co-treatment   Time In    0830   Time Out    0930   Minutes    60   Timed Code Treatment Minutes: 60Minutes      Second Session Therapy Time:    Individual Concurrent Group Co-treatment   Time In    9931   Time Out    0099   Minutes    30        Total Treatment Minutes:  90 min        Uday Whitman, PTA

## 2022-02-15 NOTE — PROGRESS NOTES
Department of Physical Medicine & Rehabilitation  Progress Note    Patient Identification:  Rylee De Luna  [id number]  : 1945  Admit date: 2022    Chief Complaint: Closed comminuted intra-articular fracture of distal femur, right, initial encounter ([facility] [address]    Subjective:   No new complaints overnight. She continues to have pain in her right leg near her incision. Improving with therapy. Cog issues continue to be a barrier to discharge. Daughter is concerned about timeline of home care vs outpatient therapy. ROS: No f/c, n/v, cp     Objective:  Patient Vitals for the past 24 hrs:   BP Temp Temp src Pulse Resp SpO2 Weight   02/15/22 0803 -- -- -- -- -- 90 % --   02/15/22 0753 (!) 144/84 98.2 °F (36.8 °C) Oral 76 -- 90 % --   02/15/22 0600 -- -- -- -- -- -- 228 lb 6.3 oz (103.6 kg)   22 2146 135/67 98 °F (36.7 °C) Oral 82 18 97 % --     Const: Alert. No distress, pleasant. HEENT: Normocephalic, atraumatic. Normal sclera/conjunctiva. MMM. CV: Regular rate and rhythm. Resp: No respiratory distress. Lungs CTAB. Abd: Soft, nontender, nondistended, NABS+   Ext: + edema. Neuro: Alert, oriented, appropriately interactive. Psych: Cooperative, appropriate mood and affect    Laboratory data: Available via EMR. Last 24 hour lab  No results found for this or any previous visit (from the past 24 hour(s)). Therapy progress:  PT  Position Activity Restriction  Other position/activity restrictions: TTWB R, up wit assistance  Objective     Sit to Stand:  Moderate Assistance  Stand to sit: Moderate Assistance  Device: [address]: Minimal assistance  Distance: x5ft  OT  PT Equipment Recommendations  Equipment Needed: Yes  Mobility Devices: Evie Bauman: Rolling  Other: will likely benefit from wc - continue to assess pending progress  Toilet - Technique: Stand pivot  Equipment Used: Raised toilet seat with rails  Assessment        SLP          Body mass index is 32.77 kg/m². Assessment and Plan:  Right midshaft femur fracture   -s/p ORIF (2/7 with Dr. Rodolfo Fraire)  -Wound care  -TTWB  -PT/OT for functional mobility, balance, ADLs     Acute blood loss anemia   -Post-surgical.   -Monitor Hgb, transfuse prn <7.      Hypoxia  -Thought to be due to atelectasis and fluid overload  -Supplemental O2, wean as tolerated  -IS     Fluid overload  -s/p IV lasix x 1 on acute   -improved     MARISELA  -Avoid nephrotoxins, renally dose meds  -Monitor renal function     Anxiety/depression  -bupropion, duloxetine, escitalopram     Fibromyalgia  -Duloxetine, gabapentin     KAIT  -CPAP     Bladder  -- Urinary retention  -Christensen in place, consider void trial ~1 week  -flomax     Bowel   -High risk constipation   -senna+colace BID, PRN miralax, MoM, and bisacodyl supp. -improved     Safety   -fall precautions     Pain control  -Percocet prn, gabapentin     PPx  -DVT: lovenox x 20 days per Ortho  -GI: pantoprazole    Rehab Progress: Improving  Anticipated Dispo: home  Services/DME:   ELOS: TBD     Team conference was held today on the patient and discussed directly with the patient utilizing their entire treatment team. Please see separate team note for details. Total treatment time for today's care >35 min. >50% of time spent counseling with patient and coordinating care.          Brittney Bianchi D.O. M.P.H  PM&R  2/15/2022  10:15 AM

## 2022-02-15 NOTE — PROGRESS NOTES
A&O, but appears confused at times. Discussed the los of her son. Coping appropriately. Denies pain at this time. Medicated earlier in shift, prior to therapy. States percocet effective. Assist x1, stand pivot from R Marisol Judith 23 to bed. TTWB to RLE. ACE wrap and gauze removed per ortho MD.  Applied mepilex to surgical incision. Incision well approximated. No drainage, no odor.

## 2022-02-15 NOTE — PROGRESS NOTES
Call placed to Dr. Lynnie Lesch office. Patient has no surgical site orders. Message left to return call.

## 2022-02-16 LAB
ANION GAP SERPL CALCULATED.3IONS-SCNC: 7 MMOL/L (ref 3–16)
BASOPHILS ABSOLUTE: 0 K/UL (ref 0–0.2)
BASOPHILS RELATIVE PERCENT: 0.5 %
BUN BLDV-MCNC: 22 MG/DL (ref 7–20)
CALCIUM SERPL-MCNC: 9 MG/DL (ref 8.3–10.6)
CHLORIDE BLD-SCNC: 103 MMOL/L (ref 99–110)
CO2: 29 MMOL/L (ref 21–32)
CREAT SERPL-MCNC: 0.8 MG/DL (ref 0.6–1.2)
EOSINOPHILS ABSOLUTE: 0.2 K/UL (ref 0–0.6)
EOSINOPHILS RELATIVE PERCENT: 4.3 %
GFR AFRICAN AMERICAN: >60
GFR NON-AFRICAN AMERICAN: >60
GLUCOSE BLD-MCNC: 86 MG/DL (ref 70–99)
HCT VFR BLD CALC: 25.5 % (ref 36–48)
HEMOGLOBIN: 8.3 G/DL (ref 12–16)
LYMPHOCYTES ABSOLUTE: 1.3 K/UL (ref 1–5.1)
LYMPHOCYTES RELATIVE PERCENT: 24.6 %
MCH RBC QN AUTO: 28.9 PG (ref 26–34)
MCHC RBC AUTO-ENTMCNC: 32.5 G/DL (ref 31–36)
MCV RBC AUTO: 89.1 FL (ref 80–100)
MONOCYTES ABSOLUTE: 0.6 K/UL (ref 0–1.3)
MONOCYTES RELATIVE PERCENT: 11 %
NEUTROPHILS ABSOLUTE: 3.1 K/UL (ref 1.7–7.7)
NEUTROPHILS RELATIVE PERCENT: 59.6 %
PDW BLD-RTO: 15.2 % (ref 12.4–15.4)
PLATELET # BLD: 262 K/UL (ref 135–450)
PMV BLD AUTO: 9.3 FL (ref 5–10.5)
POTASSIUM REFLEX MAGNESIUM: 4.2 MMOL/L (ref 3.5–5.1)
RBC # BLD: 2.87 M/UL (ref 4–5.2)
SODIUM BLD-SCNC: 139 MMOL/L (ref 136–145)
WBC # BLD: 5.2 K/UL (ref 4–11)

## 2022-02-16 PROCEDURE — 6360000002 HC RX W HCPCS: Performed by: PHYSICAL MEDICINE & REHABILITATION

## 2022-02-16 PROCEDURE — 99232 SBSQ HOSP IP/OBS MODERATE 35: CPT | Performed by: PHYSICAL MEDICINE & REHABILITATION

## 2022-02-16 PROCEDURE — 6370000000 HC RX 637 (ALT 250 FOR IP): Performed by: PHYSICAL MEDICINE & REHABILITATION

## 2022-02-16 PROCEDURE — 97530 THERAPEUTIC ACTIVITIES: CPT

## 2022-02-16 PROCEDURE — 97542 WHEELCHAIR MNGMENT TRAINING: CPT

## 2022-02-16 PROCEDURE — 85025 COMPLETE CBC W/AUTO DIFF WBC: CPT

## 2022-02-16 PROCEDURE — 1280000000 HC REHAB R&B

## 2022-02-16 PROCEDURE — 80048 BASIC METABOLIC PNL TOTAL CA: CPT

## 2022-02-16 PROCEDURE — 36415 COLL VENOUS BLD VENIPUNCTURE: CPT

## 2022-02-16 RX ADMIN — Medication: at 20:56

## 2022-02-16 RX ADMIN — Medication: at 08:12

## 2022-02-16 RX ADMIN — BUPROPION HYDROCHLORIDE 150 MG: 150 TABLET, EXTENDED RELEASE ORAL at 08:27

## 2022-02-16 RX ADMIN — GABAPENTIN 600 MG: 600 TABLET, FILM COATED ORAL at 14:22

## 2022-02-16 RX ADMIN — DULOXETINE HYDROCHLORIDE 60 MG: 60 CAPSULE, DELAYED RELEASE ORAL at 08:10

## 2022-02-16 RX ADMIN — OXYCODONE HYDROCHLORIDE AND ACETAMINOPHEN 2 TABLET: 5; 325 TABLET ORAL at 12:52

## 2022-02-16 RX ADMIN — OXYCODONE HYDROCHLORIDE AND ACETAMINOPHEN 1 TABLET: 5; 325 TABLET ORAL at 20:33

## 2022-02-16 RX ADMIN — ENOXAPARIN SODIUM 40 MG: 100 INJECTION SUBCUTANEOUS at 08:10

## 2022-02-16 RX ADMIN — GABAPENTIN 600 MG: 600 TABLET, FILM COATED ORAL at 08:27

## 2022-02-16 RX ADMIN — SENNOSIDES AND DOCUSATE SODIUM 1 TABLET: 50; 8.6 TABLET ORAL at 08:10

## 2022-02-16 RX ADMIN — OXYCODONE HYDROCHLORIDE AND ACETAMINOPHEN 1 TABLET: 5; 325 TABLET ORAL at 08:09

## 2022-02-16 RX ADMIN — ESCITALOPRAM OXALATE 20 MG: 10 TABLET ORAL at 08:10

## 2022-02-16 RX ADMIN — GABAPENTIN 600 MG: 600 TABLET, FILM COATED ORAL at 20:56

## 2022-02-16 RX ADMIN — PANTOPRAZOLE SODIUM 40 MG: 40 TABLET, DELAYED RELEASE ORAL at 06:52

## 2022-02-16 RX ADMIN — TAMSULOSIN HYDROCHLORIDE 0.4 MG: 0.4 CAPSULE ORAL at 08:09

## 2022-02-16 ASSESSMENT — PAIN DESCRIPTION - DESCRIPTORS
DESCRIPTORS: ACHING
DESCRIPTORS: ACHING

## 2022-02-16 ASSESSMENT — PAIN DESCRIPTION - LOCATION
LOCATION: HIP;LEG
LOCATION: LEG

## 2022-02-16 ASSESSMENT — PAIN DESCRIPTION - PAIN TYPE
TYPE: ACUTE PAIN;SURGICAL PAIN

## 2022-02-16 ASSESSMENT — PAIN DESCRIPTION - ONSET: ONSET: ON-GOING

## 2022-02-16 ASSESSMENT — PAIN DESCRIPTION - ORIENTATION
ORIENTATION: RIGHT
ORIENTATION: RIGHT

## 2022-02-16 ASSESSMENT — PAIN SCALES - GENERAL
PAINLEVEL_OUTOF10: 6
PAINLEVEL_OUTOF10: 8
PAINLEVEL_OUTOF10: 5
PAINLEVEL_OUTOF10: 3
PAINLEVEL_OUTOF10: 3

## 2022-02-16 ASSESSMENT — PAIN DESCRIPTION - FREQUENCY
FREQUENCY: INTERMITTENT
FREQUENCY: INTERMITTENT

## 2022-02-16 ASSESSMENT — PAIN DESCRIPTION - PROGRESSION: CLINICAL_PROGRESSION: NOT CHANGED

## 2022-02-16 ASSESSMENT — PAIN - FUNCTIONAL ASSESSMENT: PAIN_FUNCTIONAL_ASSESSMENT: PREVENTS OR INTERFERES SOME ACTIVE ACTIVITIES AND ADLS

## 2022-02-16 NOTE — PLAN OF CARE
Problem: Nutrition  Goal: Optimal nutrition therapy  Outcome: Ongoing  Note: Nutrition Problem #1: Increased nutrient needs  Intervention: Food and/or Nutrient Delivery: Continue Current Diet  Nutritional Goals: pt will continue to consume 75% or greater at each meal and 75% or greater of her ONS through admission

## 2022-02-16 NOTE — PLAN OF CARE
Problem: Falls - Risk of:  Goal: Will remain free from falls  Description: Will remain free from falls  2/15/2022 1917 by Iggy Jones RN  Outcome: Ongoing  Note: Client remains free from falls, bed/chair alarm in place, door open, encouraged to use call light for needs, call light is within reach, bed locked in lowest position,  Will continue to monitor. Problem: Mobility - Impaired:  Goal: Mobility will improve  Description: Mobility will improve  2/15/2022 1917 by Igyg Jones RN  Outcome: Ongoing  Note: Mobility to improve through active participation with therapy     Problem: Pain:  Goal: Control of acute pain  Description: Control of acute pain  2/15/2022 1917 by Iggy Jones RN  Outcome: Ongoing  Note: Pt voices pain needs appropriately, pain is assessed during shift. Problem: Skin Integrity:  Goal: Will show no infection signs and symptoms  Description: Will show no infection signs and symptoms  Outcome: Ongoing  Note: Surgical site observed for signs/symptoms of infection. Vitals performed routinely, labs observed.  Will monitor pt while on ARU       Problem: Urinary Elimination:  Goal: Complications related to the disease process, condition or treatment will be avoided or minimized  Description: Complications related to the disease process, condition or treatment will be avoided or minimized  Outcome: Ongoing  Note: Christensen care performed during shift

## 2022-02-16 NOTE — PROGRESS NOTES
Occupational Therapy  Facility/Department: [facility] ACUTE REHAB UNIT  Daily Treatment Note  NAME: Xiao Archuleta  : 1945  MRN: [id number]    Date of Service: 2022    Discharge Recommendations:  Continue to assess pending progress,24 hour supervision or assist,Patient would benefit from continued therapy after discharge  OT Equipment Recommendations  ADL Assistive Devices: Transfer Tub Bench  Other: cont to assess    Assessment   Performance deficits / Impairments: Decreased functional mobility ; Decreased ADL status; Decreased strength;Decreased ROM; Decreased safe awareness;Decreased cognition;Decreased endurance;Decreased balance;Decreased high-level IADLs;Decreased coordination;Decreased posture  Assessment: Pt demonstrated improved transfer technique w/ stand pivot transfers today w/ decreased assistance needed but continued cues needed for maintenance of TTWB RLE. Pt tolerated stance for ~1 min w/ VCs needed to maintain 1 UE support on the RW. Pt often has some increased difficulty attending to directions and sequencing transfers through this does appear to be improving w/ stand pivot technique. Pt is limited by RLE pain, back pain, and decreased activity tolerance. Pt benefits from ongoing OT to maximize functional independence. Cont OT per POC  Treatment Diagnosis: decreased ADLs, functional mobility and functional transfers 2/2 R femur fx    OT Education: Precautions;Transfer Training  Patient Education: TTWB- reinforcement needed  REQUIRES OT FOLLOW UP: Yes  Activity Tolerance  Activity Tolerance: Patient limited by pain; Patient limited by fatigue  Activity Tolerance: Extended rest breaks needed due to pain and fatigue  Safety Devices  Safety Devices in place: Yes  Type of devices: Bed alarm in place; Left in bed;Nurse notified;Call light within reach         Patient Diagnosis(es): There were no encounter diagnoses.       has a past medical history of Allergic, Anemia, Anxiety, Closed comminuted intra-articular fracture of distal femur, right, initial encounter ([facility] [address] Depression, Eye disorder, Fibromyalgia, GERD (gastroesophageal reflux disease), Hypertension, Obesity, KAIT (obstructive sleep apnea), Osteoarthritis, Osteoporosis, PONV (postoperative nausea and vomiting), and Prolonged emergence from general anesthesia. has a past surgical history that includes hip surgery; joint replacement (Right, 1993/2003); Wrist fracture surgery (Left); joint replacement (Left, 5/18/16); knee surgery; and Femur fracture surgery (Right, 2/7/2022). Restrictions  Position Activity Restriction  Other position/activity restrictions: TTWB R, up with assistance  Subjective   General  Chart Reviewed: Yes  Patient assessed for rehabilitation services?: Yes  Additional Pertinent Hx: 76 yo admitted to [facility]/22 for fall/R femur fx. OR 2/7 FEMUR OPEN REDUCTION INTERNAL FIXATION RIGHT. Admitted to ARU 2/11/22. Pmhx: HTN, fibromyalgia, KAIT, L TKR, R THR  Family / Caregiver Present: Yes (daughter)  Referring Practitioner: Nicholas Durbin MD  Diagnosis: R femur fx s/p ORIF  Subjective  Subjective: Pt was seated in w/c upon arrival.  Pt expressed feeling overwhelmed and rushed this morning. Therapy time was adjusted to give pt more time to eat and relax. Pt was then agreeable to OT/PT at later time. Co treat indicated to maximize functional independence. General Comment  Comments: Pt prefers to wear her LSO for comfort due to premorbid back pain and back spasms    Vital Signs  Patient Currently in Pain: Yes (Pt w/ increased RLE pain at EOS but pt was not due for pain meds. Ice pack applied)   Orientation     Objective             Balance  Sitting Balance: Supervision  Standing Balance: Minimal assistance  Standing Balance  Time: ~4 mins total  Activity: stand pivot transfers; stance at RW  Comment: Pt engaged in standing balance task at RW to simulate routine of managing clothes up and down for LE dressing and toileting.  Pt maintained stance at RW and pulled theraband up and down over hips w/ 1 UE support on the RW. Pt required mod VCs to maintain TTWB RLE and mod VCs to maintain at least 1 UE support on RW. Pt attempted to remove BUE support resulting in min A needed to recover LOB. Pt w/ CGA when she maintained at least 1 UE support. Standing limited due to pt c/o pain and fatigue. Extended rest break needed between 2 standing trials. Functional Mobility  Functional - Mobility Device: Wheelchair  Activity: To/From therapy gym  Assist Level: Supervision    Bed mobility  Sit to Supine: Minimal assistance     Transfers  Stand Pivot Transfers: 2 Person assistance (See below)  Sit to stand: Contact guard assistance;Minimal assistance (from w/c > RW)  Stand to sit: Minimal assistance (VCs needed for controlled descent)  Transfer Comments: Due to increased difficulty w/ squat pivot transfers yesterday, increased time spent addressing improved transfer technique today. Pt completed stand pivot transfers w/ use of RW from w/c <> EOM 2x w/ CGA x2- Min Ax1 + CGAx1. Though pt does not require significant amount of assistance for transfers, she does require assistance and increased cues to maintain TTWB and to pivot properly to transition to transfer surface. Pt was instructed to make her RLE NWB at times as pt often has increased difficulty understanding the TTWB. Pt appeared to do better when instructed to completely lift up her RLE vs leaving toes on the ground. Pt w/ increased difficulty sequencing transfers at times and required VCs for comprehension. Increased time needed between trials to recover due to pain and fatigue. Cognition  Arousal/Alertness: Appropriate responses to stimuli  Following Commands: Inconsistently follows commands; Follows one step commands with increased time; Follows one step commands with repetition  Attention Span: Attends with cues to redirect; Difficulty attending to directions  Memory: Decreased short term memory;Decreased recall of precautions  Problem Solving: Assistance required to correct errors made;Decreased awareness of errors;Assistance required to identify errors made;Assistance required to implement solutions;Assistance required to generate solutions  Insights: Decreased awareness of deficits  Initiation: Requires cues for some  Sequencing: Requires cues for some  Cognition Comment: Pt required VCs for TTWB precautions. Pt appeared to have some difficulty maintaining attention to task at times. Pt w/ difficulty sequencing transfers but pleasant and cooperative                                     2nd session: Pt was semi supine in bed upon arrival. Pt was pleasant and agreeable to OT/PT. Co treat indicated to maximize functional independence. Pt c/o pain however reported she is not due for pain meds yet. Bed mobility:  Supine > sit- SBA w/ HOB elevated and use of bed rail     Functional transfers:  Stand pivot- completed from EOB > w/c w/ use of RW w/ CGA x2. VCs needed for technique and TTWB RLE. Car transfer- stand pivot completed from w/c <> car transfer simulator w/ CGAx1 + Min Ax1. VCs needed for TTWB RLE and VCs to hold onto RW vs door frame. Pt left in w/c at EOS w/ chair alarm on and call light within reach.                          Plan   Plan  Times per week: 90min 5x/week  Times per day: Daily  Current Treatment Recommendations: Patient/Caregiver Education & Training,Home Management Training,Equipment Evaluation, Education, & procurement,Balance Mikael Kussmaul / ADL,Safety Education & Training,Strengthening,Functional Mobility Training  G-Code     OutComes Score                                                  AM-PAC Score             Goals  Short term goals  Time Frame for Short term goals: 10 days-all goals ongoing  Short term goal 1: Pt will complete toilet transfer w/ Min Ax1  Short term goal 2: Pt will complete LE dressing assessment  Short term goal 3: Pt will complete shower transfer w/ Alec x1  Short term goal 4: Pt will complete UE dressing w/ Min Ax1  Long term goals  Time Frame for Long term goals : 3 weeks-all goals ongoing  Long term goal 1: Pt will complete toilet transfer/ toileting w/ Mod I  Long term goal 2: Pt will complete UE/LE dressing w/ Mod I and use of AE prn  Long term goal 3: Pt will complete tub transfer w/ SBA  Long term goal 4: Pt will complete bathing w/ Spvn     Therapy Time   Individual Concurrent Group Co-treatment   Time In       0930   Time Out       1030   Minutes       60   Timed Code Treatment Minutes: 60 Minutes     Second Session Therapy Time:   Individual Concurrent Group Co-treatment   Time In       1349   Time Out       1415   Minutes       30     Timed Code Treatment Minutes:  30    Total Treatment Minutes:  60+30= 90     Hernan Hammond, OT

## 2022-02-16 NOTE — PROGRESS NOTES
Physical Therapy  Facility/Department: [facility] ACUTE REHAB UNIT  Daily Treatment Note  NAME: Vasquez Del Toro  : 1945  MRN: [id number]    Date of Service: 2022    Discharge Recommendations:  Continue to assess pending progress,24 hour supervision or assist,Patient would benefit from continued therapy after discharge   PT Equipment Recommendations  Equipment Needed: Yes  Mobility Devices: Rosalita Gross: Rolling  Other: will likely benefit from wc - continue to assess pending progress    Assessment   Body structures, Functions, Activity limitations: Decreased functional mobility ; Decreased balance;Decreased endurance;Decreased strength  Assessment: Pt demonstrates increased ability to comply to weight bearing precatuons this date while completing stand pivot transfers with RW with CGA-min x 2. Pt would benefit from continued skilled PT in order to progress towards PLOF and independence. Treatment Diagnosis: mobility impairment due to R femur fx  Decision Making: Medium Complexity  PT Education: Goals;PT Role;Plan of Care;Precautions;Transfer Training;General Safety; Functional Mobility Training  Patient Education: Pt verbalized understanding however would benefit from continued reniforcement  REQUIRES PT FOLLOW UP: Yes  Activity Tolerance  Activity Tolerance: Patient limited by fatigue;Patient Tolerated treatment well     Patient Diagnosis(es): There were no encounter diagnoses. has a past medical history of Allergic, Anemia, Anxiety, Closed comminuted intra-articular fracture of distal femur, right, initial encounter ([facility] [address] Depression, Eye disorder, Fibromyalgia, GERD (gastroesophageal reflux disease), Hypertension, Obesity, KAIT (obstructive sleep apnea), Osteoarthritis, Osteoporosis, PONV (postoperative nausea and vomiting), and Prolonged emergence from general anesthesia. has a past surgical history that includes hip surgery; joint replacement (Right, );  Wrist fracture surgery (Left); joint replacement (Left, 5/18/16); knee surgery; and Femur fracture surgery (Right, 2/7/2022). Restrictions  Position Activity Restriction  Other position/activity restrictions: TTWB R, up with assistance  Subjective   General  Chart Reviewed: Yes  Additional Pertinent Hx: 78 yo admitted 2/6/22 for fall/R femur fx. OR 2/7 FEMUR OPEN REDUCTION INTERNAL FIXATION RIGHT. Pmhx: HTN, fibromyalgia, KAIT, L TKR, R THR. Admitted to ARU 2/11/2022.   Family / Caregiver Present: Yes (dtr)  Subjective  Subjective: Pt seated in wc upon appraoch and agreeable to PT/OT          Orientation     Cognition      Objective   Bed mobility  Sit to Supine: Minimal assistance  Transfers  Sit to Stand: Contact guard assistance;Minimal Assistance (multiple x from wc/mat; all with CGA except for 1 instance of min A from mat ; all with RW)  Stand to sit: Contact guard assistance (to wc/mat with RW)  Stand Pivot Transfers: 2 Person Assistance;Contact guard assistance;Minimal Assistance (CGA x 2 for wc<>mat first trial and for second trial wc>mat; CGA+ min A second trial mat>wc)  Comment: Weight bearing monitored on sit>stand with foot under pt's foot with pt mantaining precautions throughout, pt demo's ability to hold R LE of ground for stand pivots at beginning of session with RW- transitions to dragging with fatigue however mantains TTWB; VC/bisual cues for sequencing, forward weight shift, and LE positioning  Ambulation  Ambulation?: No  Wheelchair Activities  Wheelchair Size: 20\"  Wheelchair Type: Standard  Wheelchair Parts Management: Yes  Left Brakes Level of Assistance: Independent  Right Brakes Level of Assistance: Independent  Propulsion: Yes  Propulsion 1  Propulsion: Manual  Level: Level Tile  Method: RUE;LUE;LLE (fluctuates between use of UEs only and use of B UEs and L LE)  Level of Assistance: Supervision  Description/ Details: moderate larry, includes 90 nd 180 deg turns and manuevering in tight places, VC to manuever wc to set up for transfer to mat  Distance: 150'     Balance  Sitting - Static: Good;- (supervision seated edge of mat)  Sitting - Dynamic: Fair;+ (SBA seated edge of mat)  Standing - Static: Fair;- (CGA with RW)  Standing - Dynamic: Poor;+ (CGA-min of 2 with RW for stand pivot transfers)  Comments: Pt stands for 3 x ~ 60 seconds for pants simulation task through pulling theraband tied around LEs up and down                         Second Session:   Pt supine in bed upon approach and agreeable to PT/OT. Pt completes supine>sit with HOB elevated and SBA. Pt transported to car transfer via wc. Stand pivot transfers throughout session completed with CGAx2 for EOB>wc and car>wc, and CGA+ min A for wc>car. Pt completes sit>stands from EOB/car with CGA however requires min A from wc and demo's decreased compliance to [facility] precautions from [facility]. WBing precautions maintained through remainder of session in response to VC. VC for set up/hand placement and forward weight shift during car transfer. Upon completion of session pt left in wc with needs in reach and chair alarm on.            Goals  Short term goals  Time Frame for Short term goals: 10 days  Short term goal 1: Pt will complete sit<>supine with SBA  Short term goal 2: Pt will complete transfers with min A x 1  Short term goal 3: Pt will ambulate 10' with min A x 1 and wc follow  Short term goal 4: Pt will complete ascent/descent of 4 steps with UL handrail and min A x 1  Short term goal 5: Pt will complete 48' wc mobility with mod I  Long term goals  Time Frame for Long term goals : 3 weeks  Long term goal 1: Pt will complete bed mobility independently  Long term goal 2: Pt will complete transfers with mod I and LRAD  Long term goal 3: Pt will ambulate 48' with mod I and LRAD  Long term goal 4: Pt will ascend/descend 5 + 5 stairs with UL hand rail and SBA  Long term goal 5: Pt will complete 150' wc mobility with independence  Patient Goals   Patient goals : return home, travel to [facility] to see her son who has terminal [id number]  Times per week: 5-7 x per week 75 min  Current Treatment Recommendations: Strengthening,Balance Training,Transfer Training  Safety Devices  Type of devices: Call light within reach,Nurse notified,Bed alarm in place,Left in bed     Therapy Time   Individual Concurrent Group Co-treatment   Time In       0930   Time Out       1030   Minutes       60         Second Session Therapy Time:   Individual Concurrent Group Co-treatment   Time In       3080   Time Out       1415   Minutes       30     Timed Code Treatment Minutes:  60+30    Total Treatment Minutes:  [address] PT, [place] [id number]

## 2022-02-16 NOTE — PROGRESS NOTES
Physician Progress Note      PATIENT:               Jeovany Ibanez  CSN #:                  [id number]  :                       1945  ADMIT DATE:       2022 1:59 PM  [address] [place] DATE:        2022 7:00 PM  RESPONDING  PROVIDER #:        Rodger Sánchez MD          QUERY TEXT:    Pt admitted  with right femoral fx and underwent ORIF and has possible   mild diastolic CHF documented in DC Summary. If possible, please document in   progress notes and discharge summary further specificity regarding the   suspected acuity of dCHF:    The medical record reflects the following:  Risk Factors: 77 y.o. female with right femoral fx and underwent ORIF, HTN,   fluid overload, ABLA, MARISELA, Sleep apnea, and urinary retention. PMH of CHF   listed by anesthesia via pre-op eval on 16  Clinical Indicators: Postop noted to be hypoxemia possibly to atelectasis as   well as fluid overload, x-ray showed bibasilar opacities, proBNP elevated (344   - 2,677), was given a dose of IV Lasix  Treatment: IV Lasix x1, Home med of losartan was discontinued for hypotension,   supplemental O2  Options provided:  -- Chronic Diastolic CHF/HFpEF  -- Acute on Chronic Diastolic CHF/HFpEF  -- Acute Diastolic CHF/HFpEF  -- Other - I will add my own diagnosis  -- Disagree - Not applicable / Not valid  -- Disagree - Clinically unable to determine / Unknown  -- Refer to Clinical Documentation Reviewer    PROVIDER RESPONSE TEXT:    This patient has chronic diastolic CHF/HFpEF.     Query created by: Philis Klinefelter on 2022 10:16 AM      Electronically signed by:  Rodger Sánchez MD 2/15/2022 9:38 PM

## 2022-02-16 NOTE — PROGRESS NOTES
Department of Physical Medicine & Rehabilitation  Progress Note    Patient Identification:  Donna Byrne  [id number]  : 1945  Admit date: 2022    Chief Complaint: Closed comminuted intra-articular fracture of distal femur, right, initial encounter ([address]    Subjective:   Doing better today. No new complaints overnight. Participating well in therapy. Plan to get appointments with ortho and rheumatology before discharge. Pain is slightly improved. ROS: No f/c, n/v, cp     Objective:  Patient Vitals for the past 24 hrs:   BP Temp Temp src Pulse Resp SpO2   22 0800 (!) 144/76 98.1 °F (36.7 °C) Oral 79 16 94 %   02/15/22 2126 125/67 97.8 °F (36.6 °C) Oral 76 16 93 %     Const: Alert. No distress, pleasant. HEENT: Normocephalic, atraumatic. Normal sclera/conjunctiva. MMM. CV: Regular rate and rhythm. Resp: No respiratory distress. Lungs CTAB. Abd: Soft, nontender, nondistended, NABS+   Ext: + edema. Neuro: Alert, oriented, appropriately interactive. Psych: Cooperative, appropriate mood and affect    Laboratory data: Available via EMR.    Last 24 hour lab  Recent Results (from the past 24 hour(s))   Basic Metabolic Panel w/ Reflex to MG    Collection Time: 22  6:25 AM   Result Value Ref Range    Sodium 139 136 - 145 mmol/L    Potassium reflex Magnesium 4.2 3.5 - 5.1 mmol/L    Chloride 103 99 - 110 mmol/L    CO2 29 21 - 32 mmol/L    Anion Gap 7 3 - 16    Glucose 86 70 - 99 mg/dL    BUN 22 (H) 7 - 20 mg/dL    CREATININE 0.8 0.6 - 1.2 mg/dL    GFR Non-African American >60 >60    GFR African American >60 >60    Calcium 9.0 8.3 - 10.6 mg/dL   CBC auto differential    Collection Time: 22  6:25 AM   Result Value Ref Range    WBC 5.2 4.0 - 11.0 K/uL    RBC 2.87 (L) 4.00 - 5.20 M/uL    Hemoglobin 8.3 (L) 12.0 - 16.0 g/dL    Hematocrit 25.5 (L) 36.0 - 48.0 %    MCV 89.1 80.0 - 100.0 fL    MCH 28.9 26.0 - 34.0 pg    MCHC 32.5 31.0 - 36.0 g/dL    RDW 15.2 12.4 - 15.4 %    Platelets 850 135 - 450 K/uL    MPV 9.3 5.0 - 10.5 fL    Neutrophils % 59.6 %    Lymphocytes % 24.6 %    Monocytes % 11.0 %    Eosinophils % 4.3 %    Basophils % 0.5 %    Neutrophils Absolute 3.1 1.7 - 7.7 K/uL    Lymphocytes Absolute 1.3 1.0 - 5.1 K/uL    Monocytes Absolute 0.6 0.0 - 1.3 K/uL    Eosinophils Absolute 0.2 0.0 - 0.6 K/uL    Basophils Absolute 0.0 0.0 - 0.2 K/uL       Therapy progress:  PT  Position Activity Restriction  Other position/activity restrictions: TTWB R, up with assistance  Objective     Sit to Stand: Moderate Assistance  Stand to sit: Moderate Assistance  Device: [address]: Minimal assistance  Distance: x5ft  OT  PT Equipment Recommendations  Equipment Needed: Yes  Mobility Devices: Hazel Wesco: Rolling  Other: will likely benefit from wc - continue to assess pending progress  Toilet - Technique: Stand pivot  Equipment Used: Raised toilet seat with rails  Assessment        SLP          Body mass index is 32.77 kg/m². Assessment and Plan:  Right midshaft femur fracture   -[facility]  -Wound care  -TTWB  -PT/OT for functional mobility, balance, ADLs     Acute blood loss anemia   -Post-surgical.   -Monitor Hgb, transfuse prn <7.      Hypoxia  -Thought to be due to atelectasis and fluid overload  -Supplemental O2, wean as tolerated  -IS        MARISELA  -Avoid nephrotoxins, renally dose meds  -Monitor renal function     Anxiety/depression  -bupropion, duloxetine, escitalopram     Fibromyalgia  -Duloxetine, gabapentin     KAIT  -CPAP     Bladder  -- Urinary retention  -Christensen in place, consider void trial ~1 week  -flomax     Bowel   -High risk constipation   -senna+colace BID, PRN miralax, MoM, and bisacodyl supp.   -improved     Safety   -fall precautions     Pain control  -Percocet prn, gabapentin     PPx  -DVT: lovenox x 20 days per Ortho- plan to follow up with ortho  -GI: pantoprazole    Rehab Progress: Improving  Anticipated Dispo: home  Services/DME: HH  ANGELA: TBD         Constantino Hennessy D.O. M.P.H  PM&R  2/16/2022  10:46 AM

## 2022-02-16 NOTE — CARE COORDINATION
Pt's daughter asked SW this morning to meet with Pt herself without dgt present this afternoon to discuss DC plan again. SW just went to bedside to meet with Pt but Pt states she has a visitor coming at 3:00 who plans to stay for over 2 hours. Pt asked for SW to come back on Friday (next scheduled day to work) to discuss DC plan and needs. SW will follow up on Friday.     Terrance Van [place]  Case Management  [phone]

## 2022-02-16 NOTE — PROGRESS NOTES
Valdez care performed with soap and water. New valdez anchor applied. 150 ml yellow clear urine output. In bed. Alarm on. Call light in reach.

## 2022-02-16 NOTE — FLOWSHEET NOTE
02/16/22 1524   Encounter Summary   Services provided to: Family  (Eladia Lucy )   Support System Family members;Friends/neighbors   Continue Visiting Yes   Complexity of Encounter High   Length of Encounter 1 hour   Grief and Life Adjustment   Type Grief and loss   Assessment Approachable;Tearful;Grieving   Intervention Active listening;Explored feelings, thoughts, concerns;Explored coping resources; Empowerment;End of life care; Discussed meaning/purpose;Discussed relationship with God;Discussed belief system/Pentecostal practices/tyron;Discussed illness/injury and it's impact   Outcome Comfort;Expressed gratitude;Engaged in conversation;Expressed feelings/needs/concerns; Shared reminiscences;Grieving;Receptive;Venting emotion   Elin expressed shate she is not particularly Pentecostal but, Mary Ohm and the Universe and more so perhaps with her family\" She then went on to explain the existing dynamics of her family since she was a young girl then what her brother ment to her. \"We were close. .. We were different but we did so much together and he would include me. \" She then shared that this would change after his diagnosis with his wife applying a boundary which was hurtful to her. We discuss this some in light of the larger grief and feelings of loss. Will continue to follow. She was thankful.   Staff Noemi Mortimer, MA

## 2022-02-16 NOTE — PROGRESS NOTES
Comprehensive Nutrition Assessment    RECOMMENDATIONS:  1. PO Diet: continue current regular diet- pt is vegetarian   2. ONS: continue current- Boost Pudding QD at lunch. [address] with yogurt and fruit BID breakfast and dinner. NUTRITION ASSESSMENT:    Nutritional summary & status: Pt is much imporved and is stable. pt has been consistently consuming % of her meal trays. Pt states she is having a hard time trying to find menu options she enjoys becaue she is vegetarian. Pt was not consuming her ONS TID due to them not being made per pt preferences. Pt preferences have been updated and RD has made updates to trays. Pt enjoys meals brought in by her daughter and friends. RD will continue to monitor pt through admission.  Admission/PMH: pt admitted for right femur fx, PHM: GERD, HTN, Obesity, sleep apnea     MALNUTRITION ASSESSMENT  Context of Malnutrition: Acute Illness   Malnutrition Status: No malnutrition    NUTRITION DIAGNOSIS   Increased nutrient needs related to other (comment) (recent surgery ) as evidenced by wounds    NUTRITION INTERVENTION  Food and/or Nutrient Delivery:  Continue Current Diet  Nutrition Education/Counseling:  No recommendation at this time   Goals:  pt will continue to consume 75% or greater at each meal and 75% or greater of her ONS through admission        Nutrition Monitoring and Evaluation:   Food/Nutrient Intake Outcomes:  Food and Nutrient Intake,Supplement Intake  Physical Signs/Symptoms Outcomes:  Biochemical Data,Weight     OBJECTIVE DATA: Significant to nutrition assessment  · Nutrition-Focused Physical Findings: lbm: 2/13, non-pitting edema on lower right extremity   · Labs: Reviewed  · Meds: Reviewed  · Wounds: None       CURRENT NUTRITION THERAPIES  ADULT ORAL NUTRITION SUPPLEMENT; Breakfast, Dinner; Other Oral Supplement; Theodore Krabbe  ADULT DIET; Regular  ADULT ORAL NUTRITION SUPPLEMENT; Lunch;  Fortified Pudding Oral Supplement     PO Intake: % PO Supplement Intake:51-75% (per pt)  Additional Sources of Calories/IVF:n/a     ANTHROPOMETRICS  Current Height: 5' 10\" (177.8 cm)  Current Weight: 228 lb 6.3 oz (103.6 kg)    Admission weight: 220 lb 7.4 oz (100 kg)  Ideal Body Weight (IBW): 150 lbs  (68 kg)    Usual Bodyweight 220 lb (99.8 kg)   Weight Changes: no significant       BMI: 32.8    Wt Readings from Last 50 Encounters:   02/15/22 228 lb 6.3 oz (103.6 kg)   02/06/22 220 lb (99.8 kg)   10/12/21 220 lb (99.8 kg)   10/28/20 220 lb (99.8 kg)       COMPARATIVE STANDARDS  Energy (kcal):  8102-6627 (15-18 kcal/kg )     Protein (g):   (0.8-1.0 g/kg)       Fluid (ml/day):  4695-3167    The patient will still be monitored per nutrition standards of care. Consult dietitian if nutrition interventions essential to patient care is needed.      Ronita Landau  Office:  [phone]

## 2022-02-16 NOTE — PLAN OF CARE
Problem: Falls - Risk of:  Goal: Will remain free from falls  Description: Will remain free from falls  2/15/2022 1917 by You Pastor RN  Outcome: Ongoing  Note: Client remains free from falls, bed/chair alarm in place, door open, encouraged to use call light for needs, call light is within reach, bed locked in lowest position,  Will continue to monitor. Problem: Skin Integrity:  Goal: Will show no infection signs and symptoms  Description: Will show no infection signs and symptoms  2/15/2022 1917 by You Pastor RN  Outcome: Ongoing  Note: Surgical site observed for signs/symptoms of infection. Vitals performed routinely, labs observed.  Will monitor pt while on ARU

## 2022-02-16 NOTE — FLOWSHEET NOTE
22 1514   Encounter Summary   Services provided to: Patient and family together   Referral/Consult From: Other    Support System Children;Family members   Continue Visiting   (, david )   Complexity of Encounter High   Length of Encounter 45 minutes   Grief and Life Adjustment   Type Grief and loss   Assessment Calm; Approachable   Intervention Active listening;Explored feelings, thoughts, concerns;Explored coping resources; Empowerment; Discussed meaning/purpose;Discussed illness/injury and it's impact   Outcome Comfort;Expressed gratitude;Engaged in conversation;Expressed feelings/needs/concerns;Encouraged   PT with her daughter Eveline Kuo at bedside provided me with an update as well as there hopes to some how participate in the PT's son's [place]) . Together we discussed some ideas and Eveline Kuo sent a text to the family to obtain the  home information so we can see what technology requirements are for a possible stream or other for the PT to take part. PT was very thank for the visit and had to go the therapy. I provided Eveline Kuo a phone number to contact me as soon as she know something so I can pass it on to IT ASAP. Will f/u tomorrow.    Staff Geovanni Shaw MA

## 2022-02-17 PROCEDURE — 97116 GAIT TRAINING THERAPY: CPT

## 2022-02-17 PROCEDURE — 99232 SBSQ HOSP IP/OBS MODERATE 35: CPT | Performed by: PHYSICAL MEDICINE & REHABILITATION

## 2022-02-17 PROCEDURE — 97530 THERAPEUTIC ACTIVITIES: CPT

## 2022-02-17 PROCEDURE — 97110 THERAPEUTIC EXERCISES: CPT | Performed by: PHYSICAL THERAPIST

## 2022-02-17 PROCEDURE — 6370000000 HC RX 637 (ALT 250 FOR IP): Performed by: PHYSICAL MEDICINE & REHABILITATION

## 2022-02-17 PROCEDURE — 6360000002 HC RX W HCPCS: Performed by: PHYSICAL MEDICINE & REHABILITATION

## 2022-02-17 PROCEDURE — 97535 SELF CARE MNGMENT TRAINING: CPT

## 2022-02-17 PROCEDURE — 1280000000 HC REHAB R&B

## 2022-02-17 RX ORDER — TIZANIDINE 4 MG/1
2 TABLET ORAL EVERY 8 HOURS PRN
Status: DISCONTINUED | OUTPATIENT
Start: 2022-02-17 | End: 2022-02-26 | Stop reason: HOSPADM

## 2022-02-17 RX ADMIN — ENOXAPARIN SODIUM 40 MG: 100 INJECTION SUBCUTANEOUS at 08:55

## 2022-02-17 RX ADMIN — PANTOPRAZOLE SODIUM 40 MG: 40 TABLET, DELAYED RELEASE ORAL at 06:16

## 2022-02-17 RX ADMIN — GABAPENTIN 600 MG: 600 TABLET, FILM COATED ORAL at 08:53

## 2022-02-17 RX ADMIN — Medication: at 08:54

## 2022-02-17 RX ADMIN — BUPROPION HYDROCHLORIDE 150 MG: 150 TABLET, EXTENDED RELEASE ORAL at 08:55

## 2022-02-17 RX ADMIN — TIZANIDINE 2 MG: 4 TABLET ORAL at 12:57

## 2022-02-17 RX ADMIN — GABAPENTIN 600 MG: 600 TABLET, FILM COATED ORAL at 15:22

## 2022-02-17 RX ADMIN — GABAPENTIN 600 MG: 600 TABLET, FILM COATED ORAL at 22:09

## 2022-02-17 RX ADMIN — Medication: at 22:09

## 2022-02-17 RX ADMIN — DULOXETINE HYDROCHLORIDE 60 MG: 60 CAPSULE, DELAYED RELEASE ORAL at 08:52

## 2022-02-17 RX ADMIN — ESCITALOPRAM OXALATE 20 MG: 10 TABLET ORAL at 08:52

## 2022-02-17 RX ADMIN — TAMSULOSIN HYDROCHLORIDE 0.4 MG: 0.4 CAPSULE ORAL at 08:52

## 2022-02-17 RX ADMIN — SENNOSIDES AND DOCUSATE SODIUM 1 TABLET: 50; 8.6 TABLET ORAL at 08:52

## 2022-02-17 RX ADMIN — OXYCODONE HYDROCHLORIDE AND ACETAMINOPHEN 2 TABLET: 5; 325 TABLET ORAL at 12:57

## 2022-02-17 RX ADMIN — OXYCODONE HYDROCHLORIDE AND ACETAMINOPHEN 2 TABLET: 5; 325 TABLET ORAL at 08:52

## 2022-02-17 RX ADMIN — OXYCODONE HYDROCHLORIDE AND ACETAMINOPHEN 2 TABLET: 5; 325 TABLET ORAL at 22:09

## 2022-02-17 ASSESSMENT — PAIN DESCRIPTION - LOCATION
LOCATION: BACK;LEG
LOCATION: BACK;LEG
LOCATION: LEG
LOCATION: BACK;LEG

## 2022-02-17 ASSESSMENT — PAIN DESCRIPTION - DESCRIPTORS
DESCRIPTORS: ACHING

## 2022-02-17 ASSESSMENT — PAIN SCALES - GENERAL
PAINLEVEL_OUTOF10: 9
PAINLEVEL_OUTOF10: 4
PAINLEVEL_OUTOF10: 9
PAINLEVEL_OUTOF10: 9
PAINLEVEL_OUTOF10: 3
PAINLEVEL_OUTOF10: 7
PAINLEVEL_OUTOF10: 7

## 2022-02-17 ASSESSMENT — PAIN DESCRIPTION - PAIN TYPE
TYPE: ACUTE PAIN;CHRONIC PAIN
TYPE: ACUTE PAIN;SURGICAL PAIN

## 2022-02-17 ASSESSMENT — PAIN DESCRIPTION - ORIENTATION
ORIENTATION: RIGHT

## 2022-02-17 ASSESSMENT — PAIN DESCRIPTION - ONSET
ONSET: ON-GOING

## 2022-02-17 ASSESSMENT — PAIN DESCRIPTION - PROGRESSION: CLINICAL_PROGRESSION: NOT CHANGED

## 2022-02-17 ASSESSMENT — PAIN DESCRIPTION - FREQUENCY
FREQUENCY: CONTINUOUS

## 2022-02-17 ASSESSMENT — PAIN - FUNCTIONAL ASSESSMENT: PAIN_FUNCTIONAL_ASSESSMENT: PREVENTS OR INTERFERES SOME ACTIVE ACTIVITIES AND ADLS

## 2022-02-17 NOTE — PROGRESS NOTES
Physical Therapy  Facility/Department: [facility] ACUTE REHAB UNIT  Daily Treatment Note  NAME: Cara Weston  : 1945  MRN: [id number]    Date of Service: 2022    Discharge Recommendations:  Continue to assess pending progress,24 hour supervision or assist,Patient would benefit from continued therapy after discharge   PT Equipment Recommendations  Equipment Needed: Yes  Giovanna Somerset: Rolling  Other: will likely benefit from wc - continue to assess pending progress    Assessment   Body structures, Functions, Activity limitations: Decreased functional mobility ; Decreased balance;Decreased endurance;Decreased strength;Decreased ROM; Increased pain  Assessment: Bed ex performed first session in which pt req significant encouragement to complete 2/2 to pain level, Pain decreased after ex. Pt is  limited by decreased ROM and pain. During afternoon pt progresses to assist x 1 for transfers and demo's ability to ambulate in // bars via hop to with CGA and wc follow. Pt maintains WBing precautions for all ambulation and for majority of transfers- improved compliance in response to verbal cues. Pt is well below baseline and would benefit from continued therapy to maximize potential and increase functional mobility towards Ind. Treatment Diagnosis: mobility impairment due to R femur fx  Decision Making: Medium Complexity  PT Education: Precautions;General Safety; Energy Conservation  Patient Education: Pt verbalized understanding however would benefit from continued reniforcement  REQUIRES PT FOLLOW UP: Yes  Activity Tolerance  Activity Tolerance: Patient limited by fatigue;Patient Tolerated treatment well;Patient limited by pain     Patient Diagnosis(es): There were no encounter diagnoses.      has a past medical history of Allergic, Anemia, Anxiety, Closed comminuted intra-articular fracture of distal femur, right, initial encounter ([facility] [address] Depression, Eye disorder, Fibromyalgia, GERD (gastroesophageal reflux disease), Hypertension, Obesity, KAIT (obstructive sleep apnea), Osteoarthritis, Osteoporosis, PONV (postoperative nausea and vomiting), and Prolonged emergence from general anesthesia. has a past surgical history that includes hip surgery; joint replacement (Right, 1993/2003); Wrist fracture surgery (Left); joint replacement (Left, 5/18/16); knee surgery; and Femur fracture surgery (Right, 2/7/2022). Restrictions  Position Activity Restriction  Other position/activity restrictions: TTWB R, up with assistance  Subjective   General  Chart Reviewed: Yes  Additional Pertinent Hx: 76 yo admitted 2/6/22 for fall/R femur fx. OR 2/7 FEMUR OPEN REDUCTION INTERNAL FIXATION RIGHT. Pmhx: HTN, fibromyalgia, KAIT, L TKR, R THR. Admitted to ARU 2/11/2022. Family / Caregiver Present: No  Referring Practitioner: Dr. Rivera Simpson  Subjective  Subjective: Pt reports that she has pain in her R LB and R Thigh. General Comment  Comments: Pt sitting on EOB finishing up breakfast when PT arrived. Nsg present in room.  Pt agreeable to therapy  Pain Screening  Patient Currently in Pain: Yes  Pain Assessment  Pain Level: 9  Patient's Stated Pain Goal: No pain  Pain Type: Acute pain;Chronic pain  Pain Location: Back;Leg  Pain Orientation: Right  Pain Descriptors: Aching  Pain Frequency: Continuous (Intensity varies)  Pain Onset: On-going  Clinical Progression: Not changed  Functional Pain Assessment: Prevents or interferes some active activities and ADLs  Non-Pharmaceutical Pain Intervention(s): Ambulation/Increased Activity;Cold applied (CP applied to R thigh and R LB x 20 minutes following bed ex.)  Response to Pain Intervention: Patient Satisfied (Nsg administered pain meds while present in room)  Vital Signs  Patient Currently in Pain: Yes       Orientation  Orientation  Overall Orientation Status: Within Functional Limits (t reports that she has some memory problems)  Cognition      Objective   Bed mobility  Sit to Supine: Supervision (Note: HOB elevated and pt used the BR. VC req for energy efficient technique)    PT instructed pt with the following ex performed in supine to BLES:  1. AP/ QS/ GS  ( combination isometrics with LES abd and extended)  2. Hip IR/ ER with LES extended and abducted  3. Alternating hip/knee flex/ext  4. Hip abd in hooklying  5. Marching in hooklying  6. SAQS with LES positioned on rolled blanket. Hold x 3 secs   Nika 10 reps of each with brief rests between. Pt req VC for maintaining alignment throughout ex. Second Session:   Pt supine in bed upon approach with daughter present and agreeable to PT/OT. Pt completes supine<>sit with SBA (HOB elevated supine>sit, flat for sit>supine). Pt completes stand pivot transfers with RW with CGAx1 from EOB>wc and wc<>commode. Mod A required for stand pivot from wc>EOB d/t pt impulsively beginning stand>sit before safe to sit. Pt maintains precautions for first 2 transfers however breaks precautions with fatigue from commode>wc anc wc>EOB with improvement in response to VC. Sit>stand transfers completed with CGA and stand>sit transfers completed with CGA except for the 1 instance of mod to EOB during stand pivot. Pt stands for ~1 x 2 + ~ 30 sec x 3 for pants management with CGA- mantains precautions through majority of stands with brief periods of heel down wihen completing balance without UE support, able to regain compliance with VC for hand placement and to raise R LE. WC mobility completed from bed into bathroom with SBA- max VC rewuired to back overt hreshold into bathroom with safe hand placement (puts hand in door hinge) and to safely position for transfer. Upon completion of session pt left in bed wih needs in reach and chair alarm on. Third Session:   Pt supine in bed upon approach and agreeable to PT. Pt completes supine>sit with HOB flat and SBA. Stand pivot EOB>wc completed with min A x1 with vc for sequencing.  Sit<>stands thoughout session completed with CGA from wc up to // bars. VC/visual cues given for forward weight shift. Pt completes hop to pattern in // bars for 2 x  6' ambulation with CGA and wc follow. VC given to maintain precautions with all upright mobility throughout session - with pt compliant except for stand pivot from EOB>wc. Upon completion of session pt left untethered in wc with chair alarm on, needs in reach and daughter present. RN clears pt to go to dining room to do paperwork with supervision of daughter.            Goals  Short term goals  Time Frame for Short term goals: 10 days  Short term goal 1: Pt will complete sit<>supine with SBA- met 2/17  Short term goal 2: Pt will complete transfers with min A x 1  Short term goal 3: Pt will ambulate 10' with min A x 1 and wc follow  Short term goal 4: Pt will complete ascent/descent of 4 steps with UL handrail and min A x 1  Short term goal 5: Pt will complete 48' wc mobility with mod I  Long term goals  Time Frame for Long term goals : 3 weeks  Long term goal 1: Pt will complete bed mobility independently  Long term goal 2: Pt will complete transfers with mod I and LRAD  Long term goal 3: Pt will ambulate 48' with mod I and LRAD  Long term goal 4: Pt will ascend/descend 5 + 5 stairs with UL hand rail and SBA  Long term goal 5: Pt will complete 150' wc mobility with independence  Patient Goals   Patient goals : return home, travel to [place] [address] to see her son who has terminal cancer    Plan    Plan  Times per week: 5-7 x per week 75 min  Current Treatment Recommendations: Strengthening,Balance Training,Transfer Training  Safety Devices  Type of devices: Call light within reach,Nurse notified,Bed alarm in place,Left in bed     Therapy Time   Individual Concurrent Group Co-treatment   Time In 0850      1100   Time Out 0915      1206   Minutes 25      66   Timed Code Treatment Minutes: 25 Minutes      Third Session Therapy Time:   Individual Concurrent Group Co-treatment   Time In      1345   Time Out Lety 37     Timed Code Treatment Minutes:  01+71+09    Total Treatment Minutes:  [address]:   Yuni Jasso PT      Second and Third Session:   Tami Monteiro PT, DPT [id number]

## 2022-02-17 NOTE — CARE COORDINATION
She wants to be able to see her grandchildren next week and is asking for a possible therapeutic leave to be able to visit with family. She continues to struggle with weight bearing precautions. Gathered from Team discussion today that patient does not want anyone in her home (thus, Home Care?). Patient refused to speak to CM yesterday and stated that she should speak to her on Friday to discuss discharge plans. [place] from Team meeting today that patient's daughter and her children (one 10 and one 8)  are coming to [facility] next week between Tuesday and Friday. Children cannot come into hospital so plan needs to be made for visiting. CM will continue to follow patient until discharge. Electronically signed by Ivan Cottrell RN on 2022 at 1:10 PM     Addendum:   Fritz Huizar to patient and daughter [facility]) regarding any needs for care after discharge. Patient stated that she wants to go home after discharge and is not  Interested in OP therapy follow up after discharge. Patient is receptive to home care (given home care list & also, [facility] brochure) and will be choosing a couple home care agencies for CM to call for services after she discusses it with her daughter and her friends. Patient stated that she and Dawood Olivares will be attending her son's  service virtually this week and that she is understanding that the manager of the ARU is working on some plans to help daughter-in-law ( son's wife) and grandchildren (10 and 6) to be able to come to visit next week when they arrive to [facility] from [place]. Patient seems calm and very pleasant and understands she will speak with Carley [facility] CM, SHELBI) tomorrow about any other discharge plans. CM will continue to follow patient until discharge.   Electronically signed by Ivan Cottrell RN on 2022 at 5:08 PM

## 2022-02-17 NOTE — FLOWSHEET NOTE
22 1519   Encounter Summary   Services provided to: Patient and family together   Referral/Consult From: Family   Support System Family members   Continue Visiting Yes   Complexity of Encounter High   Length of Encounter 30 minutes;1 hour   Grief and Life Adjustment   Type Grief and loss   Assessment Calm; Approachable; Hopeful   Intervention Active listening;Explored feelings, thoughts, concerns; Discussed meaning/purpose;Discussed illness/injury and it's impact; Develop care plan   Outcome Comfort;Expressed gratitude;Engaged in conversation;Expressed feelings/needs/concerns;Encouraged;Receptive   We also discussed details about the   and possible visitation by other family later in the week. Will continue to follow,.   Staff Nicolasa Speaker, MA

## 2022-02-17 NOTE — PROGRESS NOTES
Occupational Therapy  Facility/Department: [facility] ACUTE REHAB UNIT  Daily Treatment Note  NAME: Alejandro Urbano  : 1945  MRN: [id number]    Date of Service: 2022    Discharge Recommendations:  Continue to assess pending progress,24 hour supervision or assist,Patient would benefit from continued therapy after discharge  OT Equipment Recommendations  Equipment Needed: Yes  ADL Assistive Devices: Transfer Tub Bench  Other: cont to assess    Assessment   Performance deficits / Impairments: Decreased functional mobility ; Decreased ADL status; Decreased strength;Decreased ROM; Decreased safe awareness;Decreased cognition;Decreased endurance;Decreased balance;Decreased high-level IADLs;Decreased coordination;Decreased posture  Assessment: Pt consistently requiring one person for all functional transfers this date with inconsistent ability to maintain TTWB RLE. Pt completed LB dressing with min A and toilet transfer with CGA-min A using RW. Pt requires repeated VCs to properly sequence transfers with decreased recall and demo need for reassurance. Pt easily fatigues and demo decreased activity tolerance. Pt has many environmental barriers with home d/c including w/c not fitting through multiple doorways. Cont per OT POC. Treatment Diagnosis: decreased ADLs, functional mobility and functional transfers 2/2 R femur fx  Prognosis: Guarded; [address]  OT Education: Precautions;Transfer Training;Family Education  Patient Education: extensive time spent discussing with pt and daughter about home setup and concern for w/c not fitting through multiple doorways including bathroom-continue to problem solve for safe d/c planning  REQUIRES OT FOLLOW UP: Yes  Activity Tolerance  Activity Tolerance: Patient Tolerated treatment well;Patient limited by fatigue  Activity Tolerance: by end of session pt c/o dizziness: BP assessed, 127/64 and quickly resolved with rest break; last 2 transfers pt demo increased fatigue resulting in requiring increased assist/cues  Safety Devices  Safety Devices in place: Yes  Type of devices: Bed alarm in place; Left in bed;Nurse notified;Call light within reach         Patient Diagnosis(es): There were no encounter diagnoses. has a past medical history of Allergic, Anemia, Anxiety, Closed comminuted intra-articular fracture of distal femur, right, initial encounter ([facility] [address] Depression, Eye disorder, Fibromyalgia, GERD (gastroesophageal reflux disease), Hypertension, Obesity, KAIT (obstructive sleep apnea), Osteoarthritis, Osteoporosis, PONV (postoperative nausea and vomiting), and Prolonged emergence from general anesthesia. has a past surgical history that includes hip surgery; joint replacement (Right, 1993/2003); Wrist fracture surgery (Left); joint replacement (Left, 5/18/16); knee surgery; and Femur fracture surgery (Right, 2/7/2022). Restrictions  Position Activity Restriction  Other position/activity restrictions: TTWB R, up with assistance  Subjective   General  Chart Reviewed: Yes  Patient assessed for rehabilitation services?: Yes  Additional Pertinent Hx: 78 yo admitted to [facility]/6/22 for fall/R femur fx. OR 2/7 FEMUR OPEN REDUCTION INTERNAL FIXATION RIGHT. Admitted to ARU 2/11/22. Pmhx: HTN, fibromyalgia, KAIT, L TKR, R THR  Response to previous treatment: Patient with no complaints from previous session  Family / Caregiver Present: Yes (daughter)  Referring Practitioner: Trina Wilde MD  Diagnosis: R femur fx s/p ORIF  Subjective  Subjective: Pt was supine in bed upon arrival, pleasant and agreeable to OT session.   General Comment  Comments: Pt prefers to wear her LSO for comfort due to premorbid back pain and back spasms  Vital Signs  Patient Currently in Pain: Yes (8/10 LB pain)     Orientation  Orientation  Overall Orientation Status: Within Normal Limits  Objective    ADL  UE Dressing: Setup (pt doffed shirt i'ly and donned shirt seated EOB with setup)  LE Dressing: Minimal assistance (pt doffed/donned pants with CGA in stance and VCs to maintain unilateral UE support on GB, pt threaded clean pants seated EOB with assist for RLE d/t socks geting caught in lining of pants, assist to thread valdez)  Toileting: Contact guard assistance (pt completed LB clothing mgmt in stance at sink with CGA and VCs to maintain unilateral UE support on GB, perihygiene seated)  Additional Comments: pt donned/doffed shoe seated EOB with setup/mod I        Balance  Sitting Balance: Supervision  Standing Balance: Contact guard assistance  Standing Balance  Time: ~5 minutes total  Activity: functional transfers, LB dressing/toileting  Functional Mobility  Functional - Mobility Device: Wheelchair  Activity: To/from bathroom  Assist Level: Stand by assistance  Functional Mobility Comments: pt req + time to navigate door threshold, max VCs for safe hand placement as pt was placing hand in door frame  Toilet Transfers  Toilet - Technique: Stand pivot  Equipment Used: Raised toilet seat with rails  Toilet Transfers Comments: with use of RW, cues for hand placement, return from RTS to w/c pt non compliant with TTWBing despite cues  Bed mobility  Supine to Sit: Stand by assistance  Sit to Supine: Stand by assistance  Scooting: Stand by assistance (superiorly in bed via bridging)  Transfers  Stand Pivot Transfers: Contact guard assistance; Moderate assistance (EOB<w/c with use of RW CGA, TCs and VCs to maintain TTWB; w/c<EOB with RW req mod A as pt fatigued and demo impulsivity with poor eccentric control and non compliant with TTWB)  Sit to stand: Contact guard assistance (from EOB and w/c to RW, cues for hand placement and ant lean)  Stand to sit: Contact guard assistance (VCs for hand placement, decreased eccentric control at times)                       Cognition  Arousal/Alertness: Appropriate responses to stimuli  Following Commands: Inconsistently follows commands; Follows one step commands with increased time;Follows one step commands with repetition  Attention Span: Attends with cues to redirect; Difficulty attending to directions  Memory: Decreased short term memory;Decreased recall of precautions  Safety Judgement: Decreased awareness of need for safety;Decreased awareness of need for assistance  Problem Solving: Assistance required to correct errors made;Decreased awareness of errors;Assistance required to identify errors made;Assistance required to implement solutions;Assistance required to generate solutions  Insights: Decreased awareness of deficits  Initiation: Requires cues for some  Sequencing: Requires cues for some  Cognition Comment: Pt required VCs for TTWB precautions and req need for repeated cues with transfers despite having completed multiple trials        Second Session: Pt supine in bed upon approach and agreeable to PT/OT cotx. Of note due to pt progress with functional transfers, Graylin Janis no longer indicated for full sessions except for attempts with ambulation. Pt completes supine>sit with HOB flat and SBA. Stand pivot EOB>wc completed with min A x1 with vc for sequencing and to maintain TTWB RLE. Pt completed multiple STS from w/c up to // bars with CGA and VCs for ant lean and hand placement. One attempt did not complete full stance due to not getting CoM over Madiha req verbal and visual cues given for forward weight shift. Pt completed 2 x 6' fxl mobility in // bars with good maintenance of TTWB RLE with cues req CGA and w/c follow. At end of session pt left untethered in wc with chair alarm on and needs in reach with daughter present. RN cleared pt to go to dining room to do paperwork with supervision of daughter.       Plan   Plan  Times per week: 90min 5x/week  Times per day: Daily  Current Treatment Recommendations: Patient/Caregiver Education & Training,Home Management Training,Equipment Evaluation, Education, & procurement,Balance Training,Endurance Training,Self-Care / ADL,Safety Education & Training,Strengthening,Functional Mobility Training    Goals  Short term goals  Time Frame for Short term goals: 10 days  Short term goal 1: Pt will complete toilet transfer w/ Min Ax1-goal met 2/17  Short term goal 2: Pt will complete LE dressing assessment-goal met 2/17  Short term goal 3: Pt will complete shower transfer w/ Alec x1-ongoing  Short term goal 4: Pt will complete UE dressing w/ Min Ax1-ongoing (goal met for tshirt only 2/17)  Long term goals  Time Frame for Long term goals : 3 weeks-all goals ongoing  Long term goal 1: Pt will complete toilet transfer/ toileting w/ Mod I  Long term goal 2: Pt will complete UE/LE dressing w/ Mod I and use of AE prn  Long term goal 3: Pt will complete tub transfer w/ SBA  Long term goal 4: Pt will complete bathing w/ Spvn  Patient Goals   Patient goals : \"Be in less pain. \"       Therapy Time   Individual Concurrent Group Co-treatment   Time In       1100   Time Out       1206   Minutes       66   Timed Code Treatment Minutes: 66 Minutes     Second Session Therapy Time:   Individual Concurrent Group Co-treatment   Time In      0195   Time Out      1656   Minutes      33     Timed Code Treatment Minutes:  66 + 33    Total Treatment Minutes:  [address], OT

## 2022-02-17 NOTE — PLAN OF CARE
Problem: Skin Integrity:  Goal: Will show no infection signs and symptoms  Description: Will show no infection signs and symptoms  Outcome: Ongoing  Note: Pt's skin is warm and dry. Pt has a intact red rash to her RLQ on her abdomen that is being tx with bacitracin and cover with a mepilex dressing. Turns self, Pillow support is provided. Pt is continent of bowel and valdez remains in place. No new skin issues or signs of infection noted. Will continue to monitor. Problem: Pain:  Goal: Control of acute pain  Description: Control of acute pain  Outcome: Ongoing  Note: Pt is able to verbalize/rate pain on a scale of 0-10. Pt intermittently complains of anterior right leg post-op. Pt pain controlled with prn Percocet per protocol and ice packs and repositioning. Pt VSS. Will continue to monitor.

## 2022-02-17 NOTE — PROGRESS NOTES
Department of Physical Medicine & Rehabilitation  Progress Note    Patient Identification:  Dori Zhao  [id number]  : 1945  Admit date: 2022    Chief Complaint: Closed comminuted intra-articular fracture of distal femur, right, initial encounter ([address]    Subjective:   Still having pain in her lower back. She wants to be able to see her grandchildren next week and is asking for a possible therapeutic leave to be able to visit with family. She continues to struggle with weight bearing precautions. She would like to try a muscle relaxer for back pain. ROS: No f/c, n/v, cp     Objective:  Patient Vitals for the past 24 hrs:   BP Temp Temp src Pulse Resp SpO2   22 0836 125/68 98.6 °F (37 °C) Oral 82 16 98 %   22 (!) 146/76 98.2 °F (36.8 °C) Oral 76 18 96 %     Const: Alert. No distress, pleasant. HEENT: Normocephalic, atraumatic. Normal sclera/conjunctiva. MMM. CV: Regular rate and rhythm. Resp: No respiratory distress. Lungs CTAB. Abd: Soft, nontender, nondistended, NABS+   Ext: + edema. Neuro: Alert, oriented, appropriately interactive. Psych: Cooperative, appropriate mood and affect    Laboratory data: Available via EMR. Last 24 hour lab  No results found for this or any previous visit (from the past 24 hour(s)).     Therapy progress:  PT  Position Activity Restriction  Other position/activity restrictions: TTWB R, up with assistance  Objective     Sit to Stand: Contact guard assistance,Minimal Assistance (multiple x from wc/mat; all with CGA except for 1 instance of min A from mat ; all with RW)  Stand to sit: Contact guard assistance (to wc/mat with RW)  Device: [address]: Minimal assistance  Distance: x5ft  OT  PT Equipment Recommendations  Equipment Needed: Yes  Mobility Devices: Ana Cristina Champ: Rolling  Other: will likely benefit from wc - continue to assess pending progress  Toilet - Technique: Stand pivot  Equipment Used: Raised toilet seat with rails  Assessment        SLP          Body mass index is 32.77 kg/m². Assessment and Plan:  Right midshaft femur fracture   -s/p ORIF (2/7 with Dr. Gennaro Newton- [facility])  -Wound care  -TTWB  -PT/OT for functional mobility, balance, ADLs     Acute blood loss anemia   -Post-surgical.   -Monitor Hgb, transfuse prn <7.      Hypoxia  -Thought to be due to atelectasis and fluid overload  -Supplemental O2, wean as tolerated  -IS        MARISELA  -Avoid nephrotoxins, renally dose meds  -Monitor renal function     Anxiety/depression  -bupropion, duloxetine, escitalopram     Fibromyalgia  -Duloxetine, gabapentin     KAIT  -CPAP     Bladder  -- Urinary retention  -Christensen in place, consider void trial ~1 week  -flomax     Bowel   -High risk constipation   -senna+colace BID, PRN miralax, MoM, and bisacodyl supp.   -improved     Safety   -fall precautions     Pain control  -Percocet prn, gabapentin  - zanaflex low dose for back pain.      PPx  -DVT: lovenox x 20 days per Ortho- plan to follow up with ortho  -GI: pantoprazole    Rehab Progress: Improving  Anticipated Dispo: home  Services/DME: [facility] [facility]ILLER: BETY Frye D.O. M.P.H  PM&R  2/17/2022  11:02 AM

## 2022-02-17 NOTE — PLAN OF CARE
Problem: Falls - Risk of:  Goal: Will remain free from falls  Description: Will remain free from falls  Outcome: Ongoing  Note: Pt has called out appropriately for assistance with needs and toileting. Fall precautions in place. Pt's bed alarm is on, bed is in low and locked position. Call light within reach. Will continue to monitor. Problem: Pain:  Goal: Control of acute pain  Description: Control of acute pain  Outcome: Ongoing  Note: Pt is able to verbalize/rate pain on a scale of 0-10. Pt intermittently complains of anterior right leg post-op. Pt pain controlled with prn Percocet per protocol and ice packs and repositioning. Pt VSS. Will continue to monitor. Problem: Skin Integrity:  Goal: Will show no infection signs and symptoms  Description: Will show no infection signs and symptoms  Outcome: Ongoing  Note: Pt's skin is warm and dry. Pt has a intact red rash to her RLQ on her abdomen that is being tx with bacitracin and cover with a mepilex dressing. Pt is able to turn and reposition self in bed. Pillow support is provided as needed. Pt is continent of bowel and incontinent of bladder and has a FC in place. No new skin issues or signs of infection noted. Will continue to monitor.

## 2022-02-17 NOTE — PROGRESS NOTES
Shift assessment is completed. Pt is A/Ox4. Pt VSS pn RA. Pt complains of pain to her anterior right leg (post-op surgical pain). Pt given prn Percocet per protocol and using ice as needed for swelling. Bed alarm on, bed in low and locked position. Call light is within pt's reach.

## 2022-02-17 NOTE — PROGRESS NOTES
Daughter requested to assess if RLQ rash/pustule is healing appropriately or if it needs to be cultured, explained current treatment process; informed daughter this nurse will pass on concern to be addressed during rounds tomorrow unless change in status. Daughter satisfied, pt declined pain and reported it it had been \"much bigger at home\" as verified with pictures on her cell phone and that she has been treating this since approximately jan 30th.

## 2022-02-17 NOTE — PROGRESS NOTES
Pt is alert and oriented x4, VSS, afebrile, no signs of acute distress. Pt c/o increased pain to right leg (postop) requested ace wrap and prn percocet with effective management of discomfort. Tolerated activity to bathroom for BM and returned to bed without issues. Pt resting in bed at this time with fall risk protocol in place as per fall risk score, call light and personal belongings within reach.

## 2022-02-18 LAB
ANION GAP SERPL CALCULATED.3IONS-SCNC: 7 MMOL/L (ref 3–16)
BASOPHILS ABSOLUTE: 0 K/UL (ref 0–0.2)
BASOPHILS RELATIVE PERCENT: 0.5 %
BUN BLDV-MCNC: 19 MG/DL (ref 7–20)
CALCIUM SERPL-MCNC: 8.9 MG/DL (ref 8.3–10.6)
CHLORIDE BLD-SCNC: 103 MMOL/L (ref 99–110)
CO2: 31 MMOL/L (ref 21–32)
CREAT SERPL-MCNC: 1.1 MG/DL (ref 0.6–1.2)
EOSINOPHILS ABSOLUTE: 0.3 K/UL (ref 0–0.6)
EOSINOPHILS RELATIVE PERCENT: 5 %
GFR AFRICAN AMERICAN: 58
GFR NON-AFRICAN AMERICAN: 48
GLUCOSE BLD-MCNC: 92 MG/DL (ref 70–99)
HCT VFR BLD CALC: 24.9 % (ref 36–48)
HEMOGLOBIN: 8.4 G/DL (ref 12–16)
LYMPHOCYTES ABSOLUTE: 1.4 K/UL (ref 1–5.1)
LYMPHOCYTES RELATIVE PERCENT: 26.9 %
MCH RBC QN AUTO: 29.5 PG (ref 26–34)
MCHC RBC AUTO-ENTMCNC: 33.9 G/DL (ref 31–36)
MCV RBC AUTO: 87.1 FL (ref 80–100)
MONOCYTES ABSOLUTE: 0.6 K/UL (ref 0–1.3)
MONOCYTES RELATIVE PERCENT: 10.8 %
NEUTROPHILS ABSOLUTE: 3 K/UL (ref 1.7–7.7)
NEUTROPHILS RELATIVE PERCENT: 56.8 %
PDW BLD-RTO: 15.4 % (ref 12.4–15.4)
PLATELET # BLD: 267 K/UL (ref 135–450)
PMV BLD AUTO: 9.2 FL (ref 5–10.5)
POTASSIUM REFLEX MAGNESIUM: 4.9 MMOL/L (ref 3.5–5.1)
RBC # BLD: 2.85 M/UL (ref 4–5.2)
SODIUM BLD-SCNC: 141 MMOL/L (ref 136–145)
WBC # BLD: 5.3 K/UL (ref 4–11)

## 2022-02-18 PROCEDURE — 1280000000 HC REHAB R&B

## 2022-02-18 PROCEDURE — 97530 THERAPEUTIC ACTIVITIES: CPT

## 2022-02-18 PROCEDURE — 80048 BASIC METABOLIC PNL TOTAL CA: CPT

## 2022-02-18 PROCEDURE — 6370000000 HC RX 637 (ALT 250 FOR IP): Performed by: PHYSICAL MEDICINE & REHABILITATION

## 2022-02-18 PROCEDURE — 85025 COMPLETE CBC W/AUTO DIFF WBC: CPT

## 2022-02-18 PROCEDURE — 36415 COLL VENOUS BLD VENIPUNCTURE: CPT

## 2022-02-18 PROCEDURE — 97535 SELF CARE MNGMENT TRAINING: CPT

## 2022-02-18 PROCEDURE — 6360000002 HC RX W HCPCS: Performed by: PHYSICAL MEDICINE & REHABILITATION

## 2022-02-18 PROCEDURE — 97110 THERAPEUTIC EXERCISES: CPT

## 2022-02-18 PROCEDURE — 99232 SBSQ HOSP IP/OBS MODERATE 35: CPT | Performed by: PHYSICAL MEDICINE & REHABILITATION

## 2022-02-18 RX ADMIN — TIZANIDINE 2 MG: 4 TABLET ORAL at 19:47

## 2022-02-18 RX ADMIN — BUPROPION HYDROCHLORIDE 150 MG: 150 TABLET, EXTENDED RELEASE ORAL at 10:01

## 2022-02-18 RX ADMIN — ENOXAPARIN SODIUM 40 MG: 100 INJECTION SUBCUTANEOUS at 09:48

## 2022-02-18 RX ADMIN — TIZANIDINE 2 MG: 4 TABLET ORAL at 09:49

## 2022-02-18 RX ADMIN — DULOXETINE HYDROCHLORIDE 60 MG: 60 CAPSULE, DELAYED RELEASE ORAL at 09:49

## 2022-02-18 RX ADMIN — OXYCODONE HYDROCHLORIDE AND ACETAMINOPHEN 2 TABLET: 5; 325 TABLET ORAL at 18:24

## 2022-02-18 RX ADMIN — OXYCODONE HYDROCHLORIDE AND ACETAMINOPHEN 2 TABLET: 5; 325 TABLET ORAL at 12:55

## 2022-02-18 RX ADMIN — GABAPENTIN 600 MG: 600 TABLET, FILM COATED ORAL at 19:47

## 2022-02-18 RX ADMIN — ESCITALOPRAM OXALATE 20 MG: 10 TABLET ORAL at 09:48

## 2022-02-18 RX ADMIN — Medication: at 14:46

## 2022-02-18 RX ADMIN — GABAPENTIN 600 MG: 600 TABLET, FILM COATED ORAL at 10:01

## 2022-02-18 RX ADMIN — SENNOSIDES AND DOCUSATE SODIUM 1 TABLET: 50; 8.6 TABLET ORAL at 19:47

## 2022-02-18 RX ADMIN — TAMSULOSIN HYDROCHLORIDE 0.4 MG: 0.4 CAPSULE ORAL at 09:48

## 2022-02-18 RX ADMIN — OXYCODONE HYDROCHLORIDE AND ACETAMINOPHEN 2 TABLET: 5; 325 TABLET ORAL at 08:25

## 2022-02-18 RX ADMIN — PANTOPRAZOLE SODIUM 40 MG: 40 TABLET, DELAYED RELEASE ORAL at 09:51

## 2022-02-18 RX ADMIN — Medication: at 09:55

## 2022-02-18 RX ADMIN — GABAPENTIN 600 MG: 600 TABLET, FILM COATED ORAL at 14:46

## 2022-02-18 ASSESSMENT — PAIN DESCRIPTION - LOCATION
LOCATION: LEG
LOCATION: LEG;HIP

## 2022-02-18 ASSESSMENT — PAIN DESCRIPTION - DESCRIPTORS
DESCRIPTORS: ACHING

## 2022-02-18 ASSESSMENT — PAIN DESCRIPTION - FREQUENCY
FREQUENCY: CONTINUOUS

## 2022-02-18 ASSESSMENT — PAIN SCALES - GENERAL
PAINLEVEL_OUTOF10: 7
PAINLEVEL_OUTOF10: 9
PAINLEVEL_OUTOF10: 7
PAINLEVEL_OUTOF10: 8
PAINLEVEL_OUTOF10: 7
PAINLEVEL_OUTOF10: 7

## 2022-02-18 ASSESSMENT — PAIN DESCRIPTION - ORIENTATION
ORIENTATION: RIGHT

## 2022-02-18 ASSESSMENT — PAIN - FUNCTIONAL ASSESSMENT
PAIN_FUNCTIONAL_ASSESSMENT: ACTIVITIES ARE NOT PREVENTED

## 2022-02-18 ASSESSMENT — PAIN DESCRIPTION - PAIN TYPE
TYPE: ACUTE PAIN;SURGICAL PAIN

## 2022-02-18 ASSESSMENT — PAIN DESCRIPTION - PROGRESSION
CLINICAL_PROGRESSION: GRADUALLY WORSENING
CLINICAL_PROGRESSION: GRADUALLY WORSENING
CLINICAL_PROGRESSION: NOT CHANGED
CLINICAL_PROGRESSION: GRADUALLY IMPROVING
CLINICAL_PROGRESSION: NOT CHANGED
CLINICAL_PROGRESSION: GRADUALLY WORSENING

## 2022-02-18 ASSESSMENT — PAIN DESCRIPTION - ONSET
ONSET: ON-GOING

## 2022-02-18 NOTE — CARE COORDINATION
SW met with Pt's dgt this afternoon and had discussion again regarding DC plan. Dgt states Pt will most likely refuse SNF placement and want to return home with her assistance. Dgt asked about possible extension for DC date of 2/26/22 if needed. SW then met with Pt and dgt at bedside. Pt again stated, \"I'm going home at discharge. \"  Pt talked about the upcoming weekend services for her son's death that they will be doing via Zoom and also talked about her grandchildren coming to visit her next Thursday from [place]. Emotional support provided. SW will continue to follow.      Rachel Núñez [place]  Case Management  [phone]

## 2022-02-18 NOTE — PLAN OF CARE
Problem: Falls - Risk of:  Goal: Will remain free from falls  Description: Will remain free from falls  Outcome: Met This Shift  Note: Pt remains free of falls thus far this shift. All fall precautions in place and functioning properly. Problem: Skin Integrity:  Goal: Will show no infection signs and symptoms  Description: Will show no infection signs and symptoms  Outcome: Met This Shift  Note: Surgical site shows no s/sx of infection at this time. Will continue to assess q shift and prn and provide interventions as needed. Problem: Pain:  Goal: Control of acute pain  Description: Control of acute pain  Outcome: Ongoing  Note: Patient's pain is controlled with current regime      Problem: Nutrition  Goal: Optimal nutrition therapy  Outcome: Met This Shift  Note: Patient continues to have a good appetite and eat adequately.

## 2022-02-18 NOTE — PROGRESS NOTES
Alert and oriented. VSS on RA. Rating RLE surgical pain consistently at 7-8/10 but appears to tolerate this level and is able to participate in all therapies. She stated that she has a high thresh-hold for pain and could not verbalized a pain goal number. Declining any pain medication at present. Dressing changed to RLE surgical sites with shower. Incisions approximated with surgical glue, CDI. Continent x 3 of clear yellow urine after valdez removed in AM right before shift change. Daughter at bedside. Both appear to be in good spirits. No new issues today.

## 2022-02-18 NOTE — FLOWSHEET NOTE
22 1424   Encounter Summary   Services provided to: Patient and family together   Referral/Consult From: [facility] Family members   Continue Visiting   (, david )   Complexity of Encounter High   Length of Encounter 45 minutes   Grief and Life Adjustment   Type Grief and loss   Assessment Calm; Approachable; Hopeful   Intervention Active listening;Explored feelings, thoughts, concerns;Nurtured hope;Discussed meaning/purpose;Discussed illness/injury and it's impact   Outcome Comfort;Expressed gratitude;Engaged in conversation; Shared life review;Expressed feelings/needs/concerns; Shared reminiscences; Encouraged; Hopeful;Receptive   Together we did a short life review. PT thought this was very helpful as she prepares for the .  Unit Manager, Jaimie and I provided a logistical update about where that PT and daughter can participate in the  virtually and when and how the grand children can visit later in the week. Both PT and daughter were very thankful for a accommodations and efforts.    Staff Catalina Jaramillo MA

## 2022-02-18 NOTE — PROGRESS NOTES
Department of Physical Medicine & Rehabilitation  Progress Note    Patient Identification:  Dorcas Alston  [id number]  : 1945  Admit date: 2022    Chief Complaint: Closed comminuted intra-articular fracture of distal femur, right, initial encounter ([address]    Subjective:   Doing well today. No new complaints overnight. She does not know if the muscle relaxer is working. Doing better in therapy today with less pain all around. ROS: No f/c, n/v, cp     Objective:  Patient Vitals for the past 24 hrs:   BP Temp Temp src Pulse Resp SpO2   22 0822 127/65 97.8 °F (36.6 °C) Oral 82 18 94 %   22 125/85 98.1 °F (36.7 °C) Oral 75 16 94 %     Const: Alert. No distress, pleasant. HEENT: Normocephalic, atraumatic. Normal sclera/conjunctiva. MMM. CV: Regular rate and rhythm. Resp: No respiratory distress. Lungs CTAB. Abd: Soft, nontender, nondistended, NABS+   Ext: + edema. Neuro: Alert, oriented, appropriately interactive. Psych: Cooperative, appropriate mood and affect    Laboratory data: Available via EMR.    Last 24 hour lab  Recent Results (from the past 24 hour(s))   Basic Metabolic Panel w/ Reflex to MG    Collection Time: 22  5:56 AM   Result Value Ref Range    Sodium 141 136 - 145 mmol/L    Potassium reflex Magnesium 4.9 3.5 - 5.1 mmol/L    Chloride 103 99 - 110 mmol/L    CO2 31 21 - 32 mmol/L    Anion Gap 7 3 - 16    Glucose 92 70 - 99 mg/dL    BUN 19 7 - 20 mg/dL    CREATININE 1.1 0.6 - 1.2 mg/dL    GFR Non- 48 (A) >60    GFR  58 (A) >60    Calcium 8.9 8.3 - 10.6 mg/dL   CBC auto differential    Collection Time: 22  5:56 AM   Result Value Ref Range    WBC 5.3 4.0 - 11.0 K/uL    RBC 2.85 (L) 4.00 - 5.20 M/uL    Hemoglobin 8.4 (L) 12.0 - 16.0 g/dL    Hematocrit 24.9 (L) 36.0 - 48.0 %    MCV 87.1 80.0 - 100.0 fL    MCH 29.5 26.0 - 34.0 pg    MCHC 33.9 31.0 - 36.0 g/dL    RDW 15.4 12.4 - 15.4 %    Platelets 626 352 - 276 K/uL    MPV 9.2 5.0 - 10.5 fL    Neutrophils % 56.8 %    Lymphocytes % 26.9 %    Monocytes % 10.8 %    Eosinophils % 5.0 %    Basophils % 0.5 %    Neutrophils Absolute 3.0 1.7 - 7.7 K/uL    Lymphocytes Absolute 1.4 1.0 - 5.1 K/uL    Monocytes Absolute 0.6 0.0 - 1.3 K/uL    Eosinophils Absolute 0.3 0.0 - 0.6 K/uL    Basophils Absolute 0.0 0.0 - 0.2 K/uL       Therapy progress:  PT  Position Activity Restriction  Other position/activity restrictions: TTWB R, up with assistance  Objective     Sit to Stand: Contact guard assistance,Minimal Assistance (multiple x from wc/mat; all with CGA except for 1 instance of min A from mat ; all with RW)  Stand to sit: Contact guard assistance (to wc/mat with RW)  Device: Rolling Walker  Assistance: Minimal assistance  Distance: x5ft  OT  PT Equipment Recommendations  Equipment Needed: Yes  Mobility Devices: Laquetta David: Rolling  Other: will likely benefit from wc - continue to assess pending progress  Toilet - Technique: Stand pivot  Equipment Used: Raised toilet seat with rails  Toilet Transfers Comments: with use of RW, cues for hand placement, return from RTS to w/c pt non compliant with TTWBing despite cues  Assessment        SLP          Body mass index is 32.77 kg/m².     Assessment and Plan:  Right midshaft femur fracture   -s/p ORIF (2/7 with Dr. Karli Esparza- [facility])  -Wound care  -TTWB  -PT/OT for functional mobility, balance, ADLs     Acute blood loss anemia   -Post-surgical.   -Monitor Hgb, transfuse prn <7.      Hypoxia  -Thought to be due to atelectasis and fluid overload  -Supplemental O2, wean as tolerated  -IS        MARISELA  -Avoid nephrotoxins, renally dose meds  -Monitor renal function     Anxiety/depression  -bupropion, duloxetine, escitalopram     Fibromyalgia  -Duloxetine, gabapentin     KAIT  -CPAP     Bladder  -- Urinary retention  -Christensen in place, consider void trial ~1 week  -flomax     Bowel   -High risk constipation   -senna+colace BID, PRN miralax, MoM, and bisacodyl supp.  -improved     Safety   -fall precautions     Pain control  -Percocet prn, gabapentin  - zanaflex low dose for back pain.      PPx  -DVT: lovenox x 20 days per Ortho- plan to follow up with ortho  -GI: pantoprazole    Rehab Progress: Improving  Anticipated Dispo: home  Services/DME:   ELOS: VANI CareyPAna MH  PM&R  2/18/2022  10:20 AM

## 2022-02-18 NOTE — FLOWSHEET NOTE
22 1630   Encounter Summary   Services provided to: Patient and family together   Referral/Consult From: Patient; Family   Continue Visiting   (es )   Complexity of Encounter Moderate   Length of Encounter 45 minutes   Advance Care Planning Yes   Routine   Type Follow up   Assessment Approachable;Grieving; Sad   Intervention Active listening;Explored feelings, thoughts, concerns; Discussed illness/injury and it's impact   Outcome Engaged in conversation;Grieving;Receptive   Primary Decision Maker (Healthcare Proxy)   [address] [facility] is: Legal Next of Kin  (pt's daughter)   Consult was for [employer] paperwork. Patient and her daughter wanted to be sure that daughter can access pt's medical records and/or have them transferred between hospital systems. I explained that [employer] documents do not accomplish that, but they do allow the person named to make medical decisions only if the patient is unable to make them herself. We determined that pt's daughter is legal NOK (no . Son . No other children). Pt thinks that she might have a HCPOA document somewhere. I encouraged them to fill out a new one, if desired. Pt declined to do that at this time. I left the documents with them, with instructions for how to complete them later, if desired. With assistance from Mira Alexis, we showed them how to manage medical records on My Chart, so that pt's daughter can access them and have them transferred between hospitals. Pt and daughter spoke about their grief, and about their struggle to decide what to share at the  on . They also talked about wanting a private conference room for viewing the , and they said that there was already someone helping them with that.

## 2022-02-18 NOTE — PROGRESS NOTES
Occupational Therapy  Facility/Department: [facility] ACUTE REHAB UNIT  Daily Treatment Note  NAME: Kendy Chery  : 1945  MRN: [id number]    Date of Service: 2022    Discharge Recommendations:  Continue to assess pending progress,24 hour supervision or assist,Patient would benefit from continued therapy after discharge  OT Equipment Recommendations  ADL Assistive Devices: Transfer Tub Bench  Other: cont to assess    Assessment   Performance deficits / Impairments: Decreased functional mobility ; Decreased ADL status; Decreased strength;Decreased ROM; Decreased safe awareness;Decreased cognition;Decreased endurance;Decreased balance;Decreased high-level IADLs;Decreased coordination;Decreased posture  Assessment: Pt demonstrated good progress in OT today as evident by pt ability to complete all components of LE dressing w/ CGA and toileting w/ CGA. Pt is most limited by non compliance w/ TTWB RLE and requires max VCs and education to maintain precautions. Pt's home environment is a large barrier at this time due to amount of steps required to enter to house and due to the fact that pt's home is not very w/c accessible. Pt is motivated and continues to benefit from OT. Cont OT per POC  Treatment Diagnosis: decreased ADLs, functional mobility and functional transfers 2/2 R femur fx    OT Education: Precautions; ADL Adaptive Strategies;Transfer Training;Family Education  Patient Education: increased time spent addressing home layout and w/c accessibility issues w/ the home. Pt's daughter expressed many concerns about pt being ready for home next week. Continued discussion required to create best d/c plan. REQUIRES OT FOLLOW UP: Yes  Activity Tolerance  Activity Tolerance: Patient Tolerated treatment well  Activity Tolerance: Pt w/ increased RLE pain by end of session. Pt requested to get back to bed and pt requested ace wrap over her pants. OT educated pt about [place] hose however pt declined.  RN addressed incisions on RLE after shower today  Safety Devices  Safety Devices in place: Yes  Type of devices: Bed alarm in place; Left in bed;Call light within reach;Nurse notified           Patient Diagnosis(es): There were no encounter diagnoses. has a past medical history of Allergic, Anemia, Anxiety, Closed comminuted intra-articular fracture of distal femur, right, initial encounter ([facility] [address] Depression, Eye disorder, Fibromyalgia, GERD (gastroesophageal reflux disease), Hypertension, Obesity, KAIT (obstructive sleep apnea), Osteoarthritis, Osteoporosis, PONV (postoperative nausea and vomiting), and Prolonged emergence from general anesthesia. has a past surgical history that includes hip surgery; joint replacement (Right, 1993/2003); Wrist fracture surgery (Left); joint replacement (Left, 5/18/16); knee surgery; and Femur fracture surgery (Right, 2/7/2022). Restrictions  Position Activity Restriction  Other position/activity restrictions: TTWB R, up with assistance  Subjective   General  Chart Reviewed: Yes  Patient assessed for rehabilitation services?: Yes  Additional Pertinent Hx: 76 yo [facility]22 for fall/R femur fx. OR 2/7 FEMUR OPEN REDUCTION INTERNAL FIXATION RIGHT. Admitted to ARU 2/11/22. Pmhx: HTN, fibromyalgia, KAIT, L TKR, R THR  Response to previous treatment: Patient with no complaints from previous session  Family / Caregiver Present: Yes (daughter)  Referring Practitioner: Khai Walter MD  Diagnosis: R femur fx s/p ORIF  Subjective  Subjective: Pt was supine in bed upon arrival. Pt reported she was taking a nap. Pt was pleasant and agreeable to ADL w/ OT  General Comment  Comments: Pt prefers to wear her LSO for comfort due to premorbid back pain and back spasms  Vital Signs  Patient Currently in Pain: Yes (Pt c/o RLE pain at end of session however pt was not due for pain meds.  RN aware)   Orientation     Objective    ADL  Feeding: Independent (Lunch tray delivered)  Grooming: Modified independent  (Pt completed oral care seated in w/c)  UE Bathing: Modified independent  (Pt washed and dried 4/4 components of UE bathing seated on TTB)  LE Bathing: Setup;Verbal cueing; Increased time to complete;Stand by assistance (Pt washed and dried BLEs and tila area seated on TTB. Pt did not wash her buttocks)  UE Dressing: Setup (Pt donned her bra and tshirt seated in w/c)  LE Dressing: Setup;Verbal cueing; Increased time to complete;Contact guard assistance (Pt threaded BLEs into underwear and pants seated in w/c w/ CGA in stance for clothing management. Pt donned socks I'ly today)  Toileting: Contact guard assistance (Pt completed tila care seated I'ly. Pt completed pants management in stance at RW w/ CGA and max VCs needed for TTWB RLE)  Additional Comments: ADLs completed w/ above listed levels of assistance needed. Pt demonstrated improved ability to reach her BLEs for bathing and dressing tasks today w/o use of AE. Pt required increased VCs for TTWB          Balance  Sitting Balance: Modified independent   Standing Balance: Contact guard assistance  Standing Balance  Time: ~2 mins total  Activity: functional transfers; stance for ADLs  Functional Mobility  Functional - Mobility Device: Wheelchair  Activity: To/from bathroom  Assist Level: Minimal assistance  Functional Mobility Comments: Min A needed to manuever close enough to toilet and TTB  Toilet Transfers  Toilet - Technique: Stand pivot  Equipment Used: Raised toilet seat with rails  Toilet Transfer: Contact guard assistance  Toilet Transfers Comments: w/ use of RW and max VCs needed for [employer] - Transfer From: Wheelchair  Shower - Transfer Type: To and From  Shower - Transfer To: Transfer tub bench  Shower - Technique: Stand pivot  Shower Transfers: Minimal assistance  Shower Transfers Comments: pt completed stand pivot w/ use of RW to/from TTB.  Pt requires max VCs for TTWB and VCs for technique to safely enter shower    Bed mobility  Supine to Sit: Modified independent (HOB elevated)  Sit to Supine: Supervision    Transfers  Sit to stand: Contact guard assistance  Stand to sit: Contact guard assistance (VCs needed to extend RLE upon descent)                         Cognition  Arousal/Alertness: Appropriate responses to stimuli  Following Commands: Inconsistently follows commands; Follows one step commands with increased time; Follows one step commands with repetition  Attention Span: Attends with cues to redirect; Difficulty attending to directions  Memory: Decreased short term memory;Decreased recall of precautions  Safety Judgement: Decreased awareness of need for safety;Decreased awareness of need for assistance  Problem Solving: Assistance required to correct errors made;Decreased awareness of errors;Assistance required to identify errors made;Assistance required to implement solutions;Assistance required to generate solutions  Insights: Decreased awareness of deficits  Initiation: Requires cues for some  Sequencing: Requires cues for some  Cognition Comment: Pt often non compliant w/ TTWB precautions despite max VCs and education provided. Pt appeared more distracted today and required + cues to maintain attention to task             2nd session: Pt was semi supine in bed upon arrival. Pt was pleasant and agreeable to OT/PT. Half co treat indicated to maximize functional independence. OT and PT had conversation w/ pt and Parker Ferrera regarding pt's home setup and plans for d/c. Based on information from Parker Ferrera, pt's home is not very w/c accessible and pt's doorways into the bathroom, bedroom, and kitchen are very narrow. Pt would either have to setup space in her living room w/ a BSC and bed or pt would have to be able to hop a certain distance to enter these spaces. Pt appears to have some decreased understanding of the challenges this presents however Parker Ferrera is very concerned about this.      Functional transfers:  Stand pivot- completed from EOB > w/c w/ Min A. Pt's daughter Severo Leonard was present to assist w/ transfer for family training. Pt required max VCs for TTWB RLE during transition to w/c. Sit <> stand- completed from w/c <> RW w/ CGA. Max VCs needed for TTWB RLE. Functional mobility:  Pt attempted functional mobility w/ RW today to determine if hopping would be a possibility for pt to safely access her bedroom/bathroom/kitchen at d/c. Pt hopped ~ 4ft w/ RW w/ CGA and VCs for technique. Pt however reported that she was leaking and needed to return to the room to put on a pad. Dynamic balance:  Pt maintained stance at RW w/ CGA to apply pad however pt was weight bearing completely through RLE and she required max VCs for improved technique. Pt was also removing BUEs from RW and required max VCs to maintain 1 UE support on RW at all times. Cognition:  Pt w/ increased difficulty following commands at times and poor ability to maintain precautions despite extensive education. Pt was left in w/c at EOS w/ chair alarm on and call light within reach.                                            Plan   Plan  Times per week: 90min 5x/week  Times per day: Daily  Current Treatment Recommendations: Patient/Caregiver Education & Training,Home Management Training,Equipment Evaluation, Education, & procurement,Balance Michelle Gibbson / ADL,Safety Education & Training,Strengthening,Functional Mobility Training  G-Code     OutComes Score                                                  AM-PAC Score             Goals  Short term goals  Time Frame for Short term goals: 10 days  Short term goal 1: Pt will complete toilet transfer w/ Min Ax1-goal met 2/17  Short term goal 2: Pt will complete LE dressing assessment-goal met 2/17  Short term goal 3: Pt will complete shower transfer w/ Alec x1-goal met 2/18  Short term goal 4: Pt will complete UE dressing w/ Min Ax1-goal met 2/18  Long term goals  Time Frame for Long term goals : 3 weeks-all goals ongoing  Long term goal 1: Pt will complete toilet transfer/ toileting w/ Mod I  Long term goal 2: Pt will complete UE/LE dressing w/ Mod I and use of AE prn  Long term goal 3: Pt will complete tub transfer w/ SBA  Long term goal 4: Pt will complete bathing w/ Spvn  Patient Goals   Patient goals : \"Be in less pain. \"       Therapy Time   Individual Concurrent Group Co-treatment   Time In 1100         Time Out 1220         Minutes 80         Timed Code Treatment Minutes: 80 Minutes        Second Session Therapy Time:   Individual Concurrent Group Co-treatment   Time In        3271   Time Out        3275   Minutes        30     Timed Code Treatment Minutes:  30    Total Treatment Minutes:  80+30= Delano Hatfield OT

## 2022-02-18 NOTE — PROGRESS NOTES
Patient has been resting in bed watching TV. She is alert and oriented. Vital signs stable. She has complained of right leg pain and was medicated with Oxycodone as ordered. Dressings to right thigh surgical site clean dry and intact. Right lower quadrant abdominal dressing removed. Skin reddened and two small open wounds noted. Area was cleansed with saline spray. Bacitracin applied and new dressing applied. Patient has history of sleep apnea. She states she is supposed to wear a CPAP at home but she doesn't. Oxygen applied at 2 L. Instructed to call for any needs. Call light within reach.

## 2022-02-18 NOTE — PLAN OF CARE
Problem: Falls - Risk of:  Goal: Will remain free from falls  Outcome: Ongoing  Note: Patient's bed alarm went off and patient was found sitting on side of bed. She stated that she dropped her ice bag on the floor and was going to try to get it. Reinforced need to call for assistance and not try to bend that far. Patient verbalized understanding. Fall precautions in place Bed is in low and locked position. Bed alarm set. Call light within reach. Problem: Skin Integrity:  Goal: Absence of new skin breakdown  Outcome: Ongoing  Note: Color pink She warm and dry. Right thigh dressings clean dry and intact. Abdominal dressing changed on RLQ .  Two open wound with yellow centers on abdomen area cleansed and Bacitracin applied

## 2022-02-18 NOTE — PROGRESS NOTES
Physical Therapy  Facility/Department: [facility] ACUTE REHAB UNIT  Daily Treatment Note  NAME: Yoel Lema  : 1945  MRN: [id number]    Date of Service: 2022    Discharge Recommendations:  Continue to assess pending progress,24 hour supervision or assist,Patient would benefit from continued therapy after discharge   PT Equipment Recommendations  Equipment Needed: Yes  Mobility Devices: Wheelchair  Wheelchair: Standard  Other: 20\", tip back arm rests, anti-tippers, B leg rests    Assessment   Body structures, Functions, Activity limitations: Decreased functional mobility ; Decreased balance;Decreased endurance;Decreased strength;Decreased ROM; Increased pain  Assessment: Pt demo's improved compliance to WBing precautions during stand pivot transfers post R LE therex first session along with VC/education. Pt demo's progression in ambulation through ability to perform 5' ambulation with hop-to pattern and RW in second session. Pt would beenfit from continued skilled PT inorder to progress towards PLFO and independence. Treatment Diagnosis: mobility impairment due to R femur fx  Decision Making: Medium Complexity  PT Education: Precautions;General Safety; Energy Conservation;Goals;Plan of Care;Home Exercise Program;Transfer Training  Patient Education: Pt verbalized understanding however would benefit from continued reniforcement  REQUIRES PT FOLLOW UP: Yes  Activity Tolerance  Activity Tolerance: Patient limited by fatigue;Patient limited by pain     Patient Diagnosis(es): There were no encounter diagnoses. has a past medical history of Allergic, Anemia, Anxiety, Closed comminuted intra-articular fracture of distal femur, right, initial encounter ([facility] [address] Depression, Eye disorder, Fibromyalgia, GERD (gastroesophageal reflux disease), Hypertension, Obesity, KAIT (obstructive sleep apnea), Osteoarthritis, Osteoporosis, PONV (postoperative nausea and vomiting), and Prolonged emergence from general anesthesia. has a past surgical history that includes hip surgery; joint replacement (Right, 1993/2003); Wrist fracture surgery (Left); joint replacement (Left, 5/18/16); knee surgery; and Femur fracture surgery (Right, 2/7/2022). Restrictions  Position Activity Restriction  Other position/activity restrictions: TTWB R, up with assistance  Subjective   General  Chart Reviewed: Yes  Additional Pertinent Hx: 76 yo admitted 2/6/22 for fall/R femur fx. OR 2/7 FEMUR OPEN REDUCTION INTERNAL FIXATION RIGHT. Pmhx: HTN, fibromyalgia, KAIT, L TKR, R THR. Admitted to ARU 2/11/2022. Family / Caregiver Present: No  Referring Practitioner: Dr. Magdiel Simpson  Subjective  Subjective: Pt seated in wc upon approach and agreeable to PT. Reporting R hip and R knee pain (not rated) however reports recieving meds within last 15 min. Ice applied to knee at end of session - RN notified. Orientation     Cognition      Objective   Bed mobility  Supine to Sit: Supervision (HOB elevated, again on mat)  Sit to Supine: Supervision (mat table)  Transfers  Sit to Stand: Contact guard assistance  Stand to sit: Contact guard assistance  Stand Pivot Transfers: Contact guard assistance (from wc<>mat and wc>EOB)  Comment: Non compliant to [address] for first stand pivot however improved compliance in last 2 transfers with education/VC. VC for sequencing, forward weight shift, and WBing precautions.   Wheelchair Activities  Wheelchair Size: 20\"  Wheelchair Type: Standard  Wheelchair Parts Management: Yes  Left Brakes Level of Assistance: Independent  Right Brakes Level of Assistance: Independent  Propulsion: Yes  Propulsion 1  Propulsion: Manual  Level: Level Tile  Method: RUE;LUE  Level of Assistance: Supervision  Description/ Details: moderate larry, includes 90 and 180 deg turns VC to manuever wc to set up for transfer to mat  Distance: 150'     Balance  Sitting - Static: Good;- (supervision seated edge of mat)  Sitting - Dynamic: Fair;+ (SBA seated edge of mat)  Standing - Static: Fair;- (CGA with RW)  Standing - Dynamic: Fair;- (CGA with RW for stand pivot transfers)  Exercises  Heelslides: x15 B heelslide with B LEs on physioball- L LE providing AAROM to L LE. VC for TA activation to prevent stres on back. Hip Abduction: 2 x 5 R LE  Ankle Pumps: x15 ankle pumps B LE  Comments: limited by back and R hip pain and spasms with extended rest breaks required  Other exercises  Other exercises?: Yes  Other exercises 1: x10 adduciton squeezes in bridge position                      Second Session:   Pt supine in bed upon approach and agreeable to PT/OT. Pt completes supine>sit with supervision with HOB elevated. Daughter present and assists pt to complete stand pivot transfer from EOB>wc with min A and RW post education from therapy. Pt completes sit<>stand transfers from RW with CGA. Pt completes 5' ambulation with CGAx 1 + wc follow and RW via hop to pattern with R LE (while maintaining precautions) however limited by minor incontinence of bladder d/t hopping. Returned to room where assisted to noy protective pad in standing with CGA for standing balance with UL -no UE support. Pt demo's decreased compliance to WBing precautions during standing balance without UE support, as well as briefly during 1 stand>sit from wc and stand pivot with daughter. However pt does demo improvement in response to [facility] and is receptive to feedback.               Goals  Short term goals  Time Frame for Short term goals: 10 days  Short term goal 1: Pt will complete sit<>supine with SBA- met 2/17  Short term goal 2: Pt will complete transfers with min A x 1- met 2/18  Short term goal 3: Pt will ambulate 10' with min A x 1 and wc follow  Short term goal 4: Pt will complete ascent/descent of 4 steps with UL handrail and min A x 1  Short term goal 5: Pt will complete 48' wc mobility with mod I  Long term goals  Time Frame for Long term goals : 3 weeks  Long term goal 1: Pt will complete bed mobility independently  Long term goal 2: Pt will complete transfers with mod I and LRAD  Long term goal 3: Pt will ambulate 48' with mod I and LRAD  Long term goal 4: Pt will ascend/descend 5 + 5 stairs with UL hand rail and SBA  Long term goal 5: Pt will complete 150' wc mobility with independence  Patient Goals   Patient goals : return home    Plan    Plan  Times per week: 5-7 x per week 75 min  Current Treatment Recommendations: Strengthening,Balance Training,Transfer Training  Safety Devices  Type of devices: Call light within reach,Nurse notified,Bed alarm in place,Left in bed     Therapy Time   Individual Concurrent Group Co-treatment   Time In 0830     1245   Time Out 0930     1315   Minutes 60     30   Timed Code Treatment Minutes: Louis Mclain 1778 PT, [address]

## 2022-02-19 PROCEDURE — 6370000000 HC RX 637 (ALT 250 FOR IP): Performed by: PHYSICAL MEDICINE & REHABILITATION

## 2022-02-19 PROCEDURE — 99232 SBSQ HOSP IP/OBS MODERATE 35: CPT | Performed by: PHYSICAL MEDICINE & REHABILITATION

## 2022-02-19 PROCEDURE — 6360000002 HC RX W HCPCS: Performed by: PHYSICAL MEDICINE & REHABILITATION

## 2022-02-19 PROCEDURE — 1280000000 HC REHAB R&B

## 2022-02-19 RX ADMIN — Medication: at 20:30

## 2022-02-19 RX ADMIN — TAMSULOSIN HYDROCHLORIDE 0.4 MG: 0.4 CAPSULE ORAL at 08:28

## 2022-02-19 RX ADMIN — OXYCODONE HYDROCHLORIDE AND ACETAMINOPHEN 1 TABLET: 5; 325 TABLET ORAL at 17:41

## 2022-02-19 RX ADMIN — ENOXAPARIN SODIUM 40 MG: 100 INJECTION SUBCUTANEOUS at 08:27

## 2022-02-19 RX ADMIN — GABAPENTIN 600 MG: 600 TABLET, FILM COATED ORAL at 08:28

## 2022-02-19 RX ADMIN — Medication: at 08:29

## 2022-02-19 RX ADMIN — BUPROPION HYDROCHLORIDE 150 MG: 150 TABLET, EXTENDED RELEASE ORAL at 08:28

## 2022-02-19 RX ADMIN — OXYCODONE HYDROCHLORIDE AND ACETAMINOPHEN 1 TABLET: 5; 325 TABLET ORAL at 08:26

## 2022-02-19 RX ADMIN — PANTOPRAZOLE SODIUM 40 MG: 40 TABLET, DELAYED RELEASE ORAL at 06:15

## 2022-02-19 RX ADMIN — OXYCODONE HYDROCHLORIDE AND ACETAMINOPHEN 1 TABLET: 5; 325 TABLET ORAL at 03:44

## 2022-02-19 RX ADMIN — ESCITALOPRAM OXALATE 20 MG: 10 TABLET ORAL at 08:27

## 2022-02-19 RX ADMIN — TIZANIDINE 2 MG: 4 TABLET ORAL at 11:13

## 2022-02-19 RX ADMIN — DULOXETINE HYDROCHLORIDE 60 MG: 60 CAPSULE, DELAYED RELEASE ORAL at 08:28

## 2022-02-19 RX ADMIN — GABAPENTIN 600 MG: 600 TABLET, FILM COATED ORAL at 14:26

## 2022-02-19 RX ADMIN — GABAPENTIN 600 MG: 600 TABLET, FILM COATED ORAL at 20:30

## 2022-02-19 ASSESSMENT — PAIN SCALES - GENERAL
PAINLEVEL_OUTOF10: 0
PAINLEVEL_OUTOF10: 8
PAINLEVEL_OUTOF10: 0
PAINLEVEL_OUTOF10: 6
PAINLEVEL_OUTOF10: 4
PAINLEVEL_OUTOF10: 4

## 2022-02-19 ASSESSMENT — PAIN DESCRIPTION - DESCRIPTORS
DESCRIPTORS: ACHING

## 2022-02-19 ASSESSMENT — PAIN DESCRIPTION - PAIN TYPE
TYPE: ACUTE PAIN;SURGICAL PAIN

## 2022-02-19 ASSESSMENT — PAIN DESCRIPTION - ONSET
ONSET: ON-GOING

## 2022-02-19 ASSESSMENT — PAIN DESCRIPTION - PROGRESSION: CLINICAL_PROGRESSION: NOT CHANGED

## 2022-02-19 ASSESSMENT — PAIN DESCRIPTION - ORIENTATION
ORIENTATION: RIGHT

## 2022-02-19 ASSESSMENT — PAIN DESCRIPTION - LOCATION
LOCATION: HIP
LOCATION: HIP
LOCATION: LEG
LOCATION: HIP

## 2022-02-19 ASSESSMENT — PAIN - FUNCTIONAL ASSESSMENT
PAIN_FUNCTIONAL_ASSESSMENT: ACTIVITIES ARE NOT PREVENTED
PAIN_FUNCTIONAL_ASSESSMENT: ACTIVITIES ARE NOT PREVENTED

## 2022-02-19 ASSESSMENT — PAIN DESCRIPTION - FREQUENCY
FREQUENCY: CONTINUOUS
FREQUENCY: INTERMITTENT
FREQUENCY: CONTINUOUS

## 2022-02-19 NOTE — PLAN OF CARE
Problem: Falls - Risk of:  Goal: Will remain free from falls  Description: Will remain free from falls  2/18/2022 1929 by Savanna Mi RN  Outcome: Ongoing  Note: Client remains free from falls, bed/chair alarm in place, door open, encouraged to use call light for needs, call light is within reach, bed locked in lowest position,  Will continue to monitor. Problem: Mobility - Impaired:  Goal: Mobility will improve  Description: Mobility will improve  Outcome: Ongoing  Note: Mobility to improve through active participation with therapy      Problem: Pain:  Goal: Control of acute pain  Description: Control of acute pain  2/18/2022 1929 by Savanna Mi RN  Outcome: Ongoing  Note: Pt voices pain needs appropriately, pain is assessed during shift. Problem: Skin Integrity:  Goal: Will show no infection signs and symptoms  Description: Will show no infection signs and symptoms  2/18/2022 1929 by Savanna Mi RN  Outcome: Ongoing  Note: Surgical site observed for signs/symptoms of infection. Vitals performed routinely, labs observed. Will monitor pt while on ARU       Problem: Skin Integrity:  Goal: Absence of new skin breakdown  Description: Absence of new skin breakdown  Outcome: Ongoing  Note:   See Royer scale. Encourage/assist pt to turn and reposition every two hours and as needed. Heels elevated off bed. Protective barrier placed as needed. Patient kept clean and dry. Pillows used for positioning. Will continue to monitor for skin breakdown.

## 2022-02-19 NOTE — PROGRESS NOTES
Department of Physical Medicine & Rehabilitation  Progress Note    Patient Identification:  Ivana Moreira  [id number]  : 1945  Admit date: 2022    Chief Complaint: Closed comminuted intra-articular fracture of distal femur, right, initial encounter ([address]    Subjective:   No new complaints this morning. Seen and examined in bed this morning., Visitation issue handled by administration. She is participating in therapy. ROS: No f/c, n/v, cp     Objective:  Patient Vitals for the past 24 hrs:   BP Temp Temp src Pulse Resp SpO2   22 0823 (!) 143/80 97.7 °F (36.5 °C) Oral 83 16 96 %   22 (!) 119/59 -- -- 75 -- --   22 (!) 149/80 97.9 °F (36.6 °C) Oral 72 16 94 %     Const: Alert. No distress, pleasant. HEENT: Normocephalic, atraumatic. Normal sclera/conjunctiva. MMM. CV: Regular rate and rhythm. Resp: No respiratory distress. Lungs CTAB. Abd: Soft, nontender, nondistended, NABS+   Ext: + edema. Neuro: Alert, oriented, appropriately interactive. Psych: Cooperative, appropriate mood and affect    Laboratory data: Available via EMR. Last 24 hour lab  No results found for this or any previous visit (from the past 24 hour(s)). Therapy progress:  PT  Position Activity Restriction  Other position/activity restrictions: TTWB R, up with assistance  Objective     Sit to Stand: Contact guard assistance  Stand to sit: Contact guard assistance  Device: Rolling Walker  Assistance: Minimal assistance  Distance: x5ft  OT  PT Equipment Recommendations  Equipment Needed: Yes  Mobility Devices: Wheelchair  Walker: Rolling  Wheelchair: Standard  Other: 20\", tip back arm rests, anti-tippers, B leg rests  Toilet - Technique: Stand pivot  Equipment Used: Raised toilet seat with rails  Toilet Transfers Comments: w/ use of RW and max VCs needed for TTWB  Assessment        SLP          Body mass index is 32.77 kg/m².     Assessment and Plan:  Right midshaft femur fracture   -[facility]  -Wound care  -TTWB  -PT/OT for functional mobility, balance, ADLs     Acute blood loss anemia   -Post-surgical.   -Monitor Hgb, transfuse prn <7.      Hypoxia  -Thought to be due to atelectasis and fluid overload  -Supplemental O2, wean as tolerated  -IS        MARISELA  -Avoid nephrotoxins, renally dose meds  -Monitor renal function     Anxiety/depression  -bupropion, duloxetine, escitalopram     Fibromyalgia  -Duloxetine, gabapentin     KAIT  -CPAP     Bladder  -- Urinary retention  -Christensen in place, consider void trial ~1 week  -flomax     Bowel   -High risk constipation   -senna+colace BID, PRN miralax, MoM, and bisacodyl supp. -improved     Safety   -fall precautions     Pain control  -Percocet prn, gabapentin  - zanaflex low dose for back pain.    - improving     PPx  -DVT: lovenox x 20 days per Ortho- plan to follow up with ortho  -GI: pantoprazole    Visitation approved  -   facetime     Rehab Progress: Improving  Anticipated Dispo: home  Services/DME: [facility] MILLER: VANI Interiano.P.H  PM&R  2022  10:56 AM

## 2022-02-19 NOTE — PROGRESS NOTES
Shift assessment complete. VSS. A&Ox4. Pain being managed with PRN and scheduled medication. Non-pharm measures of rest, repositioning, and elevation encouraged throughout shift. Fall precautions in place. Patient requesting to rest in bed at this time. Patient assisted to restroom and returned to bed. Bed alarm utilized, call light within reach. Patient reports on complaints at this time.        Vitals:    02/19/22 0823   BP: (!) 143/80   Pulse: 83   Resp: 16   Temp: 97.7 °F (36.5 °C)   SpO2: 96%

## 2022-02-19 NOTE — PLAN OF CARE
Problem: Falls - Risk of:  Goal: Will remain free from falls  Description: Will remain free from falls  Outcome: Ongoing  Note: Remains free from falls this shift. Fall precautions in place. Patient calls out for assistance, call light within reach. Patient educated on using call light for staff to assist, prior to getting up on own. Patient verbalized understanding. Bed alarm utilized throughout shift.       Problem: Mobility - Impaired:  Goal: Mobility will improve  Description: Mobility will improve  Outcome: Ongoing     Problem: Pain:  Goal: Pain level will decrease  Description: Pain level will decrease  Outcome: Ongoing     Problem: Skin Integrity:  Goal: Will show no infection signs and symptoms  Description: Will show no infection signs and symptoms  Outcome: Ongoing     Problem: Urinary Elimination:  Goal: Signs and symptoms of infection will decrease  Description: Signs and symptoms of infection will decrease  Outcome: Ongoing

## 2022-02-20 PROCEDURE — 6370000000 HC RX 637 (ALT 250 FOR IP): Performed by: PHYSICAL MEDICINE & REHABILITATION

## 2022-02-20 PROCEDURE — 6360000002 HC RX W HCPCS: Performed by: PHYSICAL MEDICINE & REHABILITATION

## 2022-02-20 PROCEDURE — 1280000000 HC REHAB R&B

## 2022-02-20 PROCEDURE — 99231 SBSQ HOSP IP/OBS SF/LOW 25: CPT | Performed by: PHYSICAL MEDICINE & REHABILITATION

## 2022-02-20 RX ADMIN — ENOXAPARIN SODIUM 40 MG: 100 INJECTION SUBCUTANEOUS at 09:28

## 2022-02-20 RX ADMIN — OXYCODONE HYDROCHLORIDE AND ACETAMINOPHEN 2 TABLET: 5; 325 TABLET ORAL at 00:27

## 2022-02-20 RX ADMIN — TAMSULOSIN HYDROCHLORIDE 0.4 MG: 0.4 CAPSULE ORAL at 09:30

## 2022-02-20 RX ADMIN — OXYCODONE HYDROCHLORIDE AND ACETAMINOPHEN 1 TABLET: 5; 325 TABLET ORAL at 05:42

## 2022-02-20 RX ADMIN — Medication: at 09:29

## 2022-02-20 RX ADMIN — Medication: at 20:42

## 2022-02-20 RX ADMIN — OXYCODONE HYDROCHLORIDE AND ACETAMINOPHEN 2 TABLET: 5; 325 TABLET ORAL at 12:05

## 2022-02-20 RX ADMIN — GABAPENTIN 600 MG: 600 TABLET, FILM COATED ORAL at 20:48

## 2022-02-20 RX ADMIN — GABAPENTIN 600 MG: 600 TABLET, FILM COATED ORAL at 14:49

## 2022-02-20 RX ADMIN — OXYCODONE HYDROCHLORIDE AND ACETAMINOPHEN 2 TABLET: 5; 325 TABLET ORAL at 19:06

## 2022-02-20 RX ADMIN — Medication: at 14:56

## 2022-02-20 RX ADMIN — ACETAMINOPHEN 650 MG: 325 TABLET ORAL at 09:30

## 2022-02-20 RX ADMIN — PANTOPRAZOLE SODIUM 40 MG: 40 TABLET, DELAYED RELEASE ORAL at 05:42

## 2022-02-20 RX ADMIN — GABAPENTIN 600 MG: 600 TABLET, FILM COATED ORAL at 09:30

## 2022-02-20 RX ADMIN — Medication 3 MG: at 20:48

## 2022-02-20 RX ADMIN — BUPROPION HYDROCHLORIDE 150 MG: 150 TABLET, EXTENDED RELEASE ORAL at 09:40

## 2022-02-20 RX ADMIN — ESCITALOPRAM OXALATE 20 MG: 10 TABLET ORAL at 09:30

## 2022-02-20 RX ADMIN — DULOXETINE HYDROCHLORIDE 60 MG: 60 CAPSULE, DELAYED RELEASE ORAL at 09:30

## 2022-02-20 ASSESSMENT — PAIN DESCRIPTION - ORIENTATION
ORIENTATION: RIGHT

## 2022-02-20 ASSESSMENT — PAIN - FUNCTIONAL ASSESSMENT
PAIN_FUNCTIONAL_ASSESSMENT: ACTIVITIES ARE NOT PREVENTED

## 2022-02-20 ASSESSMENT — PAIN SCALES - GENERAL
PAINLEVEL_OUTOF10: 9
PAINLEVEL_OUTOF10: 9
PAINLEVEL_OUTOF10: 6
PAINLEVEL_OUTOF10: 8
PAINLEVEL_OUTOF10: 10
PAINLEVEL_OUTOF10: 3
PAINLEVEL_OUTOF10: 4
PAINLEVEL_OUTOF10: 8

## 2022-02-20 ASSESSMENT — PAIN DESCRIPTION - PROGRESSION
CLINICAL_PROGRESSION: NOT CHANGED

## 2022-02-20 ASSESSMENT — PAIN DESCRIPTION - FREQUENCY
FREQUENCY: CONTINUOUS

## 2022-02-20 ASSESSMENT — PAIN DESCRIPTION - LOCATION
LOCATION: LEG

## 2022-02-20 ASSESSMENT — PAIN DESCRIPTION - ONSET
ONSET: ON-GOING

## 2022-02-20 ASSESSMENT — PAIN DESCRIPTION - DESCRIPTORS
DESCRIPTORS: ACHING

## 2022-02-20 ASSESSMENT — PAIN DESCRIPTION - PAIN TYPE
TYPE: ACUTE PAIN;SURGICAL PAIN

## 2022-02-20 NOTE — PROGRESS NOTES
Department of Physical Medicine & Rehabilitation  Progress Note    Patient Identification:  Dori Zhao  [id number]  : 1945  Admit date: 2022    Chief Complaint: Closed comminuted intra-articular fracture of distal femur, right, initial encounter ([address]    Subjective:   Doing well. No new complaints overnight. Plan for  for her son today via [address] [facility]. Seen and examined in her bed this morning. ROS: No f/c, n/v, cp     Objective:  Patient Vitals for the past 24 hrs:   BP Temp Temp src Pulse Resp SpO2 Weight   22 0927 (!) 124/59 98.5 °F (36.9 °C) Oral 81 16 98 % --   22 0547 -- -- -- -- -- -- 231 lb 11.3 oz (105.1 kg)   22 128/67 98.4 °F (36.9 °C) Oral 79 16 97 % --     Const: Alert. No distress, pleasant. HEENT: Normocephalic, atraumatic. Normal sclera/conjunctiva. MMM. CV: Regular rate and rhythm. Resp: No respiratory distress. Lungs CTAB. Abd: Soft, nontender, nondistended, NABS+   Ext: + edema. Neuro: Alert, oriented, appropriately interactive. Psych: Cooperative, appropriate mood and affect    Laboratory data: Available via EMR. Last 24 hour lab  No results found for this or any previous visit (from the past 24 hour(s)). Therapy progress:  PT  Position Activity Restriction  Other position/activity restrictions: TTWB R, up with assistance  Objective     Sit to Stand: Contact guard assistance  Stand to sit: Contact guard assistance  Device: Rolling Walker  Assistance: Minimal assistance  Distance: x5ft  OT  PT Equipment Recommendations  Equipment Needed: Yes  Mobility Devices: Wheelchair  Walker: Rolling  Wheelchair: Standard  Other: 20\", tip back arm rests, anti-tippers, B leg rests  Toilet - Technique: Stand pivot  Equipment Used: Raised toilet seat with rails  Toilet Transfers Comments: w/ use of RW and max VCs needed for TTWB  Assessment        SLP          Body mass index is 33.25 kg/m².     Assessment and Plan:  Right midshaft femur fracture   -[facility] ( [facility]  -Wound care  -TTWB  -PT/OT for functional mobility, balance, ADLs     Acute blood loss anemia   -Post-surgical.   -Monitor Hgb, transfuse prn <7.      Hypoxia  -Thought to be due to atelectasis and fluid overload  -Supplemental O2, wean as tolerated  -IS        MARISELA  -Avoid nephrotoxins, renally dose meds  -Monitor renal function     Anxiety/depression  -bupropion, duloxetine, escitalopram     Fibromyalgia  -Duloxetine, gabapentin     KAIT  -CPAP     Bladder  -- Urinary retention  -Christensen in place, consider void trial ~1 week  -flomax     Bowel   -High risk constipation   -senna+colace BID, PRN miralax, MoM, and bisacodyl supp. -improved     Safety   -fall precautions     Pain control  -Percocet prn, gabapentin  - zanaflex low dose for back pain.    - improving     PPx  -DVT: lovenox x 20 days per Ortho- plan to follow up with ortho  -GI: pantoprazole    Visitation approved  -   facetime     Rehab Progress: Improving  Anticipated Dispo: home  Services/DME: [facility]  ELOS: BETY Hennessy D.O. M.P.H  PM&R  2022  12:04 PM

## 2022-02-20 NOTE — PLAN OF CARE
Problem: Falls - Risk of:  Goal: Will remain free from falls  Description: Will remain free from falls  2/20/2022 1325 by Vazquez Jarquin RN  Outcome: Ongoing  Note: Remains free from falls this shift. Fall precautions in place. Bed/chair alarm utilized throughout shift. Patient calls out for assistance, call light within reach. Patient educated on importance of asking staff for assistance.       Problem: Pain:  Goal: Pain level will decrease  Description: Pain level will decrease  2/20/2022 1325 by Vazquez Jarquin RN  Outcome: Ongoing     Problem: Skin Integrity:  Goal: Absence of new skin breakdown  Description: Absence of new skin breakdown  2/20/2022 1325 by Vazquez Jarquin RN  Outcome: Ongoing

## 2022-02-20 NOTE — PROGRESS NOTES
Patient returned to unit at this time. Fall precautions in place, call light within reach. Patient provided with ice pack for leg at this time.

## 2022-02-20 NOTE — PROGRESS NOTES
Shift assessment complete. VSS. A&Ox4. Pain being managed with PRN and scheduled medications. Non-pharm measures of repositioning and ice therapy encouraged throughout shift. Fall precautions in place. Chair alarm utilized. Patient off unit at this time with daughter in [place] room for video service of son' . Patient educated on fall precautions. Daughter provided this RN's unit phone number if assistance needed throughout service.        Vitals:    22 0927   BP: (!) 124/59   Pulse: 81   Resp: 16   Temp: 98.5 °F (36.9 °C)   SpO2: 98%

## 2022-02-20 NOTE — PROGRESS NOTES
Patient is in bed and resting at this time, vitals stable, pain at 10/10 with R leg, gave PRN oxycodone. Patient is a x 1 stand pivot with gait belt to wheelchair, continent of urine. 2 wounds on RLQ, applied bacitracin with border gauze. Call light with in reach and bed alarm on.

## 2022-02-20 NOTE — PLAN OF CARE
Problem: Falls - Risk of:  Goal: Will remain free from falls  Description: Will remain free from falls  2/20/2022 0416 by Praveen Mckeon RN  Outcome: Ongoing  Note: Patient is a fall risk. Patient is a x 1 stand pivot with gait belt. See Fall Risk assessment for details. Bed is in low, lock position; call light/belongings within reach. No attempts to get out of bed have been made, calls appropriately when assistance is needed. Bed alarm and hourly rounds in place for safety. Problem: Falls - Risk of:  Goal: Absence of physical injury  Description: Absence of physical injury  Outcome: Ongoing     Problem: Mobility - Impaired:  Goal: Mobility will improve  Description: Mobility will improve  2/20/2022 0416 by Praveen Mckeon RN  Outcome: Ongoing     Problem: Pain:  Goal: Pain level will decrease  Description: Pain level will decrease  2/20/2022 0416 by Praveen Mckeon RN  Outcome: Ongoing  Note: Patient complains of pain at level 10/10. Patient describes pain as aching. Patient requests pain medication. Patient medicated with PRN percocet. Problem: Pain:  Goal: Control of acute pain  Description: Control of acute pain  Outcome: Ongoing     Problem: Skin Integrity:  Goal: Will show no infection signs and symptoms  Description: Will show no infection signs and symptoms  2/20/2022 0416 by Praveen Mckeon RN  Outcome: Ongoing  Note: Patient has surgical incision on L leg, mepalex applied, skin is clean, dry and intact. Problem: Skin Integrity:  Goal: Absence of new skin breakdown  Description: Absence of new skin breakdown  Outcome: Ongoing     Problem: Urinary Elimination:  Goal: Signs and symptoms of infection will decrease  Description: Signs and symptoms of infection will decrease  2/20/2022 0416 by Praveen Mckeon RN  Outcome: Ongoing  Note: Christensen catheter has been removed, voiding trial successful.

## 2022-02-21 LAB
ANION GAP SERPL CALCULATED.3IONS-SCNC: 9 MMOL/L (ref 3–16)
BASOPHILS ABSOLUTE: 0 K/UL (ref 0–0.2)
BASOPHILS RELATIVE PERCENT: 0.4 %
BUN BLDV-MCNC: 16 MG/DL (ref 7–20)
CALCIUM SERPL-MCNC: 9.1 MG/DL (ref 8.3–10.6)
CHLORIDE BLD-SCNC: 106 MMOL/L (ref 99–110)
CO2: 29 MMOL/L (ref 21–32)
CREAT SERPL-MCNC: 1.1 MG/DL (ref 0.6–1.2)
EOSINOPHILS ABSOLUTE: 0.2 K/UL (ref 0–0.6)
EOSINOPHILS RELATIVE PERCENT: 4.8 %
GFR AFRICAN AMERICAN: 58
GFR NON-AFRICAN AMERICAN: 48
GLUCOSE BLD-MCNC: 97 MG/DL (ref 70–99)
HCT VFR BLD CALC: 26.5 % (ref 36–48)
HEMOGLOBIN: 8.7 G/DL (ref 12–16)
LYMPHOCYTES ABSOLUTE: 1.1 K/UL (ref 1–5.1)
LYMPHOCYTES RELATIVE PERCENT: 22.6 %
MCH RBC QN AUTO: 28.9 PG (ref 26–34)
MCHC RBC AUTO-ENTMCNC: 32.8 G/DL (ref 31–36)
MCV RBC AUTO: 88 FL (ref 80–100)
MONOCYTES ABSOLUTE: 0.5 K/UL (ref 0–1.3)
MONOCYTES RELATIVE PERCENT: 10.3 %
NEUTROPHILS ABSOLUTE: 3.1 K/UL (ref 1.7–7.7)
NEUTROPHILS RELATIVE PERCENT: 61.9 %
PDW BLD-RTO: 15.1 % (ref 12.4–15.4)
PLATELET # BLD: 279 K/UL (ref 135–450)
PMV BLD AUTO: 9.1 FL (ref 5–10.5)
POTASSIUM REFLEX MAGNESIUM: 4.9 MMOL/L (ref 3.5–5.1)
RBC # BLD: 3.01 M/UL (ref 4–5.2)
SODIUM BLD-SCNC: 144 MMOL/L (ref 136–145)
WBC # BLD: 5 K/UL (ref 4–11)

## 2022-02-21 PROCEDURE — 6370000000 HC RX 637 (ALT 250 FOR IP): Performed by: PHYSICAL MEDICINE & REHABILITATION

## 2022-02-21 PROCEDURE — 97535 SELF CARE MNGMENT TRAINING: CPT

## 2022-02-21 PROCEDURE — 99232 SBSQ HOSP IP/OBS MODERATE 35: CPT | Performed by: PHYSICAL MEDICINE & REHABILITATION

## 2022-02-21 PROCEDURE — 1280000000 HC REHAB R&B

## 2022-02-21 PROCEDURE — 97530 THERAPEUTIC ACTIVITIES: CPT

## 2022-02-21 PROCEDURE — 6360000002 HC RX W HCPCS: Performed by: PHYSICAL MEDICINE & REHABILITATION

## 2022-02-21 PROCEDURE — 85025 COMPLETE CBC W/AUTO DIFF WBC: CPT

## 2022-02-21 PROCEDURE — 97110 THERAPEUTIC EXERCISES: CPT

## 2022-02-21 PROCEDURE — 36415 COLL VENOUS BLD VENIPUNCTURE: CPT

## 2022-02-21 PROCEDURE — 80048 BASIC METABOLIC PNL TOTAL CA: CPT

## 2022-02-21 RX ORDER — ACETAMINOPHEN 325 MG/1
650 TABLET ORAL EVERY 6 HOURS PRN
Status: DISCONTINUED | OUTPATIENT
Start: 2022-02-21 | End: 2022-02-26 | Stop reason: HOSPADM

## 2022-02-21 RX ADMIN — ENOXAPARIN SODIUM 40 MG: 100 INJECTION SUBCUTANEOUS at 07:37

## 2022-02-21 RX ADMIN — DULOXETINE HYDROCHLORIDE 60 MG: 60 CAPSULE, DELAYED RELEASE ORAL at 07:38

## 2022-02-21 RX ADMIN — OXYCODONE HYDROCHLORIDE AND ACETAMINOPHEN 2 TABLET: 5; 325 TABLET ORAL at 20:36

## 2022-02-21 RX ADMIN — Medication: at 07:41

## 2022-02-21 RX ADMIN — ESCITALOPRAM OXALATE 20 MG: 10 TABLET ORAL at 07:38

## 2022-02-21 RX ADMIN — PANTOPRAZOLE SODIUM 40 MG: 40 TABLET, DELAYED RELEASE ORAL at 05:34

## 2022-02-21 RX ADMIN — TAMSULOSIN HYDROCHLORIDE 0.4 MG: 0.4 CAPSULE ORAL at 07:38

## 2022-02-21 RX ADMIN — GABAPENTIN 600 MG: 600 TABLET, FILM COATED ORAL at 07:37

## 2022-02-21 RX ADMIN — Medication: at 14:17

## 2022-02-21 RX ADMIN — OXYCODONE HYDROCHLORIDE AND ACETAMINOPHEN 2 TABLET: 5; 325 TABLET ORAL at 07:37

## 2022-02-21 RX ADMIN — SENNOSIDES AND DOCUSATE SODIUM 1 TABLET: 50; 8.6 TABLET ORAL at 07:38

## 2022-02-21 RX ADMIN — ACETAMINOPHEN 650 MG: 325 TABLET ORAL at 10:43

## 2022-02-21 RX ADMIN — GABAPENTIN 600 MG: 600 TABLET, FILM COATED ORAL at 20:36

## 2022-02-21 RX ADMIN — Medication: at 20:37

## 2022-02-21 RX ADMIN — TIZANIDINE 2 MG: 4 TABLET ORAL at 05:34

## 2022-02-21 RX ADMIN — BUPROPION HYDROCHLORIDE 150 MG: 150 TABLET, EXTENDED RELEASE ORAL at 09:48

## 2022-02-21 RX ADMIN — OXYCODONE HYDROCHLORIDE AND ACETAMINOPHEN 2 TABLET: 5; 325 TABLET ORAL at 14:17

## 2022-02-21 RX ADMIN — GABAPENTIN 600 MG: 600 TABLET, FILM COATED ORAL at 14:17

## 2022-02-21 ASSESSMENT — PAIN DESCRIPTION - ORIENTATION
ORIENTATION: RIGHT

## 2022-02-21 ASSESSMENT — PAIN DESCRIPTION - ONSET
ONSET: ON-GOING
ONSET: GRADUAL

## 2022-02-21 ASSESSMENT — PAIN SCALES - GENERAL
PAINLEVEL_OUTOF10: 10
PAINLEVEL_OUTOF10: 10
PAINLEVEL_OUTOF10: 0
PAINLEVEL_OUTOF10: 7
PAINLEVEL_OUTOF10: 0
PAINLEVEL_OUTOF10: 8
PAINLEVEL_OUTOF10: 6
PAINLEVEL_OUTOF10: 3

## 2022-02-21 ASSESSMENT — PAIN DESCRIPTION - PAIN TYPE
TYPE: ACUTE PAIN;SURGICAL PAIN
TYPE: ACUTE PAIN;SURGICAL PAIN
TYPE: ACUTE PAIN
TYPE: ACUTE PAIN;SURGICAL PAIN

## 2022-02-21 ASSESSMENT — PAIN DESCRIPTION - PROGRESSION
CLINICAL_PROGRESSION: GRADUALLY IMPROVING
CLINICAL_PROGRESSION: GRADUALLY IMPROVING
CLINICAL_PROGRESSION: GRADUALLY WORSENING

## 2022-02-21 ASSESSMENT — PAIN DESCRIPTION - FREQUENCY
FREQUENCY: CONTINUOUS

## 2022-02-21 ASSESSMENT — PAIN DESCRIPTION - DESCRIPTORS
DESCRIPTORS: ACHING

## 2022-02-21 ASSESSMENT — PAIN DESCRIPTION - LOCATION
LOCATION: LEG
LOCATION: ARM;LEG
LOCATION: LEG
LOCATION: LEG

## 2022-02-21 ASSESSMENT — PAIN - FUNCTIONAL ASSESSMENT
PAIN_FUNCTIONAL_ASSESSMENT: ACTIVITIES ARE NOT PREVENTED

## 2022-02-21 NOTE — PATIENT CARE CONFERENCE
Inpatient Rehabilitation  Weekly Team Conference Note  The [address]  [phone]  Patient Name: Gomez Willis        MRN: [id number]    : 1945  (77 y.o.)  Gender: female   Referring Practitioner: Dr. Flor Simpson  Diagnosis: R femur fx  The team conference for this patient was held on 2022 at 10:30am by:  Riaz Simpson DO    Current/Goal QM SCORES  QM Current/Goal Score   Eating CARE Score: 6 / Discharge Goal: Independent   Oral Hygiene CARE Score: 6 / Discharge Goal: Independent   Shower/Bathing CARE Score: 4 / Discharge Goal: Supervision or touching assistance   UB Dressing CARE Score: 6 / Discharge Goal: Independent   LB Dressing CARE Score: 4 / Discharge Goal: Independent   Putting on/off Footwear CARE Score: 5 / Discharge Goal: Independent   Toileting Hygiene CARE Score: 4 / Discharge Goal: Independent   Bladder Continence Bladder Continence: Always continent    Bowel Continence Bowel Continence: Not rated    Toilet Transfers CARE Score: 4 / Discharge Goal: Independent   Shower/Bathe Self  CARE Score: 4 / Discharge Goal: Supervision or touching assistance   Rolling Left and Right CARE Score: 4 / Discharge Goal: Independent   Sit to Lying CARE Score: 4 / Discharge Goal: Independent   Lying to Sitting on Bedside CARE Score: 4 / Discharge Goal: Independent   Sit to Stand CARE Score: 4 / Discharge Goal: Independent   Chair/Bed to Chair Transfer CARE Score: 4 / Discharge Goal: Independent   Car Transfers CARE Score: 1 / Discharge Goal: Independent   Walk 10 Feet CARE Score: 88 / Discharge Goal: Independent   Walk 50 Feet with Two Turns CARE Score: 88 / Discharge Goal: Independent   Walk 150 Feet CARE Score: 88 / Discharge Goal: Not Applicable   Walk 10 Feet on Uneven Surfaces CARE Score: 88 / Discharge Goal: Supervision or touching assistance   1 Step (Curb) CARE Score: 88 / Discharge Goal: Supervision or touching assistance   4 Steps CARE Score: 80 / Discharge Goal: Supervision or touching assistance   12 Steps CARE Score: 88 / Discharge Goal: Not Applicable   Picking up Object from Floor CARE Score: 88 / Discharge Goal: Independent   Wheel 50 Feet with 2 Turns CARE Score: 4 / Discharge Goal: Independent   Type         [] Manual        [] Motorized        [] N/A   Wheel 150 Feet CARE Score: 4 / Discharge Goal: Independent   Type         [] Manual        [] Motorized        [] N/A     NURSING:  A&Ox: Level of Consciousness: Alert (0)  Orientation Level: Oriented X4  Velasquez Fall Risk Score: Total Score: 35  Admission BIMS: 13   [] Unable to complete BIMS on Admission, Reasoning:  Wounds/Incisions/Ulcers: surgical incision to RLE, 2 open pustules to RLQ  Medication Review: reviewed with patient  Pain: c/o 10/10 surgical pain managed with PRN pain medciation  Consultations: none  Imaging: na  No orders to display     Active Comorbid Conditions: HTN, KAIT, Anxiety, Depression, Fibromyalgia, Osteoporosis, Osteoarthritis  Systems Review:   Renal: WNL, Dialysis: na Type: na, Frequency: na  Neurological: WNL  Musculoskeletal: TTWB to RLE  Respiratory: WNL  Cardiac/Circulatory/Peripheral Vascular: HTN  Abnormal/Relevant Test Results:   Abnormal/Relevant Lab Values:   CBC:   Recent Labs     02/21/22  0608   WBC 5.0   HGB 8.7*   HCT 26.5*   MCV 88.0        BMP:   Recent Labs     02/21/22  0608      K 4.9      CO2 29   BUN 16   CREATININE 1.1     PT/INR: No results for input(s): PROTIME, INR in the last 72 hours. APTT: No results for input(s): APTT in the last 72 hours.   Liver Profile:  No results found for: AST, ALT, ALB, BILIDIR, BILITOT, ALKPHOS, GGT, 5NUCNo results found for: CHOL, HDL, TRIG  Recent Labs     02/21/22  0608   WBC 5.0   HGB 8.7*   HCT 26.5*      MCV 88.0     Recent Labs     02/21/22  0608      K 4.9      CO2 29   BUN 16   CREATININE 1.1     No results for input(s): AST, ALT, ALB, BILIDIR, BILITOT, donned pants seated at EOB after ace wrap was applied to RLE. Pt pulled pants up over hips in bed I'ly. Toilet Transfers: Toilet Transfers  Toilet - Technique: Stand pivot  Equipment Used: Raised toilet seat with rails  Toilet Transfer: Contact guard assistance  Toilet Transfers Comments: VCs needed to recall that she needed to use the RW in order to transfer to toilet. VCs needed for TTWB RLE    Tub/ShowerTransfers:     Shower Transfers  Shower - Transfer From: Wheelchair  Shower - Transfer Type: To and From  Shower - Transfer To: Transfer tub bench  Shower - Technique: Stand pivot  Shower Transfers: Contact Guard  Shower Transfers Comments: Mod VCs needed for TTWB RLE    SPEECH THERAPY:  Assessment: Speech Therapy Diagnosis  Cognitive Diagnosis: suspected higher-level cognitive impairment    NUTRITION:   Current Weight: 224 lb 13.9 oz (102 kg) BMI (Calculated): 32.3  Current diet order: Current diet and supplement order: ADULT ORAL NUTRITION SUPPLEMENT; Breakfast, Dinner; Other Oral Supplement; Sparks Counter  ADULT DIET; Regular  ADULT ORAL NUTRITION SUPPLEMENT; Lunch; Fortified Pudding Oral Supplement         Feeding: Able to feed self  Room Service: Selective   NSG Recorded PO: PO Meals Eaten (%): 76 - 100% PO Supplement (%): 0%  Malnutrition Status Malnutrition Status: No malnutrition  Please see nutrition evaluation per nutrition standards of care for additional info. CASE MANAGEMENT:  Psychosocial/Behavioral Issues: none  Assessment:  Pt wants to return home at DC; dgt is concern with Pt returning to her home so we are having a Family Meeting tomorrow after Team Conference at 1100. Dr. Franca Casper is aware. Patient/Family Education provided by team:  Role of PT/OT, safe transfers and TTWB precautions, d/c concerns and environmental barriers,    Patient Goals for Rehab stay:  1. walk [place]      Rehab Team Goals for patient for the Upcoming Week:  1. increase compliance w/ TTWB RLE  2.  Increase independence w/functional transfers  3. Increase independence w/ ADLs     Barriers to Progress/Attainment of Goals & Efforts/Interventions to remove Barriers:  1. Home is not w/c accessible- continue to address alternatives such as rearranging furniture and setting up room in living area if pt is agreeable  2. Non compliance w/ TTWB- continue to address by OT/PT/RNs  3. Memory- consider writing all information down     Discharge Plan:  Estimated Length of Stay: 15 days  Destination: home health  Services at Discharge: [address] [place] [address], Occupational Therapy and Nursing 3x week  Equipment at Discharge: wheelchair [id number]:   Factors facilitating achievement of predicted outcomes: Cooperative and Pleasant  Barriers to the achievement of predicted outcomes: Pain, Limited family support, Cognitive deficit, Limited insight into deficits and Stairs at home    Special Needs in the Upcoming Week:   [x] Family/Caregiver Education  [] Home visit  []Therapeutic Pass [] Consults:_______   [] Family/Caregiver Training  [] Stroke Risk Factor Education     [] Other: Family meeting 2/22/22 @ 1100. TEAM SUMMARY: Will continue with current poc & goals until anticipated d/c date of 2/26/2022.     MD:   Stroke Risk Factors:   [] N/A for this patient [x] HTN  []  Diabetes  [] Hyperlipidemia  [x]Obesity BMI >25  [] Atrial Fibrillation [] Smoker (current)  [] Smoker (quit in last 12 months)  [] Sleep Apnea [] Other:     Risk for Readmission: Moderate (10-19)      Justification for Continued Stay:   Criteria for continued IRF stay:  Based on my medical assessment of the patient and review of information from the interdisciplinary team, as part of this weekly team conference, the patient continues to meet the following criteria for IRF level of care:  [x] The patient requires 24-hour rehabilitation nursing care   [x] The patient requires an intensive rehabilitation therapy program  [x] The patient requires active and ongoing intervention of multiple therapy disciplines  [x] The patient requires continued physician supervision by a rehabilitation physician  [x] The patient requires an intensive and coordinated interdisciplinary team approach to the delivery of rehabilitative care    Medical Necessity-continued close physician medical management is required for:   [] Cardiac/Circulatory dysfunction  [] Respiratory/Pulmonary dysfunction  [] Integumentary complications  [] Peripheral Vascular dysfunction  [x] Musculoskeletal dysfunction  [] Neurological dysfunction d/t:  [] CVA  [] SCI  [] TBI  [] Other: __________  [] Renal dysfunction  [] Hematologic dysfunction    [] Endocrine disorders  [] GI disorders     [] Genito-Urinary dysfunction    Assessment/Plan:  [x] The patient is making good progression towards their LTG's, is actively participating in, and has a reasonable expectation to continue to benefit from the intensive rehabilitation program.  [] The estimated discharge date has been changed from initial team conference due to:   [] The estimated discharge destination has been changed from initial team conference due to:     Rehab Team Members in attendance for Team Conference:  ARU Supervisor/PPS Coordinator:  Bhavik Guaman, PT, DPT    Therapy Manager/ARU :  Kaylene Benedict PT, DPT    Nursing Manager:  Carlton Lui RN    Social Work:  Leo Monsalve, [place]    Nursing:  Zoë Borges, SP    Therapy:  Emanuel Ponce, PT, DPT  Tiffany Cameron PT, DPT     Josselyn Hester, OTR/L  Faiza Sparks, OTR/L  Hoa, OTR/L    Shanta Hou, MA-MARCIANO, SLP    Nutrition:  Toi Boston RD LD    I approve the established interdisciplinary plan of care as documented within the medical record of Alfredo Austin.     MD Signature Jaci Cunningham, SELVIN. M.P.H  PM&R  2/22/2022  11:29 AM

## 2022-02-21 NOTE — PROGRESS NOTES
Department of Physical Medicine & Rehabilitation  Progress Note    Patient Identification:  Donna Byrne  [id number]  : 1945  Admit date: 2022    Chief Complaint: Closed comminuted intra-articular fracture of distal femur, right, initial encounter ([address]    Subjective:   Completed  via zoom yesterday. Doing well in therapy with less pain today. Her daughter is asking about assisted living in town and what services are available. Discussion about possible placement post ARU. Continue with therapy. Seen and examined in PT today. ROS: No f/c, n/v, cp     Objective:  Patient Vitals for the past 24 hrs:   BP Temp Temp src Pulse Resp SpO2 Weight   22 0727 133/69 98 °F (36.7 °C) Oral 75 16 96 % --   22 0538 -- -- -- -- -- -- 231 lb 0.7 oz (104.8 kg)   22 (!) 143/83 98.3 °F (36.8 °C) Oral 78 16 97 % --     Const: Alert. No distress, pleasant. HEENT: Normocephalic, atraumatic. Normal sclera/conjunctiva. MMM. CV: Regular rate and rhythm. Resp: No respiratory distress. Lungs CTAB. Abd: Soft, nontender, nondistended, NABS+   Ext: + edema. Neuro: Alert, oriented, appropriately interactive. Psych: Cooperative, appropriate mood and affect    Laboratory data: Available via EMR.    Last 24 hour lab  Recent Results (from the past 24 hour(s))   Basic Metabolic Panel w/ Reflex to MG    Collection Time: 22  6:08 AM   Result Value Ref Range    Sodium 144 136 - 145 mmol/L    Potassium reflex Magnesium 4.9 3.5 - 5.1 mmol/L    Chloride 106 99 - 110 mmol/L    CO2 29 21 - 32 mmol/L    Anion Gap 9 3 - 16    Glucose 97 70 - 99 mg/dL    BUN 16 7 - 20 mg/dL    CREATININE 1.1 0.6 - 1.2 mg/dL    GFR Non- 48 (A) >60    GFR  58 (A) >60    Calcium 9.1 8.3 - 10.6 mg/dL   CBC auto differential    Collection Time: 22  6:08 AM   Result Value Ref Range    WBC 5.0 4.0 - 11.0 K/uL    RBC 3.01 (L) 4.00 - 5.20 M/uL    Hemoglobin 8.7 (L) 12.0 - 16.0 g/dL supp.  -improved     Safety   -fall precautions     Pain control  -Percocet prn, gabapentin  - zanaflex low dose for back pain. - improving     PPx  -DVT: lovenox x 20 days per Ortho- plan to follow up with ortho  -GI: pantoprazole    Visitation approved  -   facetime     Possible Assisted living post rehab  - daughter investigating.      Rehab Progress: Improving  Anticipated Dispo: home  Services/DME:   ELOS: BETY Odom D.O. M.P.H  PM&R  2022  10:08 AM

## 2022-02-21 NOTE — PROGRESS NOTES
Physical Therapy  Facility/Department: [facility] ACUTE REHAB UNIT  Daily Treatment Note  NAME: Suni Her  : 1945  MRN: [id number]    Date of Service: 2022    Discharge Recommendations:  Continue to assess pending progress,24 hour supervision or assist,Patient would benefit from continued therapy after discharge   PT Equipment Recommendations  Equipment Needed: Yes  Mobility Devices: Wheelchair;Walker  Walker: Rolling  Wheelchair: Standard  Other: 20\", no rims (for decreased width to navigate narrow doorways in home), tip back arm rests, anti-tippers, B leg rests    Assessment   Body structures, Functions, Activity limitations: Decreased functional mobility ; Decreased balance;Decreased endurance;Decreased strength;Decreased ROM; Increased pain  Assessment: Pt hoang's improvement in R LE supine exercises and while she continues to be limited by back/hip pain duiring exercises is able to tolerate increased reps and progression of exercises compared to previously with PT. Pt requires CGA-min for transfers this date with VC required to [facility] precautions. Education provided to pt/daughter on method for completing stairs with shower chair to avoid hopping up stairs, however correct shower chair for completion not available currently- pt'daughter reports she will bring in pt's shower chair tomorrow. Plans to attempt stairs with this method then. Pt would benefit from continued skilled PT in order to progress towards PLOF and independence. Treatment Diagnosis: mobility impairment due to R femur fx  Decision Making: Medium Complexity  PT Education: Precautions;General Safety;Goals;Plan of Care;Transfer Training;Home Exercise Program  Patient Education: Pt verbalized understanding however would benefit from continued reniforcement  REQUIRES PT FOLLOW UP: Yes  Activity Tolerance  Activity Tolerance: Patient limited by fatigue;Patient limited by pain; Patient Tolerated treatment well  Activity Tolerance: requires extended rest breaks between exercises d/t pain/fatigue     Patient Diagnosis(es): There were no encounter diagnoses. has a past medical history of Allergic, Anemia, Anxiety, Closed comminuted intra-articular fracture of distal femur, right, initial encounter ([address] Depression, Eye disorder, Fibromyalgia, GERD (gastroesophageal reflux disease), Hypertension, Obesity, KAIT (obstructive sleep apnea), Osteoarthritis, Osteoporosis, PONV (postoperative nausea and vomiting), and Prolonged emergence from general anesthesia. has a past surgical history that includes hip surgery; joint replacement (Right, 1993/2003); Wrist fracture surgery (Left); joint replacement (Left, 5/18/16); knee surgery; and Femur fracture surgery (Right, 2/7/2022). Restrictions  Position Activity Restriction  Other position/activity restrictions: TTWB R, up with assistance  Subjective   General  Chart Reviewed: Yes  Additional Pertinent Hx: 78 yo admitted 2/6/22 for fall/R femur fx. OR 2/7 FEMUR OPEN REDUCTION INTERNAL FIXATION RIGHT. Pmhx: HTN, fibromyalgia, KAIT, L TKR, R THR. Admitted to ARU 2/11/2022. Family / Caregiver Present: No  Referring Practitioner: Dr. Vargas Simpson  Subjective  Subjective: Pt seated in wc upon approach and agreeable to PT. Reporting R hip and R knee pain (6/10 at rest) however reports recieving meds and that pain is better managed than it has been. RN aware.           Orientation     Cognition      Objective   Bed mobility  Rolling to Left: Supervision  Rolling to Right: Supervision  Supine to Sit: Supervision  Sit to Supine: Supervision  Comment: supine<>sit completed in bed with HOB flat and again on mat table, B rolling completed on mat table to place rolled towl for lumbar support during exercises  Transfers  Stand Pivot Transfers: Contact guard assistance;Minimal Assistance (wc<>mat x2 with RW; CGA for 50%, min A for 50%; increased assist required for anterior weight shift / decreased eccentric control when sitting to chair)  Comment: Non compliant to [address] for 50% of transfers however improves with VC; VC for sequencing, forward weight shift, and WBing precautions. Wheelchair Activities  Wheelchair Size: 20\"  Wheelchair Type: Standard  Wheelchair Parts Management: Yes  Left Brakes Level of Assistance: Independent  Right Brakes Level of Assistance: Independent  Propulsion: Yes  Propulsion 1  Propulsion: Manual  Level: Level Tile  Method: RUE;LUE  Level of Assistance: Supervision  Description/ Details: moderate larry, includes 90 and 180 deg turns  Distance: 150'        Exercises  Straight Leg Raise: x5 R LE  Heelslides: x10 supine R LE  Hip Abduction: x10 R LE  Comments: limited by back and R hip pain, rolled towel placed for lumbar support with increased back comfort reported  Other exercises  Other exercises?: Yes  Other exercises 2: Back stretches completed seated in wc in hopes to relieve back spasm including: x5 forward trunk flexion with scap retraction during return to upright, x 5 extension stretch with PT stabilizing wc, x 60 sec L side bend stretch (spasms R sided); pt reports relief in response to exercises                   Second Session:   Prior to session pt daughter comes to PT with concerns about pt discharge plan and wanting to prepare for family meeting tomorrow. Daughter shows pictures/videos of home with discussion of methods to enter home and maneuver inside. Daughter provided with home assessment handout which she can fill out prior to coming to family meeting tomorrow. Additionally  in hospital contacted regarding obtaining wheelchair without rims to navigate narrow doorways in home-  reports custom wc order would be necessary. Plan to look into this furthur at future date. Upon entering pt room pt seated in wc and agreeable to PT. Pt completes 150' wc mobility into gym with same propulsion method as above and supervision.  Pt educated on method for stair ascent/descent using shower chair (in order to avoid hopping up stairs) with method explained and video shown. Pt agreeable to attempting method however appropriate shower chair unable to be obtained. Pt reports her shower chair at home is appropriate and daughter reports she will bring chair in tomorrow- plan to attempt stairs with chair then. Pt completes sit<>stand from wc up to RW with CGA and VC to maintain WBing precautions and stands for ~ 2 min completing reaching crossbody and outside RIANNA with B UEs and UL UE support throughout - WBing precautions maintained. Upon completion of session pt left in wc with needs in reach and chair alarm on .            Goals  Short term goals  Time Frame for Short term goals: 10 days  Short term goal 1: Pt will complete sit<>supine with SBA- met 2/17  Short term goal 2: Pt will complete transfers with min A x 1- met 2/18  Short term goal 3: Pt will ambulate 5' with min A x 1 and wc follow  Short term goal 4: Pt will complete ascent/descent of 4 steps with UL handrail and min A x 1  Short term goal 5: Pt will complete 48' wc mobility with mod I  Long term goals  Time Frame for Long term goals : 3 weeks  Long term goal 1: Pt will complete bed mobility independently  Long term goal 2: Pt will complete transfers with mod I and LRAD  Long term goal 3: Pt will ambulate 5' with mod I and LRAD  Long term goal 4: Pt will ascend/descend 5 + 5 stairs with UL hand rail and SBA  Long term goal 5: Pt will complete 150' wc mobility with independence  Patient Goals   Patient goals : return home    Plan    Plan  Times per week: 5-7 x per week 75 min  Current Treatment Recommendations: Strengthening,Balance Training,Transfer Training  Safety Devices  Type of devices: Call light within reach,Nurse notified,Bed alarm in place,Left in bed     Therapy Time   Individual Concurrent Group Co-treatment   Time In 0830         Time Out 0930         Minutes 60         Timed Code Treatment Minutes: 60 Minutes     Second Session Therapy Time:   Individual Concurrent Group Co-treatment   Time In 1225         Time Out 1315         Minutes 50           Timed Code Treatment Minutes:  110    Total Treatment Minutes:  [address]  [place] [id number]

## 2022-02-21 NOTE — PROGRESS NOTES
Patient is in bed and resting at this time, vitals stable, pain was 8/10 with R leg, gave ice pack. Patient is a x 1 stand pivot with gait belt, toe touch WB with L foot. Call light with in reach and bed alarm on.

## 2022-02-21 NOTE — FLOWSHEET NOTE
22 1313   Encounter Summary   Services provided to: Patient and family together   Support System Family members   Continue Visiting   (, david )   Complexity of Encounter Moderate   Length of Encounter 30 minutes   Routine   Type Follow up   Assessment Calm; Approachable; Hopeful   Intervention Active listening;Explored feelings, thoughts, concerns   Outcome Comfort;Expressed gratitude   Some de-briefing about the  . PT and daughter seem to be coping well and in good spirits. The were thank for our efforts for .   Staff Lilliana Siu MA

## 2022-02-21 NOTE — PLAN OF CARE
Problem: Skin Integrity:  Goal: Will show no infection signs and symptoms  Description: Will show no infection signs and symptoms  2/21/2022 1534 by Isaura Vallejo RN  Outcome: Met This Shift  Note: Surgical site shows no s/sx of infection at this time. Will continue to assess q shift and prn and provide interventions as needed. Problem: Falls - Risk of:  Goal: Will remain free from falls  Description: Will remain free from falls  2/21/2022 1534 by Isaura Vallejo RN  Outcome: Ongoing  Note: Pt remains free of falls thus far this shift. All fall precautions in place and functioning properly. Problem: Nutrition  Goal: Optimal nutrition therapy  Outcome: Met This Shift  Note: Patient continues to have a good appetite and eat adequately.

## 2022-02-21 NOTE — PROGRESS NOTES
Occupational Therapy  Facility/Department: [facility] ACUTE REHAB UNIT  Daily Treatment Note  NAME: Alfredo Austin  : 1945  MRN: [id number]    Date of Service: 2022    Discharge Recommendations:  Continue to assess pending progress,24 hour supervision or assist,Patient would benefit from continued therapy after discharge  OT Equipment Recommendations  Equipment Needed: Yes  ADL Assistive Devices: Transfer Tub Bench  Other: Pt would benefit from a TTB however pt may be unable to access her bathroom from w/c level. pt reported she plans to sponge bath until she is WBAT. Assessment   Performance deficits / Impairments: Decreased functional mobility ; Decreased ADL status; Decreased strength;Decreased ROM; Decreased safe awareness;Decreased cognition;Decreased endurance;Decreased balance;Decreased high-level IADLs;Decreased coordination;Decreased posture  Assessment: Pt continues to complete functional transfers w/ use of RW w/ CGA w/ min-max VCs needed for TTWB RLE. Pt has poor insight into her deficits and requires ongoing education to comprehend the barriers w/ going home. Pt is making good progress w/ ADLs but is mostly limited by her impaired cognition, specifically related to her memory. Pt continues to benefit from OT. Cont OT per POC  Treatment Diagnosis: decreased ADLs, functional mobility and functional transfers 2/2 R femur fx    OT Education: ADL Adaptive Strategies;Transfer Training;Precautions  Patient Education: Increased time spent accessibility issues in her home and OT addressed concerns related to pt's memory and ability to recall proper transfer sequence and TTWB. Continue to address  REQUIRES OT FOLLOW UP: Yes  Activity Tolerance  Activity Tolerance: Patient limited by pain  Activity Tolerance: Pt w/ increased RLE pain at EOS. Ice pack applied to RLE  Safety Devices  Safety Devices in place: Yes  Type of devices: Bed alarm in place; Left in bed;Nurse notified;Call light within reach Patient Diagnosis(es): There were no encounter diagnoses. has a past medical history of Allergic, Anemia, Anxiety, Closed comminuted intra-articular fracture of distal femur, right, initial encounter ([facility] [address] Depression, Eye disorder, Fibromyalgia, GERD (gastroesophageal reflux disease), Hypertension, Obesity, KAIT (obstructive sleep apnea), Osteoarthritis, Osteoporosis, PONV (postoperative nausea and vomiting), and Prolonged emergence from general anesthesia. has a past surgical history that includes hip surgery; joint replacement (Right, 1993/2003); Wrist fracture surgery (Left); joint replacement (Left, 5/18/16); knee surgery; and Femur fracture surgery (Right, 2/7/2022). Restrictions  Position Activity Restriction  Other position/activity restrictions: TTWB R, up with assistance  Subjective   General  Chart Reviewed: Yes  Patient assessed for rehabilitation services?: Yes  Additional Pertinent Hx: 76 yo admitted to [facility] 2/6/22 for fall/R femur fx. OR 2/7 FEMUR OPEN REDUCTION INTERNAL FIXATION RIGHT. Admitted to ARU 2/11/22. Pmhx: HTN, fibromyalgia, KAIT, L TKR, R THR  Response to previous treatment: Patient with no complaints from previous session  Family / Caregiver Present: Yes (daughter)  Referring Practitioner: Alexx Sanchez MD  Diagnosis: R femur fx s/p ORIF  Subjective  Subjective: Pt was seated in w/c upon arrival. Pt reported she was excited about learning how to get up her steps. Pt was pleasant and agreeable to OT. General Comment  Comments: Pt prefers to wear her LSO for comfort due to premorbid back pain and back spasms    Vital Signs  Patient Currently in Pain: Yes (Pt c/o RLE pain.  RN notified and present to provide pain meds at EOS)   Orientation     Objective    ADL  Grooming: Modified independent  (Pt combed her hair seated in w/c at sink)  UE Bathing: Modified independent  (Pt washed and dried 4/4 components of UE bathing seated on TTB)  LE Bathing: Supervision (Pt washed and dried BLEs and tila area seated on TTB. Pt did not want to wash her buttocks)  UE Dressing: Independent (Pt retrieved tshirt from w/c level and donned bra and tshirt seated I'ly)  LE Dressing: Setup;Verbal cueing; Increased time to complete;Contact guard assistance (see below)  Toileting: Contact guard assistance (Pt completed pants management in stance at RW w/ CGA w/ min VCs for TTWB RLE and to maintain 1 UE support on RW. Tila care completed seated I'ly)  Additional Comments: Pt doffed underwear, pants and socks from BLEs seated on toilet. Pt threaded BLEs into underwear seated in w/c w/ CGA in stance at RW to manage clothes over hips. Max VCs needed to maintain 1 UE support on RW. Pt donned pants seated at EOB after ace wrap was applied to RLE. Pt pulled pants up over hips in bed I'ly. Balance  Sitting Balance: Supervision  Standing Balance: Contact guard assistance  Standing Balance  Time: ~2 mins total  Activity: functional transfers; stance for ADLs  Functional Mobility  Functional - Mobility Device: Wheelchair  Activity: To/from bathroom  Assist Level: Minimal assistance  Functional Mobility Comments: Min A needed to manuever close enough to toilet and TTB  Toilet Transfers  Toilet - Technique: Stand pivot  Equipment Used: Raised toilet seat with rails  Toilet Transfer: Contact guard assistance  Toilet Transfers Comments: VCs needed to recall that she needed to use the RW in order to transfer to toilet. VCs needed for TTWB RLE  Shower Transfers  Shower - Transfer From: Wheelchair  Shower - Transfer Type: To and From  Shower - Transfer To: Transfer tub bench  Shower - Technique: Stand pivot  Shower Transfers: Contact Guard  Shower Transfers Comments: Mod VCs needed for TTWB RLE    Bed mobility  Sit to Supine: Supervision  Transfers  Stand Pivot Transfers: Contact guard assistance (CGA from w/c > EOB.  Mod VCs needed for TTWB RLE)                         Cognition  Arousal/Alertness: Appropriate responses to stimuli  Following Commands: Inconsistently follows commands; Follows one step commands with increased time; Follows one step commands with repetition  Attention Span: Attends with cues to redirect; Difficulty attending to directions  Memory: Decreased short term memory;Decreased recall of precautions  Safety Judgement: Decreased awareness of need for safety;Decreased awareness of need for assistance  Problem Solving: Assistance required to correct errors made;Decreased awareness of errors;Assistance required to identify errors made;Assistance required to implement solutions;Assistance required to generate solutions  Insights: Decreased awareness of deficits  Initiation: Requires cues for some  Sequencing: Requires cues for some  Cognition Comment: Pt often non compliant w/ TTWB precautions despite max VCs and education provided. Pt is often forgetful and has poor awareness of her deficits. Increased time spent addressing d/c barriers however pt does not acknowledge these concerns, stating \"I know I can do it at home\". 1st session: Pt was semi supine in bed upon arrival. Pt was pleasant and agreeable to OT. Pt's daughter present. Bed mobility:  Supine > sit- completed w/ SBA. Pt's bed was elevated to simulate the height of pt's bed at home. Functional transfers:  Stand pivot- completed from EOB > w/c w/ CGA and mod VCs for TTWB RLE. Toilet- stand pivot completed from w/c <> RTS w/ CGA and mod VCs for TTWB RLE. Pt initially stated she did not need the RW in order to transfer to toilet. OT asked pt how she planned to do it w/o the RW. Pt stated she would just use the sink or her radiator if she were at home. Increased time spent addressing safety concerns w/ that technique. OT also spent increased time addressing the environmental barriers such as the fact that pt's w/c would be unable to fit into her bathroom or bedroom.  Pt has increased difficulty comprehending this information and she does not seem to understand that she will be unable to access her bedroom or bathroom. OT explained that pt is unable to ambulate w/ the RW due to TTWB precautions and decreased compliance as well as risk of injury her LLE by hopping. OT explained that pt may need to make space in her living room or alternative area to place a BSC and a bed if she is wanting to go home. Pt's daughter stated she is trying to arrange items in the house to make more space but pt is somewhat resistant to moving her furniture. Pt was left in w/c at EOS w/ chair alarm on and call light within reach. Plan   Plan  Times per week: 90min 5x/week  Times per day: Daily  Current Treatment Recommendations: Patient/Caregiver Education & Training,Home Management Training,Equipment Evaluation, Education, & procurement,Balance Training,Endurance Training,Self-Care / ADL,Safety Education & Training,Strengthening,Functional Mobility Training  G-Code     OutComes Score                                                  AM-PAC Score             Goals  Short term goals  Time Frame for Short term goals: 10 days  Short term goal 1: Pt will complete toilet transfer w/ Min Ax1-goal met 2/17  Short term goal 2: Pt will complete LE dressing assessment-goal met 2/17  Short term goal 3: Pt will complete shower transfer w/ Alec x1-goal met 2/18  Short term goal 4: Pt will complete UE dressing w/ Min Ax1-goal met 2/18  Long term goals  Time Frame for Long term goals : 3 weeks-all goals ongoing  Long term goal 1: Pt will complete toilet transfer/ toileting w/ Mod I  Long term goal 2: Pt will complete UE/LE dressing w/ Mod I and use of AE prn- met for UE dressing 2/21; ongoing for LE dressing  Long term goal 3: Pt will complete tub transfer w/ SBA  Long term goal 4: Pt will complete bathing w/ Spvn  Patient Goals   Patient goals : \"Be in less pain. \"       Therapy Time   Individual Concurrent Group Co-treatment   Time In 1315         Time Out 1415         Minutes 60         Timed Code Treatment Minutes: 60 Minutes       Second Session Therapy Time:   Individual Concurrent Group Co-treatment   Time In 1000         Time Out 1030         Minutes 30           Timed Code Treatment Minutes:  30    Total Treatment Minutes:  60+30= [facility], OT

## 2022-02-21 NOTE — CARE COORDINATION
We have a Family Meeting with the Team, Dr. Ambrose Mtz, Pt, and dgt planned for tomorrow at 1100 to discuss DC plan - all are aware. SW met with Pt and dgt this afternoon for an hour. We again discussed DC plans for 2/26/22. Pt is still adamant that she is returning to her home and had the list of skilled Home Care agencies. We discussed coverage under her Medicare for skilled Home Care re: RN, PT/OT, and HHA. Pt is refusing HHA at this time. SW explained that a HHA would be beneficial while Pt is still TTWB. Pt stated she \"would think about it. \" SW also provided option of Private Duty Care and gave list of agencies to pt and dgt. Dgt expressed concerns with Pt returning home even with her being there to assist. Dgt's concerns are getting Pt into the house, to and from doctors appoitments, and cleaning the house before Pt comes home. We all agreed that we will further discuss DC plans with the Team in Meeting tomorrow.      Rachel Núñez, [place]  Case Management  [phone]

## 2022-02-22 PROCEDURE — 97530 THERAPEUTIC ACTIVITIES: CPT

## 2022-02-22 PROCEDURE — 1280000000 HC REHAB R&B

## 2022-02-22 PROCEDURE — 6360000002 HC RX W HCPCS: Performed by: PHYSICAL MEDICINE & REHABILITATION

## 2022-02-22 PROCEDURE — 6370000000 HC RX 637 (ALT 250 FOR IP): Performed by: PHYSICAL MEDICINE & REHABILITATION

## 2022-02-22 PROCEDURE — 99233 SBSQ HOSP IP/OBS HIGH 50: CPT | Performed by: PHYSICAL MEDICINE & REHABILITATION

## 2022-02-22 RX ADMIN — OXYCODONE HYDROCHLORIDE AND ACETAMINOPHEN 2 TABLET: 5; 325 TABLET ORAL at 07:56

## 2022-02-22 RX ADMIN — BUPROPION HYDROCHLORIDE 150 MG: 150 TABLET, EXTENDED RELEASE ORAL at 09:57

## 2022-02-22 RX ADMIN — OXYCODONE HYDROCHLORIDE AND ACETAMINOPHEN 2 TABLET: 5; 325 TABLET ORAL at 13:55

## 2022-02-22 RX ADMIN — Medication: at 08:07

## 2022-02-22 RX ADMIN — ENOXAPARIN SODIUM 40 MG: 100 INJECTION SUBCUTANEOUS at 07:57

## 2022-02-22 RX ADMIN — OXYCODONE HYDROCHLORIDE AND ACETAMINOPHEN 2 TABLET: 5; 325 TABLET ORAL at 03:45

## 2022-02-22 RX ADMIN — Medication: at 13:55

## 2022-02-22 RX ADMIN — GABAPENTIN 600 MG: 600 TABLET, FILM COATED ORAL at 13:59

## 2022-02-22 RX ADMIN — OXYCODONE HYDROCHLORIDE AND ACETAMINOPHEN 2 TABLET: 5; 325 TABLET ORAL at 20:04

## 2022-02-22 RX ADMIN — SENNOSIDES AND DOCUSATE SODIUM 1 TABLET: 50; 8.6 TABLET ORAL at 07:57

## 2022-02-22 RX ADMIN — GABAPENTIN 600 MG: 600 TABLET, FILM COATED ORAL at 08:07

## 2022-02-22 RX ADMIN — PANTOPRAZOLE SODIUM 40 MG: 40 TABLET, DELAYED RELEASE ORAL at 06:42

## 2022-02-22 RX ADMIN — Medication: at 19:58

## 2022-02-22 RX ADMIN — ESCITALOPRAM OXALATE 20 MG: 10 TABLET ORAL at 07:57

## 2022-02-22 RX ADMIN — DULOXETINE HYDROCHLORIDE 60 MG: 60 CAPSULE, DELAYED RELEASE ORAL at 07:57

## 2022-02-22 RX ADMIN — Medication 3 MG: at 19:57

## 2022-02-22 RX ADMIN — TAMSULOSIN HYDROCHLORIDE 0.4 MG: 0.4 CAPSULE ORAL at 07:58

## 2022-02-22 RX ADMIN — GABAPENTIN 600 MG: 600 TABLET, FILM COATED ORAL at 19:57

## 2022-02-22 ASSESSMENT — PAIN SCALES - GENERAL
PAINLEVEL_OUTOF10: 8
PAINLEVEL_OUTOF10: 9
PAINLEVEL_OUTOF10: 6
PAINLEVEL_OUTOF10: 9
PAINLEVEL_OUTOF10: 6
PAINLEVEL_OUTOF10: 10
PAINLEVEL_OUTOF10: 0
PAINLEVEL_OUTOF10: 9

## 2022-02-22 ASSESSMENT — PAIN DESCRIPTION - LOCATION
LOCATION: LEG

## 2022-02-22 ASSESSMENT — PAIN DESCRIPTION - ONSET
ONSET: ON-GOING

## 2022-02-22 ASSESSMENT — PAIN DESCRIPTION - FREQUENCY
FREQUENCY: CONTINUOUS

## 2022-02-22 ASSESSMENT — PAIN DESCRIPTION - PROGRESSION
CLINICAL_PROGRESSION: NOT CHANGED
CLINICAL_PROGRESSION: GRADUALLY IMPROVING
CLINICAL_PROGRESSION: GRADUALLY IMPROVING

## 2022-02-22 ASSESSMENT — PAIN DESCRIPTION - DESCRIPTORS
DESCRIPTORS: ACHING

## 2022-02-22 ASSESSMENT — PAIN DESCRIPTION - ORIENTATION
ORIENTATION: RIGHT

## 2022-02-22 ASSESSMENT — PAIN DESCRIPTION - PAIN TYPE
TYPE: ACUTE PAIN

## 2022-02-22 ASSESSMENT — PAIN - FUNCTIONAL ASSESSMENT
PAIN_FUNCTIONAL_ASSESSMENT: ACTIVITIES ARE NOT PREVENTED

## 2022-02-22 NOTE — PROGRESS NOTES
Occupational Therapy  Facility/Department: [facility] ACUTE REHAB UNIT  Daily Treatment Note  NAME: Deanna Mccormack  : 1945  MRN: [id number]    Date of Service: 2022    Discharge Recommendations:  Continue to assess pending progress,24 hour supervision or assist,Patient would benefit from continued therapy after discharge  OT Equipment Recommendations  ADL Assistive Devices: Transfer Tub Bench  Other: Pt would benefit from a TTB however per measurements from pt's daughter pt will be unable to access her bathroom from w/c level and hopping w/ the RW is not an option at this time. pt reported she plans to sponge bath until she is WBAT. Assessment   Performance deficits / Impairments: Decreased functional mobility ; Decreased ADL status; Decreased strength;Decreased ROM; Decreased safe awareness;Decreased cognition;Decreased endurance;Decreased balance;Decreased high-level IADLs;Decreased coordination;Decreased posture  Assessment: Pt completed stand pivot transfers in AM session w/ CGA and improved compliance w/ TTWB RLE when pt verbalized transfer sequence outloud. Pt however was limited by increased pain the afternoon and she could not tolerate any standing activities or even stand pivot transfers. Pt did practice a squat pivot transfer today and completed w/ CGA however pt required increased VCs for TTWB RLE w/ this method. Pt continues to be limited by pain as well as impaired memory. Pt continues to benefit from OT. Cont OT per POC  Treatment Diagnosis: decreased ADLs, functional mobility and functional transfers 2/2 R femur fx    OT Education: Precautions;Transfer Training  Patient Education: Ongoing education needed  Barriers to Learning: cognition  REQUIRES OT FOLLOW UP: Yes  Activity Tolerance  Activity Tolerance: Patient Tolerated treatment well  Safety Devices  Safety Devices in place: Yes  Type of devices: Call light within reach; Bed alarm in place; Left in bed;Nurse notified         Patient Diagnosis(es): There were no encounter diagnoses. has a past medical history of Allergic, Anemia, Anxiety, Closed comminuted intra-articular fracture of distal femur, right, initial encounter ([facility] [address] Depression, Eye disorder, Fibromyalgia, GERD (gastroesophageal reflux disease), Hypertension, Obesity, KAIT (obstructive sleep apnea), Osteoarthritis, Osteoporosis, PONV (postoperative nausea and vomiting), and Prolonged emergence from general anesthesia. has a past surgical history that includes hip surgery; joint replacement (Right, 1993/2003); Wrist fracture surgery (Left); joint replacement (Left, 5/18/16); knee surgery; and Femur fracture surgery (Right, 2/7/2022). Restrictions  Position Activity Restriction  Other position/activity restrictions: TTWB R, up with assistance  Subjective   General  Chart Reviewed: Yes  Patient assessed for rehabilitation services?: Yes  Additional Pertinent Hx: 76 yo admitted to [place] [address] 2/6/22 for fall/R femur fx. OR 2/7 FEMUR OPEN REDUCTION INTERNAL FIXATION RIGHT. Admitted to ARU 2/11/22.  Pmhx: HTN, fibromyalgia, KAIT, L TKR, R THR  Response to previous treatment: Patient with no complaints from previous session  Family / Caregiver Present: Yes (daughter Sergio Landon)  Referring Practitioner: Fly Rosario MD  Diagnosis: R femur fx s/p ORIF  Subjective  Subjective: Pt was semi supine in bed upon arrival. Pt was pleasant and agreeable to OT  General Comment  Comments: Pt prefers to wear her LSO for comfort due to premorbid back pain and back spasms    Vital Signs  Patient Currently in Pain: Yes (Pt c/o RLE pain but stated she had pain meds already)   Orientation     Objective             Balance  Sitting Balance: Supervision  Standing Balance: Contact guard assistance  Standing Balance  Time: ~2 mins total  Activity: functional transfers  Functional Mobility  Functional - Mobility Device: Wheelchair  Activity: To/From therapy gym  Assist Level: Supervision    Bed mobility  Supine to Sit: Modified independent (pt completed w/ bed raised to height of her bed at home to simulate set up and w/o use of bed rail)  Sit to Supine: Modified independent (pt completed w/ bed raised to height of her bed at home to simulate set up and w/o use of bed rail)  Transfers  Stand Pivot Transfers: Contact guard assistance (See below)  Transfer Comments: Session today was focused on stand pivot transfers to increase compliance w/ TTWB RLE. Pt was instructed to complete transfers by first verbalizing to OT the steps before completion of transfer. OT minimized amount of VCs in order to determine how pt would succeed at home if she did not have anyone available to cue her as well as to continue to increase pt's independence and compliance. Pt completed stand pivot from EOB > w/c, w/c <> EOM, and w/c > EOB w/ CGA. Pt verbalized the steps of the transfers out loud accurately and she completed the transfers w/ CGA and good maintenance of TTWB. Pt only had one instance where she required VCs in order to identify that she needed to use the RW. Cognition  Arousal/Alertness: Appropriate responses to stimuli  Following Commands: Inconsistently follows commands; Follows one step commands with increased time; Follows one step commands with repetition  Attention Span: Attends with cues to redirect; Difficulty attending to directions  Memory: Decreased short term memory;Decreased recall of precautions  Safety Judgement: Decreased awareness of need for safety;Decreased awareness of need for assistance  Problem Solving: Assistance required to correct errors made;Decreased awareness of errors;Assistance required to identify errors made;Assistance required to implement solutions;Assistance required to generate solutions  Insights: Decreased awareness of deficits  Initiation: Requires cues for some  Sequencing: Requires cues for some  Cognition Comment: Pt continues to demonstrate difficulty w/ compliance w/ TTWB RLE but did improve today when OT instructed pt to verbalize transfer sequence out loud. OT also addressed pt's attention and discussed strategies to reduce distractions and improve attention to task since this tends to be a barrier. Pt verb understanding                            2nd session: Pt was semi supine in bed upon arrival. Pt reported she was in significant RLE pain from practicing the stairs. RN notified and pain meds were delivered. Pt was agreeable to OT but also somewhat frustrated by the amount of pain she was having as well as some frustrations regarding the d/c plans for home. OT, pt, and pt's daughter talked about the home plan, including solutions to make home as safe as possible (removal of rugs, rearranging furniture, etc). Pt was upset about the idea of removing carpets and rugs and she did not understand why this would be necessary. OT explained that it may decrease the effort needed to propel the w/c if on hard wood floor vs carpet. OT explained that pt may be TTWB or have WB restrictions for longer than she is anticipating and that this depends on the surgeons opinion once she follows up in the office. Pt was upset w/ hearing that she could have WB precautions for longer than 6 weeks. Pt had some increased frustration w/ this topic. OT also educated pt and pt's daughter Eveline Kuo about additional services for home since this was something Eveline Kuo was asking about during the family meeting earlier in the day. OT suggested talking w/ student nurses and therapists to see if they could hire a student for some additional private care in the home. Functional transfers:   Sit <> stand- completed from w/c > RW w/ CGA. Pt immediately needed to sit back down due to RLE pain  Stand pivot- completed from EOB > w/c w/ CGA. Squat pivot- pt was in significant pain from attempting stance at RW. Pt stated she could not do any more standing.  OT suggested getting onto the mat table to complete supine exercises. Pt stated she was unable to stand to get onto the mat table and she wanted to practice a squat pivot transfer. OT was agreeable to attempting alternative transfer. Pt completed from w/c <> EOM w/ CGA however increased VCs needed for TTWB w/ this technique. OT explained that though this is a great option it may be best to continue to do stand pivot w/ the RW when able so that pt does not get confused regarding the multiple transfer techniques. Pt verb understanding    Bed mobility:  Supine <> sit- completed from bed and mat table w/ spvn due to increased pain. UE exercises:  Due to increased pain pt was unable to tolerate standing activities as planned. Pt participated in supine BUE exercise w/ use of 3lb dowel milana to increase UE strength for improved functional transfers. Pt completed chest press x10 and shoulder flexion x10. Rest break needed between sets. Increased time spent this session addressing pt's frustration w/ her pain and the situation she is in. OT offered emotional support and encouragement. Ice pack applied to RLE during supine exercises and left on pt's RLE upon return to bed. Pt left in bed w/ call light within reach and bed alarm activated.                      Plan   Plan  Times per week: 90min 5x/week  Times per day: Daily  Current Treatment Recommendations: Patient/Caregiver Education & Training,Home Management Training,Equipment Evaluation, Education, & procurement,Balance [place] Signs / ADL,Safety Education & Training,Strengthening,Functional Mobility Training  G-Code     OutComes Score                                                  AM-PAC Score             Goals  Short term goals  Time Frame for Short term goals: 10 days  Short term goal 1: Pt will complete toilet transfer w/ Min Ax1-goal met 2/17  Short term goal 2: Pt will complete LE dressing assessment-goal met 2/17  Short term goal 3: Pt will complete shower transfer w/ Alec x1-goal met 2/18  Short term goal 4: Pt will complete UE dressing w/ Min Ax1-goal met 2/18  Long term goals  Time Frame for Long term goals : 3 weeks-all goals ongoing  Long term goal 1: Pt will complete toilet transfer/ toileting w/ Mod I  Long term goal 2: Pt will complete UE/LE dressing w/ Mod I and use of AE prn- met for UE dressing 2/21; ongoing for LE dressing  Long term goal 3: Pt will complete tub transfer w/ SBA  Long term goal 4: Pt will complete bathing w/ Spvn  Patient Goals   Patient goals : \"Be in less pain. \"       Therapy Time   Individual Concurrent Group Co-treatment   Time In 1000         Time Out 1030         Minutes 30         Timed Code Treatment Minutes: 30 Minutes     Second Session Therapy Time:   Individual Concurrent Group Co-treatment   Time In  6785         Time Out  6857         Minutes  60           Timed Code Treatment Minutes:  60    Total Treatment Minutes:  30+60= [address], OT

## 2022-02-22 NOTE — CARE COORDINATION
Team Conference held this AM re: DC date for 2/26/22 for return home with dgt staying with Pt and skilled Home Care. Therapy also states Pt will need a wheelchair. Dr. Sarah Alfred to place DME order. Family Meeting held with Pt and dgt in Pt's room after Team Conference. Dr. Sarah Alfred, SW, PT, OT, and [place] [employer] present. We discussed above DC plan. Dgt asked for an extension but Miguelina Figures explained that with Pt's Medicare, Pt's DC will be Saturday 2/26/22 and extension cannot be done. Dgt expressed concerns about staffing with [address] Pt states she does want to use [facility]. SW offered for Chaya/liaison with [facility] to come and speak with them. EDE also advised that we will make arrangements for a wheelchair and that SW provided both Pt and dgt yesterday with list of Private Duty Agencies if they feel they need more care at home. Dgt also expressed concerns with getting Pt into the house. Therapy will work on steps with Pt and if needed, wheelchair transport home could be arranged. The Team also discussed that dgt needs to make home accomodation now for plans for DC home on Saturday. DEE spoke with Andra Bell with [facility] - she will come and speak with Pt and dgt this afternoon regarding [address] DEE called Rep with [employer] regarding home wheelchair - Rep states they do now have wheelchairs in stock so will be able to deliver wheelchair to Pt's room by Friday. DEE will continue to follow.      Esperanza Turk [place]  Case Management  [phone]

## 2022-02-22 NOTE — PROGRESS NOTES
Physical Therapy  Facility/Department: [facility] ACUTE REHAB UNIT  Daily Treatment Note  NAME: Alois Alpers  : 1945  MRN: [id number]    Date of Service: 2022    Discharge Recommendations:  Continue to assess pending progress,24 hour supervision or assist,Patient would benefit from continued therapy after discharge   PT Equipment Recommendations  Equipment Needed: Yes  Mobility Devices: Lige Fling; Wheelchair  Walker: Rolling  Wheelchair: Standard  Other: 20\", no rims (for decreased width to navigate narrow doorways in home), tip back arm rests, anti-tippers, B leg rests    Assessment   Body structures, Functions, Activity limitations: Decreased functional mobility ; Decreased balance;Decreased endurance;Decreased strength;Decreased ROM; Increased pain  Assessment: Pt demo's ability to ascend/descend 2, 6\" stairs using tub transfer bench with min A- some anxiety during completion however agreeable to continue working on this method. Pt continues to be limited by pain and fatigue throughout sessions however is motivated to improve with therapy. Pt would benefit from continued skilled PT in order to maximize functinal mobility and independence. Treatment Diagnosis: mobility impairment due to R femur fx  Decision Making: Medium Complexity  PT Education: Precautions;General Safety;Goals;Plan of Care;Transfer Training;Home Exercise Program  Patient Education: Pt verbalized understanding however would benefit from continued reinforcement  REQUIRES PT FOLLOW UP: Yes  Activity Tolerance  Activity Tolerance: Patient limited by fatigue;Patient limited by pain; Patient Tolerated treatment well  Activity Tolerance: requires extended rest breaks between exercises d/t pain/fatigue     Patient Diagnosis(es): There were no encounter diagnoses.      has a past medical history of Allergic, Anemia, Anxiety, Closed comminuted intra-articular fracture of distal femur, right, initial encounter ([facility] [address] Depression, Eye disorder, Fibromyalgia, GERD (gastroesophageal reflux disease), Hypertension, Obesity, KAIT (obstructive sleep apnea), Osteoarthritis, Osteoporosis, PONV (postoperative nausea and vomiting), and Prolonged emergence from general anesthesia. has a past surgical history that includes hip surgery; joint replacement (Right, 1993/2003); Wrist fracture surgery (Left); joint replacement (Left, 5/18/16); knee surgery; and Femur fracture surgery (Right, 2/7/2022). Restrictions  Position Activity Restriction  Other position/activity restrictions: TTWB R, up with assistance  Subjective   General  Chart Reviewed: Yes  Additional Pertinent Hx: 78 yo admitted 2/6/22 for fall/R femur fx. OR 2/7 FEMUR OPEN REDUCTION INTERNAL FIXATION RIGHT. Pmhx: HTN, fibromyalgia, KAIT, L TKR, R THR. Admitted to ARU 2/11/2022. Family / Caregiver Present: No  Referring Practitioner: Dr. Alondra Simpson  Subjective  Subjective: Pt seated in wc upon approach and agreeable to PT. Reporting R hip and R knee pain (7/10 at rest) however reports pain is well managed through pain meds. RN aware. Orientation     Cognition      Objective   Bed mobility  Sit to Supine: Modified independent (pt completed w/ bed raised to height of her bed at home to simulate set up and w/o use of bed rail)  Transfers  Sit to Stand: Contact guard assistance (from edge of mat/wc with RW)  Stand to sit: Contact guard assistance (to edge of mat/wc/EOB with RW)  Stand Pivot Transfers: Contact guard assistance (from wc<>edge of mat, wc>EOB)  Comment: Pt demo's some non compliance during 50% of transfers hoever improves in response to VC. additional VC for forward weight shift.   Wheelchair Activities  Wheelchair Size: 20\"  Wheelchair Type: Standard  Wheelchair Parts Management: Yes  Left Brakes Level of Assistance: Independent  Right Brakes Level of Assistance: Independent  Propulsion: Yes  Propulsion 1  Propulsion: Manual  Level: Level Tile  Method: RUE;LUE  Level of Assistance: Supervision  Description/ Details: moderate larry, includes 90 and 180 deg turns, and positioning wc for transfer  Distance: 150'     Balance  Sitting - Static: Good;- (supervision seated edge of mat)  Sitting - Dynamic: Fair;+ (SBA seated edge of mat)  Standing - Static: Fair;- (CGA with RW)  Standing - Dynamic: Fair;- (CGA with RW for stand pivot transfers and exercise below with UL UE support)  Comments: Pt completes 2 x ~ 2 min stand and 1 x ~ 1 min stand up to RW with activity at [facility] with velcro letters including reaching outside RIANNA, overhead, and cross body. VC for wilcox bearing precautions and to keep one UE on RW at all times to increase compliance. CGA throughout. Exercises  Straight Leg Raise: x4 R LE  Heelslides: x4 RLE  Hip Abduction: x4 RLE  Comments: instructed to complete 3 exercies x10 reps 2 x a day as HEP, educated on benefits incuding strength and decreased L LE stiffness/pain                Second Session:   Pt seated in  upon approach and agreeable to PT however reports needing to use bathroom. Pt completes stand pivot transfers from wc<>commode with RW and CGA. VC for WBing precautions. Pt completes 150' wc mobility with supervision and same propulsion mechanics as above. Pt completes ascent/descent of 2 6\" steps using shower chair with R legs at shortest setting, L sided legs at longest setting, and L handrail with min A. VC/demonstratin given for sequencing. VC given for WBing precautions and UE/LE placement. Pt with some anxiety during tadk however calms down after task and is agreeable to continue practicing technique in future session. Pt transported back to room where she is left seated in wc with needs in reach, chair alarm on, and daughter and  present.            Goals  Short term goals  Time Frame for Short term goals: 10 days  Short term goal 1: Pt will complete sit<>supine with SBA- met 2/17  Short term goal 2: Pt will complete transfers with min A x 1- met 2/18  Short term goal 3: Pt will ambulate 5' with min A x 1 and wc follow  Short term goal 4: Pt will complete ascent/descent of 4 steps with UL handrail and min A x 1  Short term goal 5: Pt will complete 48' wc mobility with mod I  Long term goals  Time Frame for Long term goals : 3 weeks  Long term goal 1: Pt will complete bed mobility independently  Long term goal 2: Pt will complete transfers with mod I and LRAD  Long term goal 3: Pt will ambulate 5' with mod I and LRAD  Long term goal 4: Pt will ascend/descend 5 + 5 stairs with UL hand rail and SBA  Long term goal 5: Pt will complete 150' wc mobility with independence  Patient Goals   Patient goals : return home    Plan    Plan  Times per week: 5-7 x per week 75 min  Current Treatment Recommendations: Strengthening,Balance Training,Transfer Training  Safety Devices  Type of devices: Call light within reach,Nurse notified,Bed alarm in place,Left in bed     Therapy Time   Individual Concurrent Group Co-treatment   Time In 0830         Time Out 0930         Minutes 60             Second Session Therapy Time:   Individual Concurrent Group Co-treatment   Time In 5807         Time Out 1315         Minutes 30           Timed Code Treatment Minutes:  60+30    Total Treatment Minutes:  [address], [place] [id number]

## 2022-02-22 NOTE — PROGRESS NOTES
Comments: VCs needed to recall that she needed to use the RW in order to transfer to toilet. VCs needed for TTWB RLE  Assessment        SLP          Body mass index is 32.27 kg/m². Assessment and Plan:  Right midshaft femur fracture   -s/p ORIF ( with Dr. Louann Maravilla- orthocincy)  -Wound care  -TTWB  -PT/OT for functional mobility, balance, ADLs     Acute blood loss anemia   -Post-surgical.   -Monitor Hgb, transfuse prn <7.      Hypoxia  -Thought to be due to atelectasis and fluid overload  -Supplemental O2, wean as tolerated  -IS        MARISELA  -Avoid nephrotoxins, renally dose meds  -Monitor renal function     Anxiety/depression  -bupropion, duloxetine, escitalopram     Fibromyalgia  -Duloxetine, gabapentin     KAIT  -CPAP     Bladder  -- Urinary retention  -Christensen in place, consider void trial ~1 week  -flomax     Bowel   -High risk constipation   -senna+colace BID, PRN miralax, MoM, and bisacodyl supp. -improved     Safety   -fall precautions     Pain control  -Percocet prn, gabapentin  - zanaflex low dose for back pain. - improving     PPx  -DVT: lovenox x 20 days per Ortho- plan to follow up with ortho  -GI: pantoprazole    Visitation approved  -   facetime     Possible Assisted living post rehab  - daughter investigating. Family meeting today as well. Team conference was held today on the patient and discussed directly with the patient utilizing their entire treatment team. Please see separate team note for details. Total treatment time for today's care >35 min. >50% of time spent counseling with patient and coordinating care.        Rehab Progress: Improving  Anticipated Dispo: home  Services/DME: [place]  ELOS: 22            VANI TubbsP.H  PM&R  2022  11:29 AM

## 2022-02-22 NOTE — PROGRESS NOTES
Pt A & O. VSS. Pt x 1 st pivot to WC. All safety precautions in place. Bed alarm activated. Non skid socks on. C/o surgical pain managed with PRN pain medication. Purulent drainage to open pustules on RLQ. Cleansed with NS and applied bacitracin - covered with CDD. Cold pack applied to surgical incision for comfort. Will cont to monitor.      Vitals:    02/21/22 2026   BP: (!) 143/63   Pulse: 76   Resp: 18   Temp: 98 °F (36.7 °C)   SpO2: 95%

## 2022-02-22 NOTE — PROGRESS NOTES
Alert and oriented. VSS on RA rating RLE pain at 8-10/10 in AM and through therapy but resolved with rest and PRN percocet. Resting quietly and denying pain at end of shift. RLE surgical incision dressings x 2 CDI with mepilex. Stand pivot transfer x 1. Participated in all therapies. No new issues.

## 2022-02-23 LAB
ANION GAP SERPL CALCULATED.3IONS-SCNC: 9 MMOL/L (ref 3–16)
BASOPHILS ABSOLUTE: 0 K/UL (ref 0–0.2)
BASOPHILS RELATIVE PERCENT: 0.5 %
BUN BLDV-MCNC: 16 MG/DL (ref 7–20)
CALCIUM SERPL-MCNC: 8.9 MG/DL (ref 8.3–10.6)
CHLORIDE BLD-SCNC: 105 MMOL/L (ref 99–110)
CO2: 28 MMOL/L (ref 21–32)
CREAT SERPL-MCNC: 1.1 MG/DL (ref 0.6–1.2)
EOSINOPHILS ABSOLUTE: 0.3 K/UL (ref 0–0.6)
EOSINOPHILS RELATIVE PERCENT: 5.7 %
GFR AFRICAN AMERICAN: 58
GFR NON-AFRICAN AMERICAN: 48
GLUCOSE BLD-MCNC: 83 MG/DL (ref 70–99)
HCT VFR BLD CALC: 26.6 % (ref 36–48)
HEMOGLOBIN: 8.7 G/DL (ref 12–16)
LYMPHOCYTES ABSOLUTE: 1.1 K/UL (ref 1–5.1)
LYMPHOCYTES RELATIVE PERCENT: 23.2 %
MCH RBC QN AUTO: 28.7 PG (ref 26–34)
MCHC RBC AUTO-ENTMCNC: 32.8 G/DL (ref 31–36)
MCV RBC AUTO: 87.4 FL (ref 80–100)
MONOCYTES ABSOLUTE: 0.5 K/UL (ref 0–1.3)
MONOCYTES RELATIVE PERCENT: 10.6 %
NEUTROPHILS ABSOLUTE: 2.9 K/UL (ref 1.7–7.7)
NEUTROPHILS RELATIVE PERCENT: 60 %
PDW BLD-RTO: 15.4 % (ref 12.4–15.4)
PLATELET # BLD: 278 K/UL (ref 135–450)
PMV BLD AUTO: 9.5 FL (ref 5–10.5)
POTASSIUM REFLEX MAGNESIUM: 4.5 MMOL/L (ref 3.5–5.1)
RBC # BLD: 3.04 M/UL (ref 4–5.2)
SODIUM BLD-SCNC: 142 MMOL/L (ref 136–145)
WBC # BLD: 4.8 K/UL (ref 4–11)

## 2022-02-23 PROCEDURE — 1280000000 HC REHAB R&B

## 2022-02-23 PROCEDURE — 80048 BASIC METABOLIC PNL TOTAL CA: CPT

## 2022-02-23 PROCEDURE — 6370000000 HC RX 637 (ALT 250 FOR IP): Performed by: PHYSICAL MEDICINE & REHABILITATION

## 2022-02-23 PROCEDURE — 36415 COLL VENOUS BLD VENIPUNCTURE: CPT

## 2022-02-23 PROCEDURE — 85025 COMPLETE CBC W/AUTO DIFF WBC: CPT

## 2022-02-23 PROCEDURE — 97530 THERAPEUTIC ACTIVITIES: CPT

## 2022-02-23 PROCEDURE — 97110 THERAPEUTIC EXERCISES: CPT

## 2022-02-23 PROCEDURE — 97535 SELF CARE MNGMENT TRAINING: CPT

## 2022-02-23 PROCEDURE — 6360000002 HC RX W HCPCS: Performed by: PHYSICAL MEDICINE & REHABILITATION

## 2022-02-23 PROCEDURE — 99232 SBSQ HOSP IP/OBS MODERATE 35: CPT | Performed by: PHYSICAL MEDICINE & REHABILITATION

## 2022-02-23 RX ADMIN — GABAPENTIN 600 MG: 600 TABLET, FILM COATED ORAL at 13:33

## 2022-02-23 RX ADMIN — TAMSULOSIN HYDROCHLORIDE 0.4 MG: 0.4 CAPSULE ORAL at 08:46

## 2022-02-23 RX ADMIN — Medication: at 20:53

## 2022-02-23 RX ADMIN — Medication 3 MG: at 20:53

## 2022-02-23 RX ADMIN — OXYCODONE HYDROCHLORIDE AND ACETAMINOPHEN 2 TABLET: 5; 325 TABLET ORAL at 08:55

## 2022-02-23 RX ADMIN — PANTOPRAZOLE SODIUM 40 MG: 40 TABLET, DELAYED RELEASE ORAL at 06:22

## 2022-02-23 RX ADMIN — ESCITALOPRAM OXALATE 20 MG: 10 TABLET ORAL at 08:44

## 2022-02-23 RX ADMIN — OXYCODONE HYDROCHLORIDE AND ACETAMINOPHEN 2 TABLET: 5; 325 TABLET ORAL at 20:53

## 2022-02-23 RX ADMIN — ENOXAPARIN SODIUM 40 MG: 100 INJECTION SUBCUTANEOUS at 08:44

## 2022-02-23 RX ADMIN — Medication: at 08:43

## 2022-02-23 RX ADMIN — OXYCODONE HYDROCHLORIDE AND ACETAMINOPHEN 2 TABLET: 5; 325 TABLET ORAL at 16:09

## 2022-02-23 RX ADMIN — Medication: at 13:34

## 2022-02-23 RX ADMIN — GABAPENTIN 600 MG: 600 TABLET, FILM COATED ORAL at 20:53

## 2022-02-23 RX ADMIN — DULOXETINE HYDROCHLORIDE 60 MG: 60 CAPSULE, DELAYED RELEASE ORAL at 08:46

## 2022-02-23 RX ADMIN — GABAPENTIN 600 MG: 600 TABLET, FILM COATED ORAL at 08:54

## 2022-02-23 RX ADMIN — OXYCODONE HYDROCHLORIDE AND ACETAMINOPHEN 2 TABLET: 5; 325 TABLET ORAL at 12:04

## 2022-02-23 RX ADMIN — BUPROPION HYDROCHLORIDE 150 MG: 150 TABLET, EXTENDED RELEASE ORAL at 08:54

## 2022-02-23 RX ADMIN — SENNOSIDES AND DOCUSATE SODIUM 1 TABLET: 50; 8.6 TABLET ORAL at 08:45

## 2022-02-23 ASSESSMENT — PAIN SCALES - GENERAL
PAINLEVEL_OUTOF10: 7
PAINLEVEL_OUTOF10: 7
PAINLEVEL_OUTOF10: 2
PAINLEVEL_OUTOF10: 1
PAINLEVEL_OUTOF10: 7
PAINLEVEL_OUTOF10: 0
PAINLEVEL_OUTOF10: 7

## 2022-02-23 ASSESSMENT — PAIN DESCRIPTION - FREQUENCY
FREQUENCY: CONTINUOUS

## 2022-02-23 ASSESSMENT — PAIN DESCRIPTION - ONSET
ONSET: ON-GOING
ONSET: PROGRESSIVE
ONSET: ON-GOING

## 2022-02-23 ASSESSMENT — PAIN DESCRIPTION - PROGRESSION
CLINICAL_PROGRESSION: NOT CHANGED

## 2022-02-23 ASSESSMENT — PAIN DESCRIPTION - DESCRIPTORS
DESCRIPTORS: ACHING

## 2022-02-23 ASSESSMENT — PAIN DESCRIPTION - PAIN TYPE
TYPE: ACUTE PAIN
TYPE: ACUTE PAIN;SURGICAL PAIN

## 2022-02-23 ASSESSMENT — PAIN DESCRIPTION - ORIENTATION
ORIENTATION: RIGHT

## 2022-02-23 ASSESSMENT — PAIN DESCRIPTION - LOCATION
LOCATION: LEG

## 2022-02-23 NOTE — PROGRESS NOTES
Department of Physical Medicine & Rehabilitation  Progress Note    Patient Identification:  José Miguel Segovia  [id number]  : 1945  Admit date: 2022    Chief Complaint: Closed comminuted intra-articular fracture of distal femur, right, initial encounter ([address]    Subjective:   She states she hurt her knee yesterday while attempting stairs but she is still able to participate in therapy. Discussed with pt about possible SNF discharge. Daughter at bedside. ROS: No f/c, n/v, cp     Objective:  Patient Vitals for the past 24 hrs:   BP Temp Temp src Pulse Resp SpO2 Weight   22 1004 124/67 98.4 °F (36.9 °C) Oral 87 16 -- --   22 0624 -- -- -- -- -- -- 227 lb 11.8 oz (103.3 kg)   22 (!) 155/71 98 °F (36.7 °C) Oral 77 15 96 % --     Const: Alert. No distress, pleasant. HEENT: Normocephalic, atraumatic. Normal sclera/conjunctiva. MMM. CV: Regular rate and rhythm. Resp: No respiratory distress. Lungs CTAB. Abd: Soft, nontender, nondistended, NABS+   Ext: + edema. Neuro: Alert, oriented, appropriately interactive. Psych: Cooperative, appropriate mood and affect    Laboratory data: Available via EMR.    Last 24 hour lab  Recent Results (from the past 24 hour(s))   Basic Metabolic Panel w/ Reflex to MG    Collection Time: 22  5:48 AM   Result Value Ref Range    Sodium 142 136 - 145 mmol/L    Potassium reflex Magnesium 4.5 3.5 - 5.1 mmol/L    Chloride 105 99 - 110 mmol/L    CO2 28 21 - 32 mmol/L    Anion Gap 9 3 - 16    Glucose 83 70 - 99 mg/dL    BUN 16 7 - 20 mg/dL    CREATININE 1.1 0.6 - 1.2 mg/dL    GFR Non- 48 (A) >60    GFR  58 (A) >60    Calcium 8.9 8.3 - 10.6 mg/dL   CBC auto differential    Collection Time: 22  5:48 AM   Result Value Ref Range    WBC 4.8 4.0 - 11.0 K/uL    RBC 3.04 (L) 4.00 - 5.20 M/uL    Hemoglobin 8.7 (L) 12.0 - 16.0 g/dL    Hematocrit 26.6 (L) 36.0 - 48.0 %    MCV 87.4 80.0 - 100.0 fL    MCH 28.7 26.0 - 34.0 pg MCHC 32.8 31.0 - 36.0 g/dL    RDW 15.4 12.4 - 15.4 %    Platelets 974 701 - 079 K/uL    MPV 9.5 5.0 - 10.5 fL    Neutrophils % 60.0 %    Lymphocytes % 23.2 %    Monocytes % 10.6 %    Eosinophils % 5.7 %    Basophils % 0.5 %    Neutrophils Absolute 2.9 1.7 - 7.7 K/uL    Lymphocytes Absolute 1.1 1.0 - 5.1 K/uL    Monocytes Absolute 0.5 0.0 - 1.3 K/uL    Eosinophils Absolute 0.3 0.0 - 0.6 K/uL    Basophils Absolute 0.0 0.0 - 0.2 K/uL       Therapy progress:  PT  Position Activity Restriction  Other position/activity restrictions: TTWB R, up with assistance  Objective     Sit to Stand: Contact guard assistance (from edge of mat/wc with RW)  Stand to sit: Contact guard assistance (to edge of mat/wc/EOB with RW)  Device: Rolling Walker  Assistance: Minimal assistance  Distance: x5ft  OT  PT Equipment Recommendations  Equipment Needed: Yes  Mobility Devices: Clearance Harpin: Rolling  Wheelchair: Standard  Other: 20\", no rims (for decreased width to navigate narrow doorways in home), tip back arm rests, anti-tippers, B leg rests  Toilet - Technique: Stand pivot  Equipment Used: Raised toilet seat with rails  Toilet Transfers Comments: VCs needed to recall that she needed to use the RW in order to transfer to toilet. VCs needed for TTWB RLE  Assessment        SLP          Body mass index is 32.68 kg/m².     Assessment and Plan:  Right midshaft femur fracture   -s/p ORIF (2/7 with Dr. Gennaro Newton- [facility])  -Wound care  -TTWB  -PT/OT for functional mobility, balance, ADLs     Acute blood loss anemia   -Post-surgical.   -Monitor Hgb, transfuse prn <7.      Hypoxia  -Thought to be due to atelectasis and fluid overload  -Supplemental O2, wean as tolerated  -IS        MARISELA  -Avoid nephrotoxins, renally dose meds  -Monitor renal function     Anxiety/depression  -bupropion, duloxetine, escitalopram     Fibromyalgia  -Duloxetine, gabapentin     KAIT  -CPAP     Bladder  -- Urinary retention  -Christensen in place, consider void trial ~1 week  -flomax     Bowel   -High risk constipation   -senna+colace BID, PRN miralax, MoM, and bisacodyl supp. -improved     Safety   -fall precautions     Pain control  -Percocet prn, gabapentin  - zanaflex low dose for back pain.    - improving     PPx  -DVT: lovenox x 20 days per Ortho- plan to follow up with ortho  -GI: pantoprazole    Visitation approved  -   facetime     Possible Assisted living post rehab  - daughter investigating.   - possible SNF      Rehab Progress: Improving  Anticipated Dispo: home  Services/DME: HH>SNF  ELOS: 22            VANI Menezes.P.H  PM&R  2022  11:03 AM

## 2022-02-23 NOTE — PROGRESS NOTES
Physical Therapy  Facility/Department: [place] [address] ACUTE REHAB UNIT  Daily Treatment Note  NAME: Ángel Dixon  : 1945  MRN: [id number]    Date of Service: 2022    Discharge Recommendations:  24 hour supervision or assist,Patient would benefit from continued therapy after discharge   PT Equipment Recommendations  Equipment Needed: Yes  Other: bonifacio need wc if home: 20\", no rims (for decreased width to navigate narrow doorways in home), tip back arm rests, anti-tippers, B leg rests    Assessment   Body structures, Functions, Activity limitations: Decreased functional mobility ; Decreased balance;Decreased endurance;Decreased strength;Decreased ROM; Increased pain  Assessment: Pt demo's improved strength/endurance in supine exercises compared to previous session with less limitation by pain. Pt demo's ability to complete stand pivot transfer with RW with SBA/CGA while mantaining WBing precautions. Pt completes scoot-pivots with CGA however demo's decreased ability to be compliant with scoot pivot transfers to the R. Treatment Diagnosis: mobility impairment due to R femur fx  Decision Making: Medium Complexity  PT Education: Precautions;General Safety;Goals;Plan of Care;Transfer Training;Home Exercise Program  Patient Education: Pt verbalized understanding however would benefit from continued reinforcement  REQUIRES PT FOLLOW UP: Yes  Activity Tolerance  Activity Tolerance: Patient limited by fatigue;Patient limited by pain; Patient Tolerated treatment well  Activity Tolerance: requires extended rest breaks between exercises d/t pain/fatigue     Patient Diagnosis(es): There were no encounter diagnoses.      has a past medical history of Allergic, Anemia, Anxiety, Closed comminuted intra-articular fracture of distal femur, right, initial encounter ([facility] [address] Depression, Eye disorder, Fibromyalgia, GERD (gastroesophageal reflux disease), Hypertension, Obesity, KAIT (obstructive sleep apnea), Osteoarthritis, Osteoporosis, PONV (postoperative nausea and vomiting), and Prolonged emergence from general anesthesia. has a past surgical history that includes hip surgery; joint replacement (Right, 1993/2003); Wrist fracture surgery (Left); joint replacement (Left, 5/18/16); knee surgery; and Femur fracture surgery (Right, 2/7/2022). Restrictions  Position Activity Restriction  Other position/activity restrictions: TTWB R, up with assistance  Subjective   General  Chart Reviewed: Yes  Additional Pertinent Hx: 78 yo admitted 2/6/22 for fall/R femur fx. OR 2/7 FEMUR OPEN REDUCTION INTERNAL FIXATION RIGHT. Pmhx: HTN, fibromyalgia, KAIT, L TKR, R THR. Admitted to ARU 2/11/2022. Family / Caregiver Present: No  Referring Practitioner: Dr. Nathalie Simpson  Subjective  Subjective: Pt seated in wc upon approach and agreeable to PT. Reporting R hip and R knee pain however reports pain is well managed through pain meds at rest at beginning of session (not rated). Reports 9/10 pain at end of session post transfers and exercise- RN aware. Orientation     Cognition      Objective   Bed mobility  Supine to Sit: Modified independent;Stand by assistance (mod I with HOB elevated, SBA on flat mat table)  Sit to Supine: Modified independent;Stand by assistance (mod I with HOB elevated, SBA on flat mat table)  Transfers  Stand Pivot Transfers: Contact guard assistance;Stand by assistance (CGA progressing to SBA with RW; mat<>wc, wc>EOB)  Comment: Decreased VC given with pt demo'ing improved transitions with decreased cues. Min VC for WBing precautions and forward weight shift.   Wheelchair Activities  Propulsion: Yes  Propulsion 1  Propulsion: Manual  Level: Level Tile  Method: RUE;LOLI  Level of Assistance: Supervision  Description/ Details: moderate larry, includes 90 and 180 deg turns, and positioning wc for transfer  Distance: 150'x2        Exercises  Straight Leg Raise: 2x5 R LE  Heelslides: x10 R LE  Hip Abduction: 2 x 5 RLE  Ankle Pumps: x15 ankle pumps B LE  Comments: Rolled towel placed for lumbar support  Other exercises  Other exercises 3: Clamshells x 10 B LE in side lying  Other exercises 4: Pt completes x10 pelvic tilts supine in hopes to relieve back spasm                       Second Session:   Pt supine in bed upon approach and agreeable to PT. Pt completes supine>sit with mod I with HOB elevated. Sqaut pivots attempted per pt request and pt/daughter report that she will want to do them at home. Arm rests on pt wc not removable therefore new wc obtained in order to attempt transfers. Pt completes sqaut pivot from EOB>wc to the R with brief period of non compliance and CGA. In response to VC pt demo's ability to complete sqaut pivot transfer from wc>mat with  CGA and demo's compliance. Pt requires CGA from mat>wc to the L and is noncompliant during transfer despite max VC d/t increased difficulty pickling up R leg and scooting while moving to the L. Pt/PT have discussion and agree that stand pivot with RW is best method as she can do it while being compliant in both directions. Daughter in room upon return to pt room and is educated on pt performance during session with discussion of plan for tranfers at home/ home set up. Pt/daughter also have questions about obtaining wc. Upon completion of session pt left in wc with needs in reach and chair alarm on.         Goals  Short term goals  Time Frame for Short term goals: 10 days  Short term goal 1: Pt will complete sit<>supine with SBA- met 2/17  Short term goal 2: Pt will complete transfers with min A x 1- met 2/18  Short term goal 3: Pt will ambulate 5' with min A x 1 and wc follow  Short term goal 4: Pt will complete ascent/descent of 4 steps with UL handrail and min A x 1  Short term goal 5: Pt will complete 48' wc mobility with mod I  Long term goals  Time Frame for Long term goals : 3 weeks  Long term goal 1: Pt will complete bed mobility independently  Long term goal 2: Pt will complete transfers with mod I and LRAD  Long term goal 3: Pt will ambulate 5' with mod I and LRAD  Long term goal 4: Pt will ascend/descend 5 + 5 stairs with UL hand rail and SBA  Long term goal 5: Pt will complete 150' wc mobility with independence  Patient Goals   Patient goals : return home    Plan    Plan  Times per week: 5-7 x per week 75 min  Current Treatment Recommendations: Strengthening,Balance Training,Transfer Training  Safety Devices  Type of devices: Call light within reach,Nurse notified,Bed alarm in place,Left in bed     Therapy Time   Individual Concurrent Group Co-treatment   Time In 1100         Time Out 1200         Minutes 60         Timed Code Treatment Minutes: [facility] Time:   Individual Concurrent Group Co-treatment   Time In [address]         Minutes 40           Timed Code Treatment Minutes:  83+00    Total Treatment Minutes:  [address] [facility] [address], [place] [id number]

## 2022-02-23 NOTE — PLAN OF CARE
Problem: Falls - Risk of:  Goal: Will remain free from falls  Description: Will remain free from falls  Outcome: Ongoing    Pt Aox4 VSS patient ambulated with assistance and was able to verbalized the importance of calling before getting up.

## 2022-02-23 NOTE — PLAN OF CARE
Problem: Falls - Risk of:  Goal: Will remain free from falls  Description: Will remain free from falls  2/23/2022 0244 by Albert Monreal RN  Outcome: Ongoing  Note: Patient is a fall risk. Patient is a x 1 stand pivot with walker and gait belt. See Fall Risk assessment for details. Bed is in low, lock position; call light/belongings within reach. No attempts to get out of bed have been made, calls appropriately when assistance is needed. Bed alarm and hourly rounds in place for safety. Problem: Falls - Risk of:  Goal: Absence of physical injury  Description: Absence of physical injury  Outcome: Ongoing     Problem: Pain:  Goal: Pain level will decrease  Description: Pain level will decrease  2/23/2022 0244 by Albert Monreal RN  Outcome: Ongoing  Note: Patient complains of pain at level 10/10. Patient describes pain as aching. Patient requests pain medication. Patient medicated with PRN oxycodone. Problem: Pain:  Goal: Control of acute pain  Description: Control of acute pain  Outcome: Ongoing     Problem: Skin Integrity:  Goal: Will show no infection signs and symptoms  Description: Will show no infection signs and symptoms  2/23/2022 0244 by Albert Monreal RN  Outcome: Ongoing  Note: Patient has 2 wounds on RLQ, applied bacitracin and boarder gauze. Skin is clean, dry and intact.       Problem: Skin Integrity:  Goal: Absence of new skin breakdown  Description: Absence of new skin breakdown  Outcome: Ongoing     Problem: Urinary Elimination:  Goal: Signs and symptoms of infection will decrease  Description: Signs and symptoms of infection will decrease  Outcome: Ongoing

## 2022-02-23 NOTE — CARE COORDINATION
DEE spoke with Dr. Jerald Castle this AM re: Pt and dgt are now interested in SNF placement. SW met with dgt this AM while Pt was in therapy and then met with Pt at bedside this afternoon. [address] list provided to Pt and dgt. Medicare SNF coverage discussed with both. Pt would like SNF referrals made to the followin [facility]. [address] [place] [address] Dr made referrals to all of the above SNF's via [facility] and will follow up with Admissions. Quirino Jennings, [facility]  Case Management  [phone]      [phone]-Addendum  Admissions from [facility] called SW back - they are full so cannot accept Pt. SW called and had to leave 's for Admissions at [facility] and [facility]. DEE spoke with Admissions at [facility] DR KEZIA FINK but she asked that I fax a facesheet and clinical to fax # [phone] (all info faxed.)    LORETTA Giordano [facility]  Case Management    6198-Addendum  SW just tried meeting with Pt to update her on above SNF info but Pt is speaking with a visitor.  SW will follow up in AM.    SHELBI Zhao

## 2022-02-23 NOTE — PROGRESS NOTES
Occupational Therapy  Facility/Department: [facility] ACUTE REHAB UNIT  Daily Treatment Note  NAME: Doyle Thakkar  : 1945  MRN: [id number]    Date of Service: 2022    Discharge Recommendations:  Continue to assess pending progress,24 hour supervision or assist,Patient would benefit from continued therapy after discharge  OT Equipment Recommendations  ADL Assistive Devices: Transfer Tub Bench  Other: Pt would benefit from a TTB however per measurements from pt's daughter pt will be unable to access her bathroom from w/c level and hopping w/ the RW is not an option at this time. pt reported she plans to sponge bath until she is WBAT. Assessment   Performance deficits / Impairments: Decreased functional mobility ; Decreased ADL status; Decreased strength;Decreased ROM; Decreased safe awareness;Decreased cognition;Decreased endurance;Decreased balance;Decreased high-level IADLs;Decreased coordination;Decreased posture  Assessment: Pt was more limited by RLE pain today and had increased difficulty participating in session as a result. Pt completed all stand pivot transfers today w/ CGA however pt continues to require VCs for compliance w/ TTWB. Pt required increased assistance for footwear today due to RLE pain and pt was not interested in the sock aid today. Pt is normally able to don socks I'ly when pain is under control. Pt reported feeling overwhelmed today and she is now thinking that it would be best for her to go to a SNF instead of home. Pt continues to be limited by pain, impaired memory, and decreased safety awareness and benefits from ongoing OT. Cont OT per POC  Treatment Diagnosis: decreased ADLs, functional mobility and functional transfers 2/2 R femur fx    OT Education: Precautions; ADL Adaptive Strategies;Transfer Training;Family Education  Patient Education: Ongoing education needed  Barriers to Learning: cognition  REQUIRES OT FOLLOW UP: Yes  Activity Tolerance  Activity Tolerance: Patient limited by pain  Activity Tolerance: Pt w/ 10/10 RLE pain during session today requiring return to bed at end of session  Safety Devices  Safety Devices in place: Yes  Type of devices: Call light within reach; Bed alarm in place; Left in bed;Nurse notified         Patient Diagnosis(es): There were no encounter diagnoses. has a past medical history of Allergic, Anemia, Anxiety, Closed comminuted intra-articular fracture of distal femur, right, initial encounter ([facility] [address] Depression, Eye disorder, Fibromyalgia, GERD (gastroesophageal reflux disease), Hypertension, Obesity, KAIT (obstructive sleep apnea), Osteoarthritis, Osteoporosis, PONV (postoperative nausea and vomiting), and Prolonged emergence from general anesthesia. has a past surgical history that includes hip surgery; joint replacement (Right, 1993/2003); Wrist fracture surgery (Left); joint replacement (Left, 5/18/16); knee surgery; and Femur fracture surgery (Right, 2/7/2022). Restrictions  Position Activity Restriction  Other position/activity restrictions: TTWB R, up with assistance  Subjective   General  Chart Reviewed: Yes  Patient assessed for rehabilitation services?: Yes  Additional Pertinent Hx: 78 yo admitted to [place] [address] 2/6/22 for fall/R femur fx. OR 2/7 FEMUR OPEN REDUCTION INTERNAL FIXATION RIGHT. Admitted to ARU 2/11/22. Pmhx: HTN, fibromyalgia, KAIT, L TKR, R THR  Response to previous treatment: Patient with no complaints from previous session  Family / Caregiver Present: Yes (daughter present at end of session)  Referring Practitioner: Alexx Sanchez MD  Diagnosis: R femur fx s/p ORIF  Subjective  Subjective: Pt was semi supine in bed upon arrival. Pt reported she has been having a really rough morning due to pain. Pt was agreeable to ADL w/ OT after she received her pain meds.   General Comment  Comments: Pt prefers to wear her LSO for comfort due to premorbid back pain and back spasms  Vital Signs  Patient Currently in Pain: Yes (Pt c/o 10/10 back pain. RN present to deliver meds. Ice pack applied at end of session)   Orientation  Orientation  Overall Orientation Status: Within Normal Limits  Objective    ADL  Grooming: Modified independent  (Pt completed oral care seated in w/c)  UE Bathing: Modified independent  (Pt washed and dried 4/4 components of UE bathing seated on TTB)  LE Bathing: Supervision (Pt washed and dried BLEs seated on TTB. Pt preferred not to wash her buttocks)  UE Dressing: Independent (Pt donned her bra and tshirt seated in w/c)  LE Dressing: Setup;Verbal cueing; Increased time to complete;Minimal assistance (See below)  Toileting: Contact guard assistance (Pt completed rear tila care seated on toilet I'ly. Pt completed pants management in stance w/ CGA.)  Additional Comments: Pt maintained balance at RW w/ CGA to pull clothes down. VCs needed in stance to maintain TTWB RLE. Once seated on toilet pt doffed pants and underwear I'ly and doffed socks seated on TTB I'ly. Pt was in a significant amount of pain during sesion and following bathing tasks pt required min A to don her R sock. Pt was able to don L shoe and thread BLEs into underwear and pants w/ + time needed. CGA needed in stance for clothing management w/ VCs needed for TTWB RLE.           Balance  Sitting Balance: Supervision  Standing Balance: Contact guard assistance  Standing Balance  Time: ~2 mins total  Activity: functional transfers; stance for ADLs  Comment: Min-Mod VCs needed for TTWB in stance  Functional Mobility  Functional - Mobility Device: Wheelchair  Activity: To/from bathroom  Assist Level: Minimal assistance  Functional Mobility Comments: Min A needed to manuever close enough to toilet and TTB and max VCs needed to comprehend proper technique  Toilet Transfers  Toilet - Technique: Stand pivot  Equipment Used: Raised toilet seat with rails  Toilet Transfer: Contact guard assistance  Toilet Transfers Comments: Pt required VCs to use RW to complete transfer since pt does not have GBs at home. Pt required VCs to leave more room between the w/c and wall to fit the RW. VCs needed for TTWB during pivot. Shower Transfers  Shower - Transfer From: Wheelchair  Shower - Transfer Type: To and From  Shower - Transfer To: Transfer tub bench  Shower - Technique: Stand pivot  Shower Transfers: Contact Guard  Shower Transfers Comments: Mod VCs needed for TTWB RLE and VCs for proper positioning of RW    Bed mobility  Supine to Sit: Modified independent  Sit to Supine: Modified independent  Transfers  Stand Pivot Transfers: Contact guard assistance (Completed from bed <> w/c w/ CGA and min VCs for TTWB RLE. VCs to extend RLE upon sitting to EOB)  Sit to stand: Contact guard assistance  Stand to sit: Contact guard assistance                         Cognition  Arousal/Alertness: Appropriate responses to stimuli  Following Commands: Inconsistently follows commands; Follows one step commands with increased time; Follows one step commands with repetition  Attention Span: Attends with cues to redirect; Difficulty attending to directions  Memory: Decreased short term memory;Decreased recall of precautions  Safety Judgement: Decreased awareness of need for safety;Decreased awareness of need for assistance  Problem Solving: Assistance required to correct errors made;Decreased awareness of errors;Assistance required to identify errors made;Assistance required to implement solutions;Assistance required to generate solutions  Insights: Decreased awareness of deficits  Initiation: Requires cues for some  Sequencing: Requires cues for some  Cognition Comment: Pt was limited by increased pain this session and was somewhat tearful due to the amount of pain she has been having. Pt was also emotional about her situation and pt mentioned to her daughter and OT that she doesn't want to burden her daughter and thinks it would be best if she went to a SNF instead of home.  Pt expressed feeling very overwhelmed and increased time was spent providing emotional support. Pt continues to demonstrate difficulty w/ sequencing transfers and is limited by impaired memory and decreased attention to task at times. Pt performs best when instructed to verbalize transfer sequence outloud. Type of ROM/Therapeutic Exercise  Comment: Pt c/o increased pain this session and was very tearful, stating that she couldn't do anything else this session. Pt requested to get back to bed for remainder of session. Pt completed the following GM coordination task to increase core stability and improve overall activity tolerance for functional transfers. Pt completed: LLE SLR x10 reps + RUE shoulder flexion x10 reps simultaneously. Pt completed w/ ice pack to RLE during task.                       Plan   Plan  Times per week: 90min 5x/week  Times per day: Daily  Current Treatment Recommendations: Patient/Caregiver Education & Training,Home Management Training,Equipment Evaluation, Education, & procurement,Balance Training,Endurance Training,Self-Care / ADL,Safety Education & Training,Strengthening,Functional Mobility Training  G-Code     OutComes Score                                                  AM-PAC Score             Goals  Short term goals  Time Frame for Short term goals: 10 days  Short term goal 1: Pt will complete toilet transfer w/ Min Ax1-goal met 2/17  Short term goal 2: Pt will complete LE dressing assessment-goal met 2/17  Short term goal 3: Pt will complete shower transfer w/ Alec x1-goal met 2/18  Short term goal 4: Pt will complete UE dressing w/ Min Ax1-goal met 2/18  Long term goals  Time Frame for Long term goals : 3 weeks  Long term goal 1: Pt will complete toilet transfer/ toileting w/ Mod I-ongoing  Long term goal 2: Pt will complete UE/LE dressing w/ Mod I and use of AE prn- met for UE dressing 2/21; ongoing for LE dressing  Long term goal 3: Pt will complete tub transfer w/ SBA-ongoing  Long term goal 4: Pt will complete bathing w/ Spvn- goal met from seated position 2/23  Patient Goals   Patient goals : \"Be in less pain. \"       Therapy Time   Individual Concurrent Group Co-treatment   Time In 0830         Time Out 1000         Minutes 90         Timed Code Treatment Minutes: [address] [place], [place]

## 2022-02-23 NOTE — PLAN OF CARE
Problem: Falls - Risk of:  Goal: Will remain free from falls  Description: Will remain free from falls  Outcome: Met This Shift  Note: Pt remains free of falls thus far this shift. All fall precautions in place and functioning properly. Problem: Skin Integrity:  Goal: Will show no infection signs and symptoms  Description: Will show no infection signs and symptoms  Outcome: Met This Shift  Note: Surgical site shows no s/sx of infection at this time. Will continue to assess q shift and prn and provide interventions as needed.       Problem: Pain:  Goal: Pain level will decrease  Description: Pain level will decrease  Outcome: Ongoing  Note: Patient's pain is controlled with current regime

## 2022-02-23 NOTE — PROGRESS NOTES
Alert and oriented. VSS on RA. RLE pain under control at present and declining any pain medication. Participated in all therapies today. RLE surgical dressings x 2 remain CDI. Palpable 2+ pedal pulses. Stand pivot x 1, toe touch weight bearing of RLE. No new issues today.

## 2022-02-23 NOTE — PROGRESS NOTES
Patient is in bed and resting at this time, vitals stable, pain was 10/10 with R leg, gave PRN oxycodone. Patient is a x 1 stand pivot with walker and gait belt. Continent of bowel and bladder. Patient has two wounds on RLQ, applied bacitracin and boarder gauze. Call light with in reach and bed alarm on.

## 2022-02-24 PROCEDURE — 6370000000 HC RX 637 (ALT 250 FOR IP): Performed by: PHYSICAL MEDICINE & REHABILITATION

## 2022-02-24 PROCEDURE — 99232 SBSQ HOSP IP/OBS MODERATE 35: CPT | Performed by: PHYSICAL MEDICINE & REHABILITATION

## 2022-02-24 PROCEDURE — 97530 THERAPEUTIC ACTIVITIES: CPT

## 2022-02-24 PROCEDURE — 6360000002 HC RX W HCPCS: Performed by: PHYSICAL MEDICINE & REHABILITATION

## 2022-02-24 PROCEDURE — 97535 SELF CARE MNGMENT TRAINING: CPT

## 2022-02-24 PROCEDURE — 1280000000 HC REHAB R&B

## 2022-02-24 PROCEDURE — 97542 WHEELCHAIR MNGMENT TRAINING: CPT

## 2022-02-24 PROCEDURE — 97110 THERAPEUTIC EXERCISES: CPT

## 2022-02-24 RX ADMIN — GABAPENTIN 600 MG: 600 TABLET, FILM COATED ORAL at 13:23

## 2022-02-24 RX ADMIN — TAMSULOSIN HYDROCHLORIDE 0.4 MG: 0.4 CAPSULE ORAL at 08:21

## 2022-02-24 RX ADMIN — Medication: at 13:26

## 2022-02-24 RX ADMIN — POLYETHYLENE GLYCOL 3350 17 G: 17 POWDER, FOR SOLUTION ORAL at 13:26

## 2022-02-24 RX ADMIN — GABAPENTIN 600 MG: 600 TABLET, FILM COATED ORAL at 21:28

## 2022-02-24 RX ADMIN — Medication: at 08:20

## 2022-02-24 RX ADMIN — OXYCODONE HYDROCHLORIDE AND ACETAMINOPHEN 2 TABLET: 5; 325 TABLET ORAL at 19:59

## 2022-02-24 RX ADMIN — OXYCODONE HYDROCHLORIDE AND ACETAMINOPHEN 2 TABLET: 5; 325 TABLET ORAL at 13:23

## 2022-02-24 RX ADMIN — TIZANIDINE 2 MG: 4 TABLET ORAL at 21:28

## 2022-02-24 RX ADMIN — GABAPENTIN 600 MG: 600 TABLET, FILM COATED ORAL at 08:23

## 2022-02-24 RX ADMIN — OXYCODONE HYDROCHLORIDE AND ACETAMINOPHEN 2 TABLET: 5; 325 TABLET ORAL at 05:51

## 2022-02-24 RX ADMIN — TIZANIDINE 2 MG: 4 TABLET ORAL at 13:24

## 2022-02-24 RX ADMIN — SENNOSIDES AND DOCUSATE SODIUM 1 TABLET: 50; 8.6 TABLET ORAL at 21:28

## 2022-02-24 RX ADMIN — BUPROPION HYDROCHLORIDE 150 MG: 150 TABLET, EXTENDED RELEASE ORAL at 08:22

## 2022-02-24 RX ADMIN — SENNOSIDES AND DOCUSATE SODIUM 1 TABLET: 50; 8.6 TABLET ORAL at 08:21

## 2022-02-24 RX ADMIN — DULOXETINE HYDROCHLORIDE 60 MG: 60 CAPSULE, DELAYED RELEASE ORAL at 08:22

## 2022-02-24 RX ADMIN — ESCITALOPRAM OXALATE 20 MG: 10 TABLET ORAL at 08:21

## 2022-02-24 RX ADMIN — Medication: at 21:28

## 2022-02-24 RX ADMIN — PANTOPRAZOLE SODIUM 40 MG: 40 TABLET, DELAYED RELEASE ORAL at 05:51

## 2022-02-24 RX ADMIN — ENOXAPARIN SODIUM 40 MG: 100 INJECTION SUBCUTANEOUS at 08:21

## 2022-02-24 ASSESSMENT — PAIN DESCRIPTION - DESCRIPTORS
DESCRIPTORS: ACHING

## 2022-02-24 ASSESSMENT — PAIN DESCRIPTION - FREQUENCY
FREQUENCY: CONTINUOUS

## 2022-02-24 ASSESSMENT — PAIN DESCRIPTION - ORIENTATION
ORIENTATION: RIGHT

## 2022-02-24 ASSESSMENT — PAIN DESCRIPTION - PAIN TYPE
TYPE: ACUTE PAIN

## 2022-02-24 ASSESSMENT — PAIN DESCRIPTION - LOCATION
LOCATION: LEG

## 2022-02-24 ASSESSMENT — PAIN - FUNCTIONAL ASSESSMENT
PAIN_FUNCTIONAL_ASSESSMENT: PREVENTS OR INTERFERES SOME ACTIVE ACTIVITIES AND ADLS

## 2022-02-24 ASSESSMENT — PAIN DESCRIPTION - ONSET
ONSET: ON-GOING

## 2022-02-24 ASSESSMENT — PAIN SCALES - GENERAL
PAINLEVEL_OUTOF10: 3
PAINLEVEL_OUTOF10: 8
PAINLEVEL_OUTOF10: 8
PAINLEVEL_OUTOF10: 9
PAINLEVEL_OUTOF10: 2
PAINLEVEL_OUTOF10: 8
PAINLEVEL_OUTOF10: 6

## 2022-02-24 ASSESSMENT — PAIN DESCRIPTION - PROGRESSION
CLINICAL_PROGRESSION: NOT CHANGED

## 2022-02-24 NOTE — PROGRESS NOTES
Patient is in bed and resting at this time, vitals stable, pain was 7/10 with R leg, gave PRN oxycodone. Patient is a x 1 stand pivot with gait belt and walker. Patient is continent with bowel and bladder. Has 2 wounds on RLQ, bacitracin applied and boarder gauze. Call light with in reach and bed alarm on.

## 2022-02-24 NOTE — PLAN OF CARE
Problem: Pain:  Goal: Pain level will decrease  Description: Pain level will decrease  Outcome: Ongoing   Pt had increased pain during PT today was able to manage pain with medication.

## 2022-02-24 NOTE — CARE COORDINATION
Referrals were sent out to the following facilities and awaiting definitive acceptance. [address], cannot take  [facility]- they called and faxed MAR to them on 2/24/22  [facility]  Additional facilities added to list from briana Angela, are:   [facility], [address], [facility], and [address] All have been faxed referrals with comment to call if can or cannot accept for them to call. Electronically signed by Ban Colvin RN on 2/24/2022 at 2:54 PM     Addendum:  [facility] can take patient when ready. Still awaiting response from other referrals so family can make decision. Electronically signed by Ban Colvin RN on 2/24/2022 at 3:16 PM     Addendum:  [facility] called and [facility] can accept patient when ready to discharge. This CM saw patient and daughter, Coreen and gave them the list of affirmative SNFs so far: Dariana Scott at [place]. This CM refaxed to [facility] and still awaiting response from others. Patient wants to see who else responds. CM will continue to follow patient until discharge.   Electronically signed by Ban Colvin RN on 2/24/2022 at 6:22 PM

## 2022-02-24 NOTE — PROGRESS NOTES
Occupational Therapy  Facility/Department: [place] [address] ACUTE REHAB UNIT  Daily Treatment Note  NAME: Rylee De Luna  : 1945  MRN: [id number]    Date of Service: 2022    Discharge Recommendations:  Continue to assess pending progress,24 hour supervision or assist,Patient would benefit from continued therapy after discharge,Subacute/Skilled Nursing Facility  OT Equipment Recommendations  Equipment Needed: Yes  ADL Assistive Devices: Transfer Tub Bench  Other: Pt would benefit from a TTB however per measurements from pt's daughter pt will be unable to access her bathroom from w/c level and hopping w/ the RW is not an option at this time. pt reported she plans to sponge bath until she is WBAT. Assessment   Assessment: Pt limited by pain this session but able to participate in session with emotional support and encouragment. Transfers and stance completed with CGA with VCS for TTWB. Pt verbalizing safe tech prior to completion of transfers during first session however unable to verbalize or complete safe tech in second session due to pain, feeling overwhelmed and fatigue. Pt continues to benefit from inpt OT. Treatment Diagnosis: decreased ADLs, functional mobility and functional transfers 2/2 R femur fx  Prognosis: Guarded; Fair  OT Education: OT Role;Plan of Care;Precautions;Transfer Training  Patient Education: Ongoing education needed  Barriers to Learning: cognition  Activity Tolerance  Activity Tolerance: Patient limited by pain  Safety Devices  Type of devices: Left in bed;Bed alarm in place;Nurse notified;Call light within reach         Patient Diagnosis(es): There were no encounter diagnoses.       has a past medical history of Allergic, Anemia, Anxiety, Closed comminuted intra-articular fracture of distal femur, right, initial encounter ([facility] [address] Depression, Eye disorder, Fibromyalgia, GERD (gastroesophageal reflux disease), Hypertension, Obesity, KAIT (obstructive sleep apnea), Osteoarthritis, Osteoporosis, PONV (postoperative nausea and vomiting), and Prolonged emergence from general anesthesia. has a past surgical history that includes hip surgery; joint replacement (Right, 1993/2003); Wrist fracture surgery (Left); joint replacement (Left, 5/18/16); knee surgery; and Femur fracture surgery (Right, 2/7/2022). Restrictions  Restrictions/Precautions  Restrictions/Precautions: Weight Bearing  Position Activity Restriction  Other position/activity restrictions: TTWB R, up with assistance       Subjective   General  Chart Reviewed: Yes  Patient assessed for rehabilitation services?: Yes  Additional Pertinent Hx: 78 yo admitted to [facility] 2/6/22 for fall/R femur fx. OR 2/7 FEMUR OPEN REDUCTION INTERNAL FIXATION RIGHT. Admitted to ARU 2/11/22. Pmhx: HTN, fibromyalgia, KAIT, L TKR, R THR  Response to previous treatment: Patient with no complaints from previous session  Family / Caregiver Present: Yes (Daughter Alis Preston present throughout session)  Referring Practitioner: Trina Wilde MD  Diagnosis: R femur fx s/p ORIF  Subjective  Subjective: Pt seated in [facility] with daughter present. Pt expressing fatigue and pain. Pt with ice on R knee. General Comment  Comments: Pt prefers to wear her LSO for comfort due to premorbid back pain and back spasms  Vital Signs  Patient Currently in Pain:  (Pt with pain in  R LE. RN aware and pt repositioned to comfort. Ice applied on RLE)       Orientation   decreased short term memory and decreased sequencing       Objective    ADL  Grooming: Stand by assistance (oral care in stance at sink with verbal reminders of TTWB  precautions but no breaking precautions)  Additional Comments: Pt able to maintain stance at sink for 5 min for completion of oral care with SBA.  Pt with one hand on sink at all time for maintaining balance        Balance  Sitting Balance: Supervision  Standing Balance: Contact guard assistance  Standing Balance  Time: 5 min + 30 sec  Activity: functional transfers; stance for ADLs, standing balance training  Comment: Pt requiring VCs for maintaining TTWB precautions during stance. Standing balance task completed at sink looking in mirror for postural adjusment upright stance. Shoulder flexion and extension completed. CGA required for steadying stance. Functional Mobility  Functional - Mobility Device: Wheelchair  Activity: To/from bathroom  Assist Level: Contact guard assistance (heavy VCs for WC mechanics and safe positioning)  Functional Mobility Comments: increased time needed for manuvering into position for transfer from [facility] to bed. Bed mobility  Bridging: Supervision (5 bridges completed to relieve back pressure supine)  Sit to Supine: Supervision (with HOB flat. increased time needed for verbaly thinking through tech.)  Scooting: Supervision (lateral scooting on EOB)  Transfers  Stand Pivot Transfers: Contact guard assistance (Pt encouraged to verbalize safe transfer prior to completion. Pt able to do this with reminders. VCs during transfer for maintaing TTWB)  Sit to stand: Contact guard assistance (from [facility])  Stand to sit: Contact guard assistance  Transfer Comments: Pt completing stance from [facility] to [facility] for oral care and standing balance tasks. Transfer completed from [facility] to EOB with pt verbalizing process then completing transfer with CGA and VCs. Second Session  Pt met supine in bed and agreeable to OT treatment. Pt with increased pain and anxiety upon arrival. Pt stating \"I am willing to try\". Extended rest breaks with emotional support required throughout. Pt educated on OT goals. Goals read aloud and pt educated on progression toward goals. Bed mobility completed with Supervision. Stand pivot transfer completed from EOB to [facility] with CGA and VCs for positioning. Pt able to partially verb safe transfer tech prior to transfer with vCs. WC mobility completed to and from bathroom with WC and CGA and VCs for WC mechanics.  Pt completed toilet transfer with heavy VCs for unsafe tech and CGA to Min A. Pt becoming frustrated during transfer and grabbing out for GBs and pushing away RW. Pt able to complete toileting with CGA for steadying stance for clothing management. [place] care completed seated. Transfer from toilet to [facility] completed with CGA and heavy VC. Pt agreeable to practicing toilet transfer a second time with encouragement. Transfer completed with CGA to Min A with increased frustration and heavy VCS for safety. Pt attempting to sit down prior to safe positioning. ADL washing hands completed with Mod I seated in WC. Extended rest break taken seated at sink due to pain and frustration. Pt with increasing pain. RN admin pain medication during session. UE exercise and relaxation tech completed with deep breathing seated in WC. Shoulder elevation and depression x5. Anterior and posterior lean completed holding therapist hands for shoulder flex and extension with PLB. Pt tearful and frustrated and asking to return to bed. Transfer from [facility] to EOB was unsafe and pt had limited ability to follow VCs for safety and TTWB. Pt leaning forward onto elbow on RW and attempting to sit prior to safe positioning. Pt requesting heat at end of session. Heat provided for for 10 min. Pt left supine in bed at end of session with daughter at bedside.                   Plan  If pt discharges prior to next treatment, this note will serve as discharge summary  Plan  Times per week: 90min 5x/week  Times per day: Daily  Current Treatment Recommendations: Patient/Caregiver Education & Training,Home Management Training,Equipment Evaluation, Education, & procurement,Balance Tommie Clap / ADL,Safety Education & Training,Strengthening,Functional Mobility Training       Goals (as determined and assessed by primary OT)  Short term goals  Time Frame for Short term goals: 10 days  Short term goal 1: Pt will complete toilet transfer w/ Min Ax1-goal met 2/17  Short term goal 2: Pt will complete LE dressing assessment-goal met 2/17  Short term goal 3: Pt will complete shower transfer w/ Alec x1-goal met 2/18  Short term goal 4: Pt will complete UE dressing w/ Min Ax1-goal met 2/18  Long term goals  Time Frame for Long term goals : 3 weeks  Long term goal 1: Pt will complete toilet transfer/ toileting w/ Mod I-ongoing  Long term goal 2: Pt will complete UE/LE dressing w/ Mod I and use of AE prn- met for UE dressing 2/21; ongoing for LE dressing  Long term goal 3: Pt will complete tub transfer w/ SBA-ongoing  Long term goal 4: Pt will complete bathing w/ Spvn- goal met from seated position 2/23  Patient Goals   Patient goals : \"Be in less pain. \"       Therapy Time   First Session Second Session Group Co-treatment   Time In [id number]       Time Out 4843  3353       Minutes 30  60       Timed Code Treatment Minutes: [address] [facility], [address]

## 2022-02-24 NOTE — PROGRESS NOTES
Department of Physical Medicine & Rehabilitation  Progress Note    Patient Identification:  Natacha Constantino  [id number]  : 1945  Admit date: 2022    Chief Complaint: Closed comminuted intra-articular fracture of distal femur, right, initial encounter ([facility] [address]    Subjective:   Doing well. No new complaints overnight. She is going up and down 3 stairs in therapy. Family discussed discharge to SNF. They are still deciding on which SNF to approve. ROS: No f/c, n/v, cp     Objective:  Patient Vitals for the past 24 hrs:   BP Temp Temp src Pulse Resp SpO2 Weight   22 0830 (!) 149/73 98.4 °F (36.9 °C) Oral 77 16 97 % --   22 0550 -- -- -- -- -- -- 227 lb 1.2 oz (103 kg)   22 (!) 156/80 98.4 °F (36.9 °C) Oral 78 16 96 % --     Const: Alert. No distress, pleasant. HEENT: Normocephalic, atraumatic. Normal sclera/conjunctiva. MMM. CV: Regular rate and rhythm. Resp: No respiratory distress. Lungs CTAB. Abd: Soft, nontender, nondistended, NABS+   Ext: + edema. Neuro: Alert, oriented, appropriately interactive. Psych: Cooperative, appropriate mood and affect    Laboratory data: Available via EMR. Last 24 hour lab  No results found for this or any previous visit (from the past 24 hour(s)).     Therapy progress:  PT  Position Activity Restriction  Other position/activity restrictions: TTWB R, up with assistance  Objective     Sit to Stand: Contact guard assistance (from edge of mat/wc with RW)  Stand to sit: Contact guard assistance (to edge of mat/wc/EOB with RW)  Device: Rolling Walker  Assistance: Minimal assistance  Distance: x5ft  OT  PT Equipment Recommendations  Equipment Needed: Yes  Mobility Devices: Stu Jessica: Rolling  Wheelchair: Standard  Other: bonifacio need wc if home: 20\", no rims (for decreased width to navigate narrow doorways in home), tip back arm rests, anti-tippers, B leg rests  Toilet - Technique: Stand pivot  Equipment Used: Raised toilet seat with rails  Toilet Transfers Comments: Pt required VCs to use RW to complete transfer since pt does not have GBs at home. Pt required VCs to leave more room between the w/c and wall to fit the RW. VCs needed for TTWB during pivot. Assessment        SLP          Body mass index is 32.58 kg/m². Assessment and Plan:  Right midshaft femur fracture   -s/p ORIF ( with Dr. Oriana Guaman- [facility])  -Wound care  -TTWB  -PT/OT for functional mobility, balance, ADLs     Acute blood loss anemia   -Post-surgical.   -Monitor Hgb, transfuse prn <7.      Hypoxia  -Thought to be due to atelectasis and fluid overload  -Supplemental O2, wean as tolerated  -IS        MARISELA  -Avoid nephrotoxins, renally dose meds  -Monitor renal function     Anxiety/depression  -bupropion, duloxetine, escitalopram     Fibromyalgia  -Duloxetine, gabapentin     KAIT  -CPAP     Bladder  -- Urinary retention  -Christensen in place, consider void trial ~1 week  -flomax     Bowel   -High risk constipation   -senna+colace BID, PRN miralax, MoM, and bisacodyl supp. -improved     Safety   -fall precautions     Pain control  -Percocet prn, gabapentin  - zanaflex low dose for back pain. - improving     PPx  -DVT: lovenox x 20 days per Ortho- plan to follow up with ortho  -GI: pantoprazole    Visitation approved  -   facetime     Possible Assisted living post rehab  - daughter investigating.    -SNF- social work       Rehab Progress: Improving  Anticipated Dispo: home  Services/DME: HH>SNF  ELOS: 22            Caitlyn Lopez D.O. YAHAIRA.P.H  PM&R  2022  11:06 AM

## 2022-02-24 NOTE — PLAN OF CARE
Problem: Falls - Risk of:  Goal: Will remain free from falls  Description: Will remain free from falls  2/24/2022 0311 by Mamta Purvis RN  Outcome: Ongoing  Note: Patient is a fall risk. Patient is a x 1 stand pivot with gait belt and walker. See Fall Risk assessment for details. Bed is in low, lock position; call light/belongings within reach. No attempts to get out of bed have been made, calls appropriately when assistance is needed. Bed alarm and hourly rounds in place for safety. Problem: Falls - Risk of:  Goal: Absence of physical injury  Description: Absence of physical injury  2/24/2022 0311 by Mamta Purvis RN  Outcome: Ongoing     Problem: Pain:  Goal: Pain level will decrease  Description: Pain level will decrease  2/24/2022 0311 by Mamta Purvis RN  Outcome: Ongoing  Note: Patient complains of pain at level 10/10. Patient describes pain as aching. Patient requests pain medication. Patient medicated with PRN Percocet. Problem: Pain:  Goal: Control of acute pain  Description: Control of acute pain  2/24/2022 0311 by Mamta Purvis RN  Outcome: Ongoing     Problem: Skin Integrity:  Goal: Will show no infection signs and symptoms  Description: Will show no infection signs and symptoms  2/24/2022 0311 by Mamta Purvis RN  Outcome: Ongoing  Note: Patient has 2 wounds on RLQ, applied bacitracin and boarder gauze. Skin is clean, dry and intact.       Problem: Skin Integrity:  Goal: Absence of new skin breakdown  Description: Absence of new skin breakdown  2/24/2022 0311 by Mamta Purvis RN  Outcome: Ongoing     Problem: Urinary Elimination:  Goal: Signs and symptoms of infection will decrease  Description: Signs and symptoms of infection will decrease  2/24/2022 0311 by Tiffanie Medrano RN  Outcome: Ongoing     Problem: Urinary Elimination:  Goal: Complications related to the disease process, condition or treatment will be avoided or minimized  Description: Complications related to the disease process, condition or treatment will be avoided or minimized  2/24/2022 0311 by Amanuel Dennis RN  Outcome: Ongoing

## 2022-02-24 NOTE — PROGRESS NOTES
Physical Therapy  Facility/Department: [facility] ACUTE REHAB UNIT  Daily Treatment Note  NAME: Natacha Constantino  : 1945  MRN: [id number]    Date of Service: 2022    Discharge Recommendations:  24 hour supervision or assist,Patient would benefit from continued therapy after discharge   PT Equipment Recommendations  Equipment Needed: Yes  Other: will need wc if home: 20\", no rims (for decreased width to navigate narrow doorways in home), tip back arm rests, anti-tippers, B leg rests    Assessment   Body structures, Functions, Activity limitations: Decreased functional mobility ; Decreased balance;Decreased endurance;Decreased strength;Decreased ROM; Increased pain  Assessment: Pt demo's improved stair mobility through ascending/descending 3, 6\" steps with UL handrail and shower bench technique with min A from PT to move bench up/down steps. Pt hoang's decreased difficulty with transfers with increased pain/fatigue however is compliant to [facility] precautions for over 75% of transfers this date. Pt would benefit from continued skilled PT in order to maxmize functional mobility and independence. Treatment Diagnosis: mobility impairment due to R femur fx  Decision Making: Medium Complexity  PT Education: Precautions;General Safety;Goals;Plan of Care;Transfer Training;Home Exercise Program  Patient Education: Pt verbalized understanding however would benefit from continued reinforcement  REQUIRES PT FOLLOW UP: Yes  Activity Tolerance  Activity Tolerance: Patient limited by fatigue;Patient limited by pain; Patient Tolerated treatment well  Activity Tolerance: requires extended rest breaks between exercises d/t pain/fatigue     Patient Diagnosis(es): There were no encounter diagnoses.      has a past medical history of Allergic, Anemia, Anxiety, Closed comminuted intra-articular fracture of distal femur, right, initial encounter ([facility] [address] Depression, Eye disorder, Fibromyalgia, GERD (gastroesophageal reflux disease), Hypertension, Obesity, KAIT (obstructive sleep apnea), Osteoarthritis, Osteoporosis, PONV (postoperative nausea and vomiting), and Prolonged emergence from general anesthesia. has a past surgical history that includes hip surgery; joint replacement (Right, 1993/2003); Wrist fracture surgery (Left); joint replacement (Left, 5/18/16); knee surgery; and Femur fracture surgery (Right, 2/7/2022). Restrictions  Restrictions/Precautions  Restrictions/Precautions: Weight Bearing  Position Activity Restriction  Other position/activity restrictions: TTWB R, up with assistance  Subjective   General  Chart Reviewed: Yes  Additional Pertinent Hx: 78 yo admitted 2/6/22 for fall/R femur fx. OR 2/7 FEMUR OPEN REDUCTION INTERNAL FIXATION RIGHT. Pmhx: HTN, fibromyalgia, KAIT, L TKR, R THR. Admitted to ARU 2/11/2022. Family / Caregiver Present: No  Referring Practitioner: Dr. Rah Simpson  Subjective  Subjective: Pt up in wheelchair upon approach and agreeable to PT however reports needin gto use bathroom. Pt reports 7/10 pain at rest with increase to 8/10 after mobility throughout session. RN aware. General Comment  Comments: Plan for visit with grandchildren/family discussed during session with pt becoming distressed due to report from PT different than what she thought plan was. Liason and nurse manager consulted with miscommunication resolved. Orientation     Cognition      Objective      Transfers  Sit to Stand: Contact guard assistance (from EOB/commode up to RW; wc up to // bar)  Stand to sit: Contact guard assistance  Stand Pivot Transfers: Stand by assistance;Contact guard assistance (SBA EOB>wc, CGA wc<>commode; all with RW)  Comment: Decreased VC given with pt demo'ing improved transitions with decreased cues. Min VC for WBing precautions and forward weight shift.   Stairs/Curb  Stairs?: Yes  Stairs  # Steps : 3  Stairs Height: 6\"  Rails: Left ascending  Device:  (shower bench with R legs at shortest setting While seated edge of mat pt completes LAQ R LE x 10 and side beinding stretch with L elbow prop and R UE lateral reach for ~ 30 seconds. Pt reports decreased back spasm in response to stretch- instructed to complete this several times a day while seated EOB to prevent back spasm/pain. Upon completion of session pt left supine in bed with needs in reach, bed alarm on and heat back applied to R low back. PT returns after 30 min to retrieve heat back with skin checked with no pt report of increased pain and increased pinkness but no abrasions/ burns noted.            Goals  Short term goals  Time Frame for Short term goals: 10 days  Short term goal 1: Pt will complete sit<>supine with SBA- met 2/17  Short term goal 2: Pt will complete transfers with min A x 1- met 2/18  Short term goal 3: Pt will ambulate 5' with min A x 1 and wc follow  Short term goal 4: Pt will complete ascent/descent of 4 steps with UL handrail and min A x 1  Short term goal 5: Pt will complete 48' wc mobility with mod I  Long term goals  Time Frame for Long term goals : 3 weeks  Long term goal 1: Pt will complete bed mobility independently  Long term goal 2: Pt will complete transfers with mod I and LRAD  Long term goal 3: Pt will ambulate 5' with mod I and LRAD  Long term goal 4: Pt will ascend/descend 5 + 5 stairs with UL hand rail and SBA  Long term goal 5: Pt will complete 150' wc mobility with independence  Patient Goals   Patient goals : return home    Plan    Plan  Times per week: 5-7 x per week 90 min  Current Treatment Recommendations: Strengthening,Balance Training,Transfer Training  Safety Devices  Type of devices: Call light within reach,Nurse notified,Chair alarm in place,Left in chair     Therapy Time   Individual Concurrent Group Co-treatment   Time In 0830         Time Out 0930         Minutes 60         Timed Code Treatment Minutes: 60 Minutes     Second Session Therapy Time:   Individual Concurrent Group Co-treatment   Time In 1100         Time Out 1130         Minutes 30           Timed Code Treatment Minutes:  60+30    Total Treatment Minutes:  [address], [place] [id number]

## 2022-02-24 NOTE — FLOWSHEET NOTE
02/24/22 1512   Encounter Summary   Services provided to: Patient and family together   Support System Children;Family members   Continue Visiting   (2/24, david )   Complexity of Encounter Moderate   Length of Encounter 30 minutes   Routine   Type Follow up   Assessment Calm; Approachable; Hopeful   Intervention Active listening;Explored feelings, thoughts, concerns   Outcome Expressed gratitude;Expressed feelings of meseret, peace, and/or awe;Engaged in conversation;Encouraged; Hopeful;Receptive   PT visiting with family. She seems to be enjoying the visit. She expressed her graitude for all our efforts.   Staff Kj Wells MA

## 2022-02-25 LAB
ANION GAP SERPL CALCULATED.3IONS-SCNC: 8 MMOL/L (ref 3–16)
BASOPHILS ABSOLUTE: 0 K/UL (ref 0–0.2)
BASOPHILS RELATIVE PERCENT: 0.6 %
BUN BLDV-MCNC: 18 MG/DL (ref 7–20)
CALCIUM SERPL-MCNC: 9.3 MG/DL (ref 8.3–10.6)
CHLORIDE BLD-SCNC: 106 MMOL/L (ref 99–110)
CO2: 28 MMOL/L (ref 21–32)
CREAT SERPL-MCNC: 1.1 MG/DL (ref 0.6–1.2)
EOSINOPHILS ABSOLUTE: 0.3 K/UL (ref 0–0.6)
EOSINOPHILS RELATIVE PERCENT: 5.4 %
GFR AFRICAN AMERICAN: 58
GFR NON-AFRICAN AMERICAN: 48
GLUCOSE BLD-MCNC: 95 MG/DL (ref 70–99)
HCT VFR BLD CALC: 26.1 % (ref 36–48)
HEMOGLOBIN: 8.7 G/DL (ref 12–16)
LYMPHOCYTES ABSOLUTE: 1.2 K/UL (ref 1–5.1)
LYMPHOCYTES RELATIVE PERCENT: 25 %
MCH RBC QN AUTO: 29 PG (ref 26–34)
MCHC RBC AUTO-ENTMCNC: 33.5 G/DL (ref 31–36)
MCV RBC AUTO: 86.4 FL (ref 80–100)
MONOCYTES ABSOLUTE: 0.5 K/UL (ref 0–1.3)
MONOCYTES RELATIVE PERCENT: 10.5 %
NEUTROPHILS ABSOLUTE: 2.7 K/UL (ref 1.7–7.7)
NEUTROPHILS RELATIVE PERCENT: 58.5 %
PDW BLD-RTO: 15.4 % (ref 12.4–15.4)
PLATELET # BLD: 271 K/UL (ref 135–450)
PMV BLD AUTO: 9.3 FL (ref 5–10.5)
POTASSIUM REFLEX MAGNESIUM: 5.1 MMOL/L (ref 3.5–5.1)
RBC # BLD: 3.02 M/UL (ref 4–5.2)
SARS-COV-2, NAAT: NOT DETECTED
SODIUM BLD-SCNC: 142 MMOL/L (ref 136–145)
WBC # BLD: 4.7 K/UL (ref 4–11)

## 2022-02-25 PROCEDURE — 99232 SBSQ HOSP IP/OBS MODERATE 35: CPT | Performed by: PHYSICAL MEDICINE & REHABILITATION

## 2022-02-25 PROCEDURE — 1280000000 HC REHAB R&B

## 2022-02-25 PROCEDURE — 6370000000 HC RX 637 (ALT 250 FOR IP): Performed by: PHYSICAL MEDICINE & REHABILITATION

## 2022-02-25 PROCEDURE — 97530 THERAPEUTIC ACTIVITIES: CPT | Performed by: PHYSICAL THERAPIST

## 2022-02-25 PROCEDURE — 87635 SARS-COV-2 COVID-19 AMP PRB: CPT

## 2022-02-25 PROCEDURE — 36415 COLL VENOUS BLD VENIPUNCTURE: CPT

## 2022-02-25 PROCEDURE — 97535 SELF CARE MNGMENT TRAINING: CPT

## 2022-02-25 PROCEDURE — 85025 COMPLETE CBC W/AUTO DIFF WBC: CPT

## 2022-02-25 PROCEDURE — 6360000002 HC RX W HCPCS: Performed by: PHYSICAL MEDICINE & REHABILITATION

## 2022-02-25 PROCEDURE — 97110 THERAPEUTIC EXERCISES: CPT

## 2022-02-25 PROCEDURE — 97530 THERAPEUTIC ACTIVITIES: CPT

## 2022-02-25 PROCEDURE — 97542 WHEELCHAIR MNGMENT TRAINING: CPT | Performed by: PHYSICAL THERAPIST

## 2022-02-25 PROCEDURE — 80048 BASIC METABOLIC PNL TOTAL CA: CPT

## 2022-02-25 PROCEDURE — 97542 WHEELCHAIR MNGMENT TRAINING: CPT

## 2022-02-25 RX ORDER — OXYCODONE HYDROCHLORIDE 5 MG/1
5 TABLET ORAL EVERY 6 HOURS PRN
Qty: 28 TABLET | Refills: 0 | Status: SHIPPED | OUTPATIENT
Start: 2022-02-25 | End: 2022-02-26 | Stop reason: HOSPADM

## 2022-02-25 RX ORDER — LANOLIN ALCOHOL/MO/W.PET/CERES
3 CREAM (GRAM) TOPICAL NIGHTLY PRN
Qty: 60 TABLET | Refills: 3
Start: 2022-02-25 | End: 2022-02-26

## 2022-02-25 RX ORDER — BISACODYL 10 MG
10 SUPPOSITORY, RECTAL RECTAL DAILY PRN
Qty: 60 SUPPOSITORY | Refills: 1
Start: 2022-02-25 | End: 2022-02-26

## 2022-02-25 RX ORDER — GABAPENTIN 600 MG/1
600 TABLET ORAL 3 TIMES DAILY
Qty: 90 TABLET | Refills: 3
Start: 2022-02-25 | End: 2022-02-26

## 2022-02-25 RX ORDER — TAMSULOSIN HYDROCHLORIDE 0.4 MG/1
0.4 CAPSULE ORAL DAILY
Qty: 30 CAPSULE | Refills: 3 | Status: SHIPPED | OUTPATIENT
Start: 2022-02-26 | End: 2022-02-26 | Stop reason: HOSPADM

## 2022-02-25 RX ORDER — SENNA AND DOCUSATE SODIUM 50; 8.6 MG/1; MG/1
1 TABLET, FILM COATED ORAL 2 TIMES DAILY
Qty: 60 TABLET | Refills: 1
Start: 2022-02-25 | End: 2022-02-26

## 2022-02-25 RX ORDER — AMLODIPINE BESYLATE 5 MG/1
5 TABLET ORAL DAILY
Qty: 30 TABLET | Refills: 3
Start: 2022-02-25 | End: 2022-02-26

## 2022-02-25 RX ORDER — TIZANIDINE 2 MG/1
2 TABLET ORAL EVERY 8 HOURS PRN
Qty: 60 TABLET | Refills: 0
Start: 2022-02-25 | End: 2022-02-26

## 2022-02-25 RX ORDER — AMLODIPINE BESYLATE 5 MG/1
5 TABLET ORAL DAILY
Status: DISCONTINUED | OUTPATIENT
Start: 2022-02-25 | End: 2022-02-26 | Stop reason: HOSPADM

## 2022-02-25 RX ADMIN — POLYETHYLENE GLYCOL 3350 17 G: 17 POWDER, FOR SOLUTION ORAL at 09:26

## 2022-02-25 RX ADMIN — OXYCODONE HYDROCHLORIDE AND ACETAMINOPHEN 1 TABLET: 5; 325 TABLET ORAL at 13:40

## 2022-02-25 RX ADMIN — PANTOPRAZOLE SODIUM 40 MG: 40 TABLET, DELAYED RELEASE ORAL at 05:37

## 2022-02-25 RX ADMIN — SENNOSIDES AND DOCUSATE SODIUM 1 TABLET: 50; 8.6 TABLET ORAL at 09:26

## 2022-02-25 RX ADMIN — OXYCODONE HYDROCHLORIDE AND ACETAMINOPHEN 2 TABLET: 5; 325 TABLET ORAL at 09:37

## 2022-02-25 RX ADMIN — GABAPENTIN 600 MG: 600 TABLET, FILM COATED ORAL at 13:41

## 2022-02-25 RX ADMIN — SENNOSIDES AND DOCUSATE SODIUM 1 TABLET: 50; 8.6 TABLET ORAL at 20:53

## 2022-02-25 RX ADMIN — ENOXAPARIN SODIUM 40 MG: 100 INJECTION SUBCUTANEOUS at 09:28

## 2022-02-25 RX ADMIN — OXYCODONE HYDROCHLORIDE AND ACETAMINOPHEN 2 TABLET: 5; 325 TABLET ORAL at 05:37

## 2022-02-25 RX ADMIN — ACETAMINOPHEN 650 MG: 325 TABLET ORAL at 12:05

## 2022-02-25 RX ADMIN — AMLODIPINE BESYLATE 5 MG: 5 TABLET ORAL at 12:05

## 2022-02-25 RX ADMIN — ESCITALOPRAM OXALATE 20 MG: 10 TABLET ORAL at 09:27

## 2022-02-25 RX ADMIN — GABAPENTIN 600 MG: 600 TABLET, FILM COATED ORAL at 20:53

## 2022-02-25 RX ADMIN — OXYCODONE HYDROCHLORIDE AND ACETAMINOPHEN 1 TABLET: 5; 325 TABLET ORAL at 17:48

## 2022-02-25 RX ADMIN — Medication: at 13:41

## 2022-02-25 RX ADMIN — GABAPENTIN 600 MG: 600 TABLET, FILM COATED ORAL at 09:28

## 2022-02-25 RX ADMIN — BUPROPION HYDROCHLORIDE 150 MG: 150 TABLET, EXTENDED RELEASE ORAL at 09:28

## 2022-02-25 RX ADMIN — TAMSULOSIN HYDROCHLORIDE 0.4 MG: 0.4 CAPSULE ORAL at 09:26

## 2022-02-25 RX ADMIN — Medication: at 20:54

## 2022-02-25 RX ADMIN — Medication: at 09:25

## 2022-02-25 RX ADMIN — DULOXETINE HYDROCHLORIDE 60 MG: 60 CAPSULE, DELAYED RELEASE ORAL at 09:26

## 2022-02-25 ASSESSMENT — PAIN SCALES - GENERAL
PAINLEVEL_OUTOF10: 6
PAINLEVEL_OUTOF10: 0
PAINLEVEL_OUTOF10: 7
PAINLEVEL_OUTOF10: 1
PAINLEVEL_OUTOF10: 10
PAINLEVEL_OUTOF10: 1
PAINLEVEL_OUTOF10: 7
PAINLEVEL_OUTOF10: 1

## 2022-02-25 ASSESSMENT — PAIN DESCRIPTION - PAIN TYPE
TYPE: ACUTE PAIN

## 2022-02-25 ASSESSMENT — PAIN DESCRIPTION - DESCRIPTORS
DESCRIPTORS: ACHING

## 2022-02-25 ASSESSMENT — PAIN DESCRIPTION - LOCATION
LOCATION: LEG

## 2022-02-25 ASSESSMENT — PAIN DESCRIPTION - ONSET
ONSET: ON-GOING

## 2022-02-25 ASSESSMENT — PAIN DESCRIPTION - ORIENTATION
ORIENTATION: RIGHT

## 2022-02-25 ASSESSMENT — PAIN DESCRIPTION - FREQUENCY
FREQUENCY: CONTINUOUS

## 2022-02-25 ASSESSMENT — PAIN - FUNCTIONAL ASSESSMENT
PAIN_FUNCTIONAL_ASSESSMENT: PREVENTS OR INTERFERES SOME ACTIVE ACTIVITIES AND ADLS

## 2022-02-25 ASSESSMENT — PAIN DESCRIPTION - PROGRESSION
CLINICAL_PROGRESSION: RAPIDLY WORSENING
CLINICAL_PROGRESSION: NOT CHANGED

## 2022-02-25 NOTE — PLAN OF CARE
Problem: Mobility - Impaired:  Goal: Mobility will improve  Description: Mobility will improve  Outcome: Ongoing   Pt Aox4 with some forgetfulness VSS pt up to walker pivot to the wheel chair with transfers toe touch on right weight baring pt had some pain today managed by medication and rest.

## 2022-02-25 NOTE — PROGRESS NOTES
Occupational Therapy  Facility/Department: [facility] ACUTE REHAB UNIT  Daily Treatment Note  NAME: Angel Luis Bunch  : 1945  MRN: [id number]    Date of Service: 2022    Discharge Recommendations:  Continue to assess pending progress,24 hour supervision or assist,Patient would benefit from continued therapy after discharge,Subacute/Skilled Nursing Facility  OT Equipment Recommendations  Equipment Needed: Yes  ADL Assistive Devices: Transfer Tub Bench  Other: Pt would benefit from a TTB however per measurements from pt's daughter pt will be unable to access her bathroom from w/c level and hopping w/ the RW is not an option at this time. pt reported she plans to sponge bath until she is WBAT. Assessment   Performance deficits / Impairments: Decreased functional mobility ; Decreased ADL status; Decreased strength;Decreased ROM; Decreased safe awareness;Decreased cognition;Decreased endurance;Decreased balance;Decreased high-level IADLs;Decreased coordination;Decreased posture  Assessment: Pt demo improvement w/ LE dressing this date and was able to complete w/ CGA. Pt met 5/8 goals during ARU stay. Pt cont to be limited by decreased balance, TTWB precaution, decreased insight into level of assist required and need for safety, and pain. Pt would benefit from cont inpt OT services upon d/c in order to maximize safet yand increase funcitonal independence. Cont OT per POC  Treatment Diagnosis: decreased ADLs, functional mobility and functional transfers 2/2 R femur fx  Prognosis: Guarded; Fair  OT Education: OT Role;Plan of Care;Precautions;Transfer Training  Patient Education: Ongoing education needed  REQUIRES OT FOLLOW UP: Yes  Activity Tolerance  Activity Tolerance: Patient Tolerated treatment well;Treatment limited secondary to agitation  Safety Devices  Safety Devices in place: Yes  Type of devices: Left in bed;Bed alarm in place;Nurse notified;Call light within reach         Patient Diagnosis(es): The encounter diagnosis was Closed fracture of right femur, unspecified fracture morphology, unspecified portion of femur, initial encounter ([facility] [address] has a past medical history of Allergic, Anemia, Anxiety, Closed comminuted intra-articular fracture of distal femur, right, initial encounter ([facility] [address] Depression, Eye disorder, Fibromyalgia, GERD (gastroesophageal reflux disease), Hypertension, Obesity, KAIT (obstructive sleep apnea), Osteoarthritis, Osteoporosis, PONV (postoperative nausea and vomiting), and Prolonged emergence from general anesthesia. has a past surgical history that includes hip surgery; joint replacement (Right, 1993/2003); Wrist fracture surgery (Left); joint replacement (Left, 5/18/16); knee surgery; and Femur fracture surgery (Right, 2/7/2022). Restrictions  Restrictions/Precautions  Restrictions/Precautions: Weight Bearing  Position Activity Restriction  Other position/activity restrictions: TTWB R, up with assistance  Subjective   General  Chart Reviewed: Yes  Patient assessed for rehabilitation services?: Yes  Additional Pertinent Hx: 78 yo admitted to [facility]/22 for fall/R femur fx. OR 2/7 FEMUR OPEN REDUCTION INTERNAL FIXATION RIGHT. Admitted to ARU 2/11/22. Pmhx: HTN, fibromyalgia, KAIT, L TKR, R THR  Response to previous treatment: Patient with no complaints from previous session  Family / Caregiver Present: Yes (pt's daughter Susu Locus present t/o session)  Referring Practitioner: Flori Guerrero MD  Diagnosis: R femur fx s/p ORIF  Subjective  Subjective: Pt supine in bed upon arrival and agreeable to OT session.   General Comment  Comments: Pt prefers to wear her LSO for comfort due to premorbid back pain and back spasms      Orientation     Objective    ADL  Feeding: Independent (per pt and pt's daughters report)  Grooming: Modified independent  (seated in WC at sink to complete oral hygiene)  UE Bathing: Modified independent  (Pt washed and dried 4/4 components of UE bathing seated on TTB)  LE Bathing: Supervision (pt completed all components while seated on TTB)  UE Dressing: Independent (pt don/doff her shirt and bra while seated)  LE Dressing: Setup; Increased time to complete;Contact guard assistance (don/doff socks, pants, and underwear. CGA in stance during pant mgmt)  Toileting: Contact guard assistance (Pt completed rear tila care seated on toilet I'ly. Pt completed pants management in stance w/ CGA)        Balance  Sitting Balance: Supervision  Standing Balance: Contact guard assistance  Standing Balance  Activity: functional transfers; stance for ADLs,  Toilet Transfers  Toilet - Technique: Stand pivot  Equipment Used: Raised toilet seat with rails  Toilet Transfer: Contact guard assistance  Shower Transfers  Shower - Transfer From: Wheelchair  Shower - Transfer Type: To and From  Shower - Transfer To: Transfer tub bench  Shower - Technique: Stand pivot  Shower Transfers: Contact Guard  Bed mobility  Supine to Sit: Modified independent  Sit to Supine: Modified independent  Transfers  Sit to stand: Contact guard assistance  Stand to sit: Contact guard assistance                       Cognition  Overall Cognitive Status: Exceptions  Arousal/Alertness: Appropriate responses to stimuli  Following Commands: Inconsistently follows commands; Follows one step commands with increased time; Follows one step commands with repetition  Attention Span: Attends with cues to redirect; Difficulty attending to directions  Memory: Decreased short term memory;Decreased recall of precautions  Safety Judgement: Decreased awareness of need for safety;Decreased awareness of need for assistance  Problem Solving: Assistance required to correct errors made;Decreased awareness of errors;Assistance required to identify errors made;Assistance required to implement solutions;Assistance required to generate solutions  Insights: Decreased awareness of deficits  Initiation: Requires cues for some  Sequencing: Requires cues for some                    Type of ROM/Therapeutic Exercise  Comment: Pt completed the follow BUE exercises in order to improve endurance and strength required for increased independence w/ funcitonal transfers  Exercises  Shoulder Flexion: 2x10  Other: chest press 2x10  LUE AROM (degrees)  LUE AROM : WFL  Left Hand AROM (degrees)  Left Hand AROM: WFL  RUE AROM (degrees)  RUE AROM : WFL  Right Hand AROM (degrees)  Right Hand AROM: WFL                 Plan   Plan  Times per week: 90min 5x/week  Times per day: Daily  Current Treatment Recommendations: Patient/Caregiver Education & Training,Home Management Training,Equipment Evaluation, Education, & procurement,Balance Training,Endurance Training,Self-Care / ADL,Safety Education & Training,Strengthening,Functional Mobility Training  G-Code     OutComes Score                                                  AM-PAC Score             Goals  Short term goals  Time Frame for Short term goals: 10 days  Short term goal 1: Pt will complete toilet transfer w/ Min Ax1-goal met 2/17  Short term goal 2: Pt will complete LE dressing assessment-goal met 2/17  Short term goal 3: Pt will complete shower transfer w/ Alec x1-goal met 2/18  Short term goal 4: Pt will complete UE dressing w/ Min Ax1-goal met 2/18  Long term goals  Time Frame for Long term goals : 3 weeks  Long term goal 1: Pt will complete toilet transfer/ toileting w/ Mod I- goal not met 2/25  Long term goal 2: Pt will complete UE/LE dressing w/ Mod I and use of AE prn- met for UE dressing 2/21; goal not met for LE dressing 2/25  Long term goal 3: Pt will complete tub transfer w/ SBA- goal not met 2/25  Long term goal 4: Pt will complete bathing w/ Spvn- goal met from seated position 2/23  Patient Goals   Patient goals : \"Be in less pain. \"       Therapy Time   Individual Concurrent Group Co-treatment   Time In 2218         Time Out 1415         Minutes 90                 Timed Code Treatment Minutes:  90    Total Treatment Minutes:  [address], [place]

## 2022-02-25 NOTE — DISCHARGE INSTR - COC
Continuity of Care Form    Patient Name: Gutierrez White   :  1945  MRN:  [id number]    Admit date:  2022  Discharge date:  2022    Code Status Order: Full Code   Advance Directives:      Admitting Physician:  Matias España MD  PCP: Arnie Mckeon MD    Discharging Nurse:Yaneth Vierya 23 Unit/Room#: 2676/6286-36  Discharging Unit Phone Number: [phone]    Emergency Contact:   Extended Emergency Contact Information  Primary Emergency Contact: [address] of [address] [place] [address] Phone: [phone]  Relation: Child  Secondary Emergency Contact: [facility]  Mobile Phone: [phone]  Relation: Child    Past Surgical History:  Past Surgical History:   Procedure Laterality Date    FEMUR FRACTURE SURGERY Right 2022    FEMUR OPEN REDUCTION INTERNAL FIXATION RIGHT performed by Casper Foster DO at [address] Right     HIP    JOINT REPLACEMENT Left 16    left total knee replacement    KNEE SURGERY      WRIST FRACTURE SURGERY Left        Immunization History:   Immunization History   Administered Date(s) Administered    COVID-19, Pfizer Purple top, DILUTE for use, 12+ yrs, 30mcg/0.3mL dose 2021, 2021, 2021    Influenza 2015    Influenza Virus Vaccine 2016    Pneumococcal Conjugate Vaccine 2015       Active Problems:  Patient Active Problem List   Diagnosis Code    DDD (degenerative disc disease), lumbar M51.36    Cervical spondylosis M47.812    Neck pain M54.2    Back pain M54.9    Lumbar stenosis M48.061    Discogenic low back pain M51.36    Myofascial pain M79.18    Primary localized osteoarthrosis, lower leg M17.10    Hip pain M25.559    Ischial bursitis M70.70    Fe deficiency anemia D50.9    Knee pain, bilateral M25.561, M25.562    Obesity (BMI 30-39. 9) E66.9    Fibromyalgia M79.7    Hypertension I10    Left TKR Z96.659    Chronic blood loss anemia D50.0 Unspecified adverse effect of other drug, medicinal and biological substance(995.29) T50.995A    Painful orthopaedic hardware [facility]) T84.84XA    Plantar fasciitis, bilateral M72.2    Closed comminuted intra-articular fracture of distal femur, right, initial encounter ([facility] [address] S72.491A    Closed displaced comminuted fracture of shaft of right femur [facility] S72.351A       Isolation/Infection:   Isolation            No Isolation          Patient Infection Status       None to display            Nurse Assessment:  Last Vital Signs: BP (!) 150/76   Pulse 95   Temp 98.4 °F (36.9 °C) (Oral)   Resp 18   Ht 5' 10\" (1.778 m)   Wt 226 lb 10.1 oz (102.8 kg)   SpO2 98%   BMI 32.52 kg/m²     Last documented pain score (0-10 scale): Pain Level: 7  Last Weight:   Wt Readings from Last 1 Encounters:   02/25/22 226 lb 10.1 oz (102.8 kg)     Mental Status: Aox4 can be forgetful  IV Access:  - None    Nursing Mobility/ADLs:  Walking   Assisted  Transfer  Assisted  Bathing  Assisted  Dressing  Assisted  Toileting  Assisted  Feeding  Independent  Med [address] [place] [address] Delivery   whole    Wound Care Documentation and Therapy:  Incision 05/18/16 Knee Anterior; Left (Active)   Number of days: 2109   Right lower abdomen area clean with NS apply bacitracin ointment to wound bed apply skin prep around the wound cover with dry dressing   surgical site on right leg apply dry dressing daily     Elimination:  Continence: Bowel: Yes  Bladder: Yes  Urinary Catheter: None   Colostomy/Ileostomy/Ileal Conduit: No       Date of Last BM: 2/25/22    Intake/Output Summary (Last 24 hours) at 2/25/2022 1128  Last data filed at 2/24/2022 1805  Gross per 24 hour   Intake 800 ml   Output 0 ml   Net 800 ml     I/O last 3 completed shifts:   In: 200 [P.O.:1040]  Out: 0     Safety Concerns:     History of Falls (last 30 days) and At Risk for Falls    Impairments/Disabilities:      None    Nutrition Therapy:  Current Nutrition Therapy:   - Oral Diet: General regular     Routes of Feeding: Oral  Liquids: Thin Liquids  Daily Fluid Restriction: no  Last Modified Barium Swallow with Video (Video Swallowing Test): not done    Treatments at the Time of Hospital Discharge:   Respiratory Treatments:   Oxygen Therapy:  is not on home oxygen therapy. Ventilator:    - No ventilator support    Rehab Therapies: Physical Therapy and Occupational Therapy  Weight Bearing Status/Restrictions: toe touch weight bearing right leg   Other Medical Equipment (for information only, NOT a DME order): Other Treatments:     Patient's personal belongings (please select all that are sent with patient):  Glasses    RN SIGNATURE:  Electronically signed by Cat Urbina RN on 2/25/22 at 4:18 PM EST    CASE MANAGEMENT/SOCIAL WORK SECTION    Inpatient Status Date: ***    Readmission Risk Assessment Score:  Readmission Risk              Risk of Unplanned Readmission:  12           Discharging to Facility/ Agency   Name: [facility]  Address:  Phone: [phone]  Fax: [phone]    Dialysis Facility (if applicable)   Name:  Address:  Dialysis Schedule:  Phone:  Fax:    / signature: Electronically signed by SHELBI Hilton on 2/25/22 at 11:45 AM EST    PHYSICIAN SECTION    Prognosis: Fair    Condition at Discharge: Stable    Rehab Potential (if transferring to Rehab): Fair    Recommended Labs or Other Treatments After Discharge: PT/OT eval and treat at SNF    Physician Certification: I certify the above information and transfer of Daysi Estrada  is necessary for the continuing treatment of the diagnosis listed and that she requires [place] for greater 30 days.      Update Admission H&P: No change in H&P    PHYSICIAN SIGNATURE:  Electronically signed by Natalie Morocho DO on 2/25/22 at 11:54 AM EST

## 2022-02-25 NOTE — PROGRESS NOTES
Department of Physical Medicine & Rehabilitation  Progress Note    Patient Identification:  Angel Luis Bunch  [id number]  : 1945  Admit date: 2022    Chief Complaint: Closed comminuted intra-articular fracture of distal femur, right, initial encounter ([address]    Subjective:   Doing well today. Feels better after seeing family yesterday. BP slightly elevated today. Plan for discharge to SNF tomorrow. HTN med changes     ROS: No f/c, n/v, cp     Objective:  Patient Vitals for the past 24 hrs:   BP Temp Temp src Pulse Resp SpO2 Weight   22 0920 (!) 150/76 98.4 °F (36.9 °C) Oral 95 18 98 % --   22 0537 -- -- -- -- -- -- 226 lb 10.1 oz (102.8 kg)   22 2115 (!) 152/79 98.1 °F (36.7 °C) Oral 77 16 93 % --     Const: Alert. No distress, pleasant. HEENT: Normocephalic, atraumatic. Normal sclera/conjunctiva. MMM. CV: Regular rate and rhythm. Resp: No respiratory distress. Lungs CTAB. Abd: Soft, nontender, nondistended, NABS+   Ext: + edema. Neuro: Alert, oriented, appropriately interactive. Psych: Cooperative, appropriate mood and affect    Laboratory data: Available via EMR.    Last 24 hour lab  Recent Results (from the past 24 hour(s))   Basic Metabolic Panel w/ Reflex to MG    Collection Time: 22  6:37 AM   Result Value Ref Range    Sodium 142 136 - 145 mmol/L    Potassium reflex Magnesium 5.1 3.5 - 5.1 mmol/L    Chloride 106 99 - 110 mmol/L    CO2 28 21 - 32 mmol/L    Anion Gap 8 3 - 16    Glucose 95 70 - 99 mg/dL    BUN 18 7 - 20 mg/dL    CREATININE 1.1 0.6 - 1.2 mg/dL    GFR Non- 48 (A) >60    GFR  58 (A) >60    Calcium 9.3 8.3 - 10.6 mg/dL   CBC auto differential    Collection Time: 22  6:37 AM   Result Value Ref Range    WBC 4.7 4.0 - 11.0 K/uL    RBC 3.02 (L) 4.00 - 5.20 M/uL    Hemoglobin 8.7 (L) 12.0 - 16.0 g/dL    Hematocrit 26.1 (L) 36.0 - 48.0 %    MCV 86.4 80.0 - 100.0 fL    MCH 29.0 26.0 - 34.0 pg    MCHC 33.5 31.0 - 36.0 g/dL    RDW 15.4 12.4 - 15.4 %    Platelets 312 782 - 062 K/uL    MPV 9.3 5.0 - 10.5 fL    Neutrophils % 58.5 %    Lymphocytes % 25.0 %    Monocytes % 10.5 %    Eosinophils % 5.4 %    Basophils % 0.6 %    Neutrophils Absolute 2.7 1.7 - 7.7 K/uL    Lymphocytes Absolute 1.2 1.0 - 5.1 K/uL    Monocytes Absolute 0.5 0.0 - 1.3 K/uL    Eosinophils Absolute 0.3 0.0 - 0.6 K/uL    Basophils Absolute 0.0 0.0 - 0.2 K/uL       Therapy progress:  PT  Position Activity Restriction  Other position/activity restrictions: TTWB R, up with assistance  Objective     Sit to Stand: Contact guard assistance (from EOB/commode up to RW; wc up to // bar)  Stand to sit: Contact guard assistance  Device: Rolling Walker  Assistance: Minimal assistance  Distance: x5ft  OT  PT Equipment Recommendations  Equipment Needed: Yes  Mobility Devices: Honor Kemps: Rolling  Wheelchair: Standard  Other: will need wc if home: 20\", no rims (for decreased width to navigate narrow doorways in home), tip back arm rests, anti-tippers, B leg rests  Toilet - Technique: Stand pivot  Equipment Used: Raised toilet seat with rails  Toilet Transfers Comments: Pt required VCs to use RW to complete transfer since pt does not have GBs at home. Pt required VCs to leave more room between the w/c and wall to fit the RW. VCs needed for TTWB during pivot. Assessment        SLP          Body mass index is 32.52 kg/m².     Assessment and Plan:  Right midshaft femur fracture   -[facility]  -Wound care  -TTWB  -PT/OT for functional mobility, balance, ADLs       Hypoxia  -Thought to be due to atelectasis and fluid overload  -Supplemental O2, wean as tolerated  -IS        MARISELA  -Avoid nephrotoxins, renally dose meds  -Monitor renal function     Anxiety/depression  -bupropion, duloxetine, escitalopram     Fibromyalgia  -Duloxetine, gabapentin     KAIT  -CPAP     Bladder  -- Urinary retention  -Christensen in place, consider void trial ~1 week  -flomax     Bowel   -High risk constipation   -senna+colace BID, PRN miralax, MoM, and bisacodyl supp. -improved     Safety   -fall precautions     Pain control  -Percocet prn, gabapentin  - zanaflex low dose for back pain. - improving    HTN  - Norvasc- 5mg qd     PPx  -DVT: lovenox x 20 days per Ortho- plan to follow up with ortho  -GI: pantoprazole    Visitation approved  -   facetime     Possible Assisted living post rehab  - daughter investigating.    -SNF- social work     HTN  - start norvasc 5mg qd    Rehab Progress: Improving  Anticipated Dispo: home  Services/DME: HH>SNF  ELOS: 22            Lei Olmedo D.O. M.P.H  PM&R  2022  10:59 AM

## 2022-02-25 NOTE — PLAN OF CARE
Problem: Falls - Risk of:  Goal: Will remain free from falls  2/25/2022 0444 by Poly Noble RN  Outcome: Ongoing  Note: Patient been resting in bed. She has called appropriately for needs. Fall precautions in place. Bed is in low and locked position. Bed alarm set. Call light and bedside table within reach. She has remained free from injury     Problem: Pain:  Goal: Control of acute pain  2/25/2022 0444 by Poly Noble RN  Outcome: Ongoing  Note: Patient has continued to complain of right leg pain.  She has been medicated with Oxycodone on request. She has been sleeping without any further complaints     Problem: Skin Integrity:  Goal: Absence of new skin breakdown  2/25/2022 0444 by Poly Noble RN  Outcome: Ongoing

## 2022-02-25 NOTE — PROGRESS NOTES
Physical Therapy  Facility/Department: [facility] ACUTE REHAB UNIT  Daily Treatment Note/Discharge Summary   NAME: Mariam Cheng  : 1945  MRN: [id number]    Date of Service: 2022    Discharge Recommendations:  24 hour supervision or assist,Patient would benefit from continued therapy after discharge   PT Equipment Recommendations  Equipment Needed: No  Other: defer to next level of care    Assessment   Body structures, Functions, Activity limitations: Decreased functional mobility ; Decreased balance;Decreased endurance;Decreased strength;Decreased ROM; Increased pain  Assessment: Pt meets short term goal to ascend/descend 4, 6\" steps with UL handrail and min A through utilizing shower bench technique with min A from PT to move bench up/down steps. Pt requires increased time to meet long term goals to complete mobility with mod I - plan to discharge to SNF. Continues to be limited by pain fatigu and require VC for WBing precautions. Pt would benefit from continued skilled PT in order to maxmize functional mobility and independence. Pt would benefit from continued inpatient PT upon discharge in order to continued progression of funcitonal mobility and independence. Treatment Diagnosis: mobility impairment due to R femur fx  Decision Making: Medium Complexity  PT Education: Precautions;General Safety;Goals;Plan of Care;Transfer Training;Home Exercise Program  Patient Education: Entry to house discussed with pt/daughter with education/recommendations for entry discussed. Pt requesting to use crutches with exstensive education provided on why crutches are unsafe. Pt/daughter verbalized understanding however would benefit from continued reinforcement. REQUIRES PT FOLLOW UP: Yes  Activity Tolerance  Activity Tolerance: Patient limited by fatigue;Patient limited by pain; Patient Tolerated treatment well  Activity Tolerance: requires extended seate rest breaks during stair completion d/t pain/fatigue     Patient Diagnosis(es): The encounter diagnosis was Closed fracture of right femur, unspecified fracture morphology, unspecified portion of femur, initial encounter ([facility] [address] has a past medical history of Allergic, Anemia, Anxiety, Closed comminuted intra-articular fracture of distal femur, right, initial encounter ([facility] [address] Depression, Eye disorder, Fibromyalgia, GERD (gastroesophageal reflux disease), Hypertension, Obesity, KAIT (obstructive sleep apnea), Osteoarthritis, Osteoporosis, PONV (postoperative nausea and vomiting), and Prolonged emergence from general anesthesia. has a past surgical history that includes hip surgery; joint replacement (Right, 1993/2003); Wrist fracture surgery (Left); joint replacement (Left, 5/18/16); knee surgery; and Femur fracture surgery (Right, 2/7/2022). Restrictions  Restrictions/Precautions  Restrictions/Precautions: Weight Bearing  Position Activity Restriction  Other position/activity restrictions: TTWB R, up with assistance  Subjective   General  Chart Reviewed: Yes  Additional Pertinent Hx: 78 yo admitted 2/6/22 for fall/R femur fx. OR 2/7 FEMUR OPEN REDUCTION INTERNAL FIXATION RIGHT. Pmhx: HTN, fibromyalgia, KAIT, L TKR, R THR. Admitted to ARU 2/11/2022. Family / Caregiver Present: No  Referring Practitioner: Dr. Houston Simpson  Subjective  Subjective: Pt supine in bed upon approach and agreeable to PT. Denies pain at rest, reports 8/10 pain upon completion of session - RN notified.           Orientation     Cognition      Objective   Bed mobility  Bridging: Modified independent   Rolling to Left: Modified independent  Supine to Sit: Modified independent  Sit to Supine: Modified independent  Comment: bed mobility cmpleted with HOB elevated, rolling completed with handrals  Transfers  Sit to Stand: Contact guard assistance (from EOB/wc/car up to RW)  Stand to sit: Contact guard assistance  Stand Pivot Transfers: Contact guard assistance (wc<>EOB with RW)  Car Transfer: Contact guard assistance (wc<>car with RW)  Comment: Decreased VC given with pt demo'ing improved transitions with decreased cues. Min VC for WBing precautions and positioning. Stairs/Curb  Stairs?: Yes  Stairs  # Steps : 4  Stairs Height: 6\"  Rails: Left ascending  Assistance: Minimal assistance  Comment: Pt completes with method pivoting from wc>shower bench and then standing and pivoting to shower bench which is bumped up/down stairs. Assist from PT to move bench. VC for sequencing, handplacement, and WBing precautions. Wheelchair Activities  Wheelchair Size: 20\"  Wheelchair Type: Standard  Wheelchair Parts Management: Yes  Left Brakes Level of Assistance: Independent  Right Brakes Level of Assistance: Independent  Propulsion: Yes  Propulsion 1  Propulsion: Manual  Level: Level Tile  Method: RUE;LUE  Level of Assistance: Supervision  Description/ Details: moderate larry, includes 90 deg turns, and positioning wc for transfer- VC required for wc positioning  Distance: 300'     Balance  Sitting - Static: Good (indp seated EOB)  Sitting - Dynamic: Good;- (supervision seated EOB while donning shoe)  Standing - Static: Fair;- (CGA at RW)  Standing - Dynamic: Fair;- (CGA for stand pivot transfers with RW)       Second session: Pt supine in bed when PT arrived. Pt agreeable to therapy. Supine>sit with use of bedrail req MI.   Sit<>stand CGA with use of RW while maintaining TTWB. Bed>w/c req CGA with use of RW and SPT  W/c propulsion: MI ( propelled w/c <>gym ) with use of BUES only   PT instructed pt with \"bumping\" pt up a 4\" step. Pt is in 20\" standard w/c making this task more difficult. PT instructed pt and pt's daughter with the following sequence:   1. Locking brakes on chair prior to tilt  2. unbraking chair and using 2 people with support on wheel rims and w/c handles and not arm rests to bump pt up backwards onto step. 3. Assessing that all wheels are on surface before releasing chair  4.  Reverse sequence to bumping treating therapist for 2nd session)

## 2022-02-25 NOTE — CARE COORDINATION
DEE met with Pt early this morning and provided the following SNF's that have accepted Pt for admissions tomorrow:    [address]    Pt has decided with [facility] SNF and in agreement with DC via Ambulance tomorrow after lunch. DEE called and spoke with Admissions at [facility] - she can accept Pt via [facility] 2/26/22 at 1330. DEE faxed [address] to Admissions at fax # [phone]. Staff RN aware to call report tomorrow at [phone] and ambulance form and packet at [facility]. DEE completed and submitted HENS # [id number]. Rapid COVID order is placed and staff RN knows that this needs to be done today. Dr. Stephanie Sampson is aware of above DC plan.      Mariaa Cervantes, [place]  Case Management  [phone]

## 2022-02-25 NOTE — PROGRESS NOTES
Patient resting in bed. She stated that she feels discouraged tonight. She states that she thought she would be able to do more by now. She also voiced concerns about going to SNF on discharge. Patient reassured that the plan for nursing facility would be temporary until she is safe to go home. Patient also was complaining of surgical pain and medicated with Oxycodone and Zanaflex as ordered. Reating quietly in bed. Call light within reach.

## 2022-02-26 VITALS
RESPIRATION RATE: 18 BRPM | TEMPERATURE: 97.7 F | OXYGEN SATURATION: 96 % | HEIGHT: 70 IN | BODY MASS INDEX: 32.45 KG/M2 | WEIGHT: 226.63 LBS | DIASTOLIC BLOOD PRESSURE: 82 MMHG | SYSTOLIC BLOOD PRESSURE: 124 MMHG | HEART RATE: 65 BPM

## 2022-02-26 PROCEDURE — 6370000000 HC RX 637 (ALT 250 FOR IP): Performed by: PHYSICAL MEDICINE & REHABILITATION

## 2022-02-26 PROCEDURE — 99239 HOSP IP/OBS DSCHRG MGMT >30: CPT | Performed by: PHYSICAL MEDICINE & REHABILITATION

## 2022-02-26 PROCEDURE — 6360000002 HC RX W HCPCS: Performed by: PHYSICAL MEDICINE & REHABILITATION

## 2022-02-26 RX ORDER — GABAPENTIN 600 MG/1
600 TABLET ORAL 3 TIMES DAILY
Qty: 90 TABLET | Refills: 3 | Status: SHIPPED | OUTPATIENT
Start: 2022-02-26 | End: 2022-10-25 | Stop reason: CLARIF

## 2022-02-26 RX ORDER — OXYCODONE HYDROCHLORIDE AND ACETAMINOPHEN 5; 325 MG/1; MG/1
1 TABLET ORAL EVERY 4 HOURS PRN
Qty: 20 TABLET | Refills: 0 | Status: SHIPPED | OUTPATIENT
Start: 2022-02-26 | End: 2022-02-26

## 2022-02-26 RX ORDER — AMLODIPINE BESYLATE 5 MG/1
5 TABLET ORAL DAILY
Qty: 30 TABLET | Refills: 3 | Status: SHIPPED | OUTPATIENT
Start: 2022-02-26 | End: 2022-02-26

## 2022-02-26 RX ORDER — OXYCODONE HYDROCHLORIDE AND ACETAMINOPHEN 5; 325 MG/1; MG/1
1 TABLET ORAL EVERY 4 HOURS PRN
Qty: 20 TABLET | Refills: 0 | Status: SHIPPED | OUTPATIENT
Start: 2022-02-26 | End: 2022-03-03

## 2022-02-26 RX ORDER — SENNA AND DOCUSATE SODIUM 50; 8.6 MG/1; MG/1
1 TABLET, FILM COATED ORAL 2 TIMES DAILY
Qty: 60 TABLET | Refills: 1 | Status: SHIPPED | OUTPATIENT
Start: 2022-02-26 | End: 2022-10-25 | Stop reason: CLARIF

## 2022-02-26 RX ORDER — TIZANIDINE 2 MG/1
2 TABLET ORAL EVERY 8 HOURS PRN
Qty: 60 TABLET | Refills: 0 | Status: SHIPPED | OUTPATIENT
Start: 2022-02-26 | End: 2022-10-25 | Stop reason: CLARIF

## 2022-02-26 RX ORDER — GABAPENTIN 600 MG/1
600 TABLET ORAL 3 TIMES DAILY
Qty: 90 TABLET | Refills: 3 | Status: SHIPPED | OUTPATIENT
Start: 2022-02-26 | End: 2022-02-26

## 2022-02-26 RX ORDER — LANOLIN ALCOHOL/MO/W.PET/CERES
3 CREAM (GRAM) TOPICAL NIGHTLY PRN
Qty: 60 TABLET | Refills: 3 | Status: SHIPPED | OUTPATIENT
Start: 2022-02-26 | End: 2022-10-25 | Stop reason: CLARIF

## 2022-02-26 RX ORDER — AMLODIPINE BESYLATE 5 MG/1
5 TABLET ORAL DAILY
Qty: 30 TABLET | Refills: 3 | Status: SHIPPED | OUTPATIENT
Start: 2022-02-26

## 2022-02-26 RX ORDER — BISACODYL 10 MG
10 SUPPOSITORY, RECTAL RECTAL DAILY PRN
Qty: 60 SUPPOSITORY | Refills: 1 | Status: SHIPPED | OUTPATIENT
Start: 2022-02-26 | End: 2022-03-28

## 2022-02-26 RX ADMIN — BUPROPION HYDROCHLORIDE 150 MG: 150 TABLET, EXTENDED RELEASE ORAL at 10:33

## 2022-02-26 RX ADMIN — ESCITALOPRAM OXALATE 20 MG: 10 TABLET ORAL at 09:22

## 2022-02-26 RX ADMIN — OXYCODONE HYDROCHLORIDE AND ACETAMINOPHEN 2 TABLET: 5; 325 TABLET ORAL at 03:13

## 2022-02-26 RX ADMIN — SENNOSIDES AND DOCUSATE SODIUM 1 TABLET: 50; 8.6 TABLET ORAL at 09:22

## 2022-02-26 RX ADMIN — OXYCODONE HYDROCHLORIDE AND ACETAMINOPHEN 2 TABLET: 5; 325 TABLET ORAL at 13:16

## 2022-02-26 RX ADMIN — PANTOPRAZOLE SODIUM 40 MG: 40 TABLET, DELAYED RELEASE ORAL at 06:18

## 2022-02-26 RX ADMIN — TIZANIDINE 2 MG: 4 TABLET ORAL at 13:16

## 2022-02-26 RX ADMIN — DULOXETINE HYDROCHLORIDE 60 MG: 60 CAPSULE, DELAYED RELEASE ORAL at 09:22

## 2022-02-26 RX ADMIN — OXYCODONE HYDROCHLORIDE AND ACETAMINOPHEN 2 TABLET: 5; 325 TABLET ORAL at 09:22

## 2022-02-26 RX ADMIN — TAMSULOSIN HYDROCHLORIDE 0.4 MG: 0.4 CAPSULE ORAL at 09:22

## 2022-02-26 RX ADMIN — GABAPENTIN 600 MG: 600 TABLET, FILM COATED ORAL at 09:22

## 2022-02-26 RX ADMIN — ACETAMINOPHEN 650 MG: 325 TABLET ORAL at 11:52

## 2022-02-26 RX ADMIN — Medication: at 10:33

## 2022-02-26 RX ADMIN — ENOXAPARIN SODIUM 40 MG: 100 INJECTION SUBCUTANEOUS at 09:22

## 2022-02-26 RX ADMIN — GABAPENTIN 600 MG: 600 TABLET, FILM COATED ORAL at 13:17

## 2022-02-26 RX ADMIN — AMLODIPINE BESYLATE 5 MG: 5 TABLET ORAL at 09:22

## 2022-02-26 ASSESSMENT — PAIN DESCRIPTION - FREQUENCY
FREQUENCY: CONTINUOUS

## 2022-02-26 ASSESSMENT — PAIN DESCRIPTION - LOCATION
LOCATION: LEG

## 2022-02-26 ASSESSMENT — PAIN DESCRIPTION - PROGRESSION
CLINICAL_PROGRESSION: NOT CHANGED
CLINICAL_PROGRESSION: NOT CHANGED

## 2022-02-26 ASSESSMENT — PAIN DESCRIPTION - PAIN TYPE
TYPE: ACUTE PAIN
TYPE: CHRONIC PAIN
TYPE: ACUTE PAIN

## 2022-02-26 ASSESSMENT — PAIN DESCRIPTION - ORIENTATION
ORIENTATION: RIGHT

## 2022-02-26 ASSESSMENT — PAIN SCALES - GENERAL
PAINLEVEL_OUTOF10: 7
PAINLEVEL_OUTOF10: 0
PAINLEVEL_OUTOF10: 1
PAINLEVEL_OUTOF10: 7
PAINLEVEL_OUTOF10: 0
PAINLEVEL_OUTOF10: 0
PAINLEVEL_OUTOF10: 9
PAINLEVEL_OUTOF10: 3

## 2022-02-26 ASSESSMENT — PAIN DESCRIPTION - ONSET
ONSET: ON-GOING

## 2022-02-26 ASSESSMENT — PAIN DESCRIPTION - DESCRIPTORS
DESCRIPTORS: ACHING

## 2022-02-26 NOTE — PROGRESS NOTES
Patient alert and oriented x 4. VSS. No distress noted. Patient up to the bathroom x 1 this shift. Patient c/o pain to right leg but did not want any pain medication at this time. Patient in bed with eyes closed. Bedside table and call light within reach. Bed alarm on. Will continue to monitor.

## 2022-02-26 NOTE — DISCHARGE SUMMARY
Physical Medicine & Rehabilitation  Discharge Summary     Patient Identification:  Ángel Dixon  : 1945  Admit date: 2022  Discharge date:  22  Attending provider: Kavon Baker DO        Primary care provider: Nickie Gamez MD     Discharge Diagnoses:   Patient Active Problem List   Diagnosis    DDD (degenerative disc disease), lumbar    Cervical spondylosis    Neck pain    Back pain    Lumbar stenosis    Discogenic low back pain    Myofascial pain    Primary localized osteoarthrosis, lower leg    Hip pain    Ischial bursitis    Fe deficiency anemia    Knee pain, bilateral    Obesity (BMI 30-39. 9)    Fibromyalgia    Hypertension    Left TKR    Chronic blood loss anemia    Unspecified adverse effect of other drug, medicinal and biological substance(995.29)    Painful orthopaedic hardware (HCC)    Plantar fasciitis, bilateral    Closed comminuted intra-articular fracture of distal femur, right, initial encounter ([facility] [address]    Closed displaced comminuted fracture of shaft of right femur ([facility] [address]       History of Present Illness/Acute Hospital Course:  Patient is a 76 yo F with pmh HTN, KAIT, OA, Fibromyalgia, depression/anxiety who initially presented 2022 with right leg pain s/p fall due to slipping on ice. Found to have right midshaft comminuted displaced distal femur fracture. Underwent ORIF ( with Dr. Sue Stone). Now TTWB RLE. Post-op course notbale for hypoxia, thought to be due to atelectasis and fluid overload. She was treated with IV lasix. Course further complicated by acute blood loss anemia requiring transfusion, MARISELA, and urinary retention. Now presents to ARU with impaired mobility and self-care below her baseline. Currently, patient reports mild right thigh pain. Worse with movement. Improves with rest and medication. Denies tingling/numbness.  She is motivated to start inpatient rehab program.      Inpatient Rehabilitation Course:   Ángel Dixon is a 77 y.o. female admitted to inpatient rehabilitation on 2022 with Closed comminuted intra-articular fracture of distal femur, right, initial encounter ([facility] [address] The patient participated in an aggressive multidisciplinary inpatient rehabilitation program involving 3 hours of therapy per day, at least 5 days per week. Impairments: Decreased functional mobility, Pain control    Medical Management:    Right midshaft femur fracture   -s/p ORIF ( with Dr. Komal Kramer- [facility])  -Wound care  -TTWB  -PT/OT for functional mobility, balance, ADLs        Hypoxia  -Thought to be due to atelectasis and fluid overload  -Supplemental O2, wean as tolerated  -IS        MARISELA  -Avoid nephrotoxins, renally dose meds  -Monitor renal function     Anxiety/depression  -bupropion, duloxetine, escitalopram     Fibromyalgia  -Duloxetine, gabapentin     KAIT  -CPAP     Bladder  -- Urinary retention  -Christensen in place, consider void trial ~1 week  -flomax     Bowel   -High risk constipation   -senna+colace BID, PRN miralax, MoM, and bisacodyl supp. -improved     Safety   -fall precautions     Pain control  -Percocet prn, gabapentin  - zanaflex low dose for back pain. - improving     HTN  - Norvasc- 5mg qd     PPx  -DVT: lovenox x 20 days per Ortho- plan to follow up with ortho  -GI: pantoprazole     Visitation approved  -   facetime      Possible Assisted living post rehab  - daughter investigating. -SNF- social work      HTN  - start norvasc 5mg qd         Discharge Exam:  Constitutional: Alert, WDWN, Pleasant, no distress  Head: Normocephalic, atruamatic, MMM  Eyes: Conjunctiva noninjected, no icterus, no drainage  Pulm: CTA bilat. Respirations non-labored. CV: No murmurs noted. RRR. Abd: Soft, nontender.  NABS+  Ext: No edema, no varicosities  Neuro: Alert, fully oriented, appropriate   MSK: No joint abnormalities noted       Discharge Functional Status:    Physical therapy:  Bed Mobility: Scooting: Supervision (lateral scooting on EOB)  Transfers: Sit to Stand: Contact guard assistance (from EOB/wc/car up to RW)  Stand to sit: Contact guard assistance  Squat Pivot Transfers: 2 Person Assistance (mod x 2 wc>tub transfer bench, wc arm removed), Ambulation 1  Device: [address]: Minimal assistance  Quality of Gait: poor TTWB compliance. Distance: x5ft, Stairs  # Steps : 4  Stairs Height: 6\"  Rails: Left ascending  Device:  (shower bench with R legs at shortest setting and L legs at longest setting)  Assistance: Minimal assistance  Comment: Pt completes with method pivoting from wc>shower bench and then standing and pivoting to shower bench which is bumped up/down stairs. Assist from PT to move bench. VC for sequencing, handplacement, and WBing precautions. Mobility:  , PT Equipment Recommendations  Equipment Needed: No  Mobility Devices: [place] Man: Rolling  Wheelchair: Standard  Other: defer to next level of care, Assessment: Pt meets short term goal to ascend/descend 4, 6\" steps with UL handrail and min A through utilizing shower bench technique with min A from PT to move bench up/down steps. Pt requires increased time to meet long term goals to complete mobility with mod I - plan to discharge to SNF. Continues to be limited by pain fatigu and require VC for WBing precautions. Pt would benefit from continued skilled PT in order to maxmize functional mobility and independence. Pt would benefit from continued inpatient PT upon discharge in order to continued progression of funcitonal mobility and independence. Occupational therapy:  ,  , Assessment: Pt demo improvement w/ LE dressing this date and was able to complete w/ CGA. Pt met 5/8 goals during ARU stay. Pt cont to be limited by decreased balance, TTWB precaution, decreased insight into level of assist required and need for safety, and pain.  Pt would benefit from cont inpt OT services upon d/c in order to maximize safet yand increase funcitonal independence. Cont OT per POC    Speech therapy:         Significant Diagnostics:   Lab Results   Component Value Date    CREATININE 1.1 02/25/2022    BUN 18 02/25/2022     02/25/2022    K 5.1 02/25/2022     02/25/2022    CO2 28 02/25/2022       Lab Results   Component Value Date    WBC 4.7 02/25/2022    HGB 8.7 (L) 02/25/2022    HCT 26.1 (L) 02/25/2022    MCV 86.4 02/25/2022     02/25/2022       Disposition:  SNF    Services:PT/OT  DME: WC    Discharge Condition: Stable    Follow-up:  See after visit summary from hospitalization    Discharge Medications:     Medication List      START taking these medications    amLODIPine 5 MG tablet  Commonly known as: NORVASC  Take 1 tablet by mouth daily  Notes to patient: HTN      bisacodyl 10 MG suppository  Commonly known as: DULCOLAX  Place 1 suppository rectally daily as needed for Constipation  Notes to patient: constipation      melatonin 3 MG Tabs tablet  Take 1 tablet by mouth nightly as needed (sleep)  Notes to patient: insomnia      sennosides-docusate sodium 8.6-50 MG tablet  Commonly known as: SENOKOT-S  Take 1 tablet by mouth 2 times daily  Notes to patient: constipation      tiZANidine 2 MG tablet  Commonly known as: ZANAFLEX  Take 1 tablet by mouth every 8 hours as needed (spasticity)  Notes to patient: Muscle relaxant         CHANGE how you take these medications    gabapentin 600 MG tablet  Commonly known as: NEURONTIN  Take 1 tablet by mouth 3 times daily for 30 days.   What changed:   · how much to take  · how to take this  · when to take this  Notes to patient: Neuropathy         CONTINUE taking these medications    acetaminophen 325 MG tablet  Commonly known as: Aminofen  Take 2 tablets by mouth every 6 hours as needed for Pain  Notes to patient: Mild Pain      DULoxetine 60 MG extended release capsule  Commonly known as: CYMBALTA  Notes to patient: Neuropathy      escitalopram 20 MG tablet  Commonly known as: LEXAPRO  Notes to patient: Anxiety      MULTIVITAMIN PO  Notes to patient: supplement      oxyCODONE 5 MG immediate release tablet  Commonly known as: Roxicodone  Take 1 tablet by mouth every 6 hours as needed for Pain for up to 7 days. Intended supply: 3 days.  Take lowest dose possible to manage pain  Notes to patient: moderate to sever pain      pantoprazole 40 MG tablet  Commonly known as: PROTONIX  Take 1 tablet by mouth every morning (before breakfast)  Notes to patient: GERD      Restasis 0.05 % ophthalmic emulsion  Generic drug: cycloSPORINE  Notes to patient: Dry eyes      tamsulosin 0.4 MG capsule  Commonly known as: FLOMAX  Take 1 capsule by mouth daily  Notes to patient: Urine retention      vitamin C 1000 MG tablet  Notes to patient: supplement      vitamin D 25 MCG (1000 UT) Tabs tablet  Commonly known as: CHOLECALCIFEROL  Notes to patient: Supplement      Wellbutrin  MG extended release tablet  Generic drug: buPROPion  Notes to patient: Mood stimulator      Wh Petrol-Mineral Oil-Lanolin 0.1-0.1 % Oint  Notes to patient: Dry eyes         STOP taking these medications    meloxicam 15 MG tablet  Commonly known as: MOBIC     Prevagen 10 MG Caps  Generic drug: Apoaequorin     Toviaz 8 MG Tb24  Generic drug: Fesoterodine Fumarate ER           Where to Get Your Medications      These medications were sent to [address] [facility], [address] [address]    Phone: [phone]   · oxyCODONE 5 MG immediate release tablet  · tamsulosin 0.4 MG capsule     Information about where to get these medications is not yet available    Ask your nurse or doctor about these medications  · amLODIPine 5 MG tablet  · bisacodyl 10 MG suppository  · gabapentin 600 MG tablet  · melatonin 3 MG Tabs tablet  · sennosides-docusate sodium 8.6-50 MG tablet  · tiZANidine 2 MG tablet           I spent over 35 minutes on this discharge encounter between counseling, coordination of care, and medication reconciliation. To comply with [address] [facility] cruz [id number]:   Discharge order placed in advance to facilitate patients discharge needs.       Efren Simpson, DO

## 2022-10-25 ENCOUNTER — APPOINTMENT (OUTPATIENT)
Dept: GENERAL RADIOLOGY | Age: 77
DRG: 092 | End: 2022-10-25
Payer: MEDICARE

## 2022-10-25 ENCOUNTER — APPOINTMENT (OUTPATIENT)
Dept: CT IMAGING | Age: 77
DRG: 092 | End: 2022-10-25
Payer: MEDICARE

## 2022-10-25 ENCOUNTER — HOSPITAL ENCOUNTER (INPATIENT)
Age: 77
LOS: 4 days | Discharge: HOME HEALTH | DRG: 092 | End: 2022-10-29
Attending: STUDENT IN AN ORGANIZED HEALTH CARE EDUCATION/TRAINING PROGRAM | Admitting: INTERNAL MEDICINE
Payer: MEDICARE

## 2022-10-25 DIAGNOSIS — R41.82 ALTERED MENTAL STATUS, UNSPECIFIED ALTERED MENTAL STATUS TYPE: Primary | ICD-10-CM

## 2022-10-25 LAB
A/G RATIO: 1.4 (ref 1.1–2.2)
ALBUMIN SERPL-MCNC: 4.4 G/DL (ref 3.4–5)
ALP BLD-CCNC: 127 U/L (ref 40–129)
ALT SERPL-CCNC: 28 U/L (ref 10–40)
ANION GAP SERPL CALCULATED.3IONS-SCNC: 19 MMOL/L (ref 3–16)
AST SERPL-CCNC: 41 U/L (ref 15–37)
BASE EXCESS VENOUS: -1.2 MMOL/L (ref -2–3)
BASOPHILS ABSOLUTE: 0.1 K/UL (ref 0–0.2)
BASOPHILS RELATIVE PERCENT: 0.6 %
BILIRUB SERPL-MCNC: 0.6 MG/DL (ref 0–1)
BILIRUBIN URINE: ABNORMAL
BLOOD, URINE: ABNORMAL
BUN BLDV-MCNC: 33 MG/DL (ref 7–20)
CALCIUM SERPL-MCNC: 9.9 MG/DL (ref 8.3–10.6)
CARBOXYHEMOGLOBIN: 0.9 % (ref 0–1.5)
CHLORIDE BLD-SCNC: 105 MMOL/L (ref 99–110)
CLARITY: CLEAR
CO2: 22 MMOL/L (ref 21–32)
COLOR: YELLOW
CREAT SERPL-MCNC: 0.8 MG/DL (ref 0.6–1.2)
EKG ATRIAL RATE: 88 BPM
EKG DIAGNOSIS: NORMAL
EKG P AXIS: 71 DEGREES
EKG P-R INTERVAL: 156 MS
EKG Q-T INTERVAL: 374 MS
EKG QRS DURATION: 72 MS
EKG QTC CALCULATION (BAZETT): 452 MS
EKG R AXIS: 12 DEGREES
EKG T AXIS: 23 DEGREES
EKG VENTRICULAR RATE: 88 BPM
EOSINOPHILS ABSOLUTE: 0 K/UL (ref 0–0.6)
EOSINOPHILS RELATIVE PERCENT: 0.3 %
GFR SERPL CREATININE-BSD FRML MDRD: >60 ML/MIN/{1.73_M2}
GLUCOSE BLD-MCNC: 105 MG/DL (ref 70–99)
GLUCOSE URINE: NEGATIVE MG/DL
HCO3 VENOUS: 24.5 MMOL/L (ref 24–28)
HCT VFR BLD CALC: 39.2 % (ref 36–48)
HEMOGLOBIN, VEN, REDUCED: 68.1 %
HEMOGLOBIN: 13.1 G/DL (ref 12–16)
INR BLD: 1.15 (ref 0.87–1.14)
KETONES, URINE: 40 MG/DL
LACTIC ACID: 1.3 MMOL/L (ref 0.4–2)
LEUKOCYTE ESTERASE, URINE: NEGATIVE
LYMPHOCYTES ABSOLUTE: 1.1 K/UL (ref 1–5.1)
LYMPHOCYTES RELATIVE PERCENT: 8 %
MCH RBC QN AUTO: 28.5 PG (ref 26–34)
MCHC RBC AUTO-ENTMCNC: 33.4 G/DL (ref 31–36)
MCV RBC AUTO: 85.4 FL (ref 80–100)
METHEMOGLOBIN VENOUS: 0.3 % (ref 0–1.5)
MICROSCOPIC EXAMINATION: YES
MONOCYTES ABSOLUTE: 1.1 K/UL (ref 0–1.3)
MONOCYTES RELATIVE PERCENT: 8 %
MUCUS: ABNORMAL /LPF
NEUTROPHILS ABSOLUTE: 11.3 K/UL (ref 1.7–7.7)
NEUTROPHILS RELATIVE PERCENT: 83.1 %
NITRITE, URINE: NEGATIVE
O2 SAT, VEN: 31 %
PCO2, VEN: 44.1 MMHG (ref 41–51)
PDW BLD-RTO: 15.2 % (ref 12.4–15.4)
PH UA: 6 (ref 5–8)
PH VENOUS: 7.35 (ref 7.35–7.45)
PLATELET # BLD: 310 K/UL (ref 135–450)
PMV BLD AUTO: 9.7 FL (ref 5–10.5)
PO2, VEN: <30 MMHG (ref 25–40)
POTASSIUM REFLEX MAGNESIUM: 4.1 MMOL/L (ref 3.5–5.1)
PRO-BNP: 3253 PG/ML (ref 0–449)
PROTEIN UA: 100 MG/DL
PROTHROMBIN TIME: 14.6 SEC (ref 11.7–14.5)
RBC # BLD: 4.59 M/UL (ref 4–5.2)
RBC UA: ABNORMAL /HPF (ref 0–4)
SODIUM BLD-SCNC: 146 MMOL/L (ref 136–145)
SPECIFIC GRAVITY UA: >=1.03 (ref 1–1.03)
TCO2 CALC VENOUS: 26 MMOL/L
TOTAL CK: 627 U/L (ref 26–192)
TOTAL PROTEIN: 7.5 G/DL (ref 6.4–8.2)
TROPONIN: <0.01 NG/ML
URINE TYPE: ABNORMAL
UROBILINOGEN, URINE: 0.2 E.U./DL
WBC # BLD: 13.6 K/UL (ref 4–11)
WBC UA: ABNORMAL /HPF (ref 0–5)

## 2022-10-25 PROCEDURE — 72125 CT NECK SPINE W/O DYE: CPT

## 2022-10-25 PROCEDURE — 87086 URINE CULTURE/COLONY COUNT: CPT

## 2022-10-25 PROCEDURE — 70450 CT HEAD/BRAIN W/O DYE: CPT

## 2022-10-25 PROCEDURE — 99285 EMERGENCY DEPT VISIT HI MDM: CPT

## 2022-10-25 PROCEDURE — 73521 X-RAY EXAM HIPS BI 2 VIEWS: CPT

## 2022-10-25 PROCEDURE — 81001 URINALYSIS AUTO W/SCOPE: CPT

## 2022-10-25 PROCEDURE — 73590 X-RAY EXAM OF LOWER LEG: CPT

## 2022-10-25 PROCEDURE — 83605 ASSAY OF LACTIC ACID: CPT

## 2022-10-25 PROCEDURE — 93005 ELECTROCARDIOGRAM TRACING: CPT | Performed by: STUDENT IN AN ORGANIZED HEALTH CARE EDUCATION/TRAINING PROGRAM

## 2022-10-25 PROCEDURE — 80053 COMPREHEN METABOLIC PANEL: CPT

## 2022-10-25 PROCEDURE — 83880 ASSAY OF NATRIURETIC PEPTIDE: CPT

## 2022-10-25 PROCEDURE — 82550 ASSAY OF CK (CPK): CPT

## 2022-10-25 PROCEDURE — 85610 PROTHROMBIN TIME: CPT

## 2022-10-25 PROCEDURE — 71045 X-RAY EXAM CHEST 1 VIEW: CPT

## 2022-10-25 PROCEDURE — 84484 ASSAY OF TROPONIN QUANT: CPT

## 2022-10-25 PROCEDURE — 85025 COMPLETE CBC W/AUTO DIFF WBC: CPT

## 2022-10-25 PROCEDURE — 73552 X-RAY EXAM OF FEMUR 2/>: CPT

## 2022-10-25 PROCEDURE — 82803 BLOOD GASES ANY COMBINATION: CPT

## 2022-10-25 PROCEDURE — 72192 CT PELVIS W/O DYE: CPT

## 2022-10-25 PROCEDURE — 1200000000 HC SEMI PRIVATE

## 2022-10-25 RX ORDER — METHYLPREDNISOLONE 4 MG/1
TABLET ORAL
COMMUNITY
End: 2022-10-25 | Stop reason: CLARIF

## 2022-10-25 RX ORDER — CYCLOSPORINE 0.5 MG/ML
2 EMULSION OPHTHALMIC EVERY 12 HOURS
Status: ON HOLD | COMMUNITY
End: 2022-10-28 | Stop reason: HOSPADM

## 2022-10-25 RX ORDER — FESOTERODINE FUMARATE 8 MG/1
8 TABLET, EXTENDED RELEASE ORAL DAILY
Status: ON HOLD | COMMUNITY
End: 2022-10-28 | Stop reason: HOSPADM

## 2022-10-25 RX ORDER — BUPROPION HYDROCHLORIDE 300 MG/1
300 TABLET ORAL EVERY MORNING
COMMUNITY

## 2022-10-25 RX ORDER — LOSARTAN POTASSIUM 50 MG/1
50 TABLET ORAL DAILY
Status: ON HOLD | COMMUNITY
End: 2022-10-28 | Stop reason: HOSPADM

## 2022-10-25 RX ORDER — GABAPENTIN 300 MG/1
600 CAPSULE ORAL 3 TIMES DAILY
COMMUNITY

## 2022-10-25 ASSESSMENT — PAIN - FUNCTIONAL ASSESSMENT: PAIN_FUNCTIONAL_ASSESSMENT: NONE - DENIES PAIN

## 2022-10-25 NOTE — ED PROVIDER NOTES
[address] [facility]          ATTENDING PHYSICIAN NOTE       Date of evaluation: 10/25/2022    Chief Complaint     Fatigue (Found down by neighbors. Last seen on Saturday. Unknown down time. Pt doesn't know what happened.) and Altered Mental Status      History of Present Illness     Rishi Martini is a 69 y.o. female who presents with altered mental status    This patient presents after being found down at home. She was covered in feces. There is no blood or other signs of external trauma per the report of EMS at the scene. I was able to speak with her daughter who provides the following history. The patient was seen to stumble and fall outside her home on Friday. Neighbors were able to assist her into the house and reported the fall to the patient's daughter. The patient was not answering the phone on Saturday so the daughter asked a friend of the family to stop by. The patient did not answer the door to the knock of the friend, but the friend was able to see the patient through the window and the patient shouted that she was fine. No further contact could be made on Sunday or Monday. Today, Tuesday, a welfare check was conducted at the request of the patient's daughter. This is what led to the patient being brought here by EMS. Patient is extremely confused and can provide no significant history. She does not member falls or even understand why she was brought here. Further history is limited as the patient is unable to provide any significant history due to her condition: Altered mental status.       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, and as below    Review of Systems        Unable to obtain due to patient condition: altered mental status       Past Medical, Surgical, Family, and Social History     She has a past medical history of Allergic, Anemia, Anxiety, Closed comminuted intra-articular fracture of distal femur, right, initial encounter ([facility] [address] Depression, Eye disorder, Fibromyalgia, GERD (gastroesophageal reflux disease), Hypertension, Obesity, KAIT (obstructive sleep apnea), Osteoarthritis, Osteoporosis, PONV (postoperative nausea and vomiting), and Prolonged emergence from general anesthesia. She has a past surgical history that includes hip surgery; joint replacement (Right, 1993/2003); Wrist fracture surgery (Left); joint replacement (Left, 5/18/16); knee surgery; and Femur fracture surgery (Right, 2/7/2022). Her family history includes Alcohol Abuse in her mother; Cancer in her father, maternal aunt, and mother; Dementia in her mother; Osteoarthritis in her mother. She reports that she has never smoked. She has never used smokeless tobacco. She reports that she does not drink alcohol and does not use drugs. Medications     Previous Medications    ACETAMINOPHEN (AMINOFEN) 325 MG TABLET    Take 2 tablets by mouth every 6 hours as needed for Pain    AMLODIPINE (NORVASC) 5 MG TABLET    Take 1 tablet by mouth daily    ASCORBIC ACID (VITAMIN C) 1000 MG TABLET    Take 1,000 mg by mouth daily    BUPROPION (WELLBUTRIN SR) 150 MG EXTENDED RELEASE TABLET    Take 150 mg by mouth daily    DULOXETINE (CYMBALTA) 60 MG EXTENDED RELEASE CAPSULE    60 mg daily     ESCITALOPRAM (LEXAPRO) 20 MG TABLET    Take 20 mg by mouth daily     GABAPENTIN (NEURONTIN) 600 MG TABLET    Take 1 tablet by mouth 3 times daily for 30 days.     MELATONIN 3 MG TABS TABLET    Take 1 tablet by mouth nightly as needed (sleep)    MULTIPLE VITAMINS-MINERALS (MULTIVITAMIN PO)    Take 1 tablet by mouth daily     PANTOPRAZOLE (PROTONIX) 40 MG TABLET    Take 1 tablet by mouth every morning (before breakfast)    RESTASIS 0.05 % OPHTHALMIC EMULSION    Place 2 drops into both eyes every 12 hours     SENNOSIDES-DOCUSATE SODIUM (SENOKOT-S) 8.6-50 MG TABLET    Take 1 tablet by mouth 2 times daily    TIZANIDINE (ZANAFLEX) 2 MG TABLET    Take 1 tablet by mouth every 8 hours as needed (spasticity)    VITAMIN D (CHOLECALCIFEROL) 25 MCG (1000 UT) TABS TABLET    Take 1,000 Units by mouth daily    WHITE PETROLATUM-MINERAL OIL ([facility] PETROL-MINERAL OIL-LANOLIN) 0.1-0.1 % OINT    Refresh P.M. 57.3 %-42.5 % eye ointment   UTD       Allergies     She is allergic to pregabalin. Physical Exam     INITIAL VITALS: BP: (!) 196/106, Temp: 98.5 °F (36.9 °C), Heart Rate: 86, Resp: 18, SpO2: 100 %     General:  Well appearing. No acute distress. Non-toxic appearing    HEENT: Head atraumatic, no Dimas's sign or [place] eyes, pupils equal round and reactive to light, EOMI, sclera clear, no facial tenderness to palpation or step offs, no midface instability, no hemotympanum bilaterally, mucus membranes moist, no trismus, no jaw malocclusion, oropharynx WNL     Neck:  Supple. Trachea midline    Pulmonary:   Non-labored breathing. Breath sounds clear bilaterally. Cardiac:  Regular rate and rhythm. No murmurs. Abdomen:  Soft. Non-tender throughout. Non-distended. No masses. Musculoskeletal: No obvious deformities, no tenderness to palpation, no midline  C, T or L spine tenderness to palpation, full neck ROM without pain including lateral rotation >45 degrees bilatearally, full ROM in all extremities with no pain including R/L lower extremities - able to triple flex at all joints in BLE at least 30 degrees. Vascular:  Extremities warm and perfused. Dorsalis pedis pulses 2+ bilaterally. Skin:  No rash. Diffuse ecchymosis of various colors/ages    Neuro: GCS14 -confused, oriented to self but not month, year, situation, or location. CN 2-12 intact. Sensation intact. Strength grossly equal and symmetric. Extremities:  No peripheral edema. LE symmetric.         Diagnostic Results     EKG   Indication altered mental status     EKG Interpretation     Interpreted by me (emergency department physician)     Rhythm: normal sinus   Rate: 88  Axis: normal (12)  Ectopy: none  Conduction: normal  ST Segments: no acute change  T Waves: no acute change other than nonspecific TWI in III  Q Waves: nonspecific pattern     Clinical Impression:   Normal sinus rhythm. This is a non-specific EKG with no significant change compared to the prior EKG dated 2/6/22. There is no evidence of significant interval prolongation. There is no evidence of acute ischemia. [facility]      RADIOLOGY:  XR FEMUR RIGHT (MIN 2 VIEWS)   Final Result   Impression:   No acute fracture. Chronic distal femoral shaft fracture status post ORIF. XR HIP BILATERAL W AP PELVIS (2 VIEWS)   Final Result      No acute osseous abnormality. XR CHEST PORTABLE   Final Result      Normal.      CT CERVICAL SPINE WO CONTRAST   Final Result      Cervical spine:   1. No evidence of acute fracture or traumatic subluxation. 2. Severe multilevel degenerative disc disease without evidence of bony spinal stenosis. 3. Sclerotic density in the posterior elements of T2, indeterminate but could be degenerative. A sclerotic neoplasm is considered less likely. Head:   1. No acute intracranial hemorrhage or mass effect. CT HEAD WO CONTRAST   Final Result      Cervical spine:   1. No evidence of acute fracture or traumatic subluxation. 2. Severe multilevel degenerative disc disease without evidence of bony spinal stenosis. 3. Sclerotic density in the posterior elements of T2, indeterminate but could be degenerative. A sclerotic neoplasm is considered less likely. Head:   1. No acute intracranial hemorrhage or mass effect.       XR TIBIA FIBULA RIGHT (2 VIEWS)    (Results Pending)   XR TIBIA FIBULA LEFT (2 VIEWS)    (Results Pending)       LABS:   Results for orders placed or performed during the hospital encounter of 10/25/22   CBC with Auto Differential   Result Value Ref Range    WBC 13.6 (H) 4.0 - 11.0 K/uL    RBC 4.59 4.00 - 5.20 M/uL    Hemoglobin 13.1 12.0 - 16.0 g/dL    Hematocrit 39.2 36.0 - 48.0 %    MCV 85.4 80.0 - 100.0 fL    MCH 28.5 26.0 - 34.0 pg    MCHC 33.4 31.0 - 36.0 g/dL    RDW 15.2 12.4 - 15.4 %    Platelets 977 577 - 745 K/uL    MPV 9.7 5.0 - 10.5 fL    Neutrophils % 83.1 %    Lymphocytes % 8.0 %    Monocytes % 8.0 %    Eosinophils % 0.3 %    Basophils % 0.6 %    Neutrophils Absolute 11.3 (H) 1.7 - 7.7 K/uL    Lymphocytes Absolute 1.1 1.0 - 5.1 K/uL    Monocytes Absolute 1.1 0.0 - 1.3 K/uL    Eosinophils Absolute 0.0 0.0 - 0.6 K/uL    Basophils Absolute 0.1 0.0 - 0.2 K/uL   Comprehensive Metabolic Panel w/ Reflex to MG   Result Value Ref Range    Sodium 146 (H) 136 - 145 mmol/L    Potassium reflex Magnesium 4.1 3.5 - 5.1 mmol/L    Chloride 105 99 - 110 mmol/L    CO2 22 21 - 32 mmol/L    Anion Gap 19 (H) 3 - 16    Glucose 105 (H) 70 - 99 mg/dL    BUN 33 (H) 7 - 20 mg/dL    Creatinine 0.8 0.6 - 1.2 mg/dL    Est, Glom Filt Rate >60 >60    Calcium 9.9 8.3 - 10.6 mg/dL    Total Protein 7.5 6.4 - 8.2 g/dL    Albumin 4.4 3.4 - 5.0 g/dL    Albumin/Globulin Ratio 1.4 1.1 - 2.2    Total Bilirubin 0.6 0.0 - 1.0 mg/dL    Alkaline Phosphatase 127 40 - 129 U/L    ALT 28 10 - 40 U/L    AST 41 (H) 15 - 37 U/L   Troponin   Result Value Ref Range    Troponin <0.01 <0.01 ng/mL   Brain Natriuretic Peptide   Result Value Ref Range    Pro-BNP 3,253 (H) 0 - 449 pg/mL   Protime-INR   Result Value Ref Range    Protime 14.6 (H) 11.7 - 14.5 sec    INR 1.15 (H) 0.87 - 1.14   Urinalysis   Result Value Ref Range    Color, UA Yellow Straw/Yellow    Clarity, UA Clear Clear    Glucose, Ur Negative Negative mg/dL    Bilirubin Urine SMALL (A) Negative    Ketones, Urine 40 (A) Negative mg/dL    Specific Gravity, UA >=1.030 1.005 - 1.030    Blood, Urine MODERATE (A) Negative    pH, UA 6.0 5.0 - 8.0    Protein,  (A) Negative mg/dL    Urobilinogen, Urine 0.2 <2.0 E.U./dL    Nitrite, Urine Negative Negative    Leukocyte Esterase, Urine Negative Negative    Microscopic Examination YES     Urine Type Voided    Lactic Acid   Result Value Ref Range    Lactic Acid 1.3 0.4 - 2.0 mmol/L   Blood Gas, Venous   Result Value Ref Range    pH, Alvino 7.354 7.350 - 7.450    pCO2, Alvino 44.1 41.0 - 51.0 mmHg    pO2, Alvino <30.0 25.0 - 40.0 mmHg    HCO3, Venous 24.5 24.0 - 28.0 mmol/L    Base Excess, Alvino -1.2 -2.0 - 3.0 mmol/L    O2 Sat, Alvino 31 Not established %    Carboxyhemoglobin 0.9 0.0 - 1.5 %    MetHgb, Alvino 0.3 0.0 - 1.5 %    TC02 (Calc), Alvino 26 mmol/L    Hemoglobin, Alvino, Reduced 68.10 %   CK   Result Value Ref Range    Total  (H) 26 - 192 U/L   Microscopic Urinalysis   Result Value Ref Range    Mucus, UA 2+ (A) None Seen /LPF    WBC, UA None seen 0 - 5 /HPF    RBC, UA 3-4 0 - 4 /HPF   EKG 12 Lead   Result Value Ref Range    Ventricular Rate 88 BPM    Atrial Rate 88 BPM    P-R Interval 156 ms    QRS Duration 72 ms    Q-T Interval 374 ms    QTc Calculation (Bazett) 452 ms    P Axis 71 degrees    R Axis 12 degrees    T Axis 23 degrees    Diagnosis       EKG performed in ER and to be interpreted by ER physician. Confirmed by MD, ER ([id number]),  [address], [address] [place] [address] ([phone]) on 10/25/2022 7:08:57 PM       ED BEDSIDE ULTRASOUND:  None performed    Procedures     None performed    ED Course     Nursing Notes, Past Medical Hx, Past Surgical Hx, Social Hx, Allergies, and Family Hx were reviewed. The patient was given the following medications:  No orders of the defined types were placed in this encounter. CONSULTS:  None    MEDICAL DECISIONMAKING / ASSESSMENT / PLAN     Henrry Connors is a 69 y.o. female with altered mental status. Pt was hemodynamically stable and afebrile in the Emergency Department. This patient proceeded with altered mental status.   Immediate evaluation notable for:   -[place] prehospital stroke scale was negative  -FSBG was within normal limits    Evaluation considered multiple possible etiologies:  Infectious:   -Patient was well appearing without signs on exam or specific referent symptoms to suggestion infection  -I had a low suspicion for meningitis or encephalitis given the patient was not  febrile, complaining of neck pain, or demonstrating suggestive signs on exam  -Urinalysis  negative for evidence of infection  -Chest xray negative for evidence of infection  -WBC elevated and therefore possibly consistent with infection  -Blood cultures x2 were not sent given very low suspicion for bacteremia  -Lactate  minimally elevated but not strongly suggestive of  infection  Metabolic/toxic:  -Basic metabolic panel  with no evidence of electrolyte derangement or significant kidney injury   -Hepatic function panel with no evidence of significant hepatic disfunction  -Thyroid studies were not obtained given low suspicion and absence of multiple signs/symptoms to suggest hypo or hyperthyroid state  -Patient denied  ingestion or substance use, and examination did not suggest these etiologies  -Review of medication list did not  identify any likely culprit medications other than gabapentin  Neurologic/neurovascular  -The history and examination were not  suggestive of cerebrovascular accident, TIA, or other neurovascular process such as intracranial hemorrhage  -CT head without contrast obtained and without acute findings to explain patient's presentation  -History did not suggest  a seizure disorder, and patient does not  have history of epilepsy  Cardiac/Pulmonary  -EKG unremarkable , as above  -CXR without  acute findings to explain patient's presentation  -VBG unremarkable  -Troponin not elevated making ACS less likely  -BNP elevated consistent with a component of heart failure exacerbation but the rest of the evaluation is not suggestive of this diagnosis    Given the above evaluation, the cause of the patient's altered mental status remains elusive. Trauma evaluation standpoint the CT head and C-spine are negative for evidence of traumatic injury or intracranial hemorrhage.   Screening chest x-ray and pelvis were negative for evidence of fracture. Additional films of the legs were obtained and negative for fracture dislocation. She has a negative logroll bilaterally and is able to flex the hip and side a low suspicion for an occult hip fracture. The patient is not back to baseline based on duaghter's description and will be admitted to medicine for further evaluation of altered mental status. Clinical Impression     1. Altered mental status, unspecified altered mental status type        Disposition     PATIENT REFERRED TO:  No follow-up provider specified.     DISCHARGE MEDICATIONS:  New Prescriptions    No medications on file       DISPOSITION Decision To Admit 10/25/2022 07:11:36 PM         Maine Waterman MD  10/25/22 4505

## 2022-10-25 NOTE — ED NOTES
Pt covered in stool upon arrival to ED. This RN and Colt Hinds RN cleaned pt with wash cloths and soap and water. Pt was noted to have bruising scattered around her whole body. Pt with blanchable redness to sacrum and buttocks. Pt with abrasion/sore to right scapula area.      Aleksadnr Espinal RN  10/25/22 0216

## 2022-10-26 ENCOUNTER — APPOINTMENT (OUTPATIENT)
Dept: MRI IMAGING | Age: 77
DRG: 092 | End: 2022-10-26
Payer: MEDICARE

## 2022-10-26 LAB
ANION GAP SERPL CALCULATED.3IONS-SCNC: 15 MMOL/L (ref 3–16)
BASOPHILS ABSOLUTE: 0 K/UL (ref 0–0.2)
BASOPHILS RELATIVE PERCENT: 0.5 %
BUN BLDV-MCNC: 41 MG/DL (ref 7–20)
CALCIUM SERPL-MCNC: 9.6 MG/DL (ref 8.3–10.6)
CHLORIDE BLD-SCNC: 109 MMOL/L (ref 99–110)
CO2: 22 MMOL/L (ref 21–32)
CREAT SERPL-MCNC: 1.1 MG/DL (ref 0.6–1.2)
EOSINOPHILS ABSOLUTE: 0.2 K/UL (ref 0–0.6)
EOSINOPHILS RELATIVE PERCENT: 2 %
GFR SERPL CREATININE-BSD FRML MDRD: 52 ML/MIN/{1.73_M2}
GLUCOSE BLD-MCNC: 101 MG/DL (ref 70–99)
HCT VFR BLD CALC: 36 % (ref 36–48)
HEMOGLOBIN: 11.8 G/DL (ref 12–16)
LACTIC ACID: 0.9 MMOL/L (ref 0.4–2)
LV EF: 58 %
LVEF MODALITY: NORMAL
LYMPHOCYTES ABSOLUTE: 1.4 K/UL (ref 1–5.1)
LYMPHOCYTES RELATIVE PERCENT: 16.3 %
MCH RBC QN AUTO: 27.7 PG (ref 26–34)
MCHC RBC AUTO-ENTMCNC: 32.8 G/DL (ref 31–36)
MCV RBC AUTO: 84.4 FL (ref 80–100)
MONOCYTES ABSOLUTE: 0.9 K/UL (ref 0–1.3)
MONOCYTES RELATIVE PERCENT: 10.4 %
NEUTROPHILS ABSOLUTE: 6 K/UL (ref 1.7–7.7)
NEUTROPHILS RELATIVE PERCENT: 70.8 %
PDW BLD-RTO: 15.4 % (ref 12.4–15.4)
PLATELET # BLD: 285 K/UL (ref 135–450)
PMV BLD AUTO: 9.6 FL (ref 5–10.5)
POTASSIUM REFLEX MAGNESIUM: 3.6 MMOL/L (ref 3.5–5.1)
RBC # BLD: 4.27 M/UL (ref 4–5.2)
SODIUM BLD-SCNC: 146 MMOL/L (ref 136–145)
TOTAL CK: 247 U/L (ref 26–192)
WBC # BLD: 8.5 K/UL (ref 4–11)

## 2022-10-26 PROCEDURE — 83605 ASSAY OF LACTIC ACID: CPT

## 2022-10-26 PROCEDURE — 97530 THERAPEUTIC ACTIVITIES: CPT

## 2022-10-26 PROCEDURE — 95813 EEG EXTND MNTR 61-119 MIN: CPT

## 2022-10-26 PROCEDURE — 36415 COLL VENOUS BLD VENIPUNCTURE: CPT

## 2022-10-26 PROCEDURE — 2580000003 HC RX 258: Performed by: INTERNAL MEDICINE

## 2022-10-26 PROCEDURE — 80048 BASIC METABOLIC PNL TOTAL CA: CPT

## 2022-10-26 PROCEDURE — A9579 GAD-BASE MR CONTRAST NOS,1ML: HCPCS | Performed by: NURSE PRACTITIONER

## 2022-10-26 PROCEDURE — 6360000002 HC RX W HCPCS: Performed by: INTERNAL MEDICINE

## 2022-10-26 PROCEDURE — 92610 EVALUATE SWALLOWING FUNCTION: CPT

## 2022-10-26 PROCEDURE — 82550 ASSAY OF CK (CPK): CPT

## 2022-10-26 PROCEDURE — 70553 MRI BRAIN STEM W/O & W/DYE: CPT

## 2022-10-26 PROCEDURE — 6360000004 HC RX CONTRAST MEDICATION: Performed by: NURSE PRACTITIONER

## 2022-10-26 PROCEDURE — 93306 TTE W/DOPPLER COMPLETE: CPT

## 2022-10-26 PROCEDURE — 97535 SELF CARE MNGMENT TRAINING: CPT

## 2022-10-26 PROCEDURE — 99221 1ST HOSP IP/OBS SF/LOW 40: CPT | Performed by: INTERNAL MEDICINE

## 2022-10-26 PROCEDURE — 97166 OT EVAL MOD COMPLEX 45 MIN: CPT

## 2022-10-26 PROCEDURE — 97162 PT EVAL MOD COMPLEX 30 MIN: CPT

## 2022-10-26 PROCEDURE — 85025 COMPLETE CBC W/AUTO DIFF WBC: CPT

## 2022-10-26 PROCEDURE — 97116 GAIT TRAINING THERAPY: CPT

## 2022-10-26 PROCEDURE — 6370000000 HC RX 637 (ALT 250 FOR IP): Performed by: HOSPITALIST

## 2022-10-26 PROCEDURE — 1200000000 HC SEMI PRIVATE

## 2022-10-26 RX ORDER — SODIUM CHLORIDE 0.9 % (FLUSH) 0.9 %
5-40 SYRINGE (ML) INJECTION PRN
Status: DISCONTINUED | OUTPATIENT
Start: 2022-10-26 | End: 2022-10-29 | Stop reason: HOSPADM

## 2022-10-26 RX ORDER — ONDANSETRON 2 MG/ML
4 INJECTION INTRAMUSCULAR; INTRAVENOUS EVERY 6 HOURS PRN
Status: DISCONTINUED | OUTPATIENT
Start: 2022-10-26 | End: 2022-10-29 | Stop reason: HOSPADM

## 2022-10-26 RX ORDER — AMLODIPINE BESYLATE 5 MG/1
5 TABLET ORAL DAILY
Status: DISCONTINUED | OUTPATIENT
Start: 2022-10-26 | End: 2022-10-29 | Stop reason: HOSPADM

## 2022-10-26 RX ORDER — SODIUM CHLORIDE 9 MG/ML
INJECTION, SOLUTION INTRAVENOUS PRN
Status: DISCONTINUED | OUTPATIENT
Start: 2022-10-26 | End: 2022-10-29 | Stop reason: HOSPADM

## 2022-10-26 RX ORDER — HYDRALAZINE HYDROCHLORIDE 20 MG/ML
5 INJECTION INTRAMUSCULAR; INTRAVENOUS EVERY 6 HOURS PRN
Status: DISCONTINUED | OUTPATIENT
Start: 2022-10-26 | End: 2022-10-29 | Stop reason: HOSPADM

## 2022-10-26 RX ORDER — ACETAMINOPHEN 650 MG/1
650 SUPPOSITORY RECTAL EVERY 6 HOURS PRN
Status: DISCONTINUED | OUTPATIENT
Start: 2022-10-26 | End: 2022-10-29 | Stop reason: HOSPADM

## 2022-10-26 RX ORDER — SODIUM CHLORIDE 0.9 % (FLUSH) 0.9 %
5-40 SYRINGE (ML) INJECTION EVERY 12 HOURS SCHEDULED
Status: DISCONTINUED | OUTPATIENT
Start: 2022-10-26 | End: 2022-10-29 | Stop reason: HOSPADM

## 2022-10-26 RX ORDER — ONDANSETRON 4 MG/1
4 TABLET, ORALLY DISINTEGRATING ORAL EVERY 8 HOURS PRN
Status: DISCONTINUED | OUTPATIENT
Start: 2022-10-26 | End: 2022-10-29 | Stop reason: HOSPADM

## 2022-10-26 RX ORDER — ACETAMINOPHEN 325 MG/1
650 TABLET ORAL EVERY 6 HOURS PRN
Status: DISCONTINUED | OUTPATIENT
Start: 2022-10-26 | End: 2022-10-29 | Stop reason: HOSPADM

## 2022-10-26 RX ORDER — POLYETHYLENE GLYCOL 3350 17 G/17G
17 POWDER, FOR SOLUTION ORAL DAILY PRN
Status: DISCONTINUED | OUTPATIENT
Start: 2022-10-26 | End: 2022-10-29 | Stop reason: HOSPADM

## 2022-10-26 RX ORDER — ENOXAPARIN SODIUM 100 MG/ML
40 INJECTION SUBCUTANEOUS DAILY
Status: DISCONTINUED | OUTPATIENT
Start: 2022-10-26 | End: 2022-10-29 | Stop reason: HOSPADM

## 2022-10-26 RX ORDER — SODIUM CHLORIDE, SODIUM LACTATE, POTASSIUM CHLORIDE, CALCIUM CHLORIDE 600; 310; 30; 20 MG/100ML; MG/100ML; MG/100ML; MG/100ML
INJECTION, SOLUTION INTRAVENOUS CONTINUOUS
Status: ACTIVE | OUTPATIENT
Start: 2022-10-26 | End: 2022-10-26

## 2022-10-26 RX ADMIN — ENOXAPARIN SODIUM 40 MG: 100 INJECTION SUBCUTANEOUS at 08:22

## 2022-10-26 RX ADMIN — SODIUM CHLORIDE, POTASSIUM CHLORIDE, SODIUM LACTATE AND CALCIUM CHLORIDE: 600; 310; 30; 20 INJECTION, SOLUTION INTRAVENOUS at 12:02

## 2022-10-26 RX ADMIN — AMLODIPINE BESYLATE 5 MG: 5 TABLET ORAL at 09:56

## 2022-10-26 RX ADMIN — GADOTERIDOL 18 ML: 279.3 INJECTION, SOLUTION INTRAVENOUS at 20:00

## 2022-10-26 RX ADMIN — SODIUM CHLORIDE, POTASSIUM CHLORIDE, SODIUM LACTATE AND CALCIUM CHLORIDE: 600; 310; 30; 20 INJECTION, SOLUTION INTRAVENOUS at 01:23

## 2022-10-26 NOTE — PROGRESS NOTES
Hospitalist Progress Note      Name:  Rishi Martini /Age/Sex: 1945  (69 y.o. female)   MRN & CSN:  [id number] & [id number] Admission Date/Time: 10/25/2022  4:18 PM   Location:  6301/6301-01 PCP: Derick Jacobs MD         Hospital Day: 2    Assessment and Plan:     69 y.o. female who was brought in by EMS for altered mental status. Patient is awake, only oriented to self. Not oriented to place time and circumstance. Patient is unable to tell me what happened and why she is here. Denies any pain. History taken from the ED physicians records and per the conversation. This patient presents after being found down at home. She was covered in feces. There is no blood or other signs of external trauma per the report of EMS at the scene. I was able to speak with her daughter who provides the following history. The patient was seen to stumble and fall outside her home on Friday. Neighbors were able to assist her into the house and reported the fall to the patient's daughter. The patient was not answering the phone on Saturday so the daughter asked a friend of the family to stop by. The patient did not answer the door to the knock of the friend, but the friend was able to see the patient through the window and the patient shouted that she was fine. No further contact could be made on  or Monday. Today, Tuesday, a welfare check was conducted at the request of the patient's daughter. This is what led to the patient being brought here by EMS. #Altered mental state-per daughter this is acute onset, even though she has mentioned that patient had mental slowing over the past year.   CT head-negative, UA-not consistent with UTI  #Suspected frequent falls, obtain CT pelvis without contrast.  CT head-negative, CT cervical spine-T2 sclerotic density  #Elevated CPK (627)  #Mild leukocytosis with left shift, no signs of infection, UA and chest x-ray negative for infection  #Mild azotemia  #Elevated proBNP, no echocardiogram in our system or Care Everywhere. Clinically patient is not in fluid overload or CHF exacerbation  #Mild hypernatremia with positive urine ketones-likely secondary to dehydration     PLAN:  -Neurochecks every 4 hourly  Today alert and oriented   -Fall precautions  -Neurology consult for further recommendations  Leukocytosis resolved   -Gentle IV hydration  -Monitor electrolytes, renal function  -Monitor CK levels  -order  echocardiogram and consult cardiologist to r/o cardiac arrhythmia as cause of LOC   -Consider further evaluation of CT cervical spine finding of T2 sclerotic density  -PT/OT  -Social service consult  -Supportive therapy   passed swallow  Resume on amlodipine for HTN       DVT Prophylaxis: Lovenox    Diet ADULT DIET; Regular   DVT Prophylaxis [x] Lovenox, []  Heparin, [] SCDs, [] Ambulation   GI Prophylaxis [] PPI,  [] H2 Blocker,  [] Carafate,  [] Diet/Tube Feeds   Code Status Full Code   Disposition Patient requires continued admission due to    MDM [] Low, [] Moderate,[]  High  Patient's risk as above due to      History of Present Illness: The patient was seen and examined at bedside  The patient is alert and awake today  Does not remember what happened yesterday   Denies CP or SOB     Objective: Intake/Output Summary (Last 24 hours) at 10/26/2022 0942  Last data filed at 10/26/2022 0600  Gross per 24 hour   Intake 0 ml   Output 0 ml   Net 0 ml      Vitals:   Vitals:    10/26/22 0756   BP: (!) 125/99   Pulse: 86   Resp: 16   Temp: 97.8 °F (36.6 °C)   SpO2: 93%     Physical Exam:   GEN Awake.  Alert , not in respiratory distress, not in pain  HEENT: PEERLA, , supple neck,   Chest: air entry equal bilaterally, no wheezing or crepitation  Heart: S1 and S2 heard, no murmur, no gallop or rub, regular rate  Abdomen: soft, ND , Nt, +BS  Extremities: no cyanosis, tenderness or erythema, peripheral pulses audible  Neurology: alert, oriented x3, able to move 4 limbs    Medications:   Medications:    sodium chloride flush  5-40 mL IntraVENous 2 times per day    enoxaparin  40 mg SubCUTAneous Daily    amLODIPine  5 mg Oral Daily      Infusions:    sodium chloride      lactated ringers 100 mL/hr at 10/26/22 0123     PRN Meds: sodium chloride flush, 5-40 mL, PRN  sodium chloride, , PRN  ondansetron, 4 mg, Q8H PRN   Or  ondansetron, 4 mg, Q6H PRN  polyethylene glycol, 17 g, Daily PRN  acetaminophen, 650 mg, Q6H PRN   Or  acetaminophen, 650 mg, Q6H PRN  hydrALAZINE, 5 mg, Q6H PRN  perflutren lipid microspheres, 1.5 mL, ONCE PRN          Electronically signed by Mireille Duran MD on 10/26/2022 at 9:42 AM

## 2022-10-26 NOTE — CONSULTS
[facility]  Cardiology Inpatient Consult Service                                                                                          Pt Name: Noemi Chairez  Age: 69 y.o. Sex: female  : 1945  Location: [facility]-    Referring Physician: Jalen Carr MD      Reason for Consult:       Reason for Consultation/Chief Complaint: Syncope?/Found down      HPI:      Noemi Chairez is a 69 y.o. female with relevant cardiac history of hypertension who presents to the emergency room brought by EMS after being found down covered in feces. It seems that on Friday the patient had a fall at home and after that nobody knew about her until she was found later in the week. -A welfare check was performed on Tuesday and she was found down covered in feces. From a cardiovascular perspective she was completely symptomatic before these, denied any symptoms concerning for CAD or heart failure. No angina, no lightheadedness or any other symptoms somewhat related to hypotension. She does not recall having any symptoms prior to this episode. Were consulted for further evaluation. Histories     Past Medical History:   has a past medical history of Allergic, Anemia, Anxiety, Closed comminuted intra-articular fracture of distal femur, right, initial encounter ([facility] [address] Depression, Eye disorder, Fibromyalgia, GERD (gastroesophageal reflux disease), Hypertension, Obesity, KAIT (obstructive sleep apnea), Osteoarthritis, Osteoporosis, PONV (postoperative nausea and vomiting), and Prolonged emergence from general anesthesia. Surgical History:   has a past surgical history that includes hip surgery; joint replacement (Right, ); Wrist fracture surgery (Left); joint replacement (Left, 16); knee surgery; and Femur fracture surgery (Right, 2022). Social History:   reports that she has never smoked.  She has never used smokeless tobacco. She reports that she does not drink alcohol and does not use drugs. Family History:  No evidence for sudden cardiac death or premature CAD      Medications:       Home Medications  Were reviewed and are listed in nursing record. and/or listed below  Prior to Admission medications    Medication Sig Start Date End Date Taking? Authorizing Provider   Fesoterodine Fumarate ER 8 MG TB24 Take 8 mg by mouth daily   Yes Historical Provider, MD   losartan (COZAAR) 50 MG tablet Take 50 mg by mouth daily   Yes Historical Provider, MD   gabapentin (NEURONTIN) 300 MG capsule Take 600 mg by mouth 3 times daily. Yes Historical Provider, MD   cycloSPORINE (RESTASIS) 0.05 % ophthalmic emulsion Place 2 drops into both eyes in the morning and 2 drops in the evening.    Yes Historical Provider, MD   buPROPion (WELLBUTRIN XL) 300 MG extended release tablet Take 300 mg by mouth every morning   Yes Historical Provider, MD   amLODIPine (NORVASC) 5 MG tablet Take 1 tablet by mouth daily 2/26/22   Tamica Simpson DO   acetaminophen (AMINOFEN) 325 MG tablet Take 2 tablets by mouth every 6 hours as needed for Pain 2/7/22   Meghna Vaughan,    vitamin D (CHOLECALCIFEROL) 25 MCG (1000 UT) TABS tablet Take 1,000 Units by mouth daily    Historical Provider, MD   escitalopram (LEXAPRO) 20 MG tablet Take 20 mg by mouth daily  12/2/13   Historical Provider, MD   Multiple Vitamins-Minerals (MULTIVITAMIN PO) Take 1 tablet by mouth daily     Historical Provider, MD          Inpatient Medications:   sodium chloride flush  5-40 mL IntraVENous 2 times per day    enoxaparin  40 mg SubCUTAneous Daily    amLODIPine  5 mg Oral Daily       IV drips:   sodium chloride      lactated ringers 100 mL/hr at 10/26/22 0123       PRN:  sodium chloride flush, sodium chloride, ondansetron **OR** ondansetron, polyethylene glycol, acetaminophen **OR** acetaminophen, hydrALAZINE, perflutren lipid microspheres    Allergy:     Pregabalin       Review of Systems:     All 12 point review of symptoms completed. Pertinent positives identified in the HPI, all other review of symptoms negative as below. CONSTITUTIONAL: Found down  SKIN: No rash or pruritis. EYES: No visual changes or diplopia. No scleral icterus. ENT: No Headaches, hearing loss or vertigo. No mouth sores or sore throat. CARDIOVASCULAR: No chest pain/chest pressure/chest discomfort. No palpitations. RESPIRATORY: No cough or wheezing, no sputum production. No hematemesis. GASTROINTESTINAL: No N/V/D. No abdominal pain, appetite loss, blood in stools. GENITOURINARY: No dysuria, trouble voiding, or hematuria. MUSCULOSKELETAL:  No gait disturbance, weakness or joint complaints. NEUROLOGICAL: No headache, diplopia, change in muscle strength, numbness or tingling. No change in gait, balance, coordination, mood, affect, memory, mentation, behavior. PSHYCH: No anxiety, loss of interest, change in sexual behavior, feelings of self-harm, or confusion. ENDOCRINE: No malaise, fatigue or temperature intolerance. No excessive thirst, fluid intake, or urination. No tremor. HEMATOLOGIC: No abnormal bruising or bleeding. ALLERGY: No nasal congestion or hives. Physical Examination:     Vitals:    10/25/22 2332 10/26/22 0045 10/26/22 0600 10/26/22 0756   BP: (!) 157/89 (!) 149/82 (!) 154/98 (!) 125/99   Pulse: 82 93 84 86   Resp: 16 16 16 16   Temp:  98.4 °F (36.9 °C) 97.3 °F (36.3 °C) 97.8 °F (36.6 °C)   TempSrc:  Oral Oral Oral   SpO2: 94% 93% 92% 93%   Weight:  183 lb 13.8 oz (83.4 kg)     Height:  5' 10\" (1.778 m)         Wt Readings from Last 3 Encounters:   10/26/22 183 lb 13.8 oz (83.4 kg)   02/25/22 226 lb 10.1 oz (102.8 kg)   02/06/22 220 lb (99.8 kg)       Objective:  Vital signs: (most recent): Blood pressure (!) 125/99, pulse 86, temperature 97.8 °F (36.6 °C), temperature source Oral, resp. rate 16, height 5' 10\" (1.778 m), weight 183 lb 13.8 oz (83.4 kg), SpO2 93 %, not currently breastfeeding.         General Appearance:  Alert, cooperative, no distress, appears stated age Appropriate weight   Head:  Normocephalic, without obvious abnormality, atraumatic   Eyes:  PERRL, conjunctiva/corneas clear EOM intact  Ears normal   Throat no lesions       Nose: Nares normal, no drainage or sinus tenderness   Throat: Lips, mucosa, and tongue normal   Neck: Supple, symmetrical, trachea midline, no adenopathy, thyroid: not enlarged, symmetric, no tenderness/mass/nodules, no carotid bruit or JVD       Lungs:   Clear to auscultation bilaterally, respirations unlabored   Chest Wall:  No tenderness or deformity   Heart:  Regular rate and rhythm, S1, S2 normal, no murmur, rub or gallop PMI intact   Abdomen:   Soft, non-tender, bowel sounds active all four quadrants,  no masses, no organomegaly       Extremities: Extremities normal, atraumatic, no cyanosis or edema   Pulses: 2+ and symmetric   Skin: Skin color, texture, turgor normal, no rashes or lesions   Pysch: Normal mood and affect   Neurologic: Normal gross motor and sensory exam.  Cranial nerves intact        Labs:     Recent Labs     10/25/22  1708 10/26/22  0755   * 146*   K 4.1 3.6   BUN 33* 41*   CREATININE 0.8 1.1    109   CO2 22 22   GLUCOSE 105* 101*   CALCIUM 9.9 9.6     Recent Labs     10/25/22  1708 10/26/22  0755   WBC 13.6* 8.5   HGB 13.1 11.8*   HCT 39.2 36.0    285   MCV 85.4 84.4     No results for input(s): CHOLTOT, TRIG, HDL, CHOLHDL, LDL in the last 72 hours. Invalid input(s): [address] [facility],[address], [address] [address]  Recent Labs     10/25/22  1708   INR 1.15*     Recent Labs     10/25/22  1708 10/26/22  0755   CKTOTAL 627* 247*   TROPONINI <0.01  --      No results for input(s): BNP in the last 72 hours. No results for input(s): TSH in the last 72 hours.   No results for input(s): CHOL, HDL, LDLCALC, TRIG in the last 72 hours.]    Lab Results   Component Value Date    CKTOTAL 247 (H) 10/26/2022    TROPONINI <0.01 10/25/2022         Imaging:     I personally reviewed imaging studies including CXR, Stress test, TTE/TIFFANIE. Last ECG (if available) -personally interpreted EKG:  I have reviewed EKG with the following interpretation  NSR, no ST-T changes. Telemetry: Personally interpreted  Normal sinus rhythm normal sinus rhythm,  Changes    Last Stress (if available):    Last TTE/TIFFANIE(if available):    Last Cath (if available):    Last CMR  (if available):      Assessment / Plan:   Syncope?/Found down  -EKG without ischemic changes, no conduction abnormalities. Telemetry without any rhythm issues as well. -Benign physical exam without findings concerning for severe AS. -Agree with echo to rule out any major cardiac normalities.  -Will need at least a 2-week monitor on discharge.  -Overall very unlikely to be cardiac in nature    Tobacco use was discussed with the patient and educated on the negative effects. I have asked the patient to not utilize these agents. Thank you for allowing to us to participate in the care or Sheri Leonard. Further evaluation will be based upon the patient's clinical course and testing results. I have spent 40 minutes of face to face time with the patient with more than 50% spent counseling and coordinating care. All questions and concerns were addressed to the patient/family. Alternatives to my treatment were discussed. The note was completed using EMR. Every effort was made to ensure accuracy; however, inadvertent computerized transcription errors may be present. I have personally reviewed the reports and images of labs, radiological studies, cardiac studies including ECG's and telemetry, current and old medical records. Tremayne Alexander MD, [address] [facility] [address]

## 2022-10-26 NOTE — PROGRESS NOTES
4 Eyes Admission Assessment     I agree as the admission nurse that 2 RN's have performed a thorough Head to Toe Skin Assessment on the patient. ALL assessment sites listed below have been assessed on admission. Areas assessed by both nurses: Marcell Downsley  [x]   Head, Face, and Ears   [x]   Shoulders, Back, and Chest  [x]   Arms, Elbows, and Hands   [x]   Coccyx, Sacrum, and Ischium  [x]   Legs, Feet, and Heels        Does the Patient have Skin Breakdown?   Yes a wound was noted on the Admission Assessment and an LDA was Initiated documentation include the [place]-wound, Wound Assessment, Measurements, Dressing Treatment, Drainage, and Color\",         Royer Prevention initiated:  Yes   Wound Care Orders initiated:  No      WOC nurse consulted for Pressure Injury (Stage 3,4, Unstageable, DTI, NWPT, and Complex wounds) or Royer score 18 or lower:  No      Nurse 1 eSignature: Electronically signed by José Miguel Henry RN on 10/26/22 at 8:02 AM EDT    **SHARE this note so that the co-signing nurse is able to place an eSignature**    Nurse 2 eSignature: {Esignature:[id number]}

## 2022-10-26 NOTE — PROGRESS NOTES
Occupational Therapy  Facility/Department: [address]  Occupational Therapy Initial Assessment and Treatment Note     Name: Lamberto Jung  : 1945  MRN: [id number]  Date of Service: 10/26/2022    Discharge Recommendations:Sandra Rand scored a 15/24 on the AM-PAC ADL Inpatient form. Current research shows that an AM-PAC score of 17 or less is typically not associated with a discharge to the patient's home setting. Based on the patient's AM-PAC score and their current ADL deficits, it is recommended that the patient have 3-5 sessions per week of Occupational Therapy at d/c to increase the patient's independence. Please see assessment section for further patient specific details. If patient discharges prior to next session this note will serve as a discharge summary. Please see below for the latest assessment towards goals. OT Equipment Recommendations  Equipment Needed: No  Other: defer to [facility] care facility       Patient Diagnosis(es): The encounter diagnosis was Altered mental status, unspecified altered mental status type. Past Medical History:  has a past medical history of Allergic, Anemia, Anxiety, Closed comminuted intra-articular fracture of distal femur, right, initial encounter ([facility] [address] Depression, Eye disorder, Fibromyalgia, GERD (gastroesophageal reflux disease), Hypertension, Obesity, KAIT (obstructive sleep apnea), Osteoarthritis, Osteoporosis, PONV (postoperative nausea and vomiting), and Prolonged emergence from general anesthesia. Past Surgical History:  has a past surgical history that includes hip surgery; joint replacement (Right, ); Wrist fracture surgery (Left); joint replacement (Left, 16); knee surgery; and Femur fracture surgery (Right, 2022). Treatment Diagnosis: impaired ADLs / functional mobility / confusion / poor safety awareness / fall      Assessment   Performance deficits / Impairments: Decreased functional mobility ; Decreased ADL status; Decreased endurance;Decreased safe awareness;Decreased cognition  Assessment: Pt from home alone, multiple falls of recent - significant bruising noted on legs, shoulder / face. Pt needing mod/ max  v.cues and ongoing redirection for initiation and completion of all task. Pt confused and disoriented. Pt needing Min A for functional mobility / transfers and Mod A with moving walker for ambulation. Pt unable to participate in 1 min animal naming test.  Pt requires Mod A with LE ADLs / toileting. No carryover of learning noted. Pt would benefit from ongoing inpt OT to maximize functional level. Pt may need increased assist long term 2/2 cognition. Will follow as inpt  Treatment Diagnosis: impaired ADLs / functional mobility / confusion / poor safety awareness 2/2 fall  Prognosis: Fair  Decision Making: Medium Complexity  REQUIRES OT FOLLOW-UP: Yes  Activity Tolerance  Activity Tolerance: Patient Tolerated treatment well  Activity Tolerance Comments: No GEORGE noted        Plan   Occupational Therapy Plan  Times Per Week: 2-5x  Current Treatment Recommendations: Functional mobility training, Endurance training, Safety education & training, Patient/Caregiver education & training, Cognitive reorientation, Self-Care / ADL     Restrictions  Position Activity Restriction  Other position/activity restrictions: up with assistance    Subjective   General  Chart Reviewed: Yes  Additional Pertinent Hx: Admit 10/25 with AMS/ found down on welfare check,  recent fall -neck pain  CT head -neg, CT Cspine-Severe multilevel DDD, neg fx,   Multiple xrays BLE's - neg fx,  Cxray - neg                       PMHX: Anemia, Anxiety, depression, GERD, OA, HTN, R femur fx w/ORIF 2/2022,  Family / Caregiver Present: No  Diagnosis: AMS, Found down/ Falls  Subjective  Subjective: \" I feel okay\" pt found laying flat in bed -awake , confused and disoriented.  Pt agreeable for OOB/OT eval and tx with mod encouragement     Social/Functional History  Social/Functional History  Lives With: Alone  Type of Home: House  Home Layout: Multi-level, Able to Live on Main level with bedroom/bathroom  Home Access: Stairs to enter with rails (can enter through garage with 10 steps with rail)  Bathroom Shower/Tub: Tub/Shower unit  Bathroom Toilet: Handicap height (RTS)  Bathroom Equipment: Shower chair  Home Equipment: Eleonore Fail, 4 wheeled  Has the patient had two or more falls in the past year or any fall with injury in the past year?: Yes       Objective Treatment included functional transfer training, ADL's and pt. education. Safety Devices  Type of Devices: Call light within reach; Chair alarm in place;Nurse notified; Left in chair (PCA aware of functional mobility level as well.)     Toilet Transfers  Toilet - Technique: Ambulating  Equipment Used: Standard toilet  Toilet Transfer: Minimal assistance  Toilet Transfers Comments: pt needing Mod v.cues for placement of hands -- delayed processing noted  AROM: Within functional limits  Strength: Within functional limits  Coordination: Within functional limits  Tone: Normal  Sensation: Intact  ADL  Feeding: Setup;Verbal cueing; Increased time to complete  Feeding Skilled Clinical Factors: currently NPO awaiting MBS -- pt unable to cut / open containers  Grooming: Minimal assistance;Setup;Contact guard assistance; Increased time to complete  Grooming Skilled Clinical Factors: pt needing mod cues for wiping / drying  LE Dressing: Moderate assistance;Minimal assistance  LE Dressing Skilled Clinical Factors: to thread over BLE's. Min A over hips in stance. Pt needing cues for initiation of task  Toileting: Moderate assistance;Maximum assistance  Toileting Skilled Clinical Factors: pt incont of urine/ stool. Pt needing Mod v.cues for performing partial pericare.   Needing Mod/ Max to complete  Additional Comments: pt needing increased time / cues for all activity           Transfers  Sit to stand: Minimal assistance  Stand to sit: Minimal assistance  Transfer Comments: v/cues for hand placement  Vision  Vision: Within Functional Limits  Hearing  Hearing: Within functional limits  Cognition  Overall Cognitive Status: Exceptions  Arousal/Alertness: Delayed responses to stimuli  Following Commands: Inconsistently follows commands; Follows one step commands with repetition; Follows one step commands with increased time  Attention Span: Difficulty attending to directions  Memory: Decreased short term memory;Decreased long term memory  Safety Judgement: Decreased awareness of need for safety;Decreased awareness of need for assistance  Problem Solving: Decreased awareness of errors;Assistance required to identify errors made  Insights: Not aware of deficits  Initiation: Requires cues for all  Sequencing: Requires cues for all  Cognition Comment: Slow processing and no carryover of learning noted this date. Orientation  Overall Orientation Status: Impaired  Orientation Level: Oriented to person;Disoriented to time;Disoriented to situation;Disoriented to place       Education Given To: Patient  Education Provided: Role of Therapy;Plan of Care;ADL Adaptive Strategies;Transfer Training  Education Method: Demonstration;Verbal  Barriers to Learning: Cognition  Education Outcome: Unable to demonstrate understanding; Unable to verbalize;Continued education needed     AM-PAC Score  AM-[facility] Inpatient Daily Activity Raw Score: 15 (10/26/22 0950)  AM-PAC Inpatient ADL T-Scale Score : 34.69 (10/26/22 0950)  ADL Inpatient CMS 0-100% Score: 56.46 (10/26/22 0950)  ADL Inpatient CMS G-Code Modifier : CK (10/26/22 0950)    Goals  Short Term Goals  Time Frame for Short Term Goals: at d/c  Short Term Goal 1: Stance x 5 mins with supervision for ADLs/ IADLs  Short Term Goal 2: LE dressing with CGA  Short Term Goal 3: Toileting with SBA  Short Term Goal 4: Oriented x 3/4 attempts to environment  Short Term Goal 5: Functional transfers with SBA  Patient Goals Patient goals : unable to state     Therapy Time   Individual Concurrent Group Co-treatment   Time In 0822         Time Out 0918         Minutes 56             Timed Code Treatment Minutes:   40 mins     Total Treatment Minutes:  56 mins       Harman Neely, OT

## 2022-10-26 NOTE — PROGRESS NOTES
Physical Therapy  Facility/Department: [address] [place] [address]  Physical Therapy Initial Assessment    Name: Kobe Brooks  : 1945  MRN: [id number]  Date of Service: 10/26/2022    Discharge Recommendations:Sandra Rand scored a 17/24 on the AM-PAC short mobility form. Current research shows that an AM-PAC score of 17 or less is typically not associated with a discharge to the patient's home setting. Based on the patient's AM-PAC score and their current functional mobility deficits, it is recommended that the patient have 3-5 sessions per week of Physical Therapy at d/c to increase the patient's independence. Please see assessment section for further patient specific details. If patient discharges prior to next session this note will serve as a discharge summary. Please see below for the latest assessment towards goals. PT Equipment Recommendations  Equipment Needed: No      Patient Diagnosis(es): The encounter diagnosis was Altered mental status, unspecified altered mental status type. Past Medical History:  has a past medical history of Allergic, Anemia, Anxiety, Closed comminuted intra-articular fracture of distal femur, right, initial encounter ([facility] [address] Depression, Eye disorder, Fibromyalgia, GERD (gastroesophageal reflux disease), Hypertension, Obesity, KAIT (obstructive sleep apnea), Osteoarthritis, Osteoporosis, PONV (postoperative nausea and vomiting), and Prolonged emergence from general anesthesia. Past Surgical History:  has a past surgical history that includes hip surgery; joint replacement (Right, ); Wrist fracture surgery (Left); joint replacement (Left, 16); knee surgery; and Femur fracture surgery (Right, 2022). Assessment   Assessment: 70 yo admitted 10/25/22 for AMS/found down at home. Pt very confused this am only able to state her name/. Per notes, pt was brought in from home where it is believed she lives alone (pt unable to report her PLF).  Pt requiring min assist with all mobility this am; use of RW for ambulation. Safety concerns for pt to return home at this time and pt unable to state her discharge plan. If home, recommend 24 hour close assist, home PT, use of RW at all times. Pt would benefit from continued skilled IP  PT to maximize her functional independence. Discussed with CM/RN. Treatment Diagnosis: mobility impairment due to AMS  Decision Making: Medium Complexity  Requires PT Follow-Up: Yes  Activity Tolerance  Activity Tolerance: Patient limited by fatigue;Treatment limited secondary to decreased cognition;Patient limited by endurance     Plan   Physcial Therapy Plan  General Plan:  (2-5)  Current Treatment Recommendations: Functional mobility training, Transfer training, Gait training, Endurance training  Safety Devices  Type of Devices: Call light within reach, Chair alarm in place, Nurse notified, Left in chair (pt reclined)     Restrictions  Position Activity Restriction  Other position/activity restrictions: up with assistance     Subjective   General  Chart Reviewed: Yes  Additional Pertinent Hx: 68 yo admitted 10/25/22 for AMS. Work up negative (head CT/UA/multiple hip and pelvic XR); neuro consult pending. Pmhx: depression, fibromyalgia, HTN. femur fx 02/2022 with ARU admit. Family / Caregiver Present: No  Diagnosis: AMS  Follows Commands: Impaired  Subjective  Subjective: Pt found supine in bed and agreeable to PT with encouragement. Pt denies pain.          Social/Functional History  Social/Functional History  Lives With: Alone  Type of Home: House  Home Layout: Multi-level, Able to Live on Main level with bedroom/bathroom  Home Access: Stairs to enter with rails (can enter through garage with 10 steps with rail)  Bathroom Shower/Tub: Tub/Shower unit  Bathroom Toilet: Handicap height (RTS)  Bathroom Equipment: Shower chair  Home Equipment: Lorenzo Brauner, 4 wheeled  Has the patient had two or more falls in the past year or any fall with injury in the past year?: Yes  Additional Comments: info obtained from old chart; unclear how long patient has been home from ARU/SNF after 2022 R femur fx    Vision/Hearing  Vision  Vision: Within Functional Limits  Hearing  Hearing: Within functional limits      Cognition   Orientation  Orientation Level:  (oriented to name/ only)  Cognition  Overall Cognitive Status: Exceptions  Arousal/Alertness: Delayed responses to stimuli  Following Commands: Inconsistently follows commands; Follows one step commands with repetition; Follows one step commands with increased time  Attention Span: Difficulty attending to directions  Memory: Decreased short term memory;Decreased long term memory  Safety Judgement: Decreased awareness of need for safety;Decreased awareness of need for assistance  Problem Solving: Decreased awareness of errors;Assistance required to identify errors made  Insights: Not aware of deficits  Initiation: Requires cues for some  Sequencing: Requires cues for some     Objective                 AROM RLE (degrees)  RLE AROM: WFL  AROM LLE (degrees)  LLE AROM : WFL    Strength RLE  Strength RLE: WFL  Strength LLE  Strength LLE: WFL           Bed mobility  Supine to Sit: Minimal assistance (slow and effortful with vc to sequence/stay on task)  Scooting: Stand by assistance    Transfers  Sit to Stand: Minimal Assistance (x2 trials with max vc for hand placement; effortful)  Stand to Sit: Minimal Assistance (x2 trials with max vc for hand placement)    Ambulation  Device: Rolling Walker  Assistance: Minimal assistance  Quality of Gait: forward posture with slow larry; decreased step length/height; intermittent assist for walker sequencing  Distance: 10 ft, 20 ft  Comments: Pt fatigued quickly.                  AM-PAC Score  AM-PAC Inpatient Mobility Raw Score : 17 (10/26/22 1010)  AM-PAC Inpatient T-Scale Score : 42.13 (10/26/22 1010)  Mobility Inpatient CMS 0-100% Score: 50.57 (10/26/22 1010)  Mobility Inpatient CMS G-Code Modifier : CK (10/26/22 1010)         Goals  Short Term Goals  Time Frame for Short Term Goals: discharge  Short Term Goal 1: sit to/from supine supervision  Short Term Goal 2: sit to/from stand supervision  Short Term Goal 3: ambulate 100 ft with LRAD supervision  Patient Goals   Patient Goals : did not state       Education  Patient Education  Education Given To: Patient  Education Provided: Role of Therapy (need to call for assist to get up)  Education Method: Verbal  Barriers to Learning: Cognition  Education Outcome: Continued education needed      Therapy Time   Individual Concurrent Group Co-treatment   Time In 0822         Time Out 0915         Minutes 53          Timed Code Treatment Minutes: 43      Total Treatment Minutes:   [address], PT

## 2022-10-26 NOTE — CONSULTS
Neurology / Rosalind Neely Consult Note    Deborah Terrazas MD is requesting this consult. Reason for Consult: AMS  Admission Chief Complaint: AMS    History of Present Illness     Henrry Connors is a 69 y.o. y/o female with PMH significant for HTN, depression, recent hip fx, GERD who presented to the ED for AMS after being found down. Per chart, patient underwent a fall outside her home 5 days ago. Neighbors assisted her into her home and reported the fall to her daughter. A family friend tried to check on the patient the following day but the patient would not answer the door but did tell them that she was fine. Yesterday, a welfare check was conducted at request of the daughter and she was found down and covered in feces and was brought to ED. In ED yesterday, she was awake but confused and not able to provide much ROS. On my assessment today, patient is alert and awake. She denies any complaints such as headache, blurred vision, weakness, numbness, cough, SOB, abdominal pain or urinary symptoms. She cannot recall any of the events of the last few days and does not remember her prior hip fracture or being in rehab for it. She does not know how she got to the hospital and is alarmed by her inability to recall recent events. REVIEW OF SYSTEMS:   Constitutional- No weight loss or fevers   Eyes- No diplopia. No photophobia. Ears/nose/throat- No dysphagia. No Dysarthria   Cardiovascular- No palpitations. No chest pain   Respiratory- No dyspnea. No Cough   Gastrointestinal- No Abdominal pain. No Vomiting. Genitourinary- No incontinence. No urinary retention   Musculoskeletal- No myalgia. No arthralgia   Skin- No rash. No easy bruising. Psychiatric- No depression. No anxiety   Endocrine- No diabetes. No thyroid issues. Hematologic- No bleeding difficulty.  No fatigue   Neurologic- no headache, blurred vision, weakness, numbness     Past Medical, Surgical, Family, and Social History   PAST MEDICAL HISTORY:  Past Medical History:   Diagnosis Date    Allergic     rhinitis    Anemia     Anxiety     Closed comminuted intra-articular fracture of distal femur, right, initial encounter ([facility] [address] 02/06/2022    Depression     Eye disorder     Fibromyalgia     GERD (gastroesophageal reflux disease)     Hypertension     Obesity     KAIT (obstructive sleep apnea)     no CPAP - has dental appliance    Osteoarthritis     Osteoporosis     PONV (postoperative nausea and vomiting)     Prolonged emergence from general anesthesia      SURGICAL HISTORY:  Past Surgical History:   Procedure Laterality Date    FEMUR FRACTURE SURGERY Right 2/7/2022    FEMUR OPEN REDUCTION INTERNAL FIXATION RIGHT performed by Melia Lopez DO at [address] Right 1993/2003    HIP    JOINT REPLACEMENT Left 5/18/16    left total knee replacement    KNEE SURGERY      WRIST FRACTURE SURGERY Left      FAMILY HISTORY & SOCIAL HISTORY:  Family history non-contributory  Family History   Problem Relation Age of Onset    Cancer Father     Alcohol Abuse Mother     Cancer Mother     Osteoarthritis Mother     Dementia Mother     Cancer Maternal Aunt         breast     Social History     Tobacco Use    Smoking status: Never    Smokeless tobacco: Never   Vaping Use    Vaping Use: Never used   Substance Use Topics    Alcohol use: No     Alcohol/week: 0.0 standard drinks    Drug use: No         Allergies & Outpatient Medications   ALLERGIES:  Allergies   Allergen Reactions    Pregabalin Other (See Comments)     Other reaction(s): Confusion    Other reaction(s): Confusion  Other reaction(s): not responsive, Other (See Comments)  Other reaction(s): Confusion     HOME MEDICATIONS:  Current Discharge Medication List        CONTINUE these medications which have NOT CHANGED    Details   Fesoterodine Fumarate ER 8 MG TB24 Take 8 mg by mouth daily      losartan (COZAAR) 50 MG tablet Take 50 mg by mouth daily      gabapentin (NEURONTIN) 300 MG capsule Take 600 mg by mouth 3 times daily. cycloSPORINE (RESTASIS) 0.05 % ophthalmic emulsion Place 2 drops into both eyes in the morning and 2 drops in the evening.       buPROPion (WELLBUTRIN XL) 300 MG extended release tablet Take 300 mg by mouth every morning      amLODIPine (NORVASC) 5 MG tablet Take 1 tablet by mouth daily  Qty: 30 tablet, Refills: 3      acetaminophen (AMINOFEN) 325 MG tablet Take 2 tablets by mouth every 6 hours as needed for Pain  Qty: 28 tablet, Refills: 0      vitamin D (CHOLECALCIFEROL) 25 MCG (1000 UT) TABS tablet Take 1,000 Units by mouth daily      escitalopram (LEXAPRO) 20 MG tablet Take 20 mg by mouth daily       Multiple Vitamins-Minerals (MULTIVITAMIN PO) Take 1 tablet by mouth daily                Physical Exam   PHYSICAL EXAM:  Vitals:    10/25/22 2100 10/25/22 2332 10/26/22 0045 10/26/22 0600   BP: 138/80 (!) 157/89 (!) 149/82 (!) 154/98   Pulse:  82 93 84   Resp:  16 16 16   Temp:   98.4 °F (36.9 °C) 97.3 °F (36.3 °C)   TempSrc:   Oral Oral   SpO2: 93% 94% 93% 92%   Weight:   183 lb 13.8 oz (83.4 kg)    Height:   5' 10\" (1.778 m)          General: Alert, no distress, well-nourished  Neurologic  Mental status: Alert and oriented to self and situation only   Attention intact as able to attend well to the exam     Language fluent in conversation   Comprehension intact; follows simple commands    Cranial nerves:   CN2: Visual fields full w/o extinction on confrontational testing   CN 3,4,6: Pupils equal and reactive to light, extraocular muscles intact  CN5: Facial sensation symmetric   CN7: Face symmetric  CN8: Hearing symmetric to spoken voice  CN9: Palate elevated symmetrically  CN11: Traps full strength on shoulder shrug  CN12: Tongue midline with protrusion    Motor Exam:   R  L    Deltoid 5  5   Biceps 5 5   Triceps 5 5   Wrist extension  5 5   Interossei 5 5      R  L    Hip flexion  5  5   Hip extension  5 5   Knee flexion  5 5   Knee extension  5 5 antibiotic orders with administrations found. IMPRESSION & RECOMMENDATIONS     IMPRESSION:  77F PMH significant for HTN, depression, recent hip fx, GERD who presented to the ED for AMS after being found down. Labs demonstrated hypernatremia, elevated CK, and UA only positive for ketones. Imaging including CT pelvis, XR L/R tibia/fibula, CXR, CTH and cervical spine were all unremarkable. RECOMMENDATIONS:  - Concern for seizure given presentation, will order continuous EEG   - Will order MRI w/wo contrast      Halina Laguna MD   Neurology & Neurocritical Care   10/26/2022 7:47 AM      ICU Patients:   Neurocritical Care Line: [phone]  [facility]: [facility] Neurocritical Care    Floor / PCU Patients:  Neurology Line: [phone]  [facility]: [facility] Neurology    Time independently spent by Nurse Practitioner reviewing chart and prior testing, obtaining history from patient, examining patient, ordering appropriate medications / diagnostics, reviewing results of diagnostics, counseling and educating patient / family, communicating plan with other providers and documenting clinical information in the EMR was approximately 30 minutes.

## 2022-10-26 NOTE — PROGRESS NOTES
Pharmacy  Note  - Admission Medication History    List of qsfxf-yf-njomhtgre medications is complete.  I reviewed Rx fill history via \"Complete Dispense Report\" in [employer], and spoke to patient's daughter       The following changes made to tablc-rc-oeyncyxll medication list:    REMOVED:  1) ascorbic acid  2) duloxetine   3) melatonin  4) pantoprazole  5) sennosides-docusate   6) tizanidine   7) white petrolatum- mineral oil       Current Outpatient Medications   Medication Instructions    acetaminophen (AMINOFEN) 650 mg, Oral, EVERY 6 HOURS PRN    amLODIPine (NORVASC) 5 mg, Oral, DAILY    buPROPion (WELLBUTRIN XL) 300 mg, Oral, EVERY MORNING    cycloSPORINE (RESTASIS) 0.05 % ophthalmic emulsion 2 drops, Both Eyes, EVERY 12 HOURS    escitalopram (LEXAPRO) 20 mg, Oral, DAILY    Fesoterodine Fumarate ER 8 mg, Oral, DAILY    gabapentin (NEURONTIN) 600 mg, Oral, 3 TIMES DAILY    losartan (COZAAR) 50 mg, Oral, DAILY    Multiple Vitamins-Minerals (MULTIVITAMIN PO) 1 tablet, Oral, DAILY    vitamin D (CHOLECALCIFEROL) 1,000 Units, Oral, DAILY            10/25/2022 11:01 PM  Gloria Spears  PharmD Candidate   Class of [address] [place] [address], PharmD  Main Pharmacy: [id number]  10/25/2022 11:01 PM

## 2022-10-26 NOTE — H&P
Hospital Medicine History & Physical      PCP: Phil Wallace MD    Date of Admission: 10/25/2022    Date of Service: Pt seen/examined on 10/25/2022 and Admitted to Inpatient with expected LOS greater than two midnights due to medical therapy. Chief Complaint: Altered mental status      History Of Present Illness:    69 y.o. female who was brought in by EMS for altered mental status. Patient is awake, only oriented to self. Not oriented to place time and circumstance. Patient is unable to tell me what happened and why she is here. Denies any pain. History taken from the ED physicians records and per the conversation. This patient presents after being found down at home. She was covered in feces. There is no blood or other signs of external trauma per the report of EMS at the scene. I was able to speak with her daughter who provides the following history. The patient was seen to stumble and fall outside her home on Friday. Neighbors were able to assist her into the house and reported the fall to the patient's daughter. The patient was not answering the phone on Saturday so the daughter asked a friend of the family to stop by. The patient did not answer the door to the knock of the friend, but the friend was able to see the patient through the window and the patient shouted that she was fine. No further contact could be made on Sunday or Monday. Today, Tuesday, a welfare check was conducted at the request of the patient's daughter. This is what led to the patient being brought here by EMS.     Per history patient has been hospitalized in February 2022 for hip fracture    Past Medical History:          Diagnosis Date    Allergic     rhinitis    Anemia     Anxiety     Closed comminuted intra-articular fracture of distal femur, right, initial encounter ([facility] [address] 02/06/2022    Depression     Eye disorder     Fibromyalgia     GERD (gastroesophageal reflux disease)     Hypertension     Obesity KAIT (obstructive sleep apnea)     no CPAP - has dental appliance    Osteoarthritis     Osteoporosis     PONV (postoperative nausea and vomiting)     Prolonged emergence from general anesthesia        Past Surgical History:          Procedure Laterality Date    FEMUR FRACTURE SURGERY Right 2/7/2022    FEMUR OPEN RED[facility]  Right [facility]    HIP    JOINT REPLACEMENT Left 5/18/16    left total knee replacement    KNEE SURGERY      WRIST FRACTURE SURGERY Left        Medications Prior to Admission:      Prior to Admission medications    Medication Sig Start Date End Date Taking? Authorizing Provider   Fesoterodine Fumarate ER 8 MG TB24 Take 8 mg by mouth daily   Yes Historical Provider, MD   losartan (COZAAR) 50 MG tablet Take 50 mg by mouth daily   Yes Historical Provider, MD   gabapentin (NEURONTIN) 300 MG capsule Take 600 mg by mouth 3 times daily. Yes Historical Provider, MD   cycloSPORINE (RESTASIS) 0.05 % ophthalmic emulsion Place 2 drops into both eyes in the morning and 2 drops in the evening.    Yes Historical Provider, MD   amLODIPine (NORVASC) 5 MG tablet Take 1 tablet by mouth daily 2/26/22   Monik Simpson DO   acetaminophen (AMINOFEN) 325 MG tablet Take 2 tablets by mouth every 6 hours as needed for Pain 2/7/22   Christine Vaughan DO   vitamin D (CHOLECALCIFEROL) 25 MCG (1000 UT) TABS tablet Take 1,000 Units by mouth daily    Historical Provider, MD   buPROPion (WELLBUTRIN SR) 150 MG extended release tablet Take 300 mg by mouth daily    Historical Provider, MD   escitalopram (LEXAPRO) 20 MG tablet Take 20 mg by mouth daily  12/2/13   Historical Provider, MD   Multiple Vitamins-Minerals (MULTIVITAMIN PO) Take 1 tablet by mouth daily     Historical Provider, MD       Allergies:  Pregabalin    Social History:      The patient currently lives at home by herself    TOBACCO:   reports that she has never smoked. She has never used smokeless tobacco.  ETOH:   reports no history of alcohol use. E-cigarette/Vaping       Questions Responses    E-cigarette/Vaping Use Never User    Start Date     Passive Exposure     Quit Date     Counseling Given     Comments               Family History:    Reviewed and negative in regards to presenting illness/complaint. Problem Relation Age of Onset    Cancer Father     Alcohol Abuse Mother     Cancer Mother     Osteoarthritis Mother     Dementia Mother     Cancer Maternal Aunt         breast       REVIEW OF SYSTEMS COMPLETED:     Unable to obtain reliable history from the patient due to altered mental state    PHYSICAL EXAM PERFORMED:    /80   Pulse 88   Temp 98.5 °F (36.9 °C) (Oral)   Resp 18   Ht 5' 10\" (1.778 m)   SpO2 93%   BMI 32.52 kg/m²     General appearance:  No apparent distress, appears stated age and cooperative. Confused, awake but oriented to self only  HEENT:  Normal cephalic, atraumatic without obvious deformity. Pupils equal, round, and reactive to light. Extra ocular muscles intact. Conjunctivae/corneas clear. Bruise over the left upper eyelid  Neck: Supple, with full range of motion. No jugular venous distention. Trachea midline. Respiratory:  Normal respiratory effort. Clear to auscultation, bilaterally without Rales/Wheezes/Rhonchi. Cardiovascular:  Regular rate and rhythm with normal S1/S2 without murmurs, rubs or gallops. Abdomen: Soft, non-tender, non-distended with normal bowel sounds. Musculoskeletal:  No clubbing, cyanosis or edema bilaterally. Full range of motion without deformity. Skin: Skin color, texture, turgor normal.  Bilateral lower bruising in lower extremities, bilateral knee replacement surgical scars +  Neurologic: Awake, oriented to self only, follows simple commands moves all 4 extremities spontaneously, speech clear, no facial asymmetry  Psychiatric: Awake and oriented to self only.   Capillary Refill: Brisk,3 seconds, normal  Peripheral Pulses: +2 palpable, equal bilaterally       Labs:     Recent Labs     10/25/22  1708   WBC 13.6*   HGB 13.1   HCT 39.2          Recent Labs     10/25/22  1708   *   K 4.1      CO2 22   BUN 33*   CREATININE 0.8   CALCIUM 9.9       Recent Labs     10/25/22  1708   AST 41*   ALT 28   BILITOT 0.6   ALKPHOS 127       Recent Labs     10/25/22  1708   INR 1.15*       Recent Labs     10/25/22  1708   CKTOTAL 627*   TROPONINI <0.01         Urinalysis:      Lab Results   Component Value Date/Time    NITRU Negative 10/25/2022 05:08 PM    WBCUA None seen 10/25/2022 05:08 PM    BACTERIA 2+ 02/06/2022 10:20 PM    RBCUA 3-4 10/25/2022 05:08 PM    BLOODU MODERATE 10/25/2022 05:08 PM    SPECGRAV >=1.030 10/25/2022 05:08 PM    GLUCOSEU Negative 10/25/2022 05:08 PM       Radiology:     CXR: I have reviewed the CXR with the following interpretation: As below  EKG:  I have reviewed the EKG with the following interpretation: Sinus rhythm, VR = 88, normal intervals, no acute ST-T changes    CT PELVIS WO CONTRAST Additional Contrast? None   Final Result      1. No acute fracture. Intact right total hip arthroplasty. XR FEMUR RIGHT (MIN 2 VIEWS)   Final Result   Impression:   No acute fracture. Chronic distal femoral shaft fracture status post ORIF. XR TIBIA FIBULA LEFT (2 VIEWS)   Final Result   Impression:   No acute osseous injury. XR TIBIA FIBULA RIGHT (2 VIEWS)   Final Result   Impression:   No acute osseous injury. XR HIP BILATERAL W AP PELVIS (2 VIEWS)   Final Result      No acute osseous abnormality. XR CHEST PORTABLE   Final Result      Normal.      CT CERVICAL SPINE WO CONTRAST   Final Result      Cervical spine:   1. No evidence of acute fracture or traumatic subluxation. 2. Severe multilevel degenerative disc disease without evidence of bony spinal stenosis.    3. Sclerotic density in the posterior elements of T2, indeterminate but could be degenerative. A sclerotic neoplasm is considered less likely. Head:   1. No acute intracranial hemorrhage or mass effect. CT HEAD WO CONTRAST   Final Result      Cervical spine:   1. No evidence of acute fracture or traumatic subluxation. 2. Severe multilevel degenerative disc disease without evidence of bony spinal stenosis. 3. Sclerotic density in the posterior elements of T2, indeterminate but could be degenerative. A sclerotic neoplasm is considered less likely. Head:   1. No acute intracranial hemorrhage or mass effect. Consults:    IP CONSULT TO HOSPITALIST  IP CONSULT TO NEUROLOGY  IP CONSULT TO SOCIAL WORK    ASSESSMENT:    Active Hospital Problems    Diagnosis Date Noted    AMS (altered mental status) [R41.82] 10/25/2022     Priority: Medium   #Altered mental state-per daughter this is acute onset, even though she has mentioned that patient had mental slowing over the past year. CT head-negative, UA-not consistent with UTI  #Suspected frequent falls, obtain CT pelvis without contrast.  CT head-negative, CT cervical spine-T2 sclerotic density  #Elevated CPK (627)  #Mild leukocytosis with left shift, no signs of infection, UA and chest x-ray negative for infection  #Mild azotemia  #Elevated proBNP, no echocardiogram in our system or Care Everywhere. Clinically patient is not in fluid overload or CHF exacerbation  #Mild hypernatremia with positive urine ketones-likely secondary to dehydration    PLAN:  -Neurochecks every 4 hourly  -Fall precautions  -Neurology consult for further recommendations  -Gentle IV hydration  -Monitor electrolytes, renal function  -Monitor CK levels  -Consider echocardiogram, possibly as outpatient  -Consider further evaluation of CT cervical spine finding of T2 sclerotic density  -PT/OT  -Social service consult  -Supportive therapy      DVT Prophylaxis: Lovenox  Diet: ADULT DIET;  Regular  Code Status: Full Code    PT/OT Eval Status: As tolerated    Dispo -GMF with telemetry       Cheikh Avitia MD    Thank you Andreea Sanabria MD for the opportunity to be involved in this patient's care. If you have any questions or concerns please feel free to contact me at [phone].

## 2022-10-26 NOTE — PROGRESS NOTES
Speech Language Pathology  Facility/Department: [address]   CLINICAL BEDSIDE SWALLOW EVALUATION & DC    NAME: Kobe Brooks  : 1945  MRN: [id number]    ADMISSION DATE: 10/25/2022  ADMITTING DIAGNOSIS: has DDD (degenerative disc disease), lumbar; Cervical spondylosis; Neck pain; Back pain; Lumbar stenosis; Discogenic low back pain; Myofascial pain; Primary localized osteoarthrosis, lower leg; Hip pain; Ischial bursitis; Fe deficiency anemia; Knee pain, bilateral; Obesity (BMI 30-39.9); Fibromyalgia; Hypertension; Left TKR; Chronic blood loss anemia; Unspecified adverse effect of other drug, medicinal and biological substance(995.29); Painful orthopaedic hardware [facility]); Plantar fasciitis, bilateral; Closed comminuted intra-articular fracture of distal femur, right, initial encounter ([address] Closed displaced comminuted fracture of shaft of right femur ([address] and AMS (altered mental status) on their problem list.  ONSET DATE: 10/25/2022    Recent Chest Xray: (10/25/2022)  Impression       Normal.     CT Head: (10/25/2022)  Head:   1. No acute intracranial hemorrhage or mass effect. Date of Eval: 10/26/2022  Evaluating Therapist: JESSIE Ibarra    Current Diet level:  Current Diet : Regular      Primary Complaint  Patient Complaint: Patient did not state    Pain:  Pain Assessment  Pain Assessment: None - Denies Pain    Reason for Referral  Kobe Brooks was referred for a bedside swallow evaluation to assess the efficiency of her swallow function, identify signs and symptoms of aspiration and make recommendations regarding safe dietary consistencies, effective compensatory strategies, and safe eating environment. Impression  Dysphagia Diagnosis: Swallow function appears WFL  Dysphagia Impression : Swallow function appears grossly intact. With patient seated up in chair, on room air, assessed tolerance thins via straw (single sips, 3 oz test) and regular texture solid.  Patient with positive oral acceptance, timely mastication, positive oral clearance, no overt s/s aspiration, with patient passing 3 oz screen. Recommend continue regular texture diet and thin liquids. Dysphagia Outcome Severity Scale: Level 7: Normal in all situations     Treatment Plan  Requires SLP Intervention: No  Duration of Treatment: NA    Recommended Diet and Intervention  Regular + thins  Recommended Form of Meds: PO    Compensatory Swallowing Strategies  Compensatory Swallowing Strategies : Upright as possible for all oral intake    Treatment/Goals  NA    General  Chart Reviewed: Yes  Comments: Per admitting H&P (10/25/2022): '77 y.o. female who was brought in by EMS for altered mental status. Patient is awake, only oriented to self. Not oriented to place time and circumstance. Patient is unable to tell me what happened and why she is here. Denies any pain. History taken from the ED physicians records and per the conversation. This patient presents after being found down at home. She was covered in feces. There is no blood or other signs of external trauma per the report of EMS at the scene. I was able to speak with her daughter who provides the following history. The patient was seen to stumble and fall outside her home on Friday. Neighbors were able to assist her into the house and reported the fall to the patient's daughter. The patient was not answering the phone on Saturday so the daughter asked a friend of the family to stop by. The patient did not answer the door to the knock of the friend, but the friend was able to see the patient through the window and the patient shouted that she was fine. No further contact could be made on Sunday or Monday. Today, Tuesday, a welfare check was conducted at the request of the patient's daughter. This is what led to the patient being brought here by EMS.  Per history patient has been hospitalized in February 2022 for hip fracture.'    Subjective: Patient awake, alert, pleasant, seen seated up in bed, on room air. Behavior/Cognition: Alert; Cooperative;Pleasant mood  Respiratory Status: Room air  O2 Device: None (Room air)  Communication Observation: Functional  Follows Directions: Simple  Dentition: Adequate  Patient Positioning: Upright in chair  Baseline Vocal Quality: Normal  Volitional Cough: Strong  Consistencies Administered: Regular; Thin - straw    Prior Dysphagia History: None found in chart review. Patient previously seen by this speech therapy department during ARU admission earlier this year (02/2022). At that time, pt was referred for cognitive-linguistic assessment, the results of which were within normal limits. Vision/Hearing  Vision  Vision:  (adequate for BSE purposes)  Hearing  Hearing:  (adequate for BSE purposes)    Oral Motor Deficits  Oral/Motor  Oral Hygiene: Clean    Prognosis  Individuals consulted  Consulted and agree with results and recommendations: RN;Patient    Education  Patient Education: Educated pt to purpose of visit, swallow function, diet recommendations. Patient Education Response: Verbalizes understanding  Safety Devices in place: Yes  Type of devices: All fall risk precautions in place; Other (comment) (physician with patient)       Therapy Time  SLP Individual Minutes  Time In: 0901  Time Out: 0920  Minutes: 23     SLP Total Treatment Time  Timed Code Treatment Minutes: 0 Minutes  Total Treatment Time: 19    Plan  Diet Recommendations: regular texture solids / thin liquids / meds PO  - general aspiration precautions    Discharge Plan:  no further dysphagia therapy appears indicated at this time  Discussed with RN, Ailyn Henning. Needs within reach.     Electronically Signed by:  Iradia Farah M.A., [address]  Speech-Language Pathologist  Pager #[phone]

## 2022-10-27 ENCOUNTER — NURSE ONLY (OUTPATIENT)
Dept: CARDIOLOGY | Age: 77
End: 2022-10-27

## 2022-10-27 LAB
A/G RATIO: 1.3 (ref 1.1–2.2)
ALBUMIN SERPL-MCNC: 3.3 G/DL (ref 3.4–5)
ALP BLD-CCNC: 92 U/L (ref 40–129)
ALT SERPL-CCNC: 17 U/L (ref 10–40)
ANION GAP SERPL CALCULATED.3IONS-SCNC: 11 MMOL/L (ref 3–16)
AST SERPL-CCNC: 18 U/L (ref 15–37)
BASOPHILS ABSOLUTE: 0 K/UL (ref 0–0.2)
BASOPHILS RELATIVE PERCENT: 0.6 %
BILIRUB SERPL-MCNC: 0.4 MG/DL (ref 0–1)
BUN BLDV-MCNC: 33 MG/DL (ref 7–20)
CALCIUM SERPL-MCNC: 8.9 MG/DL (ref 8.3–10.6)
CHLORIDE BLD-SCNC: 107 MMOL/L (ref 99–110)
CO2: 25 MMOL/L (ref 21–32)
CREAT SERPL-MCNC: 0.9 MG/DL (ref 0.6–1.2)
EOSINOPHILS ABSOLUTE: 0.3 K/UL (ref 0–0.6)
EOSINOPHILS RELATIVE PERCENT: 4.2 %
GFR SERPL CREATININE-BSD FRML MDRD: >60 ML/MIN/{1.73_M2}
GLUCOSE BLD-MCNC: 99 MG/DL (ref 70–99)
HCT VFR BLD CALC: 33.7 % (ref 36–48)
HEMOGLOBIN: 11.2 G/DL (ref 12–16)
LYMPHOCYTES ABSOLUTE: 2 K/UL (ref 1–5.1)
LYMPHOCYTES RELATIVE PERCENT: 26.7 %
MAGNESIUM: 1.9 MG/DL (ref 1.8–2.4)
MCH RBC QN AUTO: 28.3 PG (ref 26–34)
MCHC RBC AUTO-ENTMCNC: 33.2 G/DL (ref 31–36)
MCV RBC AUTO: 85 FL (ref 80–100)
MONOCYTES ABSOLUTE: 0.8 K/UL (ref 0–1.3)
MONOCYTES RELATIVE PERCENT: 10.3 %
NEUTROPHILS ABSOLUTE: 4.3 K/UL (ref 1.7–7.7)
NEUTROPHILS RELATIVE PERCENT: 58.2 %
PDW BLD-RTO: 15.1 % (ref 12.4–15.4)
PLATELET # BLD: 242 K/UL (ref 135–450)
PMV BLD AUTO: 9.3 FL (ref 5–10.5)
POTASSIUM REFLEX MAGNESIUM: 3.4 MMOL/L (ref 3.5–5.1)
RBC # BLD: 3.96 M/UL (ref 4–5.2)
SODIUM BLD-SCNC: 143 MMOL/L (ref 136–145)
TOTAL PROTEIN: 5.9 G/DL (ref 6.4–8.2)
URINE CULTURE, ROUTINE: NORMAL
WBC # BLD: 7.3 K/UL (ref 4–11)

## 2022-10-27 PROCEDURE — 6370000000 HC RX 637 (ALT 250 FOR IP): Performed by: HOSPITALIST

## 2022-10-27 PROCEDURE — 6370000000 HC RX 637 (ALT 250 FOR IP): Performed by: INTERNAL MEDICINE

## 2022-10-27 PROCEDURE — 95819 EEG AWAKE AND ASLEEP: CPT | Performed by: NURSE PRACTITIONER

## 2022-10-27 PROCEDURE — 97530 THERAPEUTIC ACTIVITIES: CPT

## 2022-10-27 PROCEDURE — 36415 COLL VENOUS BLD VENIPUNCTURE: CPT

## 2022-10-27 PROCEDURE — 1200000000 HC SEMI PRIVATE

## 2022-10-27 PROCEDURE — 85025 COMPLETE CBC W/AUTO DIFF WBC: CPT

## 2022-10-27 PROCEDURE — 2580000003 HC RX 258: Performed by: INTERNAL MEDICINE

## 2022-10-27 PROCEDURE — 83735 ASSAY OF MAGNESIUM: CPT

## 2022-10-27 PROCEDURE — 80053 COMPREHEN METABOLIC PANEL: CPT

## 2022-10-27 PROCEDURE — 6360000002 HC RX W HCPCS: Performed by: INTERNAL MEDICINE

## 2022-10-27 PROCEDURE — 99232 SBSQ HOSP IP/OBS MODERATE 35: CPT

## 2022-10-27 PROCEDURE — 97116 GAIT TRAINING THERAPY: CPT

## 2022-10-27 PROCEDURE — 95819 EEG AWAKE AND ASLEEP: CPT

## 2022-10-27 PROCEDURE — 6370000000 HC RX 637 (ALT 250 FOR IP): Performed by: STUDENT IN AN ORGANIZED HEALTH CARE EDUCATION/TRAINING PROGRAM

## 2022-10-27 RX ORDER — NAPROXEN 250 MG/1
250 TABLET ORAL 2 TIMES DAILY WITH MEALS
Status: DISCONTINUED | OUTPATIENT
Start: 2022-10-27 | End: 2022-10-29 | Stop reason: HOSPADM

## 2022-10-27 RX ADMIN — ENOXAPARIN SODIUM 40 MG: 100 INJECTION SUBCUTANEOUS at 08:44

## 2022-10-27 RX ADMIN — ACETAMINOPHEN 650 MG: 325 TABLET ORAL at 04:23

## 2022-10-27 RX ADMIN — ACETAMINOPHEN 650 MG: 325 TABLET ORAL at 12:10

## 2022-10-27 RX ADMIN — SODIUM CHLORIDE, PRESERVATIVE FREE 10 ML: 5 INJECTION INTRAVENOUS at 08:44

## 2022-10-27 RX ADMIN — NAPROXEN 250 MG: 250 TABLET ORAL at 16:32

## 2022-10-27 RX ADMIN — AMLODIPINE BESYLATE 5 MG: 5 TABLET ORAL at 08:44

## 2022-10-27 RX ADMIN — ACETAMINOPHEN 650 MG: 325 TABLET ORAL at 23:42

## 2022-10-27 ASSESSMENT — PAIN SCALES - GENERAL
PAINLEVEL_OUTOF10: 7
PAINLEVEL_OUTOF10: 8
PAINLEVEL_OUTOF10: 4
PAINLEVEL_OUTOF10: 5
PAINLEVEL_OUTOF10: 4
PAINLEVEL_OUTOF10: 7
PAINLEVEL_OUTOF10: 7

## 2022-10-27 ASSESSMENT — PAIN DESCRIPTION - ORIENTATION
ORIENTATION: LOWER
ORIENTATION: LOWER
ORIENTATION: OTHER (COMMENT)
ORIENTATION: LOWER

## 2022-10-27 ASSESSMENT — PAIN DESCRIPTION - LOCATION
LOCATION: BACK

## 2022-10-27 ASSESSMENT — PAIN DESCRIPTION - DESCRIPTORS
DESCRIPTORS: ACHING;THROBBING
DESCRIPTORS: ACHING
DESCRIPTORS: ACHING;THROBBING
DESCRIPTORS: ACHING

## 2022-10-27 NOTE — PROGRESS NOTES
2 week CAM monitor # [id number] applied per order Dr Marlyn Doran MD per recs from Dr Jimena Cerda for syncope. Instructions given and questions answered. Bedside nurse aware.

## 2022-10-27 NOTE — DISCHARGE INSTRUCTIONS
Patient instructions for CAM monitor: You will need to wear monitor for 2 weeks. Mail monitor back or return to office on following date. Remove date:  11-10-22      [address]         Make sure to save box as you will place monitor back in postage paid box to return to company. After removing monitor stick it to template provided, place both your log and monitor in box and place in mailbox. If monitor comes off but has been in place at least 7 days place in box and mail back. If it comes off sooner than 7 days you will have to call office and return to have it replaced. Avoid excess sweating to maximize wear time. You are able to shower after 24 hours, however have majority of water hitting back and not directly on monitor. Do not submerge in bath. If you experience any symptoms while wearing monitor push button and record in booklet.       For questions about monitor call: Customer Service [phone]      ADDITIONAL DISCHARGE INSTRUCTIONS:    Follow-up with the Cardiology Team in 2 weeks to review the cardiac monitor logs  Follow-up with the Neurology Team for further testing  Please try to wean off the gabapentin or as low as possible dose as it can be a sedating medicine and increase risk of falls  Please avoid driving yourself completely until you have had further medical evaluation done

## 2022-10-27 NOTE — PROGRESS NOTES
Physical Therapy  Facility/Department: [address] [place] [address]  Physical Therapy Treatment Note    Name: Edgardo Segura  : 1945  MRN: [id number]  Date of Service: 10/27/2022    Discharge Recommendations:    Edgardo Segura scored a 18/24 on the AM-PAC short mobility form. Current research shows that an AM-PAC score of 17 or less is typically not associated with a discharge to the patient's home setting. Based on the patient's AM-PAC score and their current functional mobility deficits, it is recommended that the patient have 3-5 sessions per week of Physical Therapy at d/c to increase the patient's independence. Please see assessment section for further patient specific details. If patient discharges prior to next session this note will serve as a discharge summary. Please see below for the latest assessment towards goals. PT Equipment Recommendations  Equipment Needed: No (has walkers at home)      Patient Diagnosis(es): The encounter diagnosis was Altered mental status, unspecified altered mental status type. Past Medical History:  has a past medical history of Allergic, Anemia, Anxiety, Closed comminuted intra-articular fracture of distal femur, right, initial encounter ([facility] [address] Depression, Eye disorder, Fibromyalgia, GERD (gastroesophageal reflux disease), Hypertension, Obesity, KAIT (obstructive sleep apnea), Osteoarthritis, Osteoporosis, PONV (postoperative nausea and vomiting), and Prolonged emergence from general anesthesia. Past Surgical History:  has a past surgical history that includes hip surgery; joint replacement (Right, ); Wrist fracture surgery (Left); joint replacement (Left, 16); knee surgery; and Femur fracture surgery (Right, 2022). Assessment   Body Structures, Functions, Activity Limitations Requiring Skilled Therapeutic Intervention: Decreased functional mobility ; Decreased endurance; Increased pain  Assessment: Decreased assist for bed mobility, transfers and gt this date. Limited by back pain. Pt continues to be below baseline function. Normally independent with gt without AD. Currently needing CGA with walker. Pt plans to return home at d/c. Reports has neighbors and friends who can assist her - unclear how much assist available. If pt goes home, rec 24 hr assist initially and cont skilled PT to maximize mobility and independence  Treatment Diagnosis: mobility impairment due to [address], [address]:  (2-5)  Current Treatment Recommendations: Functional mobility training, Transfer training, Gait training, Endurance training  Safety Devices  Type of Devices: Call light within reach, Chair alarm in place, Nurse notified, Left in chair     Restrictions  Position Activity Restriction  Other position/activity restrictions: up with assistance     Subjective   General  Chart Reviewed: Yes  Additional Pertinent Hx: 68 yo admitted 10/25/22 for AMS. Work up negative (head CT/UA/multiple hip and pelvic XR); neuro consult pending. Pmhx: depression, fibromyalgia, HTN. femur fx 02/2022 with ARU admit. Subjective  Subjective: Pt found supine in bed and agreeable to PT.   Reports back pain - not rated, RN gave pain med prior to OOB         Social/Functional History  Social/Functional History  Lives With: Alone  Type of Home: House  Home Layout: Multi-level, Able to Live on Main level with bedroom/bathroom  Home Access: Stairs to enter with rails (can enter through garage with 10 steps with rail)  Bathroom Shower/Tub: Tub/Shower unit  Bathroom Toilet: Handicap height (RTS)  Bathroom Equipment: Shower chair  Home Equipment: [employer], 4 wheeled  Has the patient had two or more falls in the past year or any fall with injury in the past year?: Yes  Additional Comments: info obtained from old chart; unclear how long patient has been home from ARU/SNF after February 2022 R femur fx  Vision/Hearing       Cognition   Orientation  Overall Orientation Status: Within Functional Limits  Orientation Level: Oriented to person;Oriented to place;Oriented to time;Oriented to situation     Objective                                Bed mobility  Supine to Sit: Stand by assistance (HOB elevated, with rail, cues for logroll technique)  Scooting: Stand by assistance  Transfers  Sit to Stand: Contact guard assistance (cues for hand placement - pulls up on walker)  Stand to Sit: Contact guard assistance (cues for safety)  Ambulation  Device: Rolling Walker  Assistance: Contact guard assistance  Quality of Gait: decreased bilat step length/height, decreased larry  Distance: 15', 20'  Comments: Declined further gt at this time                 OutComes Score                                                  AM-PAC Score  AM-PAC Inpatient Mobility Raw Score : 18 (10/27/22 1306)  AM-PAC Inpatient T-Scale Score : 43.63 (10/27/22 1306)  Mobility Inpatient CMS 0-100% Score: 46.58 (10/27/22 1306)  Mobility Inpatient CMS G-Code Modifier : CK (10/27/22 1306)          Tinneti Score       Goals  Short Term Goals  Time Frame for Short Term Goals: discharge  Short Term Goal 1: sit to/from supine supervision. Ongoing  Short Term Goal 2: sit to/from stand supervision. Ongoing  Short Term Goal 3: ambulate 100 ft with LRAD supervision.   Ongoing  Patient Goals   Patient Goals : did not state       Education  Patient Education  Education Given To: Patient  Education Provided: Role of Therapy (need to call for assist to get up)  Education Method: Verbal  Barriers to Learning: Cognition  Education Outcome: Continued education needed      Therapy Time   Individual Concurrent Group Co-treatment   Time In 1209         Time Out 1235         Minutes 26             Timed Code Treatment Minutes:26       Total Treatment Minutes:  39 Elizabet Monge, PT

## 2022-10-27 NOTE — PLAN OF CARE
Problem: Safety - Adult  Goal: Free from fall injury  10/27/2022 0902 by Solange Sargent RN  Outcome: Progressing  10/27/2022 0313 by Prieto Shipman RN  Outcome: Progressing     Problem: Skin/Tissue Integrity  Goal: Absence of new skin breakdown  Description: 1. Monitor for areas of redness and/or skin breakdown  2. Assess vascular access sites hourly  3. Every 4-6 hours minimum:  Change oxygen saturation probe site  4. Every 4-6 hours:  If on nasal continuous positive airway pressure, respiratory therapy assess nares and determine need for appliance change or resting period.   10/27/2022 0902 by Solange Sargent RN  Outcome: Progressing  10/27/2022 0313 by Prieto Shipman RN  Outcome: Progressing

## 2022-10-27 NOTE — PLAN OF CARE
Patient remains free from falls and injury, no new skin breakdown noted. Plans to discharge home when order written. Problem: Safety - Adult  Goal: Free from fall injury  Outcome: Progressing     Problem: Skin/Tissue Integrity  Goal: Absence of new skin breakdown  Description: 1. Monitor for areas of redness and/or skin breakdown  2. Assess vascular access sites hourly  3. Every 4-6 hours minimum:  Change oxygen saturation probe site  4. Every 4-6 hours:  If on nasal continuous positive airway pressure, respiratory therapy assess nares and determine need for appliance change or resting period.   Outcome: Progressing     Problem: Discharge Planning  Goal: Discharge to home or other facility with appropriate resources  Outcome: Progressing

## 2022-10-27 NOTE — PROCEDURES
Noemi Chairez is a 69 y.o. female patient. 1. Altered mental status, unspecified altered mental status type      Past Medical History:   Diagnosis Date    Allergic     rhinitis    Anemia     Anxiety     Closed comminuted intra-articular fracture of distal femur, right, [facility] 02/06/2022    Depression     Eye disorder     Fibromyalgia     GERD (gastroesophageal reflux disease)     Hypertension     Obesity     KAIT (obstructive sleep apnea)     no CPAP - has dental appliance    Osteoarthritis     Osteoporosis     PONV (postoperative nausea and vomiting)     Prolonged emergence from general anesthesia      Blood pressure (!) 168/83, pulse 69, temperature 97.6 °F (36.4 °C), temperature source Oral, resp. rate 17, height 5' 10\" (1.778 m), weight 183 lb 13.8 oz (83.4 kg), SpO2 95 %, not currently breastfeeding. EEG awake and asleep    Date/Time: 10/27/2022 10:22 AM  Performed by: Ashely De La Rosa MD  Authorized by: ANUM Zepeda - YUSEF       CLINICAL HISTORY:  Ms. Dulce Maria Carrillo is a 62-year-old woman with a history of hypertension, depression, recent hip fracture. She was brought to the emergency room for altered mental status after being found down. Patient fell outside of her home about 5 days ago. Neighbors assisted her back into her home and called her daughter. The next day patient would not answer her door but did tell the visitor that she was okay. Yesterday a welfare check was conducted and she was found down and covered in feces. Patient remains a bit confused. She does not remember the fall or hitting her head. She does have bruising of the left side of her head. She is oriented to person and place. She cannot come up with a date, even the year. She can tell me her address and that she has 8 cats. No history of seizure. Memory may have been slowly getting worse over the last year per daughter. Clinical concern is for complex partial or simple partial seizures.     FINDINGS: This is a routine 16 channel referential and bipolar video EEG. There is a background rhythm in the theta range, with additional slowing into the delta range at times. Photic stimulation is performed without driving or abnormality. Hyperventilation is not performed. No epileptiform abnormalities are noted. No electrographic seizures are recorded. There is no asymmetry. IMPRESSION:  This is an abnormal awake and drowsy EEG due to the presence of generalized slowing. Generalized background abnormalities are indicative of an underlying diffuse encephalopathy of nonspecific etiology. No focal or epileptiform abnormalities.     Guero Musa MD  10/27/2022
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
## 2022-10-27 NOTE — PROGRESS NOTES
NEUROLOGY / NEUROCRITICAL CARE PROGRESS NOTE       Patient Name: Viki Moffett YOB: 1945   Sex: Female Age: 69 yrs     CC / Reason for Consult: AMS    Interval Hx / Changes over last 24 hours:   Patient's neuro exam slightly improved. Patient oriented to person, place, year, and situation. Remains disoriented to day of week and month. ROS: C/o back pain. HISTORY   Admission HPI:   Viki Moffett is a 69 y.o. y/o female with PMH significant for HTN, depression, recent hip fx, GERD who presented to the ED for AMS after being found down. Per chart, patient underwent a fall outside her home 5 days ago. Neighbors assisted her into her home and reported the fall to her daughter. A family friend tried to check on the patient the following day but the patient would not answer the door but did tell them that she was fine. Yesterday, a welfare check was conducted at request of the daughter and she was found down and covered in feces and was brought to ED. In ED yesterday, she was awake but confused and not able to provide much ROS. On my assessment today, patient is alert and awake. She denies any complaints such as headache, blurred vision, weakness, numbness, cough, SOB, abdominal pain or urinary symptoms. She cannot recall any of the events of the last few days and does not remember her prior hip fracture or being in rehab for it. She does not know how she got to the hospital and is alarmed by her inability to recall recent events.        PMH Past Medical History:   Diagnosis Date    Allergic     rhinitis    Anemia     Anxiety     Closed comminuted intra-articular [facility] of distal femur, right[facility]06/[facility]22    Depression     Eye disorder     Fibromyalgia     GERD (gastroesophageal reflux disease)     Hypertension     Obesity     KAIT (obstructive sleep apnea)     no CPAP - has dental appliance    Osteoarthritis     Osteoporosis     PONV (postoperative nausea and vomiting)     Prolonged emergence from general anesthesia       Allergies Allergies   Allergen Reactions    Pregabalin Other (See Comments)     Other reaction(s): Confusion    Other reaction(s): Confusion  Other reaction(s): not responsive, Other (See Comments)  Other reaction(s): Confusion      Diet ADULT DIET; Regular   Isolation No active isolations     CURRENT SCHEDULED MEDICATIONS   Inpatient Medications     sodium chloride flush, 5-40 mL, IntraVENous, 2 times per day    enoxaparin, 40 mg, SubCUTAneous, Daily    amLODIPine, 5 mg, Oral, Daily   Infusions    sodium chloride        Antibiotics   Recent Abx Admin        No antibiotic orders with administrations found. LABS   Metabolic Panel Recent Labs     10/25/22  1708 10/26/22  0755 10/27/22  0826   * 146* 143   K 4.1 3.6 3.4*    109 107   CO2 22 22 25   BUN 33* 41* 33*   CREATININE 0.8 1.1 0.9   GLUCOSE 105* 101* 99   CALCIUM 9.9 9.6 8.9   LABALBU 4.4  --  3.3*   MG  --   --  1.90   ALKPHOS 127  --  92   ALT 28  --  17   AST 41*  --  18      CBC / Coags Recent Labs     10/25/22  1708 10/26/22  0755 10/27/22  0826   WBC 13.6* 8.5 7.3   RBC 4.59 4.27 3.96*   HGB 13.1 11.8* 11.2*   HCT 39.2 36.0 33.7*    285 242   INR 1.15*  --   --       Other No results for input(s): LABA1C, LDLCALC, TRIG, TSH, [id number], FOLATE, LABSALI, COVID19 in the last 72 hours. Recent Labs     10/26/22  0755   LACTA 0.9        DIAGNOSTICS   IMAGES:  Images personally reviewed and agree w/ radiology interpretation. MRI Brain w & w/o Contrast:  Impression   1. Mild diffuse cerebral atrophy with superimposed mild to moderate small vessel ischemic disease. No hemorrhage, mass, or recent infarct. Previous Imaging:    Head CT w/o Contrast:  Head:   1. No acute intracranial hemorrhage or mass effect. CT cervical spine w/o Contrast:  Impression       Cervical spine:   1. No evidence of acute fracture or traumatic subluxation.    2. Severe multilevel degenerative disc disease without evidence of bony spinal stenosis. 3. Sclerotic density in the posterior elements of T2, indeterminate but could be degenerative. A sclerotic neoplasm is considered less likely. ECHO limited:   Summary  Left ventricular cavity size is normal.  Normal left ventricular wall thickness. Ejection fraction is visually estimated to be 55-60%. No regional wall motion abnormalities are noted. Diastolic filling parameters suggest grade I diastolic dysfunction. Routine EEG:  IMPRESSION:  This is an abnormal awake and drowsy EEG due to the presence of generalized slowing. Generalized background abnormalities are indicative of an underlying diffuse encephalopathy of nonspecific etiology. No focal or epileptiform abnormalities.     PHYSICAL EXAMINATION     PHYSICAL EXAM:  Vitals:    10/26/22 2025 10/27/22 0042 10/27/22 0417 10/27/22 0839   BP: 136/82 137/84 (!) 142/85 (!) 168/83   Pulse: 85 88 92 69   Resp: 18 18 18 17   Temp: 98.1 °F (36.7 °C) 98.1 °F (36.7 °C) 98.1 °F (36.7 °C) 97.6 °F (36.4 °C)   TempSrc: Oral Oral Oral Oral   SpO2: 93% 94% 94% 95%   Weight:       Height:             General: Alert, no distress, well-nourished  Neurologic  Mental status:   orientation to person, place, year, and situation   Attention intact as able to attend well to the exam     Language fluent in conversation   Comprehension intact; follows simple commands    Cranial nerves:   CN2: Visual fields full w/o extinction on confrontational testing   CN 3,4,6: Pupils equal and reactive to light, extraocular muscles intact  CN5: Facial sensation symmetric   CN7: Face symmetric  CN8: Hearing symmetric to spoken voice  CN9: Palate elevated symmetrically  CN11: Traps full strength on shoulder shrug  CN12: Tongue midline with protrusion    Motor Exam:   R  L    Deltoid 5  5   Biceps 5 5   Triceps 5 5   Wrist extension  5 5   Interossei 5 5      R  L    Hip flexion  5  5   Hip extension  5 5   Knee flexion  5 5   Knee extension  5 5   Ankle dorsiflexion  5 5   Ankle plantar flexion  5 5       Sensory: light touch intact and symmetric in all 4 extremities. No sensory extinction on bilateral simultaneous stimulation  Cerebellar/coordination: finger nose finger normal without ataxia  Tone: normal in all 4 extremities  Gait: held 2/2 patient safety    OTHER SYSTEMS:  Cardiovascular: Warm, appears well perfused   Respiratory: Easy, non-labored respiratory pattern   Abdominal: Abdomen is without distention   Extremities: Upper and lower extremities are atraumatic in appearance without deformity. No swelling or erythema. ASSESSMENT & RECOMMENDATIONS   Assessment:  Meagan Bell is a 70 yo female who presented with AMS after being found down in her home. CT head, CT cervical spine, MRI brain, and routine EEG all unremarkable. Labs on admission showed hypernatremia, elevated CK, and ketones on her UA. Echocardiogram shows possible grade 1 diastolic dysfunction. Patient more oriented today on exam, and feels she is returning to her baseline. Plan:  - PT/OT for eval/treat  - can follow up as outpatient for neuro-psyche evaluation  - neurology will sign off. Please call with questions or exam changes. ANUM Mac - CNP   Neurology & Neurocritical Care   10/27/2022 9:58 AM      ICU Patients:   Neurocritical Care Line: [phone]  [facility]: [facility] Neurocritical Care    Floor / PCU Patients:  Neurology Line: [phone]  [facility]: [facility] Neurology    I spent 35 minutes in the care of this patient. Over 50% of that time was in face-to-face counseling regarding disease process, diagnostic testing, preventative measures, and answering patient and family questions.

## 2022-10-27 NOTE — PROGRESS NOTES
Cardiology note  -Personally reviewed echocardiogram, no major cardiac abnormalities.  -Episode is very unlikely to be related to cardiac etiologies.  -Complete neuro work-up, if neurology work-up negative, plan for 2-week cardiac monitor on discharge.  -Please call cardiology nurse to place cardiac monitor on discharge (unable to be done during the weekend)     Ciera Jordan MD, [facility]

## 2022-10-27 NOTE — CARE COORDINATION
CM following for  d/c planning:    Patient from Home alone: Indep at  baseline PTA  :  no current services  or  DME  but does have  RW at home  . Patient  admitted  with  AMS:  patient  found down at home  :    Cm  noted  both Cardiology and Neuro consults:      Per  Cardiology  Notes:  -  Complete neuro work-up, if neurology work-up negative, plan for 2-week cardiac monitor on discharge. -  Please call cardiology nurse to place cardiac monitor on discharge (unable to be done during the weekend)     PTOT evshane  noted  pt  refusing  SNF placement at this time. Referral  to [facility]      Electronically signed by Summer Capellan RN on 10/27/2022 at 3:44 PM          Summer Capellan  RN Case Manager  [facility]  [address] [place] [address].   [facility] [address]  [phone]  Fax [phone]

## 2022-10-28 PROBLEM — R41.82 AMS (ALTERED MENTAL STATUS): Status: RESOLVED | Noted: 2022-10-25 | Resolved: 2022-10-28

## 2022-10-28 LAB
ANION GAP SERPL CALCULATED.3IONS-SCNC: 12 MMOL/L (ref 3–16)
BASOPHILS ABSOLUTE: 0 K/UL (ref 0–0.2)
BASOPHILS RELATIVE PERCENT: 0.6 %
BUN BLDV-MCNC: 29 MG/DL (ref 7–20)
CALCIUM SERPL-MCNC: 9.3 MG/DL (ref 8.3–10.6)
CHLORIDE BLD-SCNC: 103 MMOL/L (ref 99–110)
CO2: 24 MMOL/L (ref 21–32)
CREAT SERPL-MCNC: 0.9 MG/DL (ref 0.6–1.2)
EOSINOPHILS ABSOLUTE: 0.3 K/UL (ref 0–0.6)
EOSINOPHILS RELATIVE PERCENT: 3.2 %
GFR SERPL CREATININE-BSD FRML MDRD: >60 ML/MIN/{1.73_M2}
GLUCOSE BLD-MCNC: 140 MG/DL (ref 70–99)
HCT VFR BLD CALC: 38.2 % (ref 36–48)
HEMOGLOBIN: 12.3 G/DL (ref 12–16)
LYMPHOCYTES ABSOLUTE: 1.6 K/UL (ref 1–5.1)
LYMPHOCYTES RELATIVE PERCENT: 19.8 %
MCH RBC QN AUTO: 27.5 PG (ref 26–34)
MCHC RBC AUTO-ENTMCNC: 32.2 G/DL (ref 31–36)
MCV RBC AUTO: 85.6 FL (ref 80–100)
MONOCYTES ABSOLUTE: 0.5 K/UL (ref 0–1.3)
MONOCYTES RELATIVE PERCENT: 6 %
NEUTROPHILS ABSOLUTE: 5.6 K/UL (ref 1.7–7.7)
NEUTROPHILS RELATIVE PERCENT: 70.4 %
PDW BLD-RTO: 15.2 % (ref 12.4–15.4)
PLATELET # BLD: 313 K/UL (ref 135–450)
PMV BLD AUTO: 9.7 FL (ref 5–10.5)
POTASSIUM REFLEX MAGNESIUM: 3.8 MMOL/L (ref 3.5–5.1)
RBC # BLD: 4.46 M/UL (ref 4–5.2)
SODIUM BLD-SCNC: 139 MMOL/L (ref 136–145)
WBC # BLD: 7.9 K/UL (ref 4–11)

## 2022-10-28 PROCEDURE — 80048 BASIC METABOLIC PNL TOTAL CA: CPT

## 2022-10-28 PROCEDURE — 97530 THERAPEUTIC ACTIVITIES: CPT

## 2022-10-28 PROCEDURE — 6360000002 HC RX W HCPCS: Performed by: INTERNAL MEDICINE

## 2022-10-28 PROCEDURE — 97535 SELF CARE MNGMENT TRAINING: CPT

## 2022-10-28 PROCEDURE — 1200000000 HC SEMI PRIVATE

## 2022-10-28 PROCEDURE — 2580000003 HC RX 258: Performed by: INTERNAL MEDICINE

## 2022-10-28 PROCEDURE — 6370000000 HC RX 637 (ALT 250 FOR IP): Performed by: HOSPITALIST

## 2022-10-28 PROCEDURE — 6370000000 HC RX 637 (ALT 250 FOR IP): Performed by: INTERNAL MEDICINE

## 2022-10-28 PROCEDURE — 85025 COMPLETE CBC W/AUTO DIFF WBC: CPT

## 2022-10-28 PROCEDURE — 6370000000 HC RX 637 (ALT 250 FOR IP): Performed by: STUDENT IN AN ORGANIZED HEALTH CARE EDUCATION/TRAINING PROGRAM

## 2022-10-28 PROCEDURE — 36415 COLL VENOUS BLD VENIPUNCTURE: CPT

## 2022-10-28 PROCEDURE — 97116 GAIT TRAINING THERAPY: CPT

## 2022-10-28 RX ORDER — BUPROPION HYDROCHLORIDE 150 MG/1
300 TABLET ORAL DAILY
Status: DISCONTINUED | OUTPATIENT
Start: 2022-10-29 | End: 2022-10-29 | Stop reason: HOSPADM

## 2022-10-28 RX ORDER — ESCITALOPRAM OXALATE 20 MG/1
20 TABLET ORAL DAILY
Status: DISCONTINUED | OUTPATIENT
Start: 2022-10-29 | End: 2022-10-29 | Stop reason: HOSPADM

## 2022-10-28 RX ADMIN — SODIUM CHLORIDE, PRESERVATIVE FREE 10 ML: 5 INJECTION INTRAVENOUS at 09:24

## 2022-10-28 RX ADMIN — ENOXAPARIN SODIUM 40 MG: 100 INJECTION SUBCUTANEOUS at 09:23

## 2022-10-28 RX ADMIN — ACETAMINOPHEN 650 MG: 325 TABLET ORAL at 22:41

## 2022-10-28 RX ADMIN — SODIUM CHLORIDE, PRESERVATIVE FREE 10 ML: 5 INJECTION INTRAVENOUS at 19:35

## 2022-10-28 RX ADMIN — AMLODIPINE BESYLATE 5 MG: 5 TABLET ORAL at 09:22

## 2022-10-28 RX ADMIN — POLYETHYLENE GLYCOL 3350 17 G: 17 POWDER, FOR SOLUTION ORAL at 13:46

## 2022-10-28 RX ADMIN — ACETAMINOPHEN 650 MG: 325 TABLET ORAL at 09:22

## 2022-10-28 RX ADMIN — NAPROXEN 250 MG: 250 TABLET ORAL at 17:57

## 2022-10-28 ASSESSMENT — PAIN DESCRIPTION - DESCRIPTORS
DESCRIPTORS: ACHING
DESCRIPTORS: ACHING

## 2022-10-28 ASSESSMENT — PAIN SCALES - GENERAL
PAINLEVEL_OUTOF10: 9
PAINLEVEL_OUTOF10: 6
PAINLEVEL_OUTOF10: 5

## 2022-10-28 ASSESSMENT — PAIN DESCRIPTION - ORIENTATION
ORIENTATION: LOWER

## 2022-10-28 ASSESSMENT — PAIN DESCRIPTION - LOCATION
LOCATION: BACK

## 2022-10-28 NOTE — PROGRESS NOTES
Occupational Therapy  Facility/Department: [address]  Occupational Therapy Treatment     Name: Earline Baca  : 1945  MRN: [id number]  Date of Service: 10/28/2022    Discharge Recommendations:Sandra Rand scored a 17/24 on the AM-PAC ADL Inpatient form. Current research shows that an AM-PAC score of 17 or less is typically not associated with a discharge to the patient's home setting. Based on the patient's AM-PAC score and their current ADL deficits, it is recommended that the patient have 3-5 sessions per week of Occupational Therapy at d/c to increase the patient's independence. Please see assessment section for further patient specific details. If patient discharges prior to next session this note will serve as a discharge summary. Please see below for the latest assessment towards goals. OT Equipment Recommendations  Equipment Needed: No  Other: defer to [facility] care facility       Patient Diagnosis(es): The encounter diagnosis was Altered mental status, unspecified altered mental status type. Past Medical History:  has a past medical history of Allergic, Anemia, Anxiety, Closed comminuted intra-articular fracture of distal femur, right, initial encounter ([facility] [address] Depression, Eye disorder, Fibromyalgia, GERD (gastroesophageal reflux disease), Hypertension, Obesity, KAIT (obstructive sleep apnea), Osteoarthritis, Osteoporosis, PONV (postoperative nausea and vomiting), and Prolonged emergence from general anesthesia. Past Surgical History:  has a past surgical history that includes hip surgery; joint replacement (Right, ); Wrist fracture surgery (Left); joint replacement (Left, 16); knee surgery; and Femur fracture surgery (Right, 2022). Treatment Diagnosis: impaired ADLs / functional mobility / confusion / poor safety awareness 2/2 fall      Assessment   Performance deficits / Impairments: Decreased functional mobility ; Decreased ADL status; Decreased endurance;Decreased safe awareness;Decreased cognition  Assessment: Pt continues with ongoing deficits. Pt limited by poor mentation and pain this date. Pt demo poor safety awareness / insight. Pt needing Mod A with LE ADLs and CGA / MIn with functional mobility. Pt needing increased time encouragement and effort for mobility / simple ADLs. Pt is a high fall risk. Recommend ongoing inpt OT at d/c to maximize functional level / safety. If home recommend 24hr assist / supervision / [address]. Will follow as inpt. Treatment Diagnosis: impaired ADLs / functional mobility / confusion / poor safety awareness 2/2 fall  REQUIRES OT FOLLOW-UP: Yes  Activity Tolerance  Activity Tolerance: Patient Tolerated treatment well;Patient limited by pain  Activity Tolerance Comments: Pt with increased pain with all mobility - very vague description \" I've been in pain all of my life\" pt unable to elaborate what she mean and location/ cause of pain. Plan   Occupational Therapy Plan  Times Per Week: 2-5x  Current Treatment Recommendations: Functional mobility training, Endurance training, Safety education & training, Patient/Caregiver education & training, Cognitive reorientation, Self-Care / ADL     Restrictions  Position Activity Restriction  Other position/activity restrictions: up with assistance    Subjective   General  Chart Reviewed: Yes  Additional Pertinent Hx: Admit 10/25 with AMS/ found down welfare check,  recent fall -neck pain  CT head -neg, CT Cspine-Severe multilevel DDD, neg fx,   Multiple xrays BLE's - neg fx,  Cxray - neg                       PMHX: Anemia, Anxiety, depression, GERD, OA, HTN, R femur fx w/ORIF 2/2022,  Family / Caregiver Present: No  Diagnosis: AMS, Found down/ Falls  Subjective  Subjective: \" I can't seem to get things straight\" pt found resting in bed - easily awakens and agreeable for OOB/OT tx.  Pt needing increased time and encouragement to \" get moving\"  Pt claims she's feeling \" Not awake\" during session x 45 mins     Social/Functional History  Social/Functional History  Lives With: Alone  Type of Home: House  Home Layout: Multi-level, Able to Live on Main level with bedroom/bathroom  Home Access: Stairs to enter with rails (can enter through garage with 10 steps with rail)  Bathroom Shower/Tub: Tub/Shower unit  Bathroom Toilet: Handicap height (RTS)  Bathroom Equipment: Shower chair  Home Equipment: Claudia German, 4 wheeled  Has the patient had two or more falls in the past year or any fall with injury in the past year?: Yes  Additional Comments: info obtained from old chart; unclear how long patient has been home from ARU/SNF after February 2022 R femur fx       Objective Treatment included functional transfer training, ADL's and pt. education. Safety Devices  Type of Devices: Chair alarm in place;Nurse notified; Left in chair;Call light within reach     Toilet Transfers  Toilet - Technique: Ambulating  Equipment Used: Standard toilet  Toilet Transfer: Contact guard assistance  Toilet Transfers Comments: with rail -- pt needing cues for staying with the walker and correct hand placement     ADL  Feeding: Setup  Grooming: Contact guard assistance;Verbal cueing  Grooming Skilled Clinical Factors: Pt needing cues for initiation of task  Toileting: Minimal assistance; Increased time to complete; Moderate assistance  Toileting Skilled Clinical Factors: Pt with increased pain with attempts to don socks / brief over RLE.   Additional Comments: Pt when asked about how to manage self care at home - reports she'll \" figure it out\" pt demo poor insight and excuses for needing assistance        Bed mobility  Supine to Sit: Stand by assistance;Contact guard assistance (increased time and effort)  Scooting: Supervision;Stand by assistance  Transfers  Sit to stand: Contact guard assistance  Stand to sit: Contact guard assistance  Transfer Comments: v/cues for hand placement  Vision - Basic Assessment  Prior Vision: No visual deficits  Vision  Vision: Within Functional Limits  Hearing  Hearing: Within functional limits  Cognition  Overall Cognitive Status: Exceptions  Arousal/Alertness: Delayed responses to stimuli; Appropriate responses to stimuli  Following Commands: Follows one step commands with repetition  Attention Span: Attends with cues to redirect; Difficulty attending to directions  Memory: Decreased recall of recent events;Decreased short term memory  Safety Judgement: Decreased awareness of need for assistance;Decreased awareness of need for safety  Insights: Decreased awareness of deficits  Initiation: Requires cues for some  Cognition Comment: Pt tends to deflect answers to questions. Pt reports feeling \" asleep\" . Pt needing increased time / cues for processing - limited carryover noted  Orientation  Overall Orientation Status: Impaired  Orientation Level: Oriented to person;Oriented to place;Oriented to situation;Disoriented to time     Education Given To: Patient  Education Provided: ADL Adaptive Strategies;Precautions;Transfer Training;IADL Safety  Education Method: Demonstration;Verbal  Barriers to Learning: Cognition  Education Outcome: Unable to demonstrate understanding; Unable to verbalize;Continued education needed     AM-PAC Score  AM-[facility] Inpatient Daily Activity Raw Score: 17 (10/28/22 0920)  AM-PAC Inpatient ADL T-Scale Score : 37.26 (10/28/22 0920)  ADL Inpatient CMS 0-100% Score: 50.11 (10/28/22 0920)  ADL Inpatient CMS G-Code Modifier : CK (10/28/22 0920)    Goals  Short Term Goals  Time Frame for Short Term Goals: at d/c  Short Term Goal 1: Stance x 5 mins with supervision for ADLs/ IADLs - not met  Short Term Goal 2: LE dressing with CGA -not met  Short Term Goal 3: Toileting with SBA- part ment 10/28  Short Term Goal 4: Oriented x 3/4 attempts to environment- part met 10/28  Short Term Goal 5: Functional transfers with SBA- not met  Patient Goals   Patient goals : unable to state     Therapy Time Individual Concurrent Group Co-treatment   Time In 0820         Time Out 0915         Minutes 55             Timed Code Treatment Minutes:   55 mins     Total Treatment Minutes:  54 mins     Joshua Sands, OT

## 2022-10-28 NOTE — CONSULTS
Psychiatry Initial Consultation    Patient Name: Noemi Chairez  MRN: [id number]  Admission Date: 10/25/2022    Reason for Consult: Aid to capacity assessment; patient has likely some degree dementia and refusing SNF but oriented currently and difficult to determine laura medical capacity; request aid in capacity evaluation    HPI:   Noemi Chairez is a 69 y.o. female who presented to the hospital on 10/25/2022 with a chief complaint of fatigue. Per ED documentation, This patient presents after being found down at home. She was covered in feces. There is no blood or other signs of external trauma per the report of EMS at the scene. I was able to speak with her daughter who provides the following history. The patient was seen to stumble and fall outside her home on Friday. Neighbors were able to assist her into the house and reported the fall to the patient's daughter. The patient was not answering the phone on Saturday so the daughter asked a friend of the family to stop by. The patient did not answer the door to the knock of the friend, but the friend was able to see the patient through the window and the patient shouted that she was fine. No further contact could be made on Sunday or Monday. Today, Tuesday, a welfare check was conducted at the request of the patient's daughter. .. Patient is extremely confused and can provide no significant history. She does not member falls or even understand why she was brought here. Discussed case with Dr. Mindy Mcfarland. Met with Rodríguez Hopkins, who was pleasant and cooperative with assessment. She reports some confusion surrounding the events that brought her into the hospital. She notes she was found on the floor of her house by a friend but does not remember falling. She has a bruise above her L eye and she states she does not remember hitting her face. She denies pain today outside of some chronic back pain. Her medical workup has been negative for a potential cause.  She does report that she has been working on tapering some of her medications under the supervision of her PCP because she feels like she was on too much, but she could not recall the names of the medication(s). States she has been on Lexapro and Wellbutrin \"for years\", no recent dose adjustments and feels like they are helpful. She denies alcohol and substance use. She experienced a fall back in February and fractured her femur; was in physical rehab for a while and struggled with feeling as though she lost some of her independence. Then daughter rearranged house and packed some of her belongings, which upset her. Also notes that her son passed away in February and that she had to attend his  via [facility]. His passing \"fractured my family\" and she has had minimal contact with her daughter recently. We discussed her returning home post-hospitalization, as she lives alone. She is able to identify that there is concern because a cause was not determined and that it could happen again. She notes that she does not plan to participate in activities that could increase her risk of falling and she states she does not plan to drive until she follows up with outpatient medical providers and is cleared. She receives some services through the [facility] and notes they can set up transportation for her. Receives groceries through delivery services. Also notes that she has some close friends and neighbors who can check in on her and she plans to follow up with outpatient medical care. She is future oriented in conversation. Aid to Capacity Evaluation    Able to understand medical problem: yes, Hieu Hill states she does not recall falling or hitting her face but knows that she was found on the floor of her home by a friend.  She was able to tell me that multiple tests have been done and that no cause has been found  Able to understand proposed treatment: yes, able to identify that recommendation for SNF has changed and that MD is recommending some type of assistance in the home since she lives alone.  Notes she receives some services, including PT, through someone at the [facility] and they can help set up transportation  Able to understand alternative to proposed treatment (if any): N/A; per PT note, she no longer requires SNF and recommendation is for 24/7 supervision/assist and HHPT  Able to understand option of refusing proposed treatment (including withholding or withdrawing proposed treatment): yes, logically discussed options moving forward   Able to appreciate reasonably foreseeable consequences of accepting proposed treatment: yes, able to acknowledge that having someone in the home may help prevent a future fall  Able to appreciate reasonably foreseeable consequences of refusing proposed treatment (including withholding or withdrawing proposed treatment): yes, she was able to identify that an incident like this could happen again if she is alone in the home  The person's decision is affected by depression: no, endorses a longstanding history of depression but is not suicidal   The person's decision is affected by delusion/psychosis: no    Duration: 3-5 days   Severity: Moderate  Context: Stress, medical issues  Associated Symptoms: As above    Past Psychiatric History:    Previous Diagnoses: Depression, anxiety  Previous Hospitalizations: None found on chart review  Outpatient Treatment: None found on chart review  Past Medication Trials: Cymbalta, Lexapro, Wellbutrin  Suicidality: None     Past Medical History:  Past Medical History:   Diagnosis Date    Allergic     rhinitis    Anemia     Anxiety     Closed comminuted intra-articular fracture of distal femur, right, initial encounter ([facility] [address] 02/06/2022    Depression     Eye disorder     Fibromyalgia     GERD (gastroesophageal reflux disease)     Hypertension     Obesity     KAIT (obstructive sleep apnea)     no CPAP - has dental appliance    Osteoarthritis psychologist  Abuse/Trauma: None disclosed  Legal: None disclosed    Current Medications Ordered:   naproxen  250 mg Oral BID WC    sodium chloride flush  5-40 mL IntraVENous 2 times per day    enoxaparin  40 mg SubCUTAneous Daily    amLODIPine  5 mg Oral Daily      PRN Meds: sodium chloride flush, sodium chloride, ondansetron **OR** ondansetron, polyethylene glycol, acetaminophen **OR** acetaminophen, hydrALAZINE, perflutren lipid microspheres     ROS: See Medical H&PE     PE:    BP (!) 159/83   Pulse 74   Temp 97.9 °F (36.6 °C) (Oral)   Resp 18   Ht 5' 10\" (1.778 m)   Wt 183 lb 13.8 oz (83.4 kg)   SpO2 96%   BMI 26.38 kg/m²       Motor / Gait: Observed laying in bed, gait deferred  AIMS: 0    Mental Status Examination:    Appearance: WF, appears stated age, lying in bed, wearing hospital attire, fair grooming and fair hygiene   Behavior/Attitude Toward Examiner: Cooperative, attentive and good eye contact  Speech: Spontaneous, normal rate, normal volume and well articulated   Mood: \"Good\"  Affect: Mood congruent   Thought Processes: Logical  Thought Content: No SI/HI verbalized, no delusions voiced, no obsessions  Perceptions: No AVH verbalized, did not appear to be RTIS  Attention: Intact to interview  Cognition: Alert and oriented to person, place, time, and partially to situation (difficulty recalling events prior to admission)  Insight: Fair insight   Judgment: Fair judgment      LAB: Reviewed all labs obtained during admission to date. Dx:   Primary Psychiatric (DSM V) Diagnosis: Depression  Secondary Psychiatric (DSM V) Diagnoses: Anxiety  Chemical Dependency Diagnoses: None     Assessment  Reviewed nursing and ancillary staff notes since arrival to hospital, reviewed previous records and records available through [facility]. Evaluated medications and assessed for side effects and effectiveness.  Assessed patient's educational needs including reviewing plan of care, medications, and diagnosis. Recommendations:    Based on the documented assessment above, I believe that patient does have capacity to make medical decisions at this time. Dr. Barrie Huston made aware. Takes Lexapro 20 mg daily and Wellbutrin  mg daily at home but they are not currently ordered. Will reorder. Spent >80 minutes face to face with patient of which >50% was spent counseling and providing education regarding diagnosis, treatment options, and prognosis. Thank you for consult. Please do not hesitate to contact provider if there are additional questions regarding patient.     Danny Collado, MPH, PMHNP-BC  10/28/22

## 2022-10-28 NOTE — PLAN OF CARE
Problem: Safety - Adult  Goal: Free from fall injury  Outcome: Progressing     Problem: Skin/Tissue Integrity  Goal: Absence of new skin breakdown  Description: 1. Monitor for areas of redness and/or skin breakdown  2. Assess vascular access sites hourly  3. Every 4-6 hours minimum:  Change oxygen saturation probe site  4. Every 4-6 hours:  If on nasal continuous positive airway pressure, respiratory therapy assess nares and determine need for appliance change or resting period.   Outcome: Progressing     Problem: Discharge Planning  Goal: Discharge to home or other facility with appropriate resources  Outcome: Progressing

## 2022-10-28 NOTE — CARE COORDINATION
Cm  following for  d/c planning:    Patient   still needing to be seen by  Psych   and  re eval by PTOT . Per  MD  pt is refusing SNF and wants to return home  . Daughters both live out of town , I left  VM  with dgtr  and need to follow up;    Per  PT  scoring well AM PAC wise  but  still unsure of cognition . Psych to see. Will need  Transport home and  Need to see if able to get into her  house if she has a key . Per cardiology will need  2 week cardiac  monitor placed      SW to follow up in the  AM  .   Electronically signed by Mayte Preston RN on 10/28/2022 at 3:04 PM       Mayte Preston RN Case Manager  [facility]  [address] [place] [address].   [facility] [address]  [phone]  Fax [phone]

## 2022-10-28 NOTE — PROGRESS NOTES
Physician Progress Note      PATIENT:               Tacos Jefferson  CSN #:                  [id number]  :                       1945  ADMIT DATE:       10/25/2022 4:18 PM  [address] [place] [place] DATE:  RESPONDING  PROVIDER #:        Sergo Vazquez MD          QUERY TEXT:    Patient admitted with AMS, noted to have elevated CK (627 U/L). If possible,   please document in progress notes and discharge summary if you are evaluating   and/or treating any of the following: The medical record reflects the following:  Risk Factors: presents after being found down at home; AMS  Clinical Indicators: per H/P \"This patient presents after being found down at   home\"; per daughter: The patient was seen to  stumble and fall outside her home on Friday. No further contact could be made   on  or Monday\"; admission NV=091 U/L;  10/26- SG=405 U/L  Treatment: CBC, CMP, CT, U/A, LR IV @ 100cc/hour; repeat CK; V/S and   Intake/Output monitoring per unit protocol  Options provided:  -- Rhabdomyolysis  -- Rhabdomyolysis not clinically significant  -- Other - I will add my own diagnosis  -- Disagree - Not applicable / Not valid  -- Disagree - Clinically unable to determine / Unknown  -- Refer to Clinical Documentation Reviewer    PROVIDER RESPONSE TEXT:    Rhabdomyolysis is not clinically significant. Her CK peak was in 600s and quickly reduced to 200s following day without significant effect on renal function identified on Cr trend. Query created by: Pierre Patel on 10/26/2022 11:01 AM      QUERY TEXT:    Pt admitted with AMS and  falls. Pt noted to have encephalopathy and be on   Gabapentin. If possible, please document in progress notes and discharge   summary the relationship, if any, between the following.         The medical record reflects the following:  Risk Factors: Gabapentin use  Clinical Indicators: Per cardiology PN: \"Personally reviewed echocardiogram,   no major cardiac abnormalities.  -Episode is very unlikely to be related to cardiac etiologies. \" Per EEG:  No   focal or epileptiform abnormalities. MR brain with chronic small vessel white   matter changes and mild diffuse atrophy, no acute abnormalities. EKG w/o   ischemic changes or signs of conduction abnormality-Echocardiogram w/ EF   55-60%; normal LV size and wall thickness, no major valvular abnormalities. 10/27 PN: \"Patient takes gabapentin as outpatient for chronic pain:   recommended to patient she eliminate gabapentin or at least minimize dose   given risk of encephalopathy Strongly recommended/urged phyllis  Treatment: Neuro consult, EEG, Cardiology consult, Echo, EKG, lab monitoring,   CT, Pt/OT with supervision upon d/c recommended by PT  Options provided:  -- Toxic encephalopathy likely due to Gabapentin, taken as prescribed  -- Encephalopathy due to other, please specify likely cause, please specify   likely cause. -- Encephalopathy ruled out. Falls r/t age related debility  -- Other - I will add my own diagnosis  -- Disagree - Not applicable / Not valid  -- Disagree - Clinically unable to determine / Unknown  -- Refer to Clinical Documentation Reviewer    PROVIDER RESPONSE TEXT:    Provider is clinically unable to determine a response to this query.   Encephalopathy could be due to gabapentin use but unable to determine at this   time until further testing complete    Query created by: Cydney Haddad on 10/28/2022 3:37 PM      Electronically signed by:  Bridget Brewer MD 10/28/2022 7:03 PM
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
## 2022-10-29 VITALS
BODY MASS INDEX: 26.32 KG/M2 | HEIGHT: 70 IN | HEART RATE: 78 BPM | SYSTOLIC BLOOD PRESSURE: 143 MMHG | OXYGEN SATURATION: 94 % | TEMPERATURE: 98 F | DIASTOLIC BLOOD PRESSURE: 76 MMHG | WEIGHT: 183.86 LBS | RESPIRATION RATE: 18 BRPM

## 2022-10-29 PROCEDURE — 6370000000 HC RX 637 (ALT 250 FOR IP): Performed by: STUDENT IN AN ORGANIZED HEALTH CARE EDUCATION/TRAINING PROGRAM

## 2022-10-29 PROCEDURE — 6360000002 HC RX W HCPCS: Performed by: INTERNAL MEDICINE

## 2022-10-29 PROCEDURE — 6370000000 HC RX 637 (ALT 250 FOR IP): Performed by: NURSE PRACTITIONER

## 2022-10-29 PROCEDURE — 2580000003 HC RX 258: Performed by: INTERNAL MEDICINE

## 2022-10-29 PROCEDURE — 6370000000 HC RX 637 (ALT 250 FOR IP): Performed by: INTERNAL MEDICINE

## 2022-10-29 PROCEDURE — 6370000000 HC RX 637 (ALT 250 FOR IP): Performed by: HOSPITALIST

## 2022-10-29 RX ADMIN — ACETAMINOPHEN 650 MG: 325 TABLET ORAL at 06:37

## 2022-10-29 RX ADMIN — AMLODIPINE BESYLATE 5 MG: 5 TABLET ORAL at 08:15

## 2022-10-29 RX ADMIN — ENOXAPARIN SODIUM 40 MG: 100 INJECTION SUBCUTANEOUS at 08:15

## 2022-10-29 RX ADMIN — NAPROXEN 250 MG: 250 TABLET ORAL at 08:15

## 2022-10-29 RX ADMIN — SODIUM CHLORIDE, PRESERVATIVE FREE 10 ML: 5 INJECTION INTRAVENOUS at 08:15

## 2022-10-29 RX ADMIN — ESCITALOPRAM OXALATE 20 MG: 20 TABLET ORAL at 08:15

## 2022-10-29 RX ADMIN — BUPROPION HYDROCHLORIDE 300 MG: 150 TABLET, FILM COATED, EXTENDED RELEASE ORAL at 08:15

## 2022-10-29 ASSESSMENT — PAIN SCALES - GENERAL
PAINLEVEL_OUTOF10: 2
PAINLEVEL_OUTOF10: 7

## 2022-10-29 ASSESSMENT — PAIN DESCRIPTION - LOCATION: LOCATION: BACK

## 2022-10-29 NOTE — DISCHARGE SUMMARY
IV removed, tele removed, heart monitor sent with patient and box for return. Discharge instructions provided to patient and verbalized understanding. Patient transported home.
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
## 2022-10-29 NOTE — DISCHARGE INSTR - COC
Continuity of Care Form    Patient Name: Viki Moffett   :  1945  MRN:  [id number]    [address] [place] [address] date:  10/25/2022  Discharge date:  ***    Code Status Order: Full Code   Advance Directives:     Admitting Physician:  Aidee Sewell MD  PCP: Myrna Barth MD    Discharging Nurse: [facility] Unit/Room#: 3813/4229-00  Discharging Unit Phone Number: ***    Emergency Contact:   Extended Emergency Contact Information  Primary Emergency Contact: Bhumika  Address: [address] [place] [address] [address]           [place], [address] [place] [address] Phone: [phone]  Work Phone: [phone]  Mobile Phone: [phone]  Relation: Child    Past Surgical History:  Past Surgical History:   Procedure Laterality Date    FEMUR FRACTURE SURGERY Right 2022    FEMUR OPEN REDUCTION INTERNAL FIXATION RIGHT performed by Shahzad Zarate DO at [address] Right     HIP    JOINT REPLACEMENT Left 16    left total knee replacement    KNEE SURGERY      WRIST FRACTURE SURGERY Left        Immunization History:   Immunization History   Administered Date(s) Administered    COVID-19, PFIZER PURPLE top, DILUTE for use, (age 15 y+), 30mcg/0.3mL 2021, 2021, 2021    Influenza 2015    Influenza Virus Vaccine 2016    Pneumococcal Conjugate Vaccine 2015       Active Problems:  Patient Active Problem List   Diagnosis Code    DDD (degenerative disc disease), lumbar M51.36    Cervical spondylosis M47.812    Neck pain M54.2    Back pain M54.9    Lumbar stenosis M48.061    Discogenic low back pain M51.36    Myofascial pain M79.18    Primary localized osteoarthrosis, lower leg M17.10    Hip pain M25.559    Ischial bursitis M70.70    Fe deficiency anemia D50.9    Knee pain, bilateral M25.561, M25.562    Obesity (BMI 30-39. 9) E66.9    Fibromyalgia M79.7    Hypertension I10    Left TKR Z96.659    Chronic blood loss anemia D50.0 Unspecified adverse effect of other drug, medicinal and biological substance(995.29) T50.995A    Painful orthopaedic hardware ([facility] T84.84XA    Plantar fasciitis, bilateral M72.2    Closed comminuted intra-articular fracture of distal femur, right, initial encounter ([facility] S72.491A    Closed displaced comminuted fracture of shaft of right femur ([facility] S72.351A       Isolation/Infection:   Isolation            No Isolation          Patient Infection Status       None to display            Nurse Assessment:  Last Vital Signs: BP (!) 143/76   Pulse 78   Temp 98 °F (36.7 °C) (Oral)   Resp 18   Ht 5' 10\" (1.778 m)   Wt 183 lb 13.8 oz (83.4 kg)   SpO2 94%   BMI 26.38 kg/m²     Last documented pain score (0-10 scale): Pain Level: 7  Last Weight:   Wt Readings from Last 1 Encounters:   10/26/22 183 lb 13.8 oz (83.4 kg)     Mental Status:  {IP PT MENTAL STATUS:}    IV Access:  { MARCIE IV ACCESS:710059625}    Nursing Mobility/ADLs:  Walking   {P DME CVQI:035756791}  Transfer  {P DME VLPZ:142732395}  Bathing  {P DME SEMS:018011772}  Dressing  {CHP DME LKXT:961471303}  Toileting  {CHP DME QRK}  Feeding  {[facility] DME CBZU:144128387}  Med Admin  {[facility] DME ZXFJ:825064980}  Med Delivery   { MARCIE MED Delivery:151371208}    Wound Care Documentation and Therapy:  Incision 16 Knee Anterior; Left (Active)   Number of days: 2355       Incision 22 Thigh Right;Lateral (Active)   Number of days: 263        Elimination:  Continence: Bowel: {YES / H}  Bladder: {YES / CJ:}  Urinary Catheter: {Urinary Catheter:862373212}   Colostomy/Ileostomy/Ileal Conduit: {YES / LD:03374}       Date of Last BM: ***    Intake/Output Summary (Last 24 hours) at 10/29/2022 1418  Last data filed at 10/29/2022 0615  Gross per 24 hour   Intake 540 ml   Output --   Net 540 ml     I/O last 3 completed shifts:   In: 1500 [P.O.:1500]  Out: -     Safety Concerns:     [address] [facility] Safety Concerns:650655164}    Impairments/Disabilities:      [address] JACK Impairments/Disabilities:511664690}    Nutrition Therapy:  Current Nutrition Therapy:   [address] MARCIE Diet List:803330043}    Routes of Feeding: {CHP DME Other Feedings:814541800}  Liquids: {Slp liquid thickness:16874}  Daily Fluid Restriction: {CHP DME Yes amt example:457943260}  Last Modified Barium Swallow with Video (Video Swallowing Test): {Done Not Done XOZD:152738150}    Treatments at the Time of Hospital Discharge:   Respiratory Treatments: ***  Oxygen Therapy:  {Therapy; copd oxygen:23266}  Ventilator:    { CC Vent ZSWM:344759355}    Rehab Therapies: {THERAPEUTIC INTERVENTION:7961547361}  Weight Bearing Status/Restrictions: [address] Weight Bearin}  Other Medical Equipment (for information only, NOT a DME order):  {EQUIPMENT:304382299}  Other Treatments: ***    Patient's personal belongings (please select all that are sent with patient):  {CHP DME Belongings:220318130}    RN SIGNATURE:  {Esignature:347503961}    CASE MANAGEMENT/SOCIAL WORK SECTION    Inpatient Status Date: 10/25/22    Readmission Risk Assessment Score:  Readmission Risk              Risk of Unplanned Readmission:  11           Discharging to: Home with [facility]    Phone: [phone]  Fax: [phone]    Dialysis Facility (if applicable)   NA    / signature: Electronically signed by NATHANIEL Rodriguez, SHELBI on 10/29/22 at 2:23 PM EDT    PHYSICIAN SECTION    Prognosis: {Prognosis:5406140909}    Condition at Discharge: [address] Patient Condition:368202171}    Rehab Potential (if transferring to Rehab): {Prognosis:0903163267}    Recommended Labs or Other Treatments After Discharge: ***    Physician Certification: I certify the above information and transfer of [address] [place]  is necessary for the continuing treatment of the diagnosis listed and that she requires {Admit to Appropriate Level of Care:06393} for {GREATER/LESS:108698184} 30 days.      Update Admission H&P: {CHP DME Changes in [facility]:562109033}    PHYSICIAN SIGNATURE:  {Esignature:235568233}

## 2022-10-29 NOTE — CARE COORDINATION
UPDATE:  Daughter reported that a key is now in the lockbox at pt's home, and the code is 1315. CM added code to pt's DC paperwork for pt. Case Management Assessment            Discharge Note                    Date / Time of Note: 10/29/2022 11:20 AM                  Discharge Note Completed by: NATHANIEL Paula, SHELBI    Patient Name: Michael Kenny   YOB: 1945  Diagnosis: Altered mental status, unspecified altered mental status type [R41.82]  AMS (altered mental status) [R41.82]   Date / Time: 10/25/2022  4:18 PM    Current PCP: Ginette Qureshi MD  Clinic patient: No    Hospitalization in the last 30 days: No    Advance Directives:  Code Status: Full Code  [place] DNR form completed and on chart: Yes    Financial:  Payor: Sam Dailey / Plan: MEDICARE PART A AND B / Product Type: *No Product type* /      Pharmacy:    [facility], [address] [phone]  [address] [facility] [address]  Phone: [phone] Fax: [phone]    [address] [place] [address] [phone] [facility] [phone]  [address] [place] [address]  Phone: [phone] Fax: [phone]      Assistance purchasing medications?: Potential Assistance Purchasing Medications: No  Assistance provided by Case Management: None at this time    Does patient want to participate in local refill/ meds to beds program?:      Meds To Beds General Rules:  1. Can ONLY be done Monday- Friday between 8:30am-5pm  2. Prescription(s) must be in pharmacy by 3pm to be filled same day  3. Copy of patient's insurance/ prescription drug card and patient face sheet must be sent along with the prescription(s)  4. Cost of Rx cannot be added to hospital bill. If financial assistance is needed, please contact unit  or ;   or  CANNOT provide pharmacy voucher for patients co-pays  5. Patients can then  the prescription on their way out of the hospital at discharge, or pharmacy can deliver to the bedside if staff is available. (payment due at time of pick-up or delivery - cash, check, or card accepted)     Able to afford home medications/ co-pay costs: Yes    ADLS:  Current PT AM-PAC Score: 18 /24  Current OT AM-PAC Score: 17 /24      DISCHARGE Disposition: Home with [address]: PT/OT/RN     LOC at discharge: Not Applicable  MARCIE Completed: Yes    Notification completed in HENS/PAS?:  Not Applicable    IMM Completed:   No    Transportation:  Transportation PLAN for discharge:  Lyft    Mode of Transport: [address]:  [address] ordered at discharge: Yes  [address]: [address]  Phone: [phone]  Fax: [phone]  Orders faxed: Yes    Durable Medical Equipment:  DME Provider: [address] obtained during hospitalization: walker    Home Oxygen and Respiratory Equipment:  Oxygen needed at discharge?: No    Dialysis:  Dialysis patient: No    Referrals made at [facility] for outpatient continued care:  Not Applicable    Additional CM Notes:  Pt will DC home today with [facility]). Pt had 2 week cardiac monitor placed, and CM delivered rolling walker to pt's room. Daughter, Jamal Cruz is reaching out to supports to assist with getting pt's key to her home. CM will coordinate Lyft with RN. CM spoke with daughter, Jamal Cruz [phone] at length about pt's needs and possible resources, as pt refuses all attempted services (skilled [address], aide and cleaning services, following up with appts, etc.). Psych assessed and determined that pt does have capacity to make medical decisions. Jamal Cruz reported that she has medical POA. CM advised her to contact [place], who can provide legal advice/resources. CM informed her of pt's upcoming appt and appts that need to be scheduled.         The Plan for Transition of Care is related to the following treatment goals of Altered mental status, unspecified altered mental status type [R41.82]  AMS (altered mental status) [R41.82]    The Patient and/or patient representative Dorys España and her family were provided with a choice of provider and agrees with the discharge plan Yes    Freedom of choice list was provided with basic dialogue that supports the patient's individualized plan of care/goals and shares the quality data associated with the providers.  Yes    Care Transitions patient: No    Katelyn Rabago, NATHANIEL, [facility]  Case Management Department  Ph: [phone]  Fax: [phone]

## 2022-10-29 NOTE — PLAN OF CARE
Patient can verbalize pain, scheduled Naproxen administered, patient denies pain at this time, pending discharge today. Patient in bed resting, verbalized not wanting to get up yet, did take all medications as scheduled.

## 2023-06-29 ENCOUNTER — OFFICE VISIT (OUTPATIENT)
Dept: ORTHOPEDIC SURGERY | Age: 78
End: 2023-06-29

## 2023-06-29 VITALS — BODY MASS INDEX: 26.2 KG/M2 | HEIGHT: 70 IN | WEIGHT: 183 LBS

## 2023-06-29 DIAGNOSIS — M19.011 OSTEOARTHRITIS OF GLENOHUMERAL JOINT, RIGHT: ICD-10-CM

## 2023-06-29 DIAGNOSIS — M25.511 BILATERAL SHOULDER PAIN, UNSPECIFIED CHRONICITY: Primary | ICD-10-CM

## 2023-06-29 DIAGNOSIS — M25.512 BILATERAL SHOULDER PAIN, UNSPECIFIED CHRONICITY: Primary | ICD-10-CM

## 2023-06-29 RX ORDER — METHYLPREDNISOLONE ACETATE 40 MG/ML
40 INJECTION, SUSPENSION INTRA-ARTICULAR; INTRALESIONAL; INTRAMUSCULAR; SOFT TISSUE ONCE
Status: COMPLETED | OUTPATIENT
Start: 2023-06-29 | End: 2023-06-29

## 2023-06-29 RX ORDER — LIDOCAINE HYDROCHLORIDE 10 MG/ML
20 INJECTION, SOLUTION INFILTRATION; PERINEURAL ONCE
Status: COMPLETED | OUTPATIENT
Start: 2023-06-29 | End: 2023-06-29

## 2023-06-29 RX ADMIN — METHYLPREDNISOLONE ACETATE 40 MG: 40 INJECTION, SUSPENSION INTRA-ARTICULAR; INTRALESIONAL; INTRAMUSCULAR; SOFT TISSUE at 17:28

## 2023-06-29 RX ADMIN — LIDOCAINE HYDROCHLORIDE 20 ML: 10 INJECTION, SOLUTION INFILTRATION; PERINEURAL at 17:20

## 2023-07-05 ENCOUNTER — TELEPHONE (OUTPATIENT)
Dept: ORTHOPEDIC SURGERY | Age: 78
End: 2023-07-05

## 2023-07-05 NOTE — TELEPHONE ENCOUNTER
General Question     Subject: patient is calling to ck the statues of her referral for PT and Referral for her to get and New PCP. She just need and call back. Please Advise.   Patient Michael Aakash  Contact Number: [phone]

## 2023-07-10 ENCOUNTER — TELEPHONE (OUTPATIENT)
Dept: ORTHOPEDIC SURGERY | Age: 78
End: 2023-07-10

## 2023-07-10 DIAGNOSIS — M19.011 OSTEOARTHRITIS OF GLENOHUMERAL JOINT, RIGHT: ICD-10-CM

## 2023-07-10 DIAGNOSIS — M25.512 BILATERAL SHOULDER PAIN, UNSPECIFIED CHRONICITY: Primary | ICD-10-CM

## 2023-07-10 DIAGNOSIS — M19.012 ARTHRITIS OF LEFT GLENOHUMERAL JOINT: ICD-10-CM

## 2023-07-10 DIAGNOSIS — M50.30 DDD (DEGENERATIVE DISC DISEASE), CERVICAL: ICD-10-CM

## 2023-07-10 DIAGNOSIS — M25.511 BILATERAL SHOULDER PAIN, UNSPECIFIED CHRONICITY: Primary | ICD-10-CM

## 2023-07-10 NOTE — TELEPHONE ENCOUNTER
PT r shoulder physical therapy group have been very slow in responding back not scheduling properly says  pool therapy has been great [facility] would like a referral for patient to continue being seen

## 2023-08-16 ENCOUNTER — OFFICE VISIT (OUTPATIENT)
Dept: ORTHOPEDIC SURGERY | Age: 78
End: 2023-08-16
Payer: MEDICARE

## 2023-08-16 VITALS — WEIGHT: 183 LBS | HEIGHT: 70 IN | BODY MASS INDEX: 26.2 KG/M2

## 2023-08-16 DIAGNOSIS — M19.011 PRIMARY OSTEOARTHRITIS OF RIGHT SHOULDER: Primary | ICD-10-CM

## 2023-08-16 PROCEDURE — 1123F ACP DISCUSS/DSCN MKR DOCD: CPT | Performed by: ORTHOPAEDIC SURGERY

## 2023-08-16 PROCEDURE — G8399 PT W/DXA RESULTS DOCUMENT: HCPCS | Performed by: ORTHOPAEDIC SURGERY

## 2023-08-16 PROCEDURE — 99212 OFFICE O/P EST SF 10 MIN: CPT | Performed by: ORTHOPAEDIC SURGERY

## 2023-08-16 PROCEDURE — G8417 CALC BMI ABV UP PARAM F/U: HCPCS | Performed by: ORTHOPAEDIC SURGERY

## 2023-08-16 PROCEDURE — 1036F TOBACCO NON-USER: CPT | Performed by: ORTHOPAEDIC SURGERY

## 2023-08-16 PROCEDURE — 1090F PRES/ABSN URINE INCON ASSESS: CPT | Performed by: ORTHOPAEDIC SURGERY

## 2023-08-16 PROCEDURE — G8427 DOCREV CUR MEDS BY ELIG CLIN: HCPCS | Performed by: ORTHOPAEDIC SURGERY

## 2023-08-30 NOTE — PROGRESS NOTES
History of Present Illness:  Maria G Snider returns for follow-up of her right shoulder. She underwent a corticosteroid injection in June for primary osteoarthritis. She was hoping it would provide lasting relief. She experienced transient modest benefit. She is still having pain. She is using her arms more for weightbearing and transfers. She has doing problems related to a femur fracture. She is still having some balance issues. Medical History:  Patient's medications, allergies, past medical, surgical, social and family histories were reviewed and updated as appropriate. Pertinent items are noted in HPI  Review of systems reviewed from Patient History Form dated on 6/29/23 and available in the patient's chart under the Media tab. Vital Signs: There were no vitals filed for this visit. Constitutional: in no distress. Shoulder Examination:    Inspection:  benign appearing. Palpation:  tender over joint line an cuff. Active Range of Motion: elevation and abduction to 150 degrees and IR to T12    Passive Range of Motion:  deferred. 2    Strength:  Good isometric strength    Special Tests:  deferred. Assessment :  Maria G Snider has right shoulder osteoarthritis. We can try and ultrasound guided injection but otherwise she needs to prioritize her orthopaedic and low back problems. Impression:  Encounter Diagnosis   Name Primary? Primary osteoarthritis of right shoulder Yes       Office Procedures:  No orders of the defined types were placed in this encounter. Treatment Plan:  She can take a gentle analgesic and moderate her activities in the meantime. She can ice her shoulder and take Tylenol. She can consider a repeat injection but we typically space these at least every 3 months. Follow-up in 1 year or as needed. We will obtain plain radiographs annually to monitor progression of the arthritis.     She is in agreement with this plan and all of her questions were

## 2024-02-28 RX ORDER — TAMSULOSIN HYDROCHLORIDE 0.4 MG/1
0.4 CAPSULE ORAL DAILY
Qty: 90 CAPSULE | OUTPATIENT
Start: 2024-02-28

## (undated) DEVICE — 3M™ TEGADERM™ TRANSPARENT FILM DRESSING FRAME STYLE, 1627, 4 IN X 10 IN (10 CM X 25 CM), 20/CT 4CT/CASE: Brand: 3M™ TEGADERM™

## (undated) DEVICE — BIT DRL L300MM DIA3.2MM PERC QUIK CPL CALIB W/O STP REUSE

## (undated) DEVICE — GUIDEWIRE ORTH L300MM DIA2.5MM CO CHROM DRL TIP FOR LCP

## (undated) DEVICE — GLOVE SURG SZ 85 L12IN FNGR THK87MIL DK GRN LTX POLYMER W

## (undated) DEVICE — COVER LT HNDL BLU PLAS

## (undated) DEVICE — TOWEL,STOP FLAG GOLD N-W: Brand: MEDLINE

## (undated) DEVICE — ELECTRODE BLDE L4IN NONINSULATED EDGE

## (undated) DEVICE — SCREW BNE L70MM DIA4.5MM PROX CORT TIB S STL ST LOK FULL
Type: IMPLANTABLE DEVICE | Site: FEMUR | Status: NON-FUNCTIONAL
Removed: 2022-02-07

## (undated) DEVICE — APPLICATOR PREP 26ML 0.7% IOD POVACRYLEX 74% ISO ALC ST

## (undated) DEVICE — GLOVE SURG SZ 85 L12IN FNGR ORTHO 126MIL CRM LTX FREE

## (undated) DEVICE — HANDPIECE SUCTION TUBING INTERPULSE 10FT

## (undated) DEVICE — GLOVE 6 LTX ST BIOGEL M PF BEAD CUFF

## (undated) DEVICE — FLUID TRAP FOR MINIVAC ES EQUIP FLD TRAP

## (undated) DEVICE — SUTURE VCRL SZ 2-0 L18IN ABSRB UD CT-1 L36MM 1/2 CIR J839D

## (undated) DEVICE — 1000 S-DRAPE TOWEL DRAPE 10/BX: Brand: STERI-DRAPE™

## (undated) DEVICE — E-Z CLEAN, NON-STICK, PTFE COATED, ELECTROSURGICAL BLADE ELECTRODE, MODIFIED EXTENDED INSULATION, 2.5 INCH (6.35 CM): Brand: MEGADYNE

## (undated) DEVICE — STAPLER SKIN H3.9MM WIRE DIA0.58MM CRWN 6.9MM 35 STPL ROT

## (undated) DEVICE — SUTURE VCRL SZ 0 L18IN ABSRB UD L36MM CT-1 1/2 CIR J840D

## (undated) DEVICE — TUBING SUCT 12FR MAL ALUM SHFT FN CAP VENT UNIV CONN W/ OBT

## (undated) DEVICE — SYRINGE IRRIG 60ML SFT PLIABLE BLB EZ TO GRP 1 HND USE W/

## (undated) DEVICE — 2T8 #2 PDO 30 X 30: Brand: 2T8 #2 PDO 30 X 30

## (undated) DEVICE — GLOVE SURG SZ 65 L12IN FNGR THK79MIL GRN LTX FREE

## (undated) DEVICE — SUTURE ETHBND EXCEL SZ 2 L30IN NONABSORBABLE GRN L40MM V-37 MX69G

## (undated) DEVICE — PENCIL ES ULT VAC W TELSCP NOSE EZ CLN BLDE 10FT

## (undated) DEVICE — C-ARMOR C-ARM EQUIPMENT COVERS CLEAR STERILE UNIVERSAL FIT 12 PER CASE: Brand: C-ARMOR

## (undated) DEVICE — COAXIAL HIGH FLOW TIP WITH SOFT SHIELD

## (undated) DEVICE — SOLUTION IV 1000ML 0.9% SOD CHL

## (undated) DEVICE — UNDERGLOVE SURG SZ 8.5 FNGR THK0.21MIL GRN LTX BEAD CUF

## (undated) DEVICE — BANDAGE COMPR W6INXL5YD NONSTERILE TAN BRTH SELF ADH WRP W/

## (undated) DEVICE — LOWER EXTREMITY: Brand: MEDLINE INDUSTRIES, INC.

## (undated) DEVICE — STERILE PVP: Brand: MEDLINE INDUSTRIES, INC.

## (undated) DEVICE — DRESSING THERABOND 3D ANTIMIC CNTCT SYS 15INCHX10INCH

## (undated) DEVICE — C-ARM: Brand: UNBRANDED

## (undated) DEVICE — BIT DRL L300MM DIA4.3MM QUIK CPL PERC CALIB W/O STP REUSE

## (undated) DEVICE — GUIDEWIRE ORTH DIA2.5MM LOK SMOOTH DRL TIP FLX THRD FOR

## (undated) DEVICE — 3M™ STERI-DRAPE™ U-DRAPE 1015: Brand: STERI-DRAPE™

## (undated) DEVICE — KIT EVAC 400CC DIA2.3MM PVC RELIABLE SUCT DRNGE 3 SPR RND H